# Patient Record
Sex: MALE | Race: WHITE | NOT HISPANIC OR LATINO | Employment: PART TIME | URBAN - METROPOLITAN AREA
[De-identification: names, ages, dates, MRNs, and addresses within clinical notes are randomized per-mention and may not be internally consistent; named-entity substitution may affect disease eponyms.]

---

## 2017-03-16 ENCOUNTER — HOSPITAL ENCOUNTER (OUTPATIENT)
Dept: GENERAL RADIOLOGY | Facility: HOSPITAL | Age: 62
Discharge: HOME | End: 2017-03-16

## 2017-03-16 ENCOUNTER — ANESTHESIA EVENT (OUTPATIENT)
Dept: PAIN MEDICINE | Facility: HOSPITAL | Age: 62
End: 2017-03-16

## 2017-03-16 ENCOUNTER — ANESTHESIA (OUTPATIENT)
Dept: PAIN MEDICINE | Facility: HOSPITAL | Age: 62
End: 2017-03-16

## 2017-03-16 ENCOUNTER — HOSPITAL ENCOUNTER (OUTPATIENT)
Dept: PAIN MEDICINE | Facility: HOSPITAL | Age: 62
Discharge: HOME | End: 2017-03-16
Admitting: NEUROLOGICAL SURGERY

## 2017-03-16 VITALS
SYSTOLIC BLOOD PRESSURE: 130 MMHG | BODY MASS INDEX: 26.6 KG/M2 | TEMPERATURE: 97.8 F | RESPIRATION RATE: 16 BRPM | OXYGEN SATURATION: 94 % | HEIGHT: 71 IN | WEIGHT: 190 LBS | HEART RATE: 57 BPM | DIASTOLIC BLOOD PRESSURE: 76 MMHG

## 2017-03-16 DIAGNOSIS — R52 PAIN: ICD-10-CM

## 2017-03-16 PROCEDURE — C1755 CATHETER, INTRASPINAL: HCPCS

## 2017-03-16 PROCEDURE — 0 IOPAMIDOL 41 % SOLUTION: Performed by: ANESTHESIOLOGY

## 2017-03-16 PROCEDURE — 77003 FLUOROGUIDE FOR SPINE INJECT: CPT

## 2017-03-16 PROCEDURE — 25010000002 METHYLPREDNISOLONE PER 80 MG: Performed by: ANESTHESIOLOGY

## 2017-03-16 RX ORDER — FENTANYL CITRATE 50 UG/ML
50 INJECTION, SOLUTION INTRAMUSCULAR; INTRAVENOUS
Status: DISCONTINUED | OUTPATIENT
Start: 2017-03-16 | End: 2017-03-17 | Stop reason: HOSPADM

## 2017-03-16 RX ORDER — BUPIVACAINE HYDROCHLORIDE 2.5 MG/ML
30 INJECTION, SOLUTION EPIDURAL; INFILTRATION; INTRACAUDAL ONCE
Status: DISCONTINUED | OUTPATIENT
Start: 2017-03-16 | End: 2017-03-17 | Stop reason: HOSPADM

## 2017-03-16 RX ORDER — LIDOCAINE HYDROCHLORIDE 10 MG/ML
0.5 INJECTION, SOLUTION INFILTRATION; PERINEURAL ONCE AS NEEDED
Status: CANCELLED | OUTPATIENT
Start: 2017-03-16

## 2017-03-16 RX ORDER — SODIUM CHLORIDE 0.9 % (FLUSH) 0.9 %
1-10 SYRINGE (ML) INJECTION AS NEEDED
Status: DISCONTINUED | OUTPATIENT
Start: 2017-03-16 | End: 2017-03-17 | Stop reason: HOSPADM

## 2017-03-16 RX ORDER — METHYLPREDNISOLONE ACETATE 80 MG/ML
80 INJECTION, SUSPENSION INTRA-ARTICULAR; INTRALESIONAL; INTRAMUSCULAR; SOFT TISSUE ONCE
Status: COMPLETED | OUTPATIENT
Start: 2017-03-16 | End: 2017-03-16

## 2017-03-16 RX ORDER — LIDOCAINE HYDROCHLORIDE 10 MG/ML
1 INJECTION, SOLUTION INFILTRATION; PERINEURAL ONCE
Status: DISCONTINUED | OUTPATIENT
Start: 2017-03-16 | End: 2017-03-17 | Stop reason: HOSPADM

## 2017-03-16 RX ORDER — MIDAZOLAM HYDROCHLORIDE 1 MG/ML
1 INJECTION INTRAMUSCULAR; INTRAVENOUS
Status: DISCONTINUED | OUTPATIENT
Start: 2017-03-16 | End: 2017-03-17 | Stop reason: HOSPADM

## 2017-03-16 RX ADMIN — IOPAMIDOL 10 ML: 408 INJECTION, SOLUTION INTRATHECAL at 08:41

## 2017-03-16 RX ADMIN — METHYLPREDNISOLONE ACETATE 80 MG: 80 INJECTION, SUSPENSION INTRA-ARTICULAR; INTRALESIONAL; INTRAMUSCULAR; SOFT TISSUE at 08:41

## 2017-03-16 NOTE — H&P
[facility]    History and Physical    Patient Name: Avni Olivo III  :  1955  MRN:  [id number]  Date of Admission: 3/16/2017    Subjective     Patient is a 61 y.o. male presents with chief complaint of chronic, intermitent, moderate low back and hips: right pain.  Onset of symptoms was gradual starting several years ago.  Symptoms are associated/aggravated by exercise, lifting, standing or twisting. Symptoms improve with relaxation    The following portions of the patients history were reviewed and updated as appropriate: current medications, allergies, past medical history, past surgical history, past family history, past social history and problem list                Objective     Past Medical History:   Past Medical History   Diagnosis Date   • Arm pain    • Chest pain    • History of EKG 2013   • HTN (hypertension)    • Hyperlipidemia    • Low back pain    • Skin cancer    • Skin cancer    • Spinal stenosis      Past Surgical History:   Past Surgical History   Procedure Laterality Date   • Knee surgery Right    • Mohs surgery       nose   • Tonsillectomy     • Foot fracture surgery Left    • Cardiac catheterization  06/10/2013     Family History:   Family History   Problem Relation Age of Onset   • Coronary artery disease Other    • Hyperlipidemia Other    • Hypertension Other    • Heart disease Father    • Heart disease Sister      Social History:   Social History   Substance Use Topics   • Smoking status: Never Smoker   • Smokeless tobacco: Never Used   • Alcohol use Yes      Comment: social drinker       Vital Signs Range for the last 24 hours  Temperature: Temp:  [36.6 °C (97.8 °F)] 36.6 °C (97.8 °F)   Temp Source: Temp src: Oral   BP:     Pulse: Heart Rate:  [58] 58   Respirations: Resp:  [16] 16   SPO2: SpO2:  [95 %] 95 %   O2 Amount (l/min):     O2 Devices O2 Device: room air   Weight: Weight:  [190 lb (86.2 kg)] 190 lb (86.2 kg)     Flowsheet Rows         First Filed Value    Admission  "Height  71\" (180.3 cm) Documented at 03/16/2017 0818    Admission Weight  190 lb (86.2 kg) Documented at 03/16/2017 0818          --------------------------------------------------------------------------------    Current Outpatient Prescriptions   Medication Sig Dispense Refill   • B Complex Vitamins (VITAMIN B COMPLEX) capsule capsule Take  by mouth Daily.     • CALCIUM PO Take  by mouth.     • carvedilol (COREG) 12.5 MG tablet Take  by mouth 2 (two) times a day.     • eszopiclone (LUNESTA) 2 MG tablet Take  by mouth.     • melatonin 5 MG tablet tablet Take 5 mg by mouth Every Night.     • MULTIPLE VITAMINS PO Take  by mouth.     • naproxen (NAPROSYN) 500 MG tablet Take 500 mg by mouth 2 (Two) Times a Day With Meals.     • valsartan (DIOVAN) 160 MG tablet Take  by mouth daily.     • aspirin 81 MG tablet Take  by mouth daily.     • atorvastatin (LIPITOR) 40 MG tablet Take 1 tablet by mouth.     • Flaxseed, Linseed, (FLAXSEED OIL PO) Take  by mouth.     • HYDROcodone-acetaminophen (NORCO) 7.5-325 MG per tablet TK 1-2 TS PO Q 4-6 H PRN  0   • Omega-3 Fatty Acids (FISH OIL PO) Take  by mouth.       Current Facility-Administered Medications   Medication Dose Route Frequency Provider Last Rate Last Dose   • bupivacaine PF (MARCAINE) 0.25 % injection 30 mL  30 mL Infiltration Once Jose Haas MD       • FentaNYL Citrate (PF) (SUBLIMAZE) injection 50 mcg  50 mcg Intravenous Q5 Min PRN Jose Haas MD       • iopamidol (ISOVUE-M 200) injection 41%  2 mL Injection Once in imaging Jose Haas MD       • lidocaine (XYLOCAINE) 1 % injection 1 mL  1 mL Intradermal Once Jose Haas MD       • methylPREDNISolone acetate (DEPO-medrol) injection 80 mg  80 mg Intramuscular Once Jose Haas MD       • midazolam (VERSED) injection 1 mg  1 mg Intravenous Q5 Min PRN Jose Haas MD       • sodium chloride 0.9 % flush 1-10 mL  1-10 mL Intravenous PRN Jose Haas MD     "   • sodium chloride 0.9 % flush 1-10 mL  1-10 mL Intravenous PRN Jose Haas MD           --------------------------------------------------------------------------------  Assessment/Plan      Anesthesia Evaluation     Patient summary reviewed and Nursing notes reviewed   no history of anesthetic complications:  NPO Status: > 8 hours   Airway   Mallampati: II  TM distance: >3 FB  Neck ROM: full  Dental - normal exam     Pulmonary - negative pulmonary ROS and normal exam   Cardiovascular - normal exam    (+) hypertension,       Neuro/Psych- negative ROS and neuro exam normal  GI/Hepatic/Renal/Endo - negative ROS     Musculoskeletal     (+) back pain, Straight Leg Test, radiculopathy Right lower extremity  (-)  ABDIAZIZ test  Abdominal  - normal exam   Substance History      OB/GYN          Other   (+) arthritis                            Diagnosis and Plan    Treatment Plan  ASA 2   Patient has had previous injection/procedure with 25-50% improvement.   Procedures: Lumbar Epidural Steroid Injection(LESI), With fluoroscopy,       Anesthetic plan and risks discussed with patient.          Diagnosis     * Lumbar radiculopathy [M54.16]     * Degeneration, intervertebral disc, lumbar [M51.36]     * Lumbar radiculopathy [M54.16]

## 2017-03-16 NOTE — ANESTHESIA PROCEDURE NOTES
PAIN Epidural block    Patient location during procedure: pain clinic  Indication:procedure for pain  Performed By  Anesthesiologist: NANCY DOS SANTOS  Preanesthetic Checklist  Completed: patient identified, site marked, surgical consent, pre-op evaluation, timeout performed, risks and benefits discussed and monitors and equipment checked  Additional Notes  Depomedrol - 80mg    Needle position confirmed by fluoroscopy and epidurogram using 2cc of [id number].    Diagnosis  Post-Op Diagnosis Codes:     * Lumbar radiculopathy (M54.16)     * Degeneration, intervertebral disc, lumbar (M51.36)    Epidural Block Prep:  Pt Position:prone  Sterile Tech:cap, gloves, mask and sterile barrier  Prep:chlorhexidine gluconate and isopropyl alcohol  Monitoring:blood pressure monitoring, continuous pulse oximetry and EKG  Epidural Block Procedure:  Approach:right paramedian  Guidance: fluoroscopy  Location:lumbar  Level:4-5  Needle Type:Tuohy  Needle Gauge:20  Aspiration:negative  Medications:  Depomedrol:80 mg  Preservative Free Saline:2mL  Isovue:2mL    Post Assessment:  Post-procedure: bandaide.  Pt Tolerance:patient tolerated the procedure well with no apparent complications  Complications:no

## 2017-05-22 ENCOUNTER — HOSPITAL ENCOUNTER (OUTPATIENT)
Dept: SLEEP MEDICINE | Facility: HOSPITAL | Age: 62
Discharge: HOME | End: 2017-05-22
Admitting: INTERNAL MEDICINE

## 2017-05-22 DIAGNOSIS — E66.3 OVERWEIGHT (BMI 25.0-29.9): ICD-10-CM

## 2017-05-22 DIAGNOSIS — I10 ESSENTIAL HYPERTENSION: ICD-10-CM

## 2017-05-22 DIAGNOSIS — R06.83 SNORING: ICD-10-CM

## 2017-05-22 DIAGNOSIS — G47.33 OSA (OBSTRUCTIVE SLEEP APNEA): Primary | ICD-10-CM

## 2017-05-22 PROCEDURE — G0463 HOSPITAL OUTPT CLINIC VISIT: HCPCS

## 2017-05-31 ENCOUNTER — OFFICE VISIT (OUTPATIENT)
Dept: NEUROSURGERY | Facility: CLINIC | Age: 62
End: 2017-05-31

## 2017-05-31 VITALS
HEIGHT: 71 IN | WEIGHT: 190 LBS | SYSTOLIC BLOOD PRESSURE: 132 MMHG | HEART RATE: 62 BPM | DIASTOLIC BLOOD PRESSURE: 86 MMHG | BODY MASS INDEX: 26.6 KG/M2

## 2017-05-31 DIAGNOSIS — M48.061 LUMBAR CANAL STENOSIS: Primary | ICD-10-CM

## 2017-05-31 PROCEDURE — 99212 OFFICE O/P EST SF 10 MIN: CPT | Performed by: NEUROLOGICAL SURGERY

## 2017-06-12 ENCOUNTER — TELEPHONE (OUTPATIENT)
Dept: CARDIOLOGY | Facility: CLINIC | Age: 62
End: 2017-06-12

## 2017-06-12 NOTE — TELEPHONE ENCOUNTER
Pt called and states that pt is having a skin cancer remove and they need a cardiac clearance. Pt last seen 10/6/2016...please advise    Mikael Dunn MD  Faxed number [phone]    Monse TOWNSEND

## 2017-06-13 NOTE — TELEPHONE ENCOUNTER
Pt reports that he also needs to make sure that an EKG will be sent to his surgeon as well as some additional lab work. Specifically CBC and CMP. meii

## 2017-06-13 NOTE — TELEPHONE ENCOUNTER
Called and l/m to call back and confirmed the appointment on June 19, 2017 at 12pm...Monse TOWNSEND

## 2017-06-13 NOTE — TELEPHONE ENCOUNTER
His surgeon will order the blood work. TK or I will send a copy of the EKG from his office visit, wasn't clear with whom the appt was

## 2017-06-19 ENCOUNTER — OFFICE VISIT (OUTPATIENT)
Dept: CARDIOLOGY | Facility: CLINIC | Age: 62
End: 2017-06-19

## 2017-06-19 VITALS
WEIGHT: 193.6 LBS | RESPIRATION RATE: 16 BRPM | BODY MASS INDEX: 27.1 KG/M2 | HEIGHT: 71 IN | DIASTOLIC BLOOD PRESSURE: 80 MMHG | HEART RATE: 58 BPM | SYSTOLIC BLOOD PRESSURE: 120 MMHG

## 2017-06-19 DIAGNOSIS — I10 ESSENTIAL HYPERTENSION: Primary | ICD-10-CM

## 2017-06-19 DIAGNOSIS — Z01.818 PRE-OPERATIVE EXAMINATION: ICD-10-CM

## 2017-06-19 DIAGNOSIS — E78.2 MIXED HYPERLIPIDEMIA: ICD-10-CM

## 2017-06-19 PROCEDURE — 99213 OFFICE O/P EST LOW 20 MIN: CPT | Performed by: INTERNAL MEDICINE

## 2017-06-19 PROCEDURE — 93000 ELECTROCARDIOGRAM COMPLETE: CPT | Performed by: INTERNAL MEDICINE

## 2017-06-19 NOTE — PROGRESS NOTES
PATIENTINFORMATION    Date of Office Visit: 2017  Encounter Provider: Zoraida Kenyon MD  Place of Service: [facility]  Patient Name: Avni Olivo III  : 1955    Subjective:     Encounter Date:2017      Patient ID: Avni Olivo III is a 62 y.o. male.      History of Present Illness    This gentleman saw Dr. Ramírez in  with chest pain. She did an exercise echocardiogram on 2013 which showed normal LV systolic function with ejection fraction of 64%, trace mitral regurgitation. He exercised for 11.5 minutes under Jacques protocol. His stress echocardiogram images were normal. His stress EKG was normal, but the patient did complain of some chest pain. On 06/10/2013, the patient underwent a heart catheterization, which showed no evidence of coronary artery disease.     I saw him for the first time in 2016.  He was on vacation in [place] and had a bradycardic and presyncopal episode at the airport. Paramedics were called and he was noted to have a pulse in the low 50s and elevated blood pressure systolic of about 200 over 120. He was taken to the emergency room. I have reviewed that visit. He had an EKG which showed sinus bradycardia, but was otherwise normal. He was hypertensive there. His troponin and proBNP were normal. His creatinine was elevated at 1.48. He was treated with IV fluids. He did not really feel much better, so a CT scan of his head was performed which looked okay. Eventually, he was discharged from the emergency room still feeling somewhat dizzy and they were able to continue their trip home.     He has not had any recurrence of the symptoms.  We think it was just the perfect storm of not eating, not getting enough sleep, taking his Coreg less than 12 hours apart.  He has been feeling well.  He denies palpitations, shortness of breath, edema, lightheadedness, chest pain or fatigue.  He is here for preoperative  "evaluation.  He has to have a melanoma and basal cell skin cancer removed from his face    Review of Systems   Constitution: Negative for fever, malaise/fatigue, weight gain and weight loss.   HENT: Negative for ear pain, hearing loss, nosebleeds and sore throat.    Eyes: Negative for double vision, pain, vision loss in left eye and vision loss in right eye.   Cardiovascular:        See history of present illness.   Respiratory: Positive for snoring. Negative for cough, shortness of breath, sleep disturbances due to breathing and wheezing.    Endocrine: Negative for cold intolerance, heat intolerance and polyuria.   Skin: Negative for itching, poor wound healing and rash.   Musculoskeletal: Positive for back pain. Negative for joint pain, joint swelling and myalgias.   Gastrointestinal: Negative for abdominal pain, diarrhea, hematochezia, nausea and vomiting.   Genitourinary: Negative for hematuria and hesitancy.   Neurological: Negative for numbness, paresthesias and seizures.   Psychiatric/Behavioral: Negative for depression. The patient is not nervous/anxious.            ECG 12 Lead  Date/Time: 6/19/2017 12:43 PM  Performed by: BROOK PETERS  Authorized by: BROOK PETERS   Comparison: compared with previous ECG from 10/6/2016  Similar to previous ECG  Rhythm: sinus rhythm  BPM: 58  Conduction: conduction normal  ST Segments: ST segments normal  T Waves: T waves normal  Clinical impression: normal ECG               Objective:     /80 (BP Location: Right arm, Patient Position: Sitting, Cuff Size: Adult)  Pulse 58  Resp 16  Ht 71\" (180.3 cm)  Wt 193 lb 9.6 oz (87.8 kg)  BMI 27 kg/m2 Body mass index is 27 kg/(m^2).     Physical Exam   Constitutional: He appears well-developed.   HENT:   Head: Normocephalic and atraumatic.   Eyes: Conjunctivae and lids are normal. Pupils are equal, round, and reactive to light. Lids are everted and swept, no foreign bodies found.   Neck: Normal range of motion. No JVD " present. Carotid bruit is not present. No tracheal deviation present. No thyroid mass present.   Cardiovascular: Normal rate, regular rhythm and normal heart sounds.    Pulses:       Dorsalis pedis pulses are 2+ on the right side, and 2+ on the left side.   Pulmonary/Chest: Effort normal and breath sounds normal.   Abdominal: Normal appearance and bowel sounds are normal.   Musculoskeletal: Normal range of motion.   Neurological: He is alert. He has normal strength.   Skin: Skin is warm, dry and intact.   Psychiatric: He has a normal mood and affect. His behavior is normal.   Vitals reviewed.            Assessment/Plan:       1.  Preoperative evaluation.  He is low risk for cardiovascular event with surgery.  I will fax a copy of his EKG and preoperative lead her to Dr. Dunn.  2.  Hypertension.  Controlled  3.  Hyperlipidemia    I will be happy to see him back in the future if he has any changes.    Orders Placed This Encounter   Procedures   • ECG 12 Lead     This order was created via procedure documentation      Avni Olivo III   Home Medication Instructions RENEE:    Printed on:06/19/17 1186   Medication Information                      aspirin 81 MG tablet  Take  by mouth daily.             atorvastatin (LIPITOR) 40 MG tablet  Take 1 tablet by mouth.             B Complex Vitamins (VITAMIN B COMPLEX) capsule capsule  Take  by mouth Daily.             CALCIUM PO  Take  by mouth.             carvedilol (COREG) 12.5 MG tablet  Take  by mouth 2 (two) times a day.             eszopiclone (LUNESTA) 2 MG tablet  Take  by mouth.             melatonin 5 MG tablet tablet  Take 5 mg by mouth Every Night.             MULTIPLE VITAMINS PO  Take  by mouth.             naproxen (NAPROSYN) 500 MG tablet  Take 500 mg by mouth 2 (Two) Times a Day With Meals.             Omega-3 Fatty Acids (FISH OIL PO)  Take  by mouth.             valsartan (DIOVAN) 160 MG tablet  Take  by mouth daily.                        Zoraida Kenyon,  MD  06/19/17  12:44 PM

## 2017-07-12 ENCOUNTER — APPOINTMENT (OUTPATIENT)
Dept: SLEEP MEDICINE | Facility: HOSPITAL | Age: 62
End: 2017-07-12

## 2017-08-03 ENCOUNTER — APPOINTMENT (OUTPATIENT)
Dept: SLEEP MEDICINE | Facility: HOSPITAL | Age: 62
End: 2017-08-03
Attending: INTERNAL MEDICINE

## 2017-08-08 ENCOUNTER — TRANSCRIBE ORDERS (OUTPATIENT)
Dept: PAIN MEDICINE | Facility: HOSPITAL | Age: 62
End: 2017-08-08

## 2017-08-08 DIAGNOSIS — M48.061 LUMBAR CANAL STENOSIS: Primary | ICD-10-CM

## 2017-08-22 ENCOUNTER — HOSPITAL ENCOUNTER (OUTPATIENT)
Dept: GENERAL RADIOLOGY | Facility: HOSPITAL | Age: 62
Discharge: HOME | End: 2017-08-22

## 2017-08-22 ENCOUNTER — HOSPITAL ENCOUNTER (OUTPATIENT)
Dept: PAIN MEDICINE | Facility: HOSPITAL | Age: 62
Discharge: HOME | End: 2017-08-22
Admitting: NEUROLOGICAL SURGERY

## 2017-08-22 ENCOUNTER — ANESTHESIA (OUTPATIENT)
Dept: PAIN MEDICINE | Facility: HOSPITAL | Age: 62
End: 2017-08-22

## 2017-08-22 ENCOUNTER — ANESTHESIA EVENT (OUTPATIENT)
Dept: PAIN MEDICINE | Facility: HOSPITAL | Age: 62
End: 2017-08-22

## 2017-08-22 VITALS
DIASTOLIC BLOOD PRESSURE: 65 MMHG | RESPIRATION RATE: 16 BRPM | SYSTOLIC BLOOD PRESSURE: 127 MMHG | OXYGEN SATURATION: 95 % | HEART RATE: 62 BPM | HEIGHT: 71 IN

## 2017-08-22 DIAGNOSIS — M48.061 LUMBAR CANAL STENOSIS: ICD-10-CM

## 2017-08-22 DIAGNOSIS — R52 PAIN: ICD-10-CM

## 2017-08-22 PROCEDURE — 25010000002 METHYLPREDNISOLONE PER 80 MG: Performed by: ANESTHESIOLOGY

## 2017-08-22 PROCEDURE — 77003 FLUOROGUIDE FOR SPINE INJECT: CPT

## 2017-08-22 PROCEDURE — 25010000002 MIDAZOLAM PER 1 MG: Performed by: ANESTHESIOLOGY

## 2017-08-22 PROCEDURE — 25010000002 FENTANYL CITRATE (PF) 100 MCG/2ML SOLUTION: Performed by: ANESTHESIOLOGY

## 2017-08-22 PROCEDURE — C1755 CATHETER, INTRASPINAL: HCPCS

## 2017-08-22 RX ORDER — LIDOCAINE HYDROCHLORIDE 10 MG/ML
1 INJECTION, SOLUTION INFILTRATION; PERINEURAL ONCE
Status: DISCONTINUED | OUTPATIENT
Start: 2017-08-22 | End: 2017-08-23 | Stop reason: HOSPADM

## 2017-08-22 RX ORDER — MIDAZOLAM HYDROCHLORIDE 1 MG/ML
1 INJECTION INTRAMUSCULAR; INTRAVENOUS
Status: DISCONTINUED | OUTPATIENT
Start: 2017-08-22 | End: 2017-08-23 | Stop reason: HOSPADM

## 2017-08-22 RX ORDER — METHYLPREDNISOLONE ACETATE 80 MG/ML
80 INJECTION, SUSPENSION INTRA-ARTICULAR; INTRALESIONAL; INTRAMUSCULAR; SOFT TISSUE ONCE
Status: COMPLETED | OUTPATIENT
Start: 2017-08-22 | End: 2017-08-22

## 2017-08-22 RX ORDER — SODIUM CHLORIDE 0.9 % (FLUSH) 0.9 %
1-10 SYRINGE (ML) INJECTION AS NEEDED
Status: DISCONTINUED | OUTPATIENT
Start: 2017-08-22 | End: 2017-08-23 | Stop reason: HOSPADM

## 2017-08-22 RX ORDER — FENTANYL CITRATE 50 UG/ML
50 INJECTION, SOLUTION INTRAMUSCULAR; INTRAVENOUS
Status: DISCONTINUED | OUTPATIENT
Start: 2017-08-22 | End: 2017-08-23 | Stop reason: HOSPADM

## 2017-08-22 RX ADMIN — MIDAZOLAM 1 MG: 1 INJECTION INTRAMUSCULAR; INTRAVENOUS at 09:06

## 2017-08-22 RX ADMIN — METHYLPREDNISOLONE ACETATE 80 MG: 80 INJECTION, SUSPENSION INTRA-ARTICULAR; INTRALESIONAL; INTRAMUSCULAR; SOFT TISSUE at 09:11

## 2017-08-22 RX ADMIN — FENTANYL CITRATE 50 MCG: 50 INJECTION INTRAMUSCULAR; INTRAVENOUS at 09:06

## 2017-08-22 NOTE — ANESTHESIA PROCEDURE NOTES
PAIN Epidural block    Patient location during procedure: pain clinic  Indication:procedure for pain  Performed By  Anesthesiologist: YAHIR WATERS  Preanesthetic Checklist  Completed: patient identified, site marked, surgical consent, pre-op evaluation, timeout performed, IV checked, risks and benefits discussed and monitors and equipment checked  Additional Notes  LSS/LDDD  Prep:  Pt Position:prone  Sterile Tech:cap, gloves, mask and sterile barrier  Prep:chlorhexidine gluconate and isopropyl alcohol  Monitoring:blood pressure monitoring, continuous pulse oximetry and EKG  Procedure:  Approach:right paramedian  Guidance: fluoroscopy  Location:lumbar  Level:4-5  Needle Type:Tuohy  Needle Gauge:20  Aspiration:negative  Medications:  Depomedrol:80  Preservative Free Saline:3mL    Post Assessment:  Dressing:secured with tape  Pt Tolerance:patient tolerated the procedure well with no apparent complications  Complications:no

## 2017-08-29 ENCOUNTER — HOSPITAL ENCOUNTER (OUTPATIENT)
Dept: SLEEP MEDICINE | Facility: HOSPITAL | Age: 62
Discharge: HOME | End: 2017-08-29
Attending: INTERNAL MEDICINE | Admitting: INTERNAL MEDICINE

## 2017-08-29 DIAGNOSIS — I10 ESSENTIAL HYPERTENSION: ICD-10-CM

## 2017-08-29 DIAGNOSIS — G47.33 OSA (OBSTRUCTIVE SLEEP APNEA): ICD-10-CM

## 2017-08-29 DIAGNOSIS — R06.83 SNORING: ICD-10-CM

## 2017-08-29 DIAGNOSIS — E66.3 OVERWEIGHT (BMI 25.0-29.9): ICD-10-CM

## 2017-08-29 PROCEDURE — 95806 SLEEP STUDY UNATT&RESP EFFT: CPT

## 2017-09-19 ENCOUNTER — TELEPHONE (OUTPATIENT)
Dept: SLEEP MEDICINE | Facility: HOSPITAL | Age: 62
End: 2017-09-19

## 2017-11-02 ENCOUNTER — TELEPHONE (OUTPATIENT)
Dept: NEUROSURGERY | Facility: CLINIC | Age: 62
End: 2017-11-02

## 2017-11-07 DIAGNOSIS — M48.061 SPINAL STENOSIS OF LUMBAR REGION, UNSPECIFIED WHETHER NEUROGENIC CLAUDICATION PRESENT: Primary | ICD-10-CM

## 2017-11-29 ENCOUNTER — HOSPITAL ENCOUNTER (OUTPATIENT)
Dept: PAIN MEDICINE | Facility: HOSPITAL | Age: 62
Discharge: HOME | End: 2017-11-29
Attending: NEUROLOGICAL SURGERY | Admitting: NEUROLOGICAL SURGERY

## 2017-11-29 ENCOUNTER — HOSPITAL ENCOUNTER (OUTPATIENT)
Dept: GENERAL RADIOLOGY | Facility: HOSPITAL | Age: 62
Discharge: HOME | End: 2017-11-29

## 2017-11-29 ENCOUNTER — ANESTHESIA EVENT (OUTPATIENT)
Dept: PAIN MEDICINE | Facility: HOSPITAL | Age: 62
End: 2017-11-29

## 2017-11-29 ENCOUNTER — ANESTHESIA (OUTPATIENT)
Dept: PAIN MEDICINE | Facility: HOSPITAL | Age: 62
End: 2017-11-29

## 2017-11-29 VITALS
DIASTOLIC BLOOD PRESSURE: 86 MMHG | OXYGEN SATURATION: 95 % | SYSTOLIC BLOOD PRESSURE: 132 MMHG | TEMPERATURE: 98.4 F | WEIGHT: 190 LBS | BODY MASS INDEX: 26.6 KG/M2 | HEIGHT: 71 IN | RESPIRATION RATE: 16 BRPM | HEART RATE: 58 BPM

## 2017-11-29 DIAGNOSIS — M48.061 SPINAL STENOSIS OF LUMBAR REGION, UNSPECIFIED WHETHER NEUROGENIC CLAUDICATION PRESENT: ICD-10-CM

## 2017-11-29 DIAGNOSIS — R52 PAIN: ICD-10-CM

## 2017-11-29 PROCEDURE — 0 IOPAMIDOL 41 % SOLUTION: Performed by: ANESTHESIOLOGY

## 2017-11-29 PROCEDURE — 25010000002 MIDAZOLAM PER 1 MG: Performed by: ANESTHESIOLOGY

## 2017-11-29 PROCEDURE — 25010000002 FENTANYL CITRATE (PF) 100 MCG/2ML SOLUTION: Performed by: ANESTHESIOLOGY

## 2017-11-29 PROCEDURE — 25010000002 METHYLPREDNISOLONE PER 80 MG: Performed by: ANESTHESIOLOGY

## 2017-11-29 PROCEDURE — C1755 CATHETER, INTRASPINAL: HCPCS

## 2017-11-29 PROCEDURE — 77003 FLUOROGUIDE FOR SPINE INJECT: CPT

## 2017-11-29 RX ORDER — METHYLPREDNISOLONE ACETATE 80 MG/ML
80 INJECTION, SUSPENSION INTRA-ARTICULAR; INTRALESIONAL; INTRAMUSCULAR; SOFT TISSUE ONCE
Status: COMPLETED | OUTPATIENT
Start: 2017-11-29 | End: 2017-11-29

## 2017-11-29 RX ORDER — LIDOCAINE HYDROCHLORIDE 10 MG/ML
0.5 INJECTION, SOLUTION INFILTRATION; PERINEURAL ONCE AS NEEDED
Status: CANCELLED | OUTPATIENT
Start: 2017-11-29

## 2017-11-29 RX ORDER — FENTANYL CITRATE 50 UG/ML
50 INJECTION, SOLUTION INTRAMUSCULAR; INTRAVENOUS
Status: DISCONTINUED | OUTPATIENT
Start: 2017-11-29 | End: 2017-11-30 | Stop reason: HOSPADM

## 2017-11-29 RX ORDER — MIDAZOLAM HYDROCHLORIDE 1 MG/ML
1 INJECTION INTRAMUSCULAR; INTRAVENOUS
Status: DISCONTINUED | OUTPATIENT
Start: 2017-11-29 | End: 2017-11-30 | Stop reason: HOSPADM

## 2017-11-29 RX ORDER — SODIUM CHLORIDE 0.9 % (FLUSH) 0.9 %
1-10 SYRINGE (ML) INJECTION AS NEEDED
Status: DISCONTINUED | OUTPATIENT
Start: 2017-11-29 | End: 2017-11-30 | Stop reason: HOSPADM

## 2017-11-29 RX ORDER — LIDOCAINE HYDROCHLORIDE 10 MG/ML
1 INJECTION, SOLUTION INFILTRATION; PERINEURAL ONCE
Status: DISCONTINUED | OUTPATIENT
Start: 2017-11-29 | End: 2017-11-30 | Stop reason: HOSPADM

## 2017-11-29 RX ORDER — SODIUM CHLORIDE 0.9 % (FLUSH) 0.9 %
1-10 SYRINGE (ML) INJECTION AS NEEDED
Status: CANCELLED | OUTPATIENT
Start: 2017-11-29

## 2017-11-29 RX ADMIN — MIDAZOLAM 1 MG: 1 INJECTION INTRAMUSCULAR; INTRAVENOUS at 10:36

## 2017-11-29 RX ADMIN — IOPAMIDOL 10 ML: 408 INJECTION, SOLUTION INTRATHECAL at 10:39

## 2017-11-29 RX ADMIN — METHYLPREDNISOLONE ACETATE 80 MG: 80 INJECTION, SUSPENSION INTRA-ARTICULAR; INTRALESIONAL; INTRAMUSCULAR; SOFT TISSUE at 10:39

## 2017-11-29 RX ADMIN — FENTANYL CITRATE 50 MCG: 50 INJECTION INTRAMUSCULAR; INTRAVENOUS at 10:36

## 2017-11-29 NOTE — ANESTHESIA PROCEDURE NOTES
PAIN Epidural block    Patient location during procedure: pain clinic  Start Time: 11/29/2017 10:33 AM  Stop Time: 11/29/2017 10:44 AM  Indication:procedure for pain  Performed By  Anesthesiologist: NANCY DOS SANTOS  Preanesthetic Checklist  Completed: patient identified, site marked, surgical consent, pre-op evaluation, timeout performed, risks and benefits discussed and monitors and equipment checked  Additional Notes  Depomedrol - 80mg    Needle position confirmed by fluoroscopy and epidurogram using 2cc of [id number].    Diagnosis  Post-Op Diagnosis Codes:     * Lumbar radiculopathy (M54.16)     * Lumbar degenerative disc disease (M51.36)    Prep:  Pt Position:prone  Sterile Tech:cap, gloves, mask and sterile barrier  Prep:chlorhexidine gluconate and isopropyl alcohol  Monitoring:blood pressure monitoring, continuous pulse oximetry and EKG  Procedure:  Sedation: yes   Approach:midline  Guidance: fluoroscopy  Location:lumbar  Level:4-5  Needle Type:Tuohy  Needle Gauge:20  Aspiration:negative  Medications:  Depomedrol:80 mg  Preservative Free Saline:2mL  Isovue:2mL    Post Assessment:  Post-procedure: bandaide.  Pt Tolerance:patient tolerated the procedure well with no apparent complications  Complications:no

## 2017-11-29 NOTE — H&P
[facility]    History and Physical    Patient Name: Avni Olivo III  :  1955  MRN:  [id number]  Date of Admission: 2017    Subjective     Patient is a 62 y.o. male presents with chief complaint of chronic, intermitent, moderate low back and hips: right pain.  Onset of symptoms was gradual starting several years ago.  Symptoms are associated/aggravated by exercise, lifting, standing or twisting. Symptoms improve with relaxation    The following portions of the patients history were reviewed and updated as appropriate: current medications, allergies, past medical history, past surgical history, past family history, past social history and problem list                Objective     Past Medical History:   Past Medical History:   Diagnosis Date   • Arm pain    • Chest pain    • DDD (degenerative disc disease), lumbar    • Dizziness    • History of EKG 2013   • HTN (hypertension)    • Hyperlipidemia    • Limb pain    • Low back pain    • Lower extremity edema    • Lumbar canal stenosis    • Melanoma 2017   • Mitral regurgitation     trace   • Nausea and vomiting    • Sinus bradycardia    • Snoring    • Spinal stenosis      Past Surgical History:   Past Surgical History:   Procedure Laterality Date   • CARDIAC CATHETERIZATION  06/10/2013   • FOOT FRACTURE SURGERY Left    • KNEE SURGERY Right    • MOHS SURGERY      nose   • SKIN CANCER EXCISION Bilateral 2017    MELANOMA   • TONSILLECTOMY       Family History:   Family History   Problem Relation Age of Onset   • Coronary artery disease Other    • Hyperlipidemia Other    • Hypertension Other    • Other Other      heart surgery   • Heart disease Father    • Heart disease Sister      Social History:   Social History   Substance Use Topics   • Smoking status: Never Smoker   • Smokeless tobacco: Never Used   • Alcohol use Yes      Comment: social drinker       Vital Signs Range for the last 24 hours  Temperature:     Temp Source:     BP:    "  Pulse:     Respirations:     SPO2:     O2 Amount (l/min):     O2 Devices     Weight:       Flowsheet Rows         First Filed Value    Admission Height  71\" (180.3 cm) Documented at 03/16/2017 0818    Admission Weight  190 lb (86.2 kg) Documented at 03/16/2017 0818          --------------------------------------------------------------------------------    Current Outpatient Prescriptions   Medication Sig Dispense Refill   • aspirin 81 MG tablet Take  by mouth daily.     • atorvastatin (LIPITOR) 40 MG tablet Take 1 tablet by mouth.     • B Complex Vitamins (VITAMIN B COMPLEX) capsule capsule Take  by mouth Daily.     • CALCIUM PO Take  by mouth.     • carvedilol (COREG) 12.5 MG tablet Take  by mouth 2 (two) times a day.     • eszopiclone (LUNESTA) 2 MG tablet Take  by mouth.     • melatonin 5 MG tablet tablet Take 5 mg by mouth Every Night.     • MULTIPLE VITAMINS PO Take  by mouth.     • naproxen (NAPROSYN) 500 MG tablet Take 500 mg by mouth 2 (Two) Times a Day With Meals.     • Omega-3 Fatty Acids (FISH OIL PO) Take  by mouth.     • valsartan (DIOVAN) 160 MG tablet Take  by mouth daily.       No current facility-administered medications for this encounter.        --------------------------------------------------------------------------------  Assessment/Plan      Anesthesia Evaluation     Patient summary reviewed and Nursing notes reviewed   no history of anesthetic complications:  NPO Solid Status: > 6 hours  NPO Liquid Status: > 6 hours     Airway   Mallampati: II  TM distance: >3 FB  Neck ROM: full  Dental - normal exam     Pulmonary - negative pulmonary ROS and normal exam   Cardiovascular - normal exam    (+) hypertension, valvular problems/murmurs MR, hyperlipidemia      Neuro/Psych- neuro exam normal  (+) dizziness/light headedness,    GI/Hepatic/Renal/Endo - negative ROS     Musculoskeletal     (+) back pain, Straight Leg Test, radiculopathy Right lower extremity  (-)  ABDIAZIZ test  Abdominal  - normal " exam   Substance History      OB/GYN          Other   (+) arthritis                                Diagnosis and Plan      Treatment Plan  ASA 2   Patient has had previous injection/procedure with 50-75% improvement.   Procedures: Lumbar Epidural Steroid Injection(LESI), With fluoroscopy,       Anesthetic plan and risks discussed with patient.          Diagnosis     * Lumbar radiculopathy [M54.16]     * Lumbar degenerative disc disease [M51.36]

## 2017-12-20 ENCOUNTER — OFFICE VISIT (OUTPATIENT)
Dept: NEUROSURGERY | Facility: CLINIC | Age: 62
End: 2017-12-20

## 2017-12-20 VITALS
DIASTOLIC BLOOD PRESSURE: 93 MMHG | HEART RATE: 64 BPM | HEIGHT: 71 IN | SYSTOLIC BLOOD PRESSURE: 155 MMHG | WEIGHT: 190 LBS | BODY MASS INDEX: 26.6 KG/M2

## 2017-12-20 DIAGNOSIS — M51.36 DDD (DEGENERATIVE DISC DISEASE), LUMBAR: ICD-10-CM

## 2017-12-20 DIAGNOSIS — M48.062 SPINAL STENOSIS OF LUMBAR REGION WITH NEUROGENIC CLAUDICATION: Primary | ICD-10-CM

## 2017-12-20 PROCEDURE — 99213 OFFICE O/P EST LOW 20 MIN: CPT | Performed by: NEUROLOGICAL SURGERY

## 2017-12-20 RX ORDER — TESTOSTERONE CYPIONATE 200 MG/ML
INJECTION, SOLUTION INTRAMUSCULAR
Refills: 5 | COMMUNITY
Start: 2017-09-29 | End: 2019-11-14

## 2017-12-20 NOTE — PROGRESS NOTES
Subjective   Patient ID: Avni Olivo III is a 62 y.o. male is here today for follow-up on back pain.    At the patient's last visit he reported that he was stable. He was still having bilateral lower extremity numbness. The numbness increases with prolonged walking.    Today the patient reports he had his last epidural injection on 11/29/17 and he has gotten some relief. He still complains of the numbness from the waist down with prolonged walking.    Back Pain   This is a chronic problem. The current episode started more than 1 month ago. The problem occurs daily. The problem has been waxing and waning since onset. The pain is present in the gluteal and sacro-iliac. The quality of the pain is described as aching. Associated symptoms include bladder incontinence, numbness and weakness. Pertinent negatives include no bowel incontinence.       The following portions of the patient's history were reviewed and updated as appropriate: allergies, current medications, past family history, past medical history, past social history, past surgical history and problem list.    Review of Systems   Gastrointestinal: Negative for bowel incontinence.   Genitourinary: Positive for bladder incontinence.   Musculoskeletal: Positive for back pain.   Neurological: Positive for weakness and numbness.   All other systems reviewed and are negative.    This patient has lumbar stenosis from L4 to L5 and L5 to S1 with bilateral buttock and leg pain. The blocks have been helping and so we have continued to do it. He seems to do best when it is done every 3 months. The last block was late 11/2017. We will get another one in late 02/2018 and another one in late 05/2018, and then have him come back and see me in 7 months. He has quite a bit of numbness and tingling in his legs which he has been learning to live with. The blocks do not really help with that, but they do help with the pain.        Objective   Physical Exam   Constitutional: He is  oriented to person, place, and time. He appears well-developed and well-nourished.   HENT:   Head: Normocephalic and atraumatic.   Eyes: Conjunctivae and EOM are normal. Pupils are equal, round, and reactive to light.   Fundoscopic exam:       The right eye shows no papilledema. The right eye shows venous pulsations.        The left eye shows no papilledema. The left eye shows venous pulsations.   Neck: Carotid bruit is not present.   Neurological: He is oriented to person, place, and time. He has a normal Finger-Nose-Finger Test and a normal Heel to Shin Test. Gait normal.   Reflex Scores:       Tricep reflexes are 2+ on the right side and 2+ on the left side.       Bicep reflexes are 2+ on the right side and 2+ on the left side.       Brachioradialis reflexes are 2+ on the right side and 2+ on the left side.       Patellar reflexes are 2+ on the right side and 2+ on the left side.       Achilles reflexes are 2+ on the right side and 2+ on the left side.  Psychiatric: His speech is normal.     Neurologic Exam     Mental Status   Oriented to person, place, and time.   Registration of memory: Good recent and remote memory.   Attention: normal. Concentration: normal.   Speech: speech is normal   Level of consciousness: alert  Knowledge: consistent with education.     Cranial Nerves     CN II   Visual fields full to confrontation.   Visual acuity: normal    CN III, IV, VI   Pupils are equal, round, and reactive to light.  Extraocular motions are normal.     CN V   Facial sensation intact.   Right corneal reflex: normal  Left corneal reflex: normal    CN VII   Facial expression full, symmetric.   Right facial weakness: none  Left facial weakness: none    CN VIII   Hearing: intact    CN IX, X   Palate: symmetric    CN XI   Right sternocleidomastoid strength: normal  Left sternocleidomastoid strength: normal    CN XII   Tongue: not atrophic  Tongue deviation: none    Motor Exam   Muscle bulk: normal  Right arm tone:  normal  Left arm tone: normal  Right leg tone: normal  Left leg tone: normal    Strength   Strength 5/5 except as noted.     Sensory Exam   Light touch normal.     Gait, Coordination, and Reflexes     Gait  Gait: normal    Coordination   Finger to nose coordination: normal  Heel to shin coordination: normal    Reflexes   Right brachioradialis: 2+  Left brachioradialis: 2+  Right biceps: 2+  Left biceps: 2+  Right triceps: 2+  Left triceps: 2+  Right patellar: 2+  Left patellar: 2+  Right achilles: 2+  Left achilles: 2+  Right : 2+  Left : 2+      Assessment/Plan   Independent Review of Radiographic Studies:    I have reviewed his lumbar MRI done 09/04/2015 which shows spinal stenosis at L4-L5 and L5-S1. I agree with the report.      Medical Decision Making:    We will stick with the current strategy of non-operative treatment and we will go ahead and continue the blocks. We will get another one in late 02/2018 and another one in late 05/2018, and have him come back to see me in 7 months. We will continue this for as long as it helps.      Avni was seen today for back pain.    Diagnoses and all orders for this visit:    Spinal stenosis of lumbar region with neurogenic claudication  -     Epidural Block    DDD (degenerative disc disease), lumbar  -     Epidural Block    Return in about 7 months (around 7/20/2018).

## 2018-02-19 ENCOUNTER — HOSPITAL ENCOUNTER (OUTPATIENT)
Dept: GENERAL RADIOLOGY | Facility: HOSPITAL | Age: 63
Discharge: HOME | End: 2018-02-19

## 2018-02-19 ENCOUNTER — HOSPITAL ENCOUNTER (OUTPATIENT)
Dept: PAIN MEDICINE | Facility: HOSPITAL | Age: 63
Discharge: HOME | End: 2018-02-19
Attending: NEUROLOGICAL SURGERY | Admitting: NEUROLOGICAL SURGERY

## 2018-02-19 ENCOUNTER — ANESTHESIA (OUTPATIENT)
Dept: PAIN MEDICINE | Facility: HOSPITAL | Age: 63
End: 2018-02-19

## 2018-02-19 ENCOUNTER — ANESTHESIA EVENT (OUTPATIENT)
Dept: PAIN MEDICINE | Facility: HOSPITAL | Age: 63
End: 2018-02-19

## 2018-02-19 VITALS
HEIGHT: 71 IN | OXYGEN SATURATION: 96 % | RESPIRATION RATE: 16 BRPM | SYSTOLIC BLOOD PRESSURE: 111 MMHG | DIASTOLIC BLOOD PRESSURE: 65 MMHG | WEIGHT: 193 LBS | HEART RATE: 66 BPM | BODY MASS INDEX: 27.02 KG/M2 | TEMPERATURE: 98.2 F

## 2018-02-19 DIAGNOSIS — M51.36 DDD (DEGENERATIVE DISC DISEASE), LUMBAR: ICD-10-CM

## 2018-02-19 DIAGNOSIS — R52 PAIN: ICD-10-CM

## 2018-02-19 DIAGNOSIS — M48.062 SPINAL STENOSIS OF LUMBAR REGION WITH NEUROGENIC CLAUDICATION: ICD-10-CM

## 2018-02-19 PROCEDURE — C1755 CATHETER, INTRASPINAL: HCPCS

## 2018-02-19 PROCEDURE — 25010000002 FENTANYL CITRATE (PF) 100 MCG/2ML SOLUTION: Performed by: ANESTHESIOLOGY

## 2018-02-19 PROCEDURE — 25010000002 METHYLPREDNISOLONE PER 80 MG: Performed by: ANESTHESIOLOGY

## 2018-02-19 PROCEDURE — 77003 FLUOROGUIDE FOR SPINE INJECT: CPT

## 2018-02-19 PROCEDURE — 0 IOPAMIDOL 41 % SOLUTION: Performed by: ANESTHESIOLOGY

## 2018-02-19 PROCEDURE — 25010000002 MIDAZOLAM PER 1 MG: Performed by: ANESTHESIOLOGY

## 2018-02-19 RX ORDER — LIDOCAINE HYDROCHLORIDE 10 MG/ML
0.5 INJECTION, SOLUTION INFILTRATION; PERINEURAL ONCE AS NEEDED
Status: DISCONTINUED | OUTPATIENT
Start: 2018-02-19 | End: 2018-02-20 | Stop reason: HOSPADM

## 2018-02-19 RX ORDER — SODIUM CHLORIDE 0.9 % (FLUSH) 0.9 %
1-10 SYRINGE (ML) INJECTION AS NEEDED
Status: DISCONTINUED | OUTPATIENT
Start: 2018-02-19 | End: 2018-02-20 | Stop reason: HOSPADM

## 2018-02-19 RX ORDER — FENTANYL CITRATE 50 UG/ML
50 INJECTION, SOLUTION INTRAMUSCULAR; INTRAVENOUS
Status: DISCONTINUED | OUTPATIENT
Start: 2018-02-19 | End: 2018-02-20 | Stop reason: HOSPADM

## 2018-02-19 RX ORDER — LIDOCAINE HYDROCHLORIDE 10 MG/ML
1 INJECTION, SOLUTION INFILTRATION; PERINEURAL ONCE
Status: DISCONTINUED | OUTPATIENT
Start: 2018-02-19 | End: 2018-02-20 | Stop reason: HOSPADM

## 2018-02-19 RX ORDER — METHYLPREDNISOLONE ACETATE 80 MG/ML
80 INJECTION, SUSPENSION INTRA-ARTICULAR; INTRALESIONAL; INTRAMUSCULAR; SOFT TISSUE ONCE
Status: COMPLETED | OUTPATIENT
Start: 2018-02-19 | End: 2018-02-19

## 2018-02-19 RX ORDER — MIDAZOLAM HYDROCHLORIDE 1 MG/ML
1 INJECTION INTRAMUSCULAR; INTRAVENOUS
Status: DISCONTINUED | OUTPATIENT
Start: 2018-02-19 | End: 2018-02-20 | Stop reason: HOSPADM

## 2018-02-19 RX ADMIN — MIDAZOLAM 1 MG: 1 INJECTION INTRAMUSCULAR; INTRAVENOUS at 09:32

## 2018-02-19 RX ADMIN — FENTANYL CITRATE 50 MCG: 50 INJECTION INTRAMUSCULAR; INTRAVENOUS at 09:32

## 2018-02-19 RX ADMIN — METHYLPREDNISOLONE ACETATE 80 MG: 80 INJECTION, SUSPENSION INTRA-ARTICULAR; INTRALESIONAL; INTRAMUSCULAR; SOFT TISSUE at 09:37

## 2018-02-19 RX ADMIN — IOPAMIDOL 10 ML: 408 INJECTION, SOLUTION INTRATHECAL at 09:37

## 2018-02-19 NOTE — ANESTHESIA PROCEDURE NOTES
PAIN Epidural block    Patient location during procedure: pain clinic  Start Time: 2/19/2018 9:23 AM  Stop Time: 2/19/2018 9:39 AM  Indication:procedure for pain  Performed By  Anesthesiologist: NANCY DOS SANTOS  Preanesthetic Checklist  Completed: patient identified, site marked, surgical consent, pre-op evaluation, timeout performed, risks and benefits discussed and monitors and equipment checked  Additional Notes  Depomedrol - 80mg    Needle position confirmed by fluoroscopy and epidurogram using 2cc of [id number].    Diagnosis  Post-Op Diagnosis Codes:     * Lumbar radiculopathy (M54.16)     * Lumbar degenerative disc disease (M51.36)    Prep:  Pt Position:prone  Sterile Tech:cap, gloves, mask and sterile barrier  Prep:chlorhexidine gluconate and isopropyl alcohol  Monitoring:blood pressure monitoring, continuous pulse oximetry and EKG  Procedure:  Sedation: no   Approach:midline  Guidance: fluoroscopy  Location:lumbar  Level:4-5  Needle Type:Tuohy  Needle Gauge:20  Aspiration:negative  Medications:  Depomedrol:80 mg  Preservative Free Saline:2mL  Isovue:2mL    Post Assessment:  Post-procedure: bandaide.  Pt Tolerance:patient tolerated the procedure well with no apparent complications  Complications:no

## 2018-02-19 NOTE — H&P
[facility]    History and Physical    Patient Name: Avni Olivo III  :  1955  MRN:  [id number]  Date of Admission: 2018    Subjective     Patient is a 62 y.o. male presents with chief complaint of chronic, intermitent, moderate low back and hips: right pain.  Onset of symptoms was gradual starting several years ago.  Symptoms are associated/aggravated by exercise, lifting, standing or twisting. Symptoms improve with relaxation    The following portions of the patients history were reviewed and updated as appropriate: current medications, allergies, past medical history, past surgical history, past family history, past social history and problem list                Objective     Past Medical History:   Past Medical History:   Diagnosis Date   • Arm pain    • Chest pain    • DDD (degenerative disc disease), lumbar    • Dizziness    • History of EKG 2013   • HTN (hypertension)    • Hyperlipidemia    • Limb pain    • Low back pain    • Lower extremity edema    • Lumbar canal stenosis    • Melanoma 2017   • Mitral regurgitation     trace   • Nausea and vomiting    • Sinus bradycardia    • Snoring    • Spinal stenosis      Past Surgical History:   Past Surgical History:   Procedure Laterality Date   • CARDIAC CATHETERIZATION  06/10/2013   • FOOT FRACTURE SURGERY Left    • KNEE SURGERY Right    • MOHS SURGERY      nose   • SKIN CANCER EXCISION Bilateral 2017    MELANOMA   • TONSILLECTOMY       Family History:   Family History   Problem Relation Age of Onset   • Coronary artery disease Other    • Hyperlipidemia Other    • Hypertension Other    • Other Other      heart surgery   • Heart disease Father    • Heart disease Sister      Social History:   Social History   Substance Use Topics   • Smoking status: Never Smoker   • Smokeless tobacco: Never Used   • Alcohol use Yes      Comment: social drinker       Vital Signs Range for the last 24 hours  Temperature:     Temp Source:     BP:    "  Pulse:     Respirations:     SPO2:     O2 Amount (l/min):     O2 Devices     Weight:       Flowsheet Rows         First Filed Value    Admission Height  71\" (180.3 cm) Documented at 03/16/2017 0818    Admission Weight  190 lb (86.2 kg) Documented at 03/16/2017 0818          --------------------------------------------------------------------------------    Current Outpatient Prescriptions   Medication Sig Dispense Refill   • aspirin 81 MG tablet Take  by mouth daily.     • atorvastatin (LIPITOR) 40 MG tablet Take 1 tablet by mouth.     • B Complex Vitamins (VITAMIN B COMPLEX) capsule capsule Take  by mouth Daily.     • CALCIUM PO Take  by mouth.     • carvedilol (COREG) 12.5 MG tablet Take  by mouth 2 (two) times a day.     • eszopiclone (LUNESTA) 2 MG tablet Take  by mouth.     • melatonin 5 MG tablet tablet Take 5 mg by mouth Every Night.     • MULTIPLE VITAMINS PO Take  by mouth.     • naproxen (NAPROSYN) 500 MG tablet Take 500 mg by mouth 2 (Two) Times a Day With Meals.     • Omega-3 Fatty Acids (FISH OIL PO) Take  by mouth.     • Testosterone Cypionate (DEPOTESTOTERONE CYPIONATE) 200 MG/ML injection   5   • valsartan (DIOVAN) 160 MG tablet Take  by mouth daily.       No current facility-administered medications for this encounter.        --------------------------------------------------------------------------------  Assessment/Plan      Anesthesia Evaluation     Patient summary reviewed and Nursing notes reviewed   no history of anesthetic complications:  NPO Solid Status: > 6 hours  NPO Liquid Status: > 6 hours    Pain impairs ability to perform ADLs: Sleeping  Modalities previously tried to control pain with limited effectiveness: Ice and Rest     Airway   Mallampati: II  TM distance: >3 FB  Neck ROM: full  Dental - normal exam     Pulmonary - negative pulmonary ROS and normal exam   Cardiovascular - normal exam    (+) hypertension, valvular problems/murmurs MR, hyperlipidemia      Neuro/Psych- neuro exam " normal  (+) dizziness/light headedness,     GI/Hepatic/Renal/Endo - negative ROS     Musculoskeletal     (+) back pain, radiculopathy Right lower extremity  (-)  ABDIAZIZ test  Abdominal  - normal exam   Substance History      OB/GYN          Other   (+) arthritis                Diagnosis and Plan      Treatment Plan  ASA 2      Procedures: Lumbar Epidural Steroid Injection(LESI), With fluoroscopy,       Anesthetic plan and risks discussed with patient.          Diagnosis     * Lumbar radiculopathy [M54.16]     * Lumbar degenerative disc disease [M51.36]

## 2018-03-13 ENCOUNTER — TELEPHONE (OUTPATIENT)
Dept: CARDIOLOGY | Facility: CLINIC | Age: 63
End: 2018-03-13

## 2018-03-13 DIAGNOSIS — R00.2 PALPITATION: Primary | ICD-10-CM

## 2018-03-13 NOTE — TELEPHONE ENCOUNTER
I put in an order for a 48 hour Holter monitor to be performed at [facility].  Please blood came to see me on the 29th.

## 2018-03-13 NOTE — TELEPHONE ENCOUNTER
03/13/18  4:41 PM  Avni Olivo III  1955    Home Phone [phone]   Mobile [phone]     Mr. Olivo calls for appt with Dr. Kenyon at the  office. He states he has been having an intermittent fluttering sensation in his chest for several months. Lately, it is occurring daily. He states it occurs frequently with eating. He has not had any episodes with exercise. He denies chest pain, SOA, or lightheadedness. He has not measured HR/BP. He did see Dr. Martinez in December and /93 with HR 64. He was last seen by Dr. Kenyon in June 2017.    He takes 12.5mg carvedilol BID and 160mg valsartan daily.    Does he need to be seen? Dr. Kenyon has an opening at  on 3/29.    Monse EGAN RN

## 2018-04-03 ENCOUNTER — OFFICE VISIT (OUTPATIENT)
Dept: CARDIOLOGY | Facility: CLINIC | Age: 63
End: 2018-04-03

## 2018-04-03 VITALS
WEIGHT: 203.8 LBS | HEIGHT: 71 IN | RESPIRATION RATE: 16 BRPM | HEART RATE: 66 BPM | SYSTOLIC BLOOD PRESSURE: 128 MMHG | DIASTOLIC BLOOD PRESSURE: 84 MMHG | BODY MASS INDEX: 28.53 KG/M2

## 2018-04-03 DIAGNOSIS — R00.2 PALPITATIONS: Primary | ICD-10-CM

## 2018-04-03 DIAGNOSIS — I10 ESSENTIAL HYPERTENSION: ICD-10-CM

## 2018-04-03 DIAGNOSIS — E78.2 MIXED HYPERLIPIDEMIA: ICD-10-CM

## 2018-04-03 PROCEDURE — 93000 ELECTROCARDIOGRAM COMPLETE: CPT | Performed by: INTERNAL MEDICINE

## 2018-04-03 PROCEDURE — 99214 OFFICE O/P EST MOD 30 MIN: CPT | Performed by: INTERNAL MEDICINE

## 2018-04-03 NOTE — PROGRESS NOTES
PATIENTINFORMATION    Date of Office Visit: 2018  Encounter Provider: Zoraida Kenyon MD  Place of Service: [facility] CARDIOLOGY  Patient Name: Avni Olivo III  : 1955    Subjective:     Encounter Date:2018      Patient ID: Avni Olivo III is a 62 y.o. male.      History of Present Illness    This gentleman saw Dr. Ramírez in  with chest pain. She did an exercise echocardiogram on 2013 which showed normal LV systolic function with ejection fraction of 64%, trace mitral regurgitation. He exercised for 11.5 minutes under Jacques protocol. His stress echocardiogram images were normal. His stress EKG was normal, but the patient did complain of some chest pain. On 06/10/2013, the patient underwent a heart catheterization, which showed no evidence of coronary artery disease.      I saw him for the first time in 2016.  He was on vacation in [place] and had a bradycardic and presyncopal episode at the airport. Paramedics were called and he was noted to have a pulse in the low 50s and elevated blood pressure systolic of about 200 over 120. He was taken to the emergency room. I have reviewed that visit. He had an EKG which showed sinus bradycardia, but was otherwise normal. He was hypertensive there. His troponin and proBNP were normal. His creatinine was elevated at 1.48. He was treated with IV fluids. He did not really feel much better, so a CT scan of his head was performed which looked okay. Eventually, he was discharged from the emergency room still feeling somewhat dizzy and they were able to continue their trip home.      He has not had any recurrence of the symptoms.  We think it was just the perfect storm of not eating, not getting enough sleep, taking his Coreg less than 12 hours apart.  He has been feeling well.      He called me a couple weeks ago complaining of palpitations.  I had him wear a Holter monitor.  He had symptomatic PVCs.  He  "says that he has a quick skip-type sensation that makes him take a breath.  It's more common if he's had bourbon or if he is feeling bloated.  He reports a lot of GI issues of late.    Review of Systems   Constitution: Negative for fever, malaise/fatigue, weight gain and weight loss.   HENT: Negative for ear pain, hearing loss, nosebleeds and sore throat.    Eyes: Negative for double vision, pain, vision loss in left eye and vision loss in right eye.   Cardiovascular:        See history of present illness.   Respiratory: Positive for snoring. Negative for cough, shortness of breath, sleep disturbances due to breathing and wheezing.    Endocrine: Negative for cold intolerance, heat intolerance and polyuria.   Skin: Negative for itching, poor wound healing and rash.   Musculoskeletal: Positive for joint pain. Negative for joint swelling and myalgias.   Gastrointestinal: Negative for abdominal pain, diarrhea, hematochezia, nausea and vomiting.   Genitourinary: Negative for hematuria and hesitancy.   Neurological: Negative for numbness, paresthesias and seizures.   Psychiatric/Behavioral: Negative for depression. The patient is not nervous/anxious.            ECG 12 Lead  Date/Time: 4/3/2018 11:54 AM  Performed by: BROOK PETERS  Authorized by: BROOK PETERS   Comparison: compared with previous ECG from 6/19/2017  Similar to previous ECG  Rhythm: sinus rhythm  BPM: 66  Conduction: conduction normal  ST Segments: ST segments normal  T Waves: T waves normal  Clinical impression: normal ECG               Objective:     /84 (BP Location: Right arm, Patient Position: Sitting, Cuff Size: Adult)   Pulse 66   Resp 16   Ht 180.3 cm (71\")   Wt 92.4 kg (203 lb 12.8 oz)   BMI 28.42 kg/m²  Body mass index is 28.42 kg/m².     Physical Exam   Constitutional: He appears well-developed.   HENT:   Head: Normocephalic and atraumatic.   Eyes: Conjunctivae and lids are normal. Pupils are equal, round, and reactive to light. " Lids are everted and swept, no foreign bodies found.   Neck: Normal range of motion. No JVD present. Carotid bruit is not present. No tracheal deviation present. No thyroid mass present.   Cardiovascular: Normal rate, regular rhythm and normal heart sounds.    Pulses:       Dorsalis pedis pulses are 2+ on the right side, and 2+ on the left side.   Pulmonary/Chest: Effort normal and breath sounds normal.   Abdominal: Normal appearance and bowel sounds are normal.   Musculoskeletal: Normal range of motion.   Neurological: He is alert. He has normal strength.   Skin: Skin is warm, dry and intact.   Psychiatric: He has a normal mood and affect. His behavior is normal.   Vitals reviewed.          Assessment/Plan:       1.  Premature ventricular contractions.  These are symptomatic but infrequent.  I encouraged him to exercise, manage his stress, avoid stimulants and reassured him that these are not dangerous.  I told him if they get worse, we could look at increasing his carvedilol.  I'm reluctant to do this now because he did have a reaction a couple of years ago when he took 2 doses of carvedilol to close together and became bradycardic and presyncopal.  2.  Hypertension.  Controlled  3.  Hyperlipidemia followed by Dr. Sims.    I will see him back in one year.    Orders Placed This Encounter   Procedures   • ECG 12 Lead     This order was created via procedure documentation      Avni Olivo III   Home Medication Instructions RENEE:    Printed on:04/03/18 4866   Medication Information                      aspirin 81 MG tablet  Take  by mouth daily.             atorvastatin (LIPITOR) 40 MG tablet  Take 1 tablet by mouth.             B Complex Vitamins (VITAMIN B COMPLEX) capsule capsule  Take  by mouth Daily.             CALCIUM PO  Take  by mouth.             carvedilol (COREG) 12.5 MG tablet  Take  by mouth 2 (two) times a day.             eszopiclone (LUNESTA) 2 MG tablet  Take  by mouth.             melatonin 5  MG tablet tablet  Take 5 mg by mouth Every Night.             MULTIPLE VITAMINS PO  Take  by mouth.             naproxen (NAPROSYN) 500 MG tablet  Take 500 mg by mouth 2 (Two) Times a Day With Meals.             Omega-3 Fatty Acids (FISH OIL PO)  Take  by mouth.             Testosterone Cypionate (DEPOTESTOTERONE CYPIONATE) 200 MG/ML injection               valsartan (DIOVAN) 160 MG tablet  Take  by mouth daily.                        Zoraida Kenyon MD  04/03/18  12:45 PM

## 2018-05-03 ENCOUNTER — OFFICE (OUTPATIENT)
Dept: URBAN - METROPOLITAN AREA CLINIC 66 | Facility: CLINIC | Age: 63
End: 2018-05-03

## 2018-05-03 VITALS
SYSTOLIC BLOOD PRESSURE: 143 MMHG | HEART RATE: 70 BPM | HEIGHT: 72 IN | WEIGHT: 200 LBS | DIASTOLIC BLOOD PRESSURE: 94 MMHG

## 2018-05-03 DIAGNOSIS — R19.4 CHANGE IN BOWEL HABIT: ICD-10-CM

## 2018-05-03 DIAGNOSIS — K59.01 SLOW TRANSIT CONSTIPATION: ICD-10-CM

## 2018-05-03 DIAGNOSIS — R68.81 EARLY SATIETY: ICD-10-CM

## 2018-05-03 DIAGNOSIS — R14.0 ABDOMINAL DISTENSION (GASEOUS): ICD-10-CM

## 2018-05-03 PROCEDURE — 99202 OFFICE O/P NEW SF 15 MIN: CPT | Performed by: INTERNAL MEDICINE

## 2018-05-03 RX ORDER — SIMETHICONE 180 MG
720 CAPSULE ORAL
Qty: 120 | Refills: 5 | Status: ACTIVE
Start: 2018-05-03

## 2018-05-10 ENCOUNTER — TRANSCRIBE ORDERS (OUTPATIENT)
Dept: PAIN MEDICINE | Facility: HOSPITAL | Age: 63
End: 2018-05-10

## 2018-05-10 DIAGNOSIS — M48.062 SPINAL STENOSIS OF LUMBAR REGION WITH NEUROGENIC CLAUDICATION: Primary | ICD-10-CM

## 2018-05-10 DIAGNOSIS — M51.36 DDD (DEGENERATIVE DISC DISEASE), LUMBAR: ICD-10-CM

## 2018-05-14 ENCOUNTER — HOSPITAL ENCOUNTER (OUTPATIENT)
Dept: GENERAL RADIOLOGY | Facility: HOSPITAL | Age: 63
Discharge: HOME | End: 2018-05-14

## 2018-05-14 ENCOUNTER — ANESTHESIA (OUTPATIENT)
Dept: PAIN MEDICINE | Facility: HOSPITAL | Age: 63
End: 2018-05-14

## 2018-05-14 ENCOUNTER — ANESTHESIA EVENT (OUTPATIENT)
Dept: PAIN MEDICINE | Facility: HOSPITAL | Age: 63
End: 2018-05-14

## 2018-05-14 ENCOUNTER — HOSPITAL ENCOUNTER (OUTPATIENT)
Dept: PAIN MEDICINE | Facility: HOSPITAL | Age: 63
Discharge: HOME | End: 2018-05-14
Admitting: NEUROLOGICAL SURGERY

## 2018-05-14 VITALS
RESPIRATION RATE: 16 BRPM | DIASTOLIC BLOOD PRESSURE: 90 MMHG | WEIGHT: 200 LBS | OXYGEN SATURATION: 93 % | HEART RATE: 65 BPM | HEIGHT: 71 IN | TEMPERATURE: 97.5 F | SYSTOLIC BLOOD PRESSURE: 118 MMHG | BODY MASS INDEX: 28 KG/M2

## 2018-05-14 DIAGNOSIS — M48.062 SPINAL STENOSIS OF LUMBAR REGION WITH NEUROGENIC CLAUDICATION: ICD-10-CM

## 2018-05-14 DIAGNOSIS — R52 PAIN: ICD-10-CM

## 2018-05-14 DIAGNOSIS — M51.36 DDD (DEGENERATIVE DISC DISEASE), LUMBAR: ICD-10-CM

## 2018-05-14 PROCEDURE — 25010000002 FENTANYL CITRATE (PF) 100 MCG/2ML SOLUTION: Performed by: ANESTHESIOLOGY

## 2018-05-14 PROCEDURE — 0 IOPAMIDOL 41 % SOLUTION: Performed by: ANESTHESIOLOGY

## 2018-05-14 PROCEDURE — 25010000002 METHYLPREDNISOLONE PER 80 MG: Performed by: ANESTHESIOLOGY

## 2018-05-14 PROCEDURE — 25010000002 MIDAZOLAM PER 1 MG: Performed by: ANESTHESIOLOGY

## 2018-05-14 PROCEDURE — 77003 FLUOROGUIDE FOR SPINE INJECT: CPT

## 2018-05-14 PROCEDURE — C1755 CATHETER, INTRASPINAL: HCPCS

## 2018-05-14 RX ORDER — LIDOCAINE HYDROCHLORIDE 10 MG/ML
0.5 INJECTION, SOLUTION INFILTRATION; PERINEURAL ONCE AS NEEDED
Status: DISCONTINUED | OUTPATIENT
Start: 2018-05-14 | End: 2018-05-15 | Stop reason: HOSPADM

## 2018-05-14 RX ORDER — MIDAZOLAM HYDROCHLORIDE 1 MG/ML
1 INJECTION INTRAMUSCULAR; INTRAVENOUS
Status: DISCONTINUED | OUTPATIENT
Start: 2018-05-14 | End: 2018-05-15 | Stop reason: HOSPADM

## 2018-05-14 RX ORDER — SODIUM CHLORIDE 0.9 % (FLUSH) 0.9 %
1-10 SYRINGE (ML) INJECTION AS NEEDED
Status: DISCONTINUED | OUTPATIENT
Start: 2018-05-14 | End: 2018-05-15 | Stop reason: HOSPADM

## 2018-05-14 RX ORDER — LIDOCAINE HYDROCHLORIDE 10 MG/ML
1 INJECTION, SOLUTION INFILTRATION; PERINEURAL ONCE
Status: DISCONTINUED | OUTPATIENT
Start: 2018-05-14 | End: 2018-05-15 | Stop reason: HOSPADM

## 2018-05-14 RX ORDER — METHYLPREDNISOLONE ACETATE 80 MG/ML
80 INJECTION, SUSPENSION INTRA-ARTICULAR; INTRALESIONAL; INTRAMUSCULAR; SOFT TISSUE ONCE
Status: COMPLETED | OUTPATIENT
Start: 2018-05-14 | End: 2018-05-14

## 2018-05-14 RX ORDER — FENTANYL CITRATE 50 UG/ML
50 INJECTION, SOLUTION INTRAMUSCULAR; INTRAVENOUS
Status: DISCONTINUED | OUTPATIENT
Start: 2018-05-14 | End: 2018-05-15 | Stop reason: HOSPADM

## 2018-05-14 RX ADMIN — METHYLPREDNISOLONE ACETATE 80 MG: 80 INJECTION, SUSPENSION INTRA-ARTICULAR; INTRALESIONAL; INTRAMUSCULAR; SOFT TISSUE at 08:36

## 2018-05-14 RX ADMIN — MIDAZOLAM HYDROCHLORIDE 1 MG: 1 INJECTION, SOLUTION INTRAMUSCULAR; INTRAVENOUS at 08:32

## 2018-05-14 RX ADMIN — FENTANYL CITRATE 50 MCG: 50 INJECTION INTRAMUSCULAR; INTRAVENOUS at 08:32

## 2018-05-14 RX ADMIN — IOPAMIDOL 10 ML: 408 INJECTION, SOLUTION INTRATHECAL at 08:36

## 2018-05-14 NOTE — H&P
[facility]    History and Physical    Patient Name: Avni Olivo III  :  1955  MRN:  [id number]  Date of Admission: 2018    Subjective     Patient is a 62 y.o. male presents with chief complaint of chronic, intermitent, moderate low back and hips: right pain.  Onset of symptoms was gradual starting several years ago.  Symptoms are associated/aggravated by exercise, lifting, standing or twisting. Symptoms improve with relaxation    The following portions of the patients history were reviewed and updated as appropriate: current medications, allergies, past medical history, past surgical history, past family history, past social history and problem list                Objective     Past Medical History:   Past Medical History:   Diagnosis Date   • Arm pain    • Chest pain    • DDD (degenerative disc disease), lumbar    • Dizziness    • History of EKG 2013   • HTN (hypertension)    • Hyperlipidemia    • Limb pain    • Low back pain    • Lower extremity edema    • Lumbar canal stenosis    • Melanoma 2017   • Mitral regurgitation     trace   • Nausea and vomiting    • Peripheral neuropathy    • Sinus bradycardia    • Snoring    • Spinal stenosis      Past Surgical History:   Past Surgical History:   Procedure Laterality Date   • CARDIAC CATHETERIZATION  06/10/2013   • EPIDURAL BLOCK     • FOOT FRACTURE SURGERY Left    • KNEE SURGERY Right    • MOHS SURGERY      nose   • SKIN CANCER EXCISION Bilateral 2017    MELANOMA   • TONSILLECTOMY       Family History:   Family History   Problem Relation Age of Onset   • Coronary artery disease Other    • Hyperlipidemia Other    • Hypertension Other    • Other Other      heart surgery   • Heart disease Father    • Heart disease Sister      Social History:   Social History   Substance Use Topics   • Smoking status: Never Smoker   • Smokeless tobacco: Never Used   • Alcohol use Yes      Comment: social drinker       Vital Signs Range for the last 24  "hours  Temperature: Temp:  [36.4 °C (97.5 °F)] 36.4 °C (97.5 °F)   Temp Source: Temp src: Oral   BP: BP: (127)/(88) 127/88   Pulse: Heart Rate:  [85] 85   Respirations: Resp:  [16] 16   SPO2: SpO2:  [95 %] 95 %   O2 Amount (l/min):     O2 Devices Device (Oxygen Therapy): room air   Weight: Weight:  [90.7 kg (200 lb)] 90.7 kg (200 lb)     Flowsheet Rows         First Filed Value    Admission Height  71\" (180.3 cm) Documented at 03/16/2017 0818    Admission Weight  190 lb (86.2 kg) Documented at 03/16/2017 0818          --------------------------------------------------------------------------------    Current Outpatient Prescriptions   Medication Sig Dispense Refill   • atorvastatin (LIPITOR) 40 MG tablet Take 1 tablet by mouth.     • B Complex Vitamins (VITAMIN B COMPLEX) capsule capsule Take  by mouth Daily.     • CALCIUM PO Take  by mouth.     • carvedilol (COREG) 12.5 MG tablet Take  by mouth 2 (two) times a day.     • eszopiclone (LUNESTA) 2 MG tablet Take  by mouth.     • melatonin 5 MG tablet tablet Take 5 mg by mouth Every Night.     • MULTIPLE VITAMINS PO Take  by mouth.     • naproxen (NAPROSYN) 500 MG tablet Take 500 mg by mouth 2 (Two) Times a Day With Meals.     • Testosterone Cypionate (DEPOTESTOTERONE CYPIONATE) 200 MG/ML injection   5   • valsartan (DIOVAN) 160 MG tablet Take  by mouth daily.     • aspirin 81 MG tablet Take  by mouth daily.     • Omega-3 Fatty Acids (FISH OIL PO) Take  by mouth.       Current Facility-Administered Medications   Medication Dose Route Frequency Provider Last Rate Last Dose   • lidocaine (XYLOCAINE) 1 % injection 0.5 mL  0.5 mL Intradermal Once PRN Jose Haas MD       • sodium chloride 0.9 % flush 1-10 mL  1-10 mL Intravenous PRN Jose Xu Haas, MD           --------------------------------------------------------------------------------  Assessment/Plan      Anesthesia Evaluation     Patient summary reviewed and Nursing notes reviewed   no history of " anesthetic complications:  NPO Solid Status: > 6 hours  NPO Liquid Status: > 6 hours    Pain impairs ability to perform ADLs: Sleeping  Modalities previously tried to control pain with limited effectiveness: Ice and Rest     Airway   Mallampati: II  TM distance: >3 FB  Neck ROM: full  Dental - normal exam     Pulmonary - negative pulmonary ROS and normal exam   Cardiovascular - normal exam    (+) hypertension, valvular problems/murmurs MR, hyperlipidemia,       Neuro/Psych- neuro exam normal  (+) dizziness/light headedness, numbness,     GI/Hepatic/Renal/Endo - negative ROS     Musculoskeletal     (+) back pain, radiculopathy Right lower extremity  (-)  ABDIAZIZ test  Abdominal  - normal exam   Substance History      OB/GYN          Other   (+) arthritis                Diagnosis and Plan      Treatment Plan  ASA 2   Patient has had previous injection/procedure with 25-50% improvement.   Procedures: Lumbar Epidural Steroid Injection(LESI), With fluoroscopy,       Anesthetic plan and risks discussed with patient.          Diagnosis     * Lumbar radiculopathy [M54.16]     * Lumbar degenerative disc disease [M51.36]

## 2018-05-14 NOTE — ANESTHESIA PROCEDURE NOTES
PAIN Epidural block    Patient location during procedure: pain clinic  Start Time: 5/14/2018 8:22 AM  Stop Time: 5/14/2018 8:38 AM  Indication:procedure for pain  Performed By  Anesthesiologist: NANCY DOS SANTOS  Preanesthetic Checklist  Completed: patient identified, site marked, surgical consent, pre-op evaluation, timeout performed, risks and benefits discussed and monitors and equipment checked  Additional Notes  Depomedrol - 80mg    Needle position confirmed by fluoroscopy and epidurogram using 2cc of [id number].    Diagnosis  Post-Op Diagnosis Codes:     * Lumbar radiculopathy (M54.16)     * Lumbar degenerative disc disease (M51.36)    Prep:  Pt Position:prone  Sterile Tech:cap, gloves, mask and sterile barrier  Prep:chlorhexidine gluconate and isopropyl alcohol  Monitoring:blood pressure monitoring, continuous pulse oximetry and EKG  Procedure:  Sedation: yes   Approach:midline  Guidance: fluoroscopy  Location:lumbar  Level:4-5  Needle Type:Tuohy  Needle Gauge:20  Aspiration:negative  Medications:  Depomedrol:80 mg  Preservative Free Saline:2mL  Isovue:2mL    Post Assessment:  Post-procedure: bandaide.  Pt Tolerance:patient tolerated the procedure well with no apparent complications  Complications:no

## 2018-05-31 ENCOUNTER — OFFICE (OUTPATIENT)
Dept: URBAN - METROPOLITAN AREA PATHOLOGY 4 | Facility: PATHOLOGY | Age: 63
End: 2018-05-31
Payer: COMMERCIAL

## 2018-05-31 ENCOUNTER — AMBULATORY SURGICAL CENTER (OUTPATIENT)
Dept: URBAN - METROPOLITAN AREA SURGERY 20 | Facility: SURGERY | Age: 63
End: 2018-05-31

## 2018-05-31 VITALS — HEIGHT: 72 IN

## 2018-05-31 DIAGNOSIS — R14.0 ABDOMINAL DISTENSION (GASEOUS): ICD-10-CM

## 2018-05-31 DIAGNOSIS — R19.4 CHANGE IN BOWEL HABIT: ICD-10-CM

## 2018-05-31 DIAGNOSIS — R68.81 EARLY SATIETY: ICD-10-CM

## 2018-05-31 DIAGNOSIS — K64.0 FIRST DEGREE HEMORRHOIDS: ICD-10-CM

## 2018-05-31 DIAGNOSIS — K59.01 SLOW TRANSIT CONSTIPATION: ICD-10-CM

## 2018-05-31 DIAGNOSIS — K57.30 DIVERTICULOSIS OF LARGE INTESTINE WITHOUT PERFORATION OR ABS: ICD-10-CM

## 2018-05-31 LAB
GI HISTOLOGY: A. SELECT: (no result)
GI HISTOLOGY: B. SELECT: (no result)
GI HISTOLOGY: PDF REPORT: (no result)

## 2018-05-31 PROCEDURE — 45378 DIAGNOSTIC COLONOSCOPY: CPT | Performed by: INTERNAL MEDICINE

## 2018-05-31 PROCEDURE — 88305 TISSUE EXAM BY PATHOLOGIST: CPT | Performed by: INTERNAL MEDICINE

## 2018-05-31 PROCEDURE — 43239 EGD BIOPSY SINGLE/MULTIPLE: CPT | Performed by: INTERNAL MEDICINE

## 2018-08-15 ENCOUNTER — OFFICE (OUTPATIENT)
Dept: URBAN - METROPOLITAN AREA CLINIC 66 | Facility: CLINIC | Age: 63
End: 2018-08-15

## 2018-08-15 VITALS
DIASTOLIC BLOOD PRESSURE: 84 MMHG | WEIGHT: 205 LBS | HEART RATE: 68 BPM | SYSTOLIC BLOOD PRESSURE: 112 MMHG | HEIGHT: 72 IN

## 2018-08-15 DIAGNOSIS — K57.30 DIVERTICULOSIS OF LARGE INTESTINE WITHOUT PERFORATION OR ABS: ICD-10-CM

## 2018-08-15 DIAGNOSIS — R68.81 EARLY SATIETY: ICD-10-CM

## 2018-08-15 DIAGNOSIS — K59.01 SLOW TRANSIT CONSTIPATION: ICD-10-CM

## 2018-08-15 DIAGNOSIS — K29.70 GASTRITIS, UNSPECIFIED, WITHOUT BLEEDING: ICD-10-CM

## 2018-08-15 PROCEDURE — 99212 OFFICE O/P EST SF 10 MIN: CPT | Performed by: INTERNAL MEDICINE

## 2018-08-24 ENCOUNTER — TELEPHONE (OUTPATIENT)
Dept: CARDIOLOGY | Facility: CLINIC | Age: 63
End: 2018-08-24

## 2018-08-24 RX ORDER — LOSARTAN POTASSIUM 50 MG/1
50 TABLET ORAL DAILY
Qty: 30 TABLET | Refills: 3 | Status: SHIPPED | OUTPATIENT
Start: 2018-08-24 | End: 2018-12-05 | Stop reason: SDUPTHER

## 2018-08-24 NOTE — TELEPHONE ENCOUNTER
I placed an order for losartan 50 mg a day to replace the valsartan.  Monitor blood pressure to make sure this is adequately controlling his blood pressure

## 2018-09-05 ENCOUNTER — HOSPITAL ENCOUNTER (OUTPATIENT)
Dept: PAIN MEDICINE | Facility: HOSPITAL | Age: 63
Discharge: HOME | End: 2018-09-05
Admitting: ANESTHESIOLOGY

## 2018-09-05 ENCOUNTER — ANESTHESIA EVENT (OUTPATIENT)
Dept: PAIN MEDICINE | Facility: HOSPITAL | Age: 63
End: 2018-09-05

## 2018-09-05 ENCOUNTER — HOSPITAL ENCOUNTER (OUTPATIENT)
Dept: GENERAL RADIOLOGY | Facility: HOSPITAL | Age: 63
Discharge: HOME | End: 2018-09-05

## 2018-09-05 ENCOUNTER — ANESTHESIA (OUTPATIENT)
Dept: PAIN MEDICINE | Facility: HOSPITAL | Age: 63
End: 2018-09-05

## 2018-09-05 VITALS
SYSTOLIC BLOOD PRESSURE: 137 MMHG | RESPIRATION RATE: 16 BRPM | HEART RATE: 66 BPM | BODY MASS INDEX: 27.2 KG/M2 | TEMPERATURE: 97.6 F | OXYGEN SATURATION: 95 % | DIASTOLIC BLOOD PRESSURE: 87 MMHG | WEIGHT: 195 LBS

## 2018-09-05 DIAGNOSIS — R52 PAIN: ICD-10-CM

## 2018-09-05 PROCEDURE — 77003 FLUOROGUIDE FOR SPINE INJECT: CPT

## 2018-09-05 PROCEDURE — 25010000002 MIDAZOLAM PER 1 MG: Performed by: ANESTHESIOLOGY

## 2018-09-05 PROCEDURE — C1755 CATHETER, INTRASPINAL: HCPCS

## 2018-09-05 PROCEDURE — 0 IOPAMIDOL 41 % SOLUTION: Performed by: ANESTHESIOLOGY

## 2018-09-05 PROCEDURE — 25010000002 FENTANYL CITRATE (PF) 100 MCG/2ML SOLUTION: Performed by: ANESTHESIOLOGY

## 2018-09-05 PROCEDURE — 25010000002 METHYLPREDNISOLONE PER 80 MG: Performed by: ANESTHESIOLOGY

## 2018-09-05 RX ORDER — FENTANYL CITRATE 50 UG/ML
50 INJECTION, SOLUTION INTRAMUSCULAR; INTRAVENOUS AS NEEDED
Status: DISCONTINUED | OUTPATIENT
Start: 2018-09-05 | End: 2018-09-06 | Stop reason: HOSPADM

## 2018-09-05 RX ORDER — LIDOCAINE HYDROCHLORIDE 10 MG/ML
1 INJECTION, SOLUTION INFILTRATION; PERINEURAL ONCE
Status: DISCONTINUED | OUTPATIENT
Start: 2018-09-05 | End: 2018-09-06 | Stop reason: HOSPADM

## 2018-09-05 RX ORDER — MIDAZOLAM HYDROCHLORIDE 1 MG/ML
1 INJECTION INTRAMUSCULAR; INTRAVENOUS
Status: DISCONTINUED | OUTPATIENT
Start: 2018-09-05 | End: 2018-09-06 | Stop reason: HOSPADM

## 2018-09-05 RX ORDER — SODIUM CHLORIDE 0.9 % (FLUSH) 0.9 %
1-10 SYRINGE (ML) INJECTION AS NEEDED
Status: DISCONTINUED | OUTPATIENT
Start: 2018-09-05 | End: 2018-09-06 | Stop reason: HOSPADM

## 2018-09-05 RX ORDER — METHYLPREDNISOLONE ACETATE 80 MG/ML
80 INJECTION, SUSPENSION INTRA-ARTICULAR; INTRALESIONAL; INTRAMUSCULAR; SOFT TISSUE ONCE
Status: COMPLETED | OUTPATIENT
Start: 2018-09-05 | End: 2018-09-05

## 2018-09-05 RX ADMIN — MIDAZOLAM HYDROCHLORIDE 1 MG: 2 INJECTION, SOLUTION INTRAMUSCULAR; INTRAVENOUS at 13:06

## 2018-09-05 RX ADMIN — FENTANYL CITRATE 50 MCG: 50 INJECTION INTRAMUSCULAR; INTRAVENOUS at 13:06

## 2018-09-05 RX ADMIN — IOPAMIDOL 10 ML: 408 INJECTION, SOLUTION INTRATHECAL at 13:10

## 2018-09-05 RX ADMIN — METHYLPREDNISOLONE ACETATE 80 MG: 80 INJECTION, SUSPENSION INTRA-ARTICULAR; INTRALESIONAL; INTRAMUSCULAR; SOFT TISSUE at 13:10

## 2018-09-05 NOTE — H&P
[facility]    History and Physical    Patient Name: Avni Olivo III  :  1955  MRN:  [id number]  Date of Admission: 2018    Subjective     Patient is a 63 y.o. male presents with chief complaint of chronic, intermitent, moderate low back and hips: right pain.  Onset of symptoms was gradual starting several years ago.  Symptoms are associated/aggravated by exercise, lifting, standing or twisting. Symptoms improve with relaxation    The following portions of the patients history were reviewed and updated as appropriate: current medications, allergies, past medical history, past surgical history, past family history, past social history and problem list                Objective     Past Medical History:   Past Medical History:   Diagnosis Date   • Arm pain    • Chest pain    • DDD (degenerative disc disease), lumbar    • Dizziness    • History of EKG 2013   • HTN (hypertension)    • Hyperlipidemia    • Limb pain    • Low back pain    • Lower extremity edema    • Lumbar canal stenosis    • Melanoma (CMS/[facility]) 2017   • Mitral regurgitation     trace   • Nausea and vomiting    • Peripheral neuropathy    • Sinus bradycardia    • Snoring    • Spinal stenosis      Past Surgical History:   Past Surgical History:   Procedure Laterality Date   • CARDIAC CATHETERIZATION  06/10/2013   • EPIDURAL BLOCK     • FOOT FRACTURE SURGERY Left    • KNEE SURGERY Right    • MOHS SURGERY      nose   • SKIN CANCER EXCISION Bilateral 2017    MELANOMA   • TONSILLECTOMY       Family History:   Family History   Problem Relation Age of Onset   • Coronary artery disease Other    • Hyperlipidemia Other    • Hypertension Other    • Other Other         heart surgery   • Heart disease Father    • Heart disease Sister      Social History:   Social History   Substance Use Topics   • Smoking status: Never Smoker   • Smokeless tobacco: Never Used   • Alcohol use Yes      Comment: social drinker       Vital Signs Range for  "the last 24 hours  Temperature: Temp:  [36.4 °C (97.6 °F)] 36.4 °C (97.6 °F)   Temp Source: Temp src: Oral   BP: BP: (167)/(100) 167/100   Pulse: Heart Rate:  [61] 61   Respirations: Resp:  [16] 16   SPO2: SpO2:  [97 %] 97 %   O2 Amount (l/min):     O2 Devices Device (Oxygen Therapy): room air   Weight: Weight:  [88.5 kg (195 lb)] 88.5 kg (195 lb)     Flowsheet Rows         First Filed Value    Admission Height  71\" (180.3 cm) Documented at 03/16/2017 0818    Admission Weight  190 lb (86.2 kg) Documented at 03/16/2017 0818          --------------------------------------------------------------------------------    Current Outpatient Prescriptions   Medication Sig Dispense Refill   • atorvastatin (LIPITOR) 40 MG tablet Take 1 tablet by mouth.     • B Complex Vitamins (VITAMIN B COMPLEX) capsule capsule Take  by mouth Daily.     • CALCIUM PO Take  by mouth.     • carvedilol (COREG) 12.5 MG tablet Take  by mouth 2 (two) times a day.     • eszopiclone (LUNESTA) 2 MG tablet Take  by mouth.     • losartan (COZAAR) 50 MG tablet Take 1 tablet by mouth Daily. 30 tablet 3   • melatonin 5 MG tablet tablet Take 5 mg by mouth Every Night.     • MULTIPLE VITAMINS PO Take  by mouth.     • naproxen (NAPROSYN) 500 MG tablet Take 500 mg by mouth 2 (Two) Times a Day With Meals.     • Testosterone Cypionate (DEPOTESTOTERONE CYPIONATE) 200 MG/ML injection   5   • aspirin 81 MG tablet Take  by mouth daily.     • Omega-3 Fatty Acids (FISH OIL PO) Take  by mouth.       Current Facility-Administered Medications   Medication Dose Route Frequency Provider Last Rate Last Dose   • sodium chloride 0.9 % flush 1-10 mL  1-10 mL Intravenous PRN Jose Haas MD           --------------------------------------------------------------------------------  Assessment/Plan      Anesthesia Evaluation     Patient summary reviewed and Nursing notes reviewed   no history of anesthetic complications:  NPO Solid Status: > 6 hours  NPO Liquid Status: > " 6 hours    Pain impairs ability to perform ADLs: Sleeping  Modalities previously tried to control pain with limited effectiveness within the last 4-6 weeks: Ice and Rest     Airway   Mallampati: II  TM distance: >3 FB  Neck ROM: full  Dental - normal exam     Pulmonary - negative pulmonary ROS and normal exam   Cardiovascular - normal exam    (+) hypertension, valvular problems/murmurs MR, hyperlipidemia,       Neuro/Psych- neuro exam normal  (+) dizziness/light headedness, numbness,     GI/Hepatic/Renal/Endo - negative ROS     Musculoskeletal     (+) back pain, radiculopathy Right lower extremity  (-)  ABDIAZIZ test  Abdominal  - normal exam   Substance History      OB/GYN          Other   (+) arthritis                Diagnosis and Plan      Treatment Plan  ASA 2   Patient has had previous injection/procedure with 25-50% improvement.   Procedures: Lumbar Epidural Steroid Injection(LESI), With fluoroscopy,       Anesthetic plan and risks discussed with patient.          Diagnosis     * Lumbar radiculopathy [M54.16]     * Lumbar degenerative disc disease [M51.36]

## 2018-09-05 NOTE — ANESTHESIA PROCEDURE NOTES
PAIN Epidural block      Patient location during procedure: pain clinic  Start Time: 9/5/2018 1:00 PM  Stop Time: 9/5/2018 1:14 PM  Indication:procedure for pain  Performed By  Anesthesiologist: Jose Haas MD  Preanesthetic Checklist  Completed: patient identified, site marked, surgical consent, pre-op evaluation, timeout performed, risks and benefits discussed and monitors and equipment checked  Additional Notes  Depomedrol - 80mg    Needle position confirmed by fluoroscopy and epidurogram using 2cc of [id number].    Diagnosis  Post-Op Diagnosis Codes:     * Lumbar radiculopathy (M54.16)     * Lumbar degenerative disc disease (M51.36)    Prep:  Pt Position:prone  Sterile Tech:cap, gloves, mask and sterile barrier  Prep:chlorhexidine gluconate and isopropyl alcohol  Monitoring:blood pressure monitoring, continuous pulse oximetry and EKG  Procedure:  Sedation: yes   Approach:midline  Guidance: fluoroscopy  Location:lumbar  Level:3-4  Needle Type:Tuohy  Needle Gauge:20  Aspiration:negative  Medications:  Depomedrol:80 mg  Preservative Free Saline:2mL  Isovue:2mL    Post Assessment:  Post-procedure: bandaide.  Pt Tolerance:patient tolerated the procedure well with no apparent complications  Complications:no

## 2018-10-09 NOTE — PROGRESS NOTES
Subjective   Patient ID: Avni Olivo III is a 63 y.o. male is here today for follow-up on back pain.    At the last visit the patient reported numbness from the waist down with prolonged walking.    Today the patient reports that he has had three epidurals since the last visit and states that he gets moderate relief. He still has some intermittent numbness from the waist down.    Back Pain   This is a chronic problem. The current episode started more than 1 month ago. The problem occurs intermittently. The problem has been waxing and waning since onset. The pain is present in the gluteal and sacro-iliac. The pain does not radiate. Associated symptoms include numbness and weakness. Pertinent negatives include no bladder incontinence, bowel incontinence, leg pain or tingling.       The following portions of the patient's history were reviewed and updated as appropriate: allergies, current medications, past family history, past medical history, past social history, past surgical history and problem list.    Review of Systems   Gastrointestinal: Negative for bowel incontinence.   Genitourinary: Negative for bladder incontinence.   Musculoskeletal: Positive for back pain.   Neurological: Positive for weakness and numbness. Negative for tingling.   All other systems reviewed and are negative.    I have been following this patient for lumbar stenosis from L4 to S1. We have been trying to avoid surgery with blocks. The blocks seem to be helping and so will continue it once every 3 or 4 months. He still has the numbness and tingling but he has no motor deficits and he is staying active and functional.       Objective   Physical Exam   Constitutional: He is oriented to person, place, and time. He appears well-developed and well-nourished.   HENT:   Head: Normocephalic and atraumatic.   Eyes: Pupils are equal, round, and reactive to light. Conjunctivae and EOM are normal.   Fundoscopic exam:       The right eye shows no  papilledema. The right eye shows venous pulsations.        The left eye shows no papilledema. The left eye shows venous pulsations.   Neck: Carotid bruit is not present.   Neurological: He is oriented to person, place, and time. He has a normal Finger-Nose-Finger Test and a normal Heel to Shin Test. Gait normal.   Reflex Scores:       Tricep reflexes are 2+ on the right side and 2+ on the left side.       Bicep reflexes are 2+ on the right side and 2+ on the left side.       Brachioradialis reflexes are 2+ on the right side and 2+ on the left side.       Patellar reflexes are 2+ on the right side and 2+ on the left side.       Achilles reflexes are 2+ on the right side and 2+ on the left side.  Psychiatric: His speech is normal.     Neurologic Exam     Mental Status   Oriented to person, place, and time.   Registration of memory: Good recent and remote memory.   Attention: normal. Concentration: normal.   Speech: speech is normal   Level of consciousness: alert  Knowledge: consistent with education.     Cranial Nerves     CN II   Visual fields full to confrontation.   Visual acuity: normal    CN III, IV, VI   Pupils are equal, round, and reactive to light.  Extraocular motions are normal.     CN V   Facial sensation intact.   Right corneal reflex: normal  Left corneal reflex: normal    CN VII   Facial expression full, symmetric.   Right facial weakness: none  Left facial weakness: none    CN VIII   Hearing: intact    CN IX, X   Palate: symmetric    CN XI   Right sternocleidomastoid strength: normal  Left sternocleidomastoid strength: normal    CN XII   Tongue: not atrophic  Tongue deviation: none    Motor Exam   Muscle bulk: normal  Right arm tone: normal  Left arm tone: normal  Right leg tone: normal  Left leg tone: normal    Strength   Strength 5/5 except as noted.     Sensory Exam   Light touch normal.     Gait, Coordination, and Reflexes     Gait  Gait: normal    Coordination   Finger to nose coordination:  normal  Heel to shin coordination: normal    Reflexes   Right brachioradialis: 2+  Left brachioradialis: 2+  Right biceps: 2+  Left biceps: 2+  Right triceps: 2+  Left triceps: 2+  Right patellar: 2+  Left patellar: 2+  Right achilles: 2+  Left achilles: 2+  Right : 2+  Left : 2+      Assessment/Plan   Independent Review of Radiographic Studies:    I reviewed the lumbar MRI done on 9//15 which show spinal stenosis at L4-L5 and L5-S1.  Agree with the report.      Medical Decision Making:    We'll stick with nonoperative treatment and continue the series of blocks on average one every 3 months.  We'll set up a series of 3 now but have him come back in 9 months.  I'll leave that up to him to determine the interval between blocks.      Avni was seen today for back pain.    Diagnoses and all orders for this visit:    Spinal stenosis of lumbar region with neurogenic claudication  -     Epidural Block    DDD (degenerative disc disease), lumbar  -     Epidural Block      Return in about 9 months (around 7/10/2019).

## 2018-10-10 ENCOUNTER — OFFICE VISIT (OUTPATIENT)
Dept: NEUROSURGERY | Facility: CLINIC | Age: 63
End: 2018-10-10

## 2018-10-10 VITALS
HEART RATE: 92 BPM | HEIGHT: 71 IN | WEIGHT: 195 LBS | DIASTOLIC BLOOD PRESSURE: 86 MMHG | SYSTOLIC BLOOD PRESSURE: 138 MMHG | BODY MASS INDEX: 27.3 KG/M2

## 2018-10-10 DIAGNOSIS — M48.062 SPINAL STENOSIS OF LUMBAR REGION WITH NEUROGENIC CLAUDICATION: Primary | ICD-10-CM

## 2018-10-10 DIAGNOSIS — M51.36 DDD (DEGENERATIVE DISC DISEASE), LUMBAR: ICD-10-CM

## 2018-10-10 PROCEDURE — 99213 OFFICE O/P EST LOW 20 MIN: CPT | Performed by: NEUROLOGICAL SURGERY

## 2018-12-05 RX ORDER — LOSARTAN POTASSIUM 50 MG/1
50 TABLET ORAL DAILY
Qty: 90 TABLET | Refills: 3 | Status: SHIPPED | OUTPATIENT
Start: 2018-12-05 | End: 2018-12-21 | Stop reason: SDUPTHER

## 2018-12-21 RX ORDER — LOSARTAN POTASSIUM 50 MG/1
50 TABLET ORAL DAILY
Qty: 90 TABLET | Refills: 3 | Status: SHIPPED | OUTPATIENT
Start: 2018-12-21 | End: 2020-01-20

## 2019-02-04 ENCOUNTER — HOSPITAL ENCOUNTER (OUTPATIENT)
Dept: PAIN MEDICINE | Facility: HOSPITAL | Age: 64
Discharge: HOME | End: 2019-02-04
Attending: NEUROLOGICAL SURGERY | Admitting: ANESTHESIOLOGY

## 2019-02-04 ENCOUNTER — ANESTHESIA EVENT (OUTPATIENT)
Dept: PAIN MEDICINE | Facility: HOSPITAL | Age: 64
End: 2019-02-04

## 2019-02-04 ENCOUNTER — HOSPITAL ENCOUNTER (OUTPATIENT)
Dept: GENERAL RADIOLOGY | Facility: HOSPITAL | Age: 64
Discharge: HOME | End: 2019-02-04

## 2019-02-04 ENCOUNTER — ANESTHESIA (OUTPATIENT)
Dept: PAIN MEDICINE | Facility: HOSPITAL | Age: 64
End: 2019-02-04

## 2019-02-04 VITALS
HEIGHT: 71 IN | TEMPERATURE: 98.3 F | OXYGEN SATURATION: 93 % | BODY MASS INDEX: 27.3 KG/M2 | WEIGHT: 195 LBS | HEART RATE: 65 BPM | SYSTOLIC BLOOD PRESSURE: 151 MMHG | DIASTOLIC BLOOD PRESSURE: 93 MMHG | RESPIRATION RATE: 16 BRPM

## 2019-02-04 DIAGNOSIS — M48.061 SPINAL STENOSIS OF LUMBAR REGION WITHOUT NEUROGENIC CLAUDICATION: Primary | ICD-10-CM

## 2019-02-04 DIAGNOSIS — R52 PAIN: ICD-10-CM

## 2019-02-04 PROCEDURE — 0 IOPAMIDOL 41 % SOLUTION: Performed by: ANESTHESIOLOGY

## 2019-02-04 PROCEDURE — 25010000002 FENTANYL CITRATE (PF) 100 MCG/2ML SOLUTION: Performed by: ANESTHESIOLOGY

## 2019-02-04 PROCEDURE — C1755 CATHETER, INTRASPINAL: HCPCS

## 2019-02-04 PROCEDURE — 77003 FLUOROGUIDE FOR SPINE INJECT: CPT

## 2019-02-04 PROCEDURE — 25010000002 MIDAZOLAM PER 1 MG: Performed by: ANESTHESIOLOGY

## 2019-02-04 PROCEDURE — 25010000002 METHYLPREDNISOLONE PER 80 MG: Performed by: ANESTHESIOLOGY

## 2019-02-04 RX ORDER — METHYLPREDNISOLONE ACETATE 80 MG/ML
80 INJECTION, SUSPENSION INTRA-ARTICULAR; INTRALESIONAL; INTRAMUSCULAR; SOFT TISSUE ONCE
Status: COMPLETED | OUTPATIENT
Start: 2019-02-04 | End: 2019-02-04

## 2019-02-04 RX ORDER — LIDOCAINE HYDROCHLORIDE 10 MG/ML
1 INJECTION, SOLUTION INFILTRATION; PERINEURAL ONCE
Status: DISCONTINUED | OUTPATIENT
Start: 2019-02-04 | End: 2019-02-05 | Stop reason: HOSPADM

## 2019-02-04 RX ORDER — MIDAZOLAM HYDROCHLORIDE 1 MG/ML
1 INJECTION INTRAMUSCULAR; INTRAVENOUS
Status: DISCONTINUED | OUTPATIENT
Start: 2019-02-04 | End: 2019-02-05 | Stop reason: HOSPADM

## 2019-02-04 RX ORDER — FENTANYL CITRATE 50 UG/ML
50 INJECTION, SOLUTION INTRAMUSCULAR; INTRAVENOUS AS NEEDED
Status: DISCONTINUED | OUTPATIENT
Start: 2019-02-04 | End: 2019-02-05 | Stop reason: HOSPADM

## 2019-02-04 RX ORDER — SODIUM CHLORIDE 0.9 % (FLUSH) 0.9 %
3-10 SYRINGE (ML) INJECTION AS NEEDED
Status: DISCONTINUED | OUTPATIENT
Start: 2019-02-04 | End: 2019-02-05 | Stop reason: HOSPADM

## 2019-02-04 RX ORDER — SODIUM CHLORIDE 0.9 % (FLUSH) 0.9 %
3 SYRINGE (ML) INJECTION EVERY 12 HOURS SCHEDULED
Status: DISCONTINUED | OUTPATIENT
Start: 2019-02-04 | End: 2019-02-05 | Stop reason: HOSPADM

## 2019-02-04 RX ORDER — LIDOCAINE HYDROCHLORIDE 10 MG/ML
0.5 INJECTION, SOLUTION INFILTRATION; PERINEURAL ONCE AS NEEDED
Status: DISCONTINUED | OUTPATIENT
Start: 2019-02-04 | End: 2019-02-05 | Stop reason: HOSPADM

## 2019-02-04 RX ADMIN — METHYLPREDNISOLONE ACETATE 80 MG: 80 INJECTION, SUSPENSION INTRA-ARTICULAR; INTRALESIONAL; INTRAMUSCULAR; SOFT TISSUE at 09:33

## 2019-02-04 RX ADMIN — FENTANYL CITRATE 50 MCG: 50 INJECTION, SOLUTION INTRAMUSCULAR; INTRAVENOUS at 09:28

## 2019-02-04 RX ADMIN — IOPAMIDOL 10 ML: 408 INJECTION, SOLUTION INTRATHECAL at 09:33

## 2019-02-04 RX ADMIN — MIDAZOLAM HYDROCHLORIDE 1 MG: 2 INJECTION, SOLUTION INTRAMUSCULAR; INTRAVENOUS at 09:28

## 2019-02-04 NOTE — ANESTHESIA PROCEDURE NOTES
PAIN Epidural block    Pre-sedation assessment completed: 2/4/2019 9:28 AM    Patient reassessed immediately prior to procedure    Patient location during procedure: pain clinic  Start Time: 2/4/2019 9:28 AM  Stop Time: 2/4/2019 9:44 AM  Indication:procedure for pain  Performed By  Anesthesiologist: Jose Haas MD  Preanesthetic Checklist  Completed: patient identified, site marked, surgical consent, pre-op evaluation, timeout performed, risks and benefits discussed and monitors and equipment checked  Additional Notes  Depomedrol - 80mg    Needle position confirmed by fluoroscopy and epidurogram using 2cc of [id number].    Diagnosis  Post-Op Diagnosis Codes:     * Lumbar radiculopathy (M54.16)     * Lumbar degenerative disc disease (M51.36)    Prep:  Pt Position:prone  Sterile Tech:cap, gloves, mask and sterile barrier  Prep:chlorhexidine gluconate and isopropyl alcohol  Monitoring:blood pressure monitoring, continuous pulse oximetry and EKG  Procedure:  Sedation: yes   Approach:midline  Guidance: fluoroscopy  Location:lumbar  Level:3-4  Needle Type:Tuohy  Needle Gauge:20  Aspiration:negative  Medications:  Depomedrol:80 mg  Preservative Free Saline:2mL  Isovue:2mL    Post Assessment:  Post-procedure: bandaide.  Pt Tolerance:patient tolerated the procedure well with no apparent complications  Complications:no

## 2019-02-04 NOTE — H&P
[facility]    History and Physical    Patient Name: Avni Olivo III  :  1955  MRN:  [id number]  Date of Admission: 2019    Subjective     Patient is a 63 y.o. male presents with chief complaint of chronic, intermitent, moderate low back and hips: right pain.  Onset of symptoms was gradual starting several years ago.  Symptoms are associated/aggravated by exercise, lifting, standing or twisting. Symptoms improve with relaxation    The following portions of the patients history were reviewed and updated as appropriate: current medications, allergies, past medical history, past surgical history, past family history, past social history and problem list                Objective     Past Medical History:   Past Medical History:   Diagnosis Date   • Arm pain    • Chest pain    • DDD (degenerative disc disease), lumbar    • Dizziness    • History of EKG 2013   • HTN (hypertension)    • Hyperlipidemia    • Limb pain    • Low back pain    • Lower extremity edema    • Lumbar canal stenosis    • Melanoma (CMS/[facility]) 2017   • Mitral regurgitation     trace   • Nausea and vomiting    • Peripheral neuropathy    • Sinus bradycardia    • Snoring    • Spinal stenosis      Past Surgical History:   Past Surgical History:   Procedure Laterality Date   • CARDIAC CATHETERIZATION  06/10/2013   • EPIDURAL BLOCK     • FOOT FRACTURE SURGERY Left    • KNEE SURGERY Right    • MOHS SURGERY      nose   • SKIN CANCER EXCISION Bilateral 2017    MELANOMA   • TONSILLECTOMY       Family History:   Family History   Problem Relation Age of Onset   • Coronary artery disease Other    • Hyperlipidemia Other    • Hypertension Other    • Other Other         heart surgery   • Heart disease Father    • Heart disease Sister      Social History:   Social History     Tobacco Use   • Smoking status: Never Smoker   • Smokeless tobacco: Never Used   Substance Use Topics   • Alcohol use: Yes     Comment: social drinker   • Drug use:  "No       Vital Signs Range for the last 24 hours  Temperature:     Temp Source:     BP: BP: ()/()    Pulse:     Respirations:     SPO2:     O2 Amount (l/min):     O2 Devices     Weight:       Flowsheet Rows         First Filed Value    Admission Height  71\" (180.3 cm) Documented at 03/16/2017 0818    Admission Weight  190 lb (86.2 kg) Documented at 03/16/2017 0818          --------------------------------------------------------------------------------    Current Outpatient Medications   Medication Sig Dispense Refill   • aspirin 81 MG tablet Take  by mouth daily.     • atorvastatin (LIPITOR) 40 MG tablet Take 1 tablet by mouth.     • B Complex Vitamins (VITAMIN B COMPLEX) capsule capsule Take  by mouth Daily.     • CALCIUM PO Take  by mouth.     • carvedilol (COREG) 12.5 MG tablet Take  by mouth 2 (two) times a day.     • eszopiclone (LUNESTA) 2 MG tablet Take  by mouth.     • losartan (COZAAR) 50 MG tablet Take 1 tablet by mouth Daily. 90 tablet 3   • melatonin 5 MG tablet tablet Take 5 mg by mouth Every Night.     • MULTIPLE VITAMINS PO Take  by mouth.     • naproxen (NAPROSYN) 500 MG tablet Take 500 mg by mouth 2 (Two) Times a Day With Meals.     • Omega-3 Fatty Acids (FISH OIL PO) Take  by mouth.     • Testosterone Cypionate (DEPOTESTOTERONE CYPIONATE) 200 MG/ML injection   5     No current facility-administered medications for this encounter.        --------------------------------------------------------------------------------  Assessment/Plan      Anesthesia Evaluation     Patient summary reviewed and Nursing notes reviewed   no history of anesthetic complications:  NPO Solid Status: > 6 hours  NPO Liquid Status: > 6 hours    Pain impairs ability to perform ADLs: Sleeping  Modalities previously tried to control pain with limited effectiveness within the last 4-6 weeks: Ice and Rest     Airway   Mallampati: II  TM distance: >3 FB  Neck ROM: full  Dental - normal exam     Pulmonary - negative pulmonary ROS " and normal exam   Cardiovascular - normal exam    (+) hypertension, valvular problems/murmurs MR, hyperlipidemia,       Neuro/Psych- neuro exam normal  (+) dizziness/light headedness, numbness,     GI/Hepatic/Renal/Endo - negative ROS     Musculoskeletal     (+) back pain, radiculopathy Right lower extremity  (-)  ABDIAZIZ test  Abdominal  - normal exam   Substance History      OB/GYN          Other   (+) arthritis                Diagnosis and Plan      Treatment Plan  ASA 2   Patient has had previous injection/procedure with 25-50% improvement.   Procedures: Lumbar Epidural Steroid Injection(LESI), With fluoroscopy,       Anesthetic plan and risks discussed with patient.          Diagnosis     * Lumbar radiculopathy [M54.16]     * Lumbar degenerative disc disease [M51.36]

## 2019-02-16 NOTE — H&P
[facility]    History and Physical    Patient Name: Avni Olivo III  :  1955  MRN:  [id number]  Date of Admission: 2017    Subjective     Patient is a 62 y.o. male presents with chief complaint of chronic low back pain.  Onset of symptoms was gradual starting several years ago.  Symptoms are associated/aggravated by activity or exercise or standing for prolonged period of time. Symptoms improve with injection.  Pain /10, radiate to right hip and back.    The following portions of the patients history were reviewed and updated as appropriate: current medications, allergies, past medical history, past surgical history, past family history, past social history and problem list                   Objective     Past Medical History:   Past Medical History:   Diagnosis Date   • Arm pain    • Chest pain    • DDD (degenerative disc disease), lumbar    • Dizziness    • History of EKG 2013   • HTN (hypertension)    • Hyperlipidemia    • Limb pain    • Low back pain    • Lower extremity edema    • Lumbar canal stenosis    • Melanoma 2017   • Mitral regurgitation     trace   • Nausea and vomiting    • Sinus bradycardia    • Snoring    • Spinal stenosis      Past Surgical History:   Past Surgical History:   Procedure Laterality Date   • CARDIAC CATHETERIZATION  06/10/2013   • FOOT FRACTURE SURGERY Left    • KNEE SURGERY Right    • MOHS SURGERY      nose   • SKIN CANCER EXCISION Bilateral 2017    MELANOMA   • TONSILLECTOMY       Family History:   Family History   Problem Relation Age of Onset   • Coronary artery disease Other    • Hyperlipidemia Other    • Hypertension Other    • Other Other      heart surgery   • Heart disease Father    • Heart disease Sister      Social History:   Social History   Substance Use Topics   • Smoking status: Never Smoker   • Smokeless tobacco: Never Used   • Alcohol use Yes      Comment: social drinker       Vital Signs Range for the last 24 hours  Temperature:    "  Temp Source:     BP: BP: (128)/(85) 128/85   Pulse: Heart Rate:  [62] 62   Respirations: Resp:  [16] 16   SPO2: SpO2:  [95 %] 95 %   O2 Amount (l/min):     O2 Devices O2 Device: room air   Weight:       Flowsheet Rows         First Filed Value    Admission Height  71\" (180.3 cm) Documented at 08/22/2017 0835    Admission Weight            --------------------------------------------------------------------------------    Current Outpatient Prescriptions   Medication Sig Dispense Refill   • aspirin 81 MG tablet Take  by mouth daily.     • atorvastatin (LIPITOR) 40 MG tablet Take 1 tablet by mouth.     • B Complex Vitamins (VITAMIN B COMPLEX) capsule capsule Take  by mouth Daily.     • CALCIUM PO Take  by mouth.     • carvedilol (COREG) 12.5 MG tablet Take  by mouth 2 (two) times a day.     • eszopiclone (LUNESTA) 2 MG tablet Take  by mouth.     • melatonin 5 MG tablet tablet Take 5 mg by mouth Every Night.     • MULTIPLE VITAMINS PO Take  by mouth.     • naproxen (NAPROSYN) 500 MG tablet Take 500 mg by mouth 2 (Two) Times a Day With Meals.     • Omega-3 Fatty Acids (FISH OIL PO) Take  by mouth.     • valsartan (DIOVAN) 160 MG tablet Take  by mouth daily.       No current facility-administered medications for this encounter.        --------------------------------------------------------------------------------  Assessment/Plan      Anesthesia Evaluation     Patient summary reviewed and Nursing notes reviewed          Airway   Mallampati: II  TM distance: >3 FB  Neck ROM: full  no difficulty expected  Dental - normal exam     Pulmonary - negative pulmonary ROS    breath sounds clear to auscultation  Cardiovascular - normal exam  Exercise tolerance: good (4-7 METS)    Patient on routine beta blocker and Beta blocker given within 24 hours of surgery  Rhythm: regular  Rate: normal    (+) hypertension well controlled, hyperlipidemia      Neuro/Psych- neuro exam normal  (+) dizziness/light headedness,    " GI/Hepatic/Renal/Endo      Musculoskeletal (-) normal exam    Abdominal  - normal exam   Substance History - negative use     OB/GYN          Other   (+) arthritis   history of cancer remission                               Diagnosis and Plan    Treatment Plan  ASA 2      Procedures: Lumbar Epidural Steroid Injection(LESI), With fluoroscopy,       Anesthetic plan and risks discussed with patient.          Diagnosis     * Lumbar degenerative disc disease [M51.36]     * Lumbar spinal stenosis [M48.06]                     Left arm;

## 2019-05-02 ENCOUNTER — APPOINTMENT (OUTPATIENT)
Dept: PAIN MEDICINE | Facility: HOSPITAL | Age: 64
End: 2019-05-02

## 2019-05-07 ENCOUNTER — HOSPITAL ENCOUNTER (OUTPATIENT)
Dept: PAIN MEDICINE | Facility: HOSPITAL | Age: 64
Discharge: HOME | End: 2019-05-07
Admitting: NEUROLOGICAL SURGERY

## 2019-05-07 ENCOUNTER — HOSPITAL ENCOUNTER (OUTPATIENT)
Dept: GENERAL RADIOLOGY | Facility: HOSPITAL | Age: 64
Discharge: HOME | End: 2019-05-07

## 2019-05-07 ENCOUNTER — ANESTHESIA (OUTPATIENT)
Dept: PAIN MEDICINE | Facility: HOSPITAL | Age: 64
End: 2019-05-07

## 2019-05-07 ENCOUNTER — ANESTHESIA EVENT (OUTPATIENT)
Dept: PAIN MEDICINE | Facility: HOSPITAL | Age: 64
End: 2019-05-07

## 2019-05-07 VITALS
HEART RATE: 62 BPM | OXYGEN SATURATION: 93 % | TEMPERATURE: 97.7 F | SYSTOLIC BLOOD PRESSURE: 138 MMHG | RESPIRATION RATE: 16 BRPM | DIASTOLIC BLOOD PRESSURE: 105 MMHG

## 2019-05-07 DIAGNOSIS — R52 PAIN: ICD-10-CM

## 2019-05-07 DIAGNOSIS — M51.36 DDD (DEGENERATIVE DISC DISEASE), LUMBAR: Primary | ICD-10-CM

## 2019-05-07 PROCEDURE — 25010000002 MIDAZOLAM PER 1 MG: Performed by: ANESTHESIOLOGY

## 2019-05-07 PROCEDURE — 0 IOPAMIDOL 41 % SOLUTION: Performed by: ANESTHESIOLOGY

## 2019-05-07 PROCEDURE — 77003 FLUOROGUIDE FOR SPINE INJECT: CPT

## 2019-05-07 PROCEDURE — C1755 CATHETER, INTRASPINAL: HCPCS

## 2019-05-07 PROCEDURE — 25010000002 FENTANYL CITRATE (PF) 100 MCG/2ML SOLUTION: Performed by: ANESTHESIOLOGY

## 2019-05-07 PROCEDURE — 25010000002 METHYLPREDNISOLONE PER 80 MG: Performed by: ANESTHESIOLOGY

## 2019-05-07 RX ORDER — LIDOCAINE HYDROCHLORIDE 10 MG/ML
1 INJECTION, SOLUTION INFILTRATION; PERINEURAL ONCE
Status: DISCONTINUED | OUTPATIENT
Start: 2019-05-07 | End: 2019-05-08 | Stop reason: HOSPADM

## 2019-05-07 RX ORDER — METHYLPREDNISOLONE ACETATE 80 MG/ML
80 INJECTION, SUSPENSION INTRA-ARTICULAR; INTRALESIONAL; INTRAMUSCULAR; SOFT TISSUE ONCE
Status: COMPLETED | OUTPATIENT
Start: 2019-05-07 | End: 2019-05-07

## 2019-05-07 RX ORDER — MIDAZOLAM HYDROCHLORIDE 1 MG/ML
1 INJECTION INTRAMUSCULAR; INTRAVENOUS
Status: DISCONTINUED | OUTPATIENT
Start: 2019-05-07 | End: 2019-05-08 | Stop reason: HOSPADM

## 2019-05-07 RX ORDER — SODIUM CHLORIDE 0.9 % (FLUSH) 0.9 %
3-10 SYRINGE (ML) INJECTION AS NEEDED
Status: DISCONTINUED | OUTPATIENT
Start: 2019-05-07 | End: 2019-05-08 | Stop reason: HOSPADM

## 2019-05-07 RX ORDER — SODIUM CHLORIDE 0.9 % (FLUSH) 0.9 %
3 SYRINGE (ML) INJECTION EVERY 12 HOURS SCHEDULED
Status: DISCONTINUED | OUTPATIENT
Start: 2019-05-07 | End: 2019-05-08 | Stop reason: HOSPADM

## 2019-05-07 RX ORDER — FENTANYL CITRATE 50 UG/ML
50 INJECTION, SOLUTION INTRAMUSCULAR; INTRAVENOUS AS NEEDED
Status: DISCONTINUED | OUTPATIENT
Start: 2019-05-07 | End: 2019-05-08 | Stop reason: HOSPADM

## 2019-05-07 RX ORDER — LIDOCAINE HYDROCHLORIDE 10 MG/ML
0.5 INJECTION, SOLUTION INFILTRATION; PERINEURAL ONCE AS NEEDED
Status: DISCONTINUED | OUTPATIENT
Start: 2019-05-07 | End: 2019-05-08 | Stop reason: HOSPADM

## 2019-05-07 RX ADMIN — IOPAMIDOL 3 ML: 408 INJECTION, SOLUTION INTRATHECAL at 09:53

## 2019-05-07 RX ADMIN — METHYLPREDNISOLONE ACETATE 80 MG: 80 INJECTION, SUSPENSION INTRA-ARTICULAR; INTRALESIONAL; INTRAMUSCULAR; SOFT TISSUE at 09:53

## 2019-05-07 RX ADMIN — MIDAZOLAM 1 MG: 1 INJECTION INTRAMUSCULAR; INTRAVENOUS at 09:50

## 2019-05-07 RX ADMIN — FENTANYL CITRATE 50 MCG: 50 INJECTION, SOLUTION INTRAMUSCULAR; INTRAVENOUS at 09:50

## 2019-05-07 NOTE — H&P
[facility]    History and Physical    Patient Name: Avni Olivo III  :  1955  MRN:  [id number]  Date of Admission: 2019    Subjective     Patient is a 63 y.o. male presents with chief complaint of chronic, intermitent, moderate low back and hips: right pain.  Onset of symptoms was gradual starting several years ago.  Symptoms are associated/aggravated by exercise, lifting, standing or twisting. Symptoms improve with relaxation    The following portions of the patients history were reviewed and updated as appropriate: current medications, allergies, past medical history, past surgical history, past family history, past social history and problem list                Objective     Past Medical History:   Past Medical History:   Diagnosis Date   • Arm pain    • Chest pain    • DDD (degenerative disc disease), lumbar    • Dizziness    • History of EKG 2013   • HTN (hypertension)    • Hyperlipidemia    • Limb pain    • Low back pain    • Lower extremity edema    • Lumbar canal stenosis    • Melanoma (CMS/[facility]) 2017   • Mitral regurgitation     trace   • Nausea and vomiting    • Palpitations    • Peripheral neuropathy    • Sinus bradycardia    • Snoring    • Spinal stenosis    • Sprain of right foot      Past Surgical History:   Past Surgical History:   Procedure Laterality Date   • CARDIAC CATHETERIZATION  06/10/2013   • EPIDURAL BLOCK     • FOOT FRACTURE SURGERY Left    • KNEE SURGERY Right    • MOHS SURGERY      nose   • SKIN CANCER EXCISION Bilateral 2017    MELANOMA   • TONSILLECTOMY       Family History:   Family History   Problem Relation Age of Onset   • Coronary artery disease Other    • Hyperlipidemia Other    • Hypertension Other    • Other Other         heart surgery   • Heart disease Father    • Heart disease Sister      Social History:   Social History     Tobacco Use   • Smoking status: Never Smoker   • Smokeless tobacco: Never Used   Substance Use Topics   • Alcohol use:  "Yes     Comment: social drinker   • Drug use: No       Vital Signs Range for the last 24 hours  Temperature: Temp:  [36.5 °C (97.7 °F)] 36.5 °C (97.7 °F)   Temp Source: Temp src: Oral   BP: BP: (147)/(91) 147/91   Pulse: Heart Rate:  [66] 66   Respirations: Resp:  [16] 16   SPO2: SpO2:  [93 %] 93 %   O2 Amount (l/min):     O2 Devices Device (Oxygen Therapy): room air   Weight:       Flowsheet Rows         First Filed Value    Admission Height  71\" (180.3 cm) Documented at 03/16/2017 0818    Admission Weight  190 lb (86.2 kg) Documented at 03/16/2017 0818          --------------------------------------------------------------------------------    Current Outpatient Medications   Medication Sig Dispense Refill   • atorvastatin (LIPITOR) 40 MG tablet Take 1 tablet by mouth.     • B Complex Vitamins (VITAMIN B COMPLEX) capsule capsule Take  by mouth Daily.     • CALCIUM PO Take  by mouth.     • carvedilol (COREG) 12.5 MG tablet Take  by mouth 2 (two) times a day.     • eszopiclone (LUNESTA) 2 MG tablet Take  by mouth.     • losartan (COZAAR) 50 MG tablet Take 1 tablet by mouth Daily. 90 tablet 3   • melatonin 5 MG tablet tablet Take 5 mg by mouth Every Night.     • MULTIPLE VITAMINS PO Take  by mouth.     • naproxen (NAPROSYN) 500 MG tablet Take 500 mg by mouth 2 (Two) Times a Day With Meals.     • Testosterone Cypionate (DEPOTESTOTERONE CYPIONATE) 200 MG/ML injection   5   • aspirin 81 MG tablet Take  by mouth daily.     • Omega-3 Fatty Acids (FISH OIL PO) Take  by mouth.       Current Facility-Administered Medications   Medication Dose Route Frequency Provider Last Rate Last Dose   • lidocaine (XYLOCAINE) 1 % injection 0.5 mL  0.5 mL Intradermal Once PRN Jose Haas MD       • sodium chloride 0.9 % flush 3 mL  3 mL Intravenous Q12H Jose Haas MD       • sodium chloride 0.9 % flush 3-10 mL  3-10 mL Intravenous PRN Jose Haas MD     "       --------------------------------------------------------------------------------  Assessment/Plan      Anesthesia Evaluation     Patient summary reviewed and Nursing notes reviewed   no history of anesthetic complications:  NPO Solid Status: > 6 hours  NPO Liquid Status: > 6 hours    Pain impairs ability to perform ADLs: Sleeping  Modalities previously tried to control pain with limited effectiveness within the last 4-6 weeks: Ice and Rest     Airway   Mallampati: II  TM distance: >3 FB  Neck ROM: full  Dental - normal exam     Pulmonary - negative pulmonary ROS and normal exam   Cardiovascular - normal exam    (+) hypertension, valvular problems/murmurs MR, hyperlipidemia,       Neuro/Psych- neuro exam normal  (+) dizziness/light headedness, numbness,     GI/Hepatic/Renal/Endo - negative ROS     Musculoskeletal     (+) back pain, radiculopathy Right lower extremity  (-)  ABDIAZIZ test  Abdominal  - normal exam   Substance History      OB/GYN          Other   (+) arthritis                Diagnosis and Plan      Treatment Plan  ASA 2   Patient has had previous injection/procedure with 50-75% improvement.   Procedures: Lumbar Epidural Steroid Injection(LESI), With fluoroscopy,       Anesthetic plan and risks discussed with patient.          Diagnosis     * Lumbar radiculopathy [M54.16]     * Lumbar degenerative disc disease [M51.36]

## 2019-05-07 NOTE — ANESTHESIA PROCEDURE NOTES
PAIN Epidural block    Pre-sedation assessment completed: 5/7/2019 9:46 AM    Patient reassessed immediately prior to procedure    Patient location during procedure: pain clinic  Start Time: 5/7/2019 9:46 AM  Stop Time: 5/7/2019 10:01 AM  Indication:procedure for pain  Performed By  Anesthesiologist: Jose Haas MD  Preanesthetic Checklist  Completed: patient identified, site marked, surgical consent, pre-op evaluation, timeout performed, risks and benefits discussed and monitors and equipment checked  Additional Notes  Depomedrol - 80mg    Needle position confirmed by fluoroscopy and epidurogram using 2cc of [id number].    Diagnosis  Post-Op Diagnosis Codes:     * Lumbar radiculopathy (M54.16)     * Lumbar degenerative disc disease (M51.36)    Prep:  Pt Position:prone  Sterile Tech:cap, gloves, mask and sterile barrier  Prep:chlorhexidine gluconate and isopropyl alcohol  Monitoring:blood pressure monitoring, continuous pulse oximetry and EKG  Procedure:Sedation: yes     Approach:midline  Guidance: fluoroscopy  Location:lumbar  Level:3-4  Needle Type:Tuohy  Needle Gauge:20  Aspiration:negative  Medications:  Depomedrol:80 mg  Preservative Free Saline:2mL  Isovue:2mL    Post Assessment:  Post-procedure: bandaide.  Pt Tolerance:patient tolerated the procedure well with no apparent complications  Complications:no

## 2019-05-08 ENCOUNTER — OFFICE VISIT (OUTPATIENT)
Dept: CARDIOLOGY | Facility: CLINIC | Age: 64
End: 2019-05-08

## 2019-05-08 VITALS
DIASTOLIC BLOOD PRESSURE: 100 MMHG | HEART RATE: 68 BPM | SYSTOLIC BLOOD PRESSURE: 140 MMHG | BODY MASS INDEX: 28.11 KG/M2 | HEIGHT: 71 IN | WEIGHT: 200.8 LBS

## 2019-05-08 DIAGNOSIS — E78.2 MIXED HYPERLIPIDEMIA: ICD-10-CM

## 2019-05-08 DIAGNOSIS — R07.89 PRESSURE IN CHEST: Primary | ICD-10-CM

## 2019-05-08 DIAGNOSIS — I49.3 PVC'S (PREMATURE VENTRICULAR CONTRACTIONS): ICD-10-CM

## 2019-05-08 DIAGNOSIS — I10 ESSENTIAL HYPERTENSION: ICD-10-CM

## 2019-05-08 DIAGNOSIS — G47.33 OSA (OBSTRUCTIVE SLEEP APNEA): ICD-10-CM

## 2019-05-08 PROCEDURE — 99214 OFFICE O/P EST MOD 30 MIN: CPT | Performed by: NURSE PRACTITIONER

## 2019-05-08 PROCEDURE — 93000 ELECTROCARDIOGRAM COMPLETE: CPT | Performed by: NURSE PRACTITIONER

## 2019-05-08 RX ORDER — ASCORBIC ACID 500 MG
1000 TABLET ORAL DAILY
COMMUNITY
End: 2022-01-11 | Stop reason: ALTCHOICE

## 2019-05-08 NOTE — PROGRESS NOTES
Date of Office Visit: 2019  Encounter Provider: Sushila Mtz, ESPINOZA, APRN  Place of Service: [facility] CARDIOLOGY  Patient Name: Avni Olivo III  :1955        Subjective:     Chief Complaint:  Follow-up, PVCs, hypertension.      History of Present Illness:  Avni Olivo III is a 63 y.o. male patient of Dr. Kenyon.  This is my first time seeing this patient in the office today, and I have reviewed his records.    Patient has a history of premature ventricular contractions, hypertension, hx of presyncopal episode, hyperlipidemia.    Patient was first seen in office by Dr. Ramírez in  with chest pain.  He had an exercise echo stress test done 2013 that showed normal LV systolic function with EF of 64%, trace mitral regurgitation.  He was able to exercise for 11.5 minutes on the Jacques protocol.  Stress echo images were normal.  Stress EKG was normal.  Patient did have some chest discomfort.  He underwent heart catheterization 6/10/2013 which showed no evidence of coronary artery disease.    Patient was first seen by Dr. Kenyon 2016.  He had been on vacation and [place] bradycardia and [place] the airport[place]  EKG in the ER there showed sinus bradycardia but was otherwise normal.  He was hypertensive.  Troponin and proBNP were normal.  Creatinine was elevated and he was given IV fluids.  CT scan of the head looked okay.  When he was seen back in the office for follow-up he had not had any recurrence of symptoms.  Episode was thought to have occurred from combination of not eating, not getting enough sleep, and taking Coreg less than 12 hours apart.    Patient later complained of palpitations and had a Holter monitor done which showed symptomatic PVCs that were infrequent.  It was described as a skipping sensation that would take his breath.  It was more common with alcohol or if he was feeling bloated.  He was advised to avoid stimulants and stress,  suicide regularly.  Plan was to possibly increase carvedilol symptoms got worse.      Patient presents to office today for follow-up appointment.  Patient reports he has been feeling well overall since last visit.  He exercises pretty regularly, though the amount of walking he does is limited by his spinal stenosis.  He does try to do something active every day, whether it is doing some walking around the neighborhood or riding his bike or going to the gym and doing some elliptical and weightlifting.  He thinks that he does heavier exertion at least 3 times a week.  He rides his bike for at least 30 minutes at a time when he is active.  Blood pressure is elevated in the office today, however he reports he took his medications late this morning.  He has a blood pressure cuff at home but has not been checking recently.  He continues to get palpitations, and he thinks that they have increased slightly since last visit or that possibly he is just more aware of them.  He thinks that they are occurring on a daily basis.  He is currently drinking 1 to 3 cups of coffee in the morning and 1-2 alcoholic beverages per day.  At this point I advised that he try to limit stimulants and drink no more than 8 ounces of coffee in the morning.  He wants to try to avoid going up on carvedilol at this time.  We will see if symptoms improve with decrease stimulant use; he will notify the office.  He is also going to monitor blood pressure at home and call if it is staying greater than 130/80.  He denies any chest pain/discomfort, shortness of breath, shortness of breath with exertion, lower extremity edema, dizziness, syncope, near syncope, falls, fatigue.  He reports he did have a positive sleep study in the past and CPAP was recommended however he never followed up on it.  Given elevated blood pressure reading and palpitations I recommended that he see sleep medicine and he is agreeable.        Past Medical History:   Diagnosis Date    • Arm pain    • Chest pain    • DDD (degenerative disc disease), lumbar    • Dizziness    • History of EKG 08/06/2013   • HTN (hypertension)    • Hyperlipidemia    • Limb pain    • Low back pain    • Lower extremity edema    • Lumbar canal stenosis    • Melanoma (CMS/HCC) 07/05/2017   • Mitral regurgitation     trace   • Nausea and vomiting    • Palpitations    • Peripheral neuropathy    • Sinus bradycardia    • Snoring    • Spinal stenosis    • Sprain of right foot      Past Surgical History:   Procedure Laterality Date   • CARDIAC CATHETERIZATION  06/10/2013   • EPIDURAL BLOCK     • FOOT FRACTURE SURGERY Left    • KNEE SURGERY Right    • MOHS SURGERY      nose   • SKIN CANCER EXCISION Bilateral 07/05/2017    MELANOMA   • TONSILLECTOMY       Outpatient Medications Prior to Visit   Medication Sig Dispense Refill   • aspirin 81 MG tablet Take  by mouth daily.     • atorvastatin (LIPITOR) 40 MG tablet Take 1 tablet by mouth.     • B Complex Vitamins (VITAMIN B COMPLEX) capsule capsule Take  by mouth Daily.     • CALCIUM PO Take  by mouth Daily.     • carvedilol (COREG) 12.5 MG tablet Take  by mouth 2 (two) times a day.     • Cholecalciferol (VITAMIN D3) 5000 units capsule capsule Take 5,000 Units by mouth Daily. 2 tablets daily     • eszopiclone (LUNESTA) 2 MG tablet Take 2 mg by mouth Every Night. Takes 1/2 tablet qhs     • losartan (COZAAR) 50 MG tablet Take 1 tablet by mouth Daily. 90 tablet 3   • melatonin 5 MG tablet tablet Take 5 mg by mouth Every Night.     • MULTIPLE VITAMINS PO Take  by mouth Daily.     • naproxen (NAPROSYN) 500 MG tablet Take 500 mg by mouth 2 (Two) Times a Day With Meals.     • Omega-3 Fatty Acids (FISH OIL PO) Take  by mouth Daily.     • Testosterone Cypionate (DEPOTESTOTERONE CYPIONATE) 200 MG/ML injection Every 4-5 months  5   • vitamin C (ASCORBIC ACID) 500 MG tablet Take 500 mg by mouth Daily. Takes 2 tablets daily       No facility-administered medications prior to visit.   "      Allergies as of 05/08/2019   • (No Known Allergies)     Social History     Socioeconomic History   • Marital status:      Spouse name: Not on file   • Number of children: Not on file   • Years of education: college graduate   • Highest education level: Not on file   Tobacco Use   • Smoking status: Never Smoker   • Smokeless tobacco: Never Used   Substance and Sexual Activity   • Alcohol use: Yes     Comment: social drinker   • Drug use: No   • Sexual activity: Defer     Family History   Problem Relation Age of Onset   • Coronary artery disease Other    • Hyperlipidemia Other    • Hypertension Other    • Other Other         heart surgery   • Heart disease Father    • Heart disease Sister        Review of Systems   Constitution: Negative for chills, fever, malaise/fatigue, weight gain and weight loss.   HENT: Negative for ear pain, hearing loss, nosebleeds and sore throat.    Eyes: Negative for blurred vision, double vision, redness, vision loss in left eye, vision loss in right eye and visual disturbance.   Cardiovascular:        SEE HPI.    Respiratory: Positive for snoring. Negative for cough, shortness of breath and wheezing.    Endocrine: Negative for cold intolerance and heat intolerance.   Skin: Negative for itching, rash and suspicious lesions.   Musculoskeletal: Negative for joint pain, joint swelling and myalgias.   Gastrointestinal: Negative for abdominal pain, diarrhea, hematemesis, melena, nausea and vomiting.   Genitourinary: Negative for dysuria, frequency and hematuria.   Neurological: Negative for dizziness, headaches, numbness, paresthesias and seizures.   Psychiatric/Behavioral: Negative for altered mental status and depression. The patient is not nervous/anxious.           Objective:     Vitals:    05/08/19 1017   BP: 140/100   BP Location: Left arm   Pulse: 68   Weight: 91.1 kg (200 lb 12.8 oz)   Height: 180.3 cm (71\")     Body mass index is 28.01 kg/m².      PHYSICAL EXAM:  Physical " Exam   Constitutional: He is oriented to person, place, and time. He appears well-developed and well-nourished. No distress.   HENT:   Head: Normocephalic and atraumatic.   Eyes: Pupils are equal, round, and reactive to light.   Neck: Neck supple. No JVD present. Carotid bruit is not present. No tracheal deviation present.   Cardiovascular: Normal rate, regular rhythm, normal heart sounds and intact distal pulses. Exam reveals no gallop and no friction rub.   No murmur heard.  Pulses:       Radial pulses are 2+ on the right side, and 2+ on the left side.        Posterior tibial pulses are 2+ on the right side, and 2+ on the left side.   Pulmonary/Chest: Effort normal and breath sounds normal. No respiratory distress. He has no wheezes. He has no rales.   Abdominal: Soft. Bowel sounds are normal. He exhibits no distension. There is no tenderness.   Musculoskeletal: He exhibits no edema, tenderness or deformity.   Neurological: He is alert and oriented to person, place, and time.   Skin: Skin is warm and dry. No rash noted. He is not diaphoretic. No erythema.   Psychiatric: He has a normal mood and affect. His behavior is normal. Judgment normal.           ECG 12 Lead  Date/Time: 5/8/2019 10:32 AM  Performed by: Sushila Mtz DNP, APRN  Authorized by: Sushila Mtz DNP, APRN   Comparison: compared with previous ECG from 4/3/2018  Rhythm: sinus rhythm  Rate: normal  BPM: 68  Other findings: non-specific ST-T wave changes  Other findings comments: Reviewed with Dr. Kenyon    Clinical impression: abnormal EKG            Assessment:       Diagnosis Plan   1. Pressure in chest     2. PVC's (premature ventricular contractions)     3. Essential hypertension     4. Mixed hyperlipidemia     5. URSZULA (obstructive sleep apnea)  Ambulatory Referral to Sleep Medicine         Plan:     1. History of chest pressure: Patient reports that for several years he will get some occasional left-sided chest pressure if he rides his  bike too hard without warming up first.  He was told in the past to warm up as the heart is also muscle.  He generally does not experience this if he warms up appropriately. He reports that in the past several years and this is been occurring it has not gotten any worse, is not occurring more often, and has not had any new associated symptoms.  He had a normal heart cath in 2013.  He had some nonspecific ST-T wave changes in anterior leads on ECG today.  We discussed doing a treadmill stress test, however patient declines at this time as he is really feeling well with exercise.  He is going to follow-up in 6 months or call sooner for any new or worsening symptoms or other issues before that time.  2. Premature ventricular contractions: Infrequent but symptomatic on last Holter monitor.  He is drinking 1 to 3 cups of coffee a day and 1 to 2 cups of alcohol a day.  Advised that he drink no more than 8 ounces of caffeine per day and try to limit alcohol.  He wants to try to avoid increase in carvedilol at this time.  He will try to limit caffeine and notify the office if symptoms do not improve or if they worsen.  3. Hypertension: Blood pressure elevated in the office today, however he reports that he took medications late this morning.  He does have a blood pressure cuff at home but has not been checking blood pressure recently.  I advised that he start to check blood pressure at home 1 to 2 hours after morning medications and after resting 10 to 15 minutes, keep a log, and call if systolic blood pressure staying greater than 130 or diastolic greater than 80.  4. Hyperlipidemia: Managed by outside provider.  On statin therapy.  5. History of sleep apnea: Patient reports he was diagnosed with sleep apnea in the past but he was not sure if the test was accurate.  CPAP was advised.  He never followed up with sleep medicine.  Due to elevated blood pressure and palpations I did advise seeing sleep medicine and he is  agreeable.  Referral placed.    Patient to follow-up with Dr. Kenyon in 6 months or sooner if needed for any new, recurrent, or worsening symptoms or other problems/concerns.           Your medication list           Accurate as of 5/8/19 11:14 AM. If you have any questions, ask your nurse or doctor.               CONTINUE taking these medications      Instructions Last Dose Given Next Dose Due   aspirin 81 MG tablet      Take  by mouth daily.       atorvastatin 40 MG tablet  Commonly known as:  LIPITOR      Take 1 tablet by mouth.       CALCIUM PO      Take  by mouth Daily.       carvedilol 12.5 MG tablet  Commonly known as:  COREG      Take  by mouth 2 (two) times a day.       eszopiclone 2 MG tablet  Commonly known as:  LUNESTA      Take 2 mg by mouth Every Night. Takes 1/2 tablet qhs       FISH OIL PO      Take  by mouth Daily.       losartan 50 MG tablet  Commonly known as:  COZAAR      Take 1 tablet by mouth Daily.       melatonin 5 MG tablet tablet      Take 5 mg by mouth Every Night.       MULTIPLE VITAMINS PO      Take  by mouth Daily.       naproxen 500 MG tablet  Commonly known as:  NAPROSYN      Take 500 mg by mouth 2 (Two) Times a Day With Meals.       Testosterone Cypionate 200 MG/ML injection  Commonly known as:  DEPOTESTOTERONE CYPIONATE      Every 4-5 months       vitamin b complex capsule capsule      Take  by mouth Daily.       vitamin C 500 MG tablet  Commonly known as:  ASCORBIC ACID      Take 500 mg by mouth Daily. Takes 2 tablets daily       vitamin D3 5000 units capsule capsule      Take 5,000 Units by mouth Daily. 2 tablets daily              The above medication changes may not have been made by this provider.  Patient's medication list was updated to reflect medications they are currently taking including medication changes made by other providers.            Thanks,    Sushila Mtz, ESPINOZA, APRN  05/08/2019             Dictated utilizing Dragon dictation

## 2019-05-21 ENCOUNTER — APPOINTMENT (OUTPATIENT)
Dept: SLEEP MEDICINE | Facility: HOSPITAL | Age: 64
End: 2019-05-21

## 2019-05-22 ENCOUNTER — OFFICE VISIT (OUTPATIENT)
Dept: SLEEP MEDICINE | Facility: HOSPITAL | Age: 64
End: 2019-05-22

## 2019-05-22 VITALS
SYSTOLIC BLOOD PRESSURE: 144 MMHG | OXYGEN SATURATION: 97 % | HEART RATE: 66 BPM | DIASTOLIC BLOOD PRESSURE: 92 MMHG | BODY MASS INDEX: 28 KG/M2 | WEIGHT: 200 LBS | HEIGHT: 71 IN

## 2019-05-22 DIAGNOSIS — R06.83 SNORING: Primary | ICD-10-CM

## 2019-05-22 DIAGNOSIS — R06.89 GASPING FOR BREATH: ICD-10-CM

## 2019-05-22 PROCEDURE — G0463 HOSPITAL OUTPT CLINIC VISIT: HCPCS

## 2019-05-22 NOTE — PROGRESS NOTES
[facility]  Telephone: [phone] / Fax: [phone] [place]  Telephone: [phone] / Fax: [phone] Hilary Stevenson    Referring Physician: [facility]  PCP: Apolinar Sims Jr., MD    Reason for consult:  sleep apnea    Avni Olivo III is a 63 y.o.male  was seen in the [facility] today for evaluation of sleep apnea. He had HST in 2017 through Dr. Richard. Air flow sensor shifted during the night and he woke up without it. Despite this, HST showed severe URSZULA. Overall AHI was 38. Left side AHI was 18, supine AHI was 59.  He did not believe the results and therefore did not start CPAP. He is here today to be reevaluated in view of loud snoring, recurrent PVCs and complaints by his wife. He wakes up feeling rested. He denies  significant EDS during the day. He does feel a bit sleepy after lunch. He snores worse on his back.  His sleep schedule is 11:30 PM-7:45 AM    Social history, no tobacco, no illicit drugs, 2 alcohol a day.    Review of systems: frequent urination, irregular heartbeat with PVCs.  Rest is negative      Avni Olivo III  has a past medical history of Arm pain, Chest pain, DDD (degenerative disc disease), lumbar, Dizziness, History of EKG (08/06/2013), HTN (hypertension), Hyperlipidemia, Limb pain, Low back pain, Lower extremity edema, Lumbar canal stenosis, Melanoma (CMS/[facility]) (07/05/2017), Mitral regurgitation, Nausea and vomiting, Palpitations, Peripheral neuropathy, Sinus bradycardia, Snoring, Spinal stenosis, and Sprain of right foot.    Current Medications:    Current Outpatient Medications:   •  aspirin 81 MG tablet, Take  by mouth daily., Disp: , Rfl:   •  atorvastatin (LIPITOR) 40 MG tablet, Take 1 tablet by mouth., Disp: , Rfl:   •  B Complex Vitamins (VITAMIN B COMPLEX) capsule capsule, Take  by mouth Daily., Disp: , Rfl:   •  CALCIUM PO, Take  by mouth Daily., Disp: , Rfl:   •  carvedilol (COREG) 12.5 MG tablet, Take  by mouth 2 (two)  "times a day., Disp: , Rfl:   •  Cholecalciferol (VITAMIN D3) 5000 units capsule capsule, Take 5,000 Units by mouth Daily. 2 tablets daily, Disp: , Rfl:   •  eszopiclone (LUNESTA) 2 MG tablet, Take 2 mg by mouth Every Night. Takes 1/2 tablet qhs, Disp: , Rfl:   •  losartan (COZAAR) 50 MG tablet, Take 1 tablet by mouth Daily., Disp: 90 tablet, Rfl: 3  •  melatonin 5 MG tablet tablet, Take 5 mg by mouth Every Night., Disp: , Rfl:   •  MULTIPLE VITAMINS PO, Take  by mouth Daily., Disp: , Rfl:   •  naproxen (NAPROSYN) 500 MG tablet, Take 500 mg by mouth 2 (Two) Times a Day With Meals., Disp: , Rfl:   •  Omega-3 Fatty Acids (FISH OIL PO), Take  by mouth Daily., Disp: , Rfl:   •  Testosterone Cypionate (DEPOTESTOTERONE CYPIONATE) 200 MG/ML injection, Every 4-5 months, Disp: , Rfl: 5  •  vitamin C (ASCORBIC ACID) 500 MG tablet, Take 500 mg by mouth Daily. Takes 2 tablets daily, Disp: , Rfl:     I have reviewed Past Medical History, Past Surgical History, Medication List, Social History and Family History as entered in Sleep Questionnaire and EPIC.    ESS  6   Vital Signs /92   Pulse 66   Ht 180.3 cm (71\")   Wt 90.7 kg (200 lb)   SpO2 97%   BMI 27.89 kg/m²  Body mass index is 27.89 kg/m².    General Alert and oriented. No acute distress noted   Pharynx/Throat Class IV Mallampati airway, large tongue, no evidence of redundant lateral pharyngeal tissue. No oral lesions. No thrush. Moist mucous membranes.   Head Normocephalic. Symmetrical. Atraumatic.    Nose No septal deviation. No drainage   Chest Wall Normal shape. Symmetric expansion with respiration. No tenderness.   Neck Trachea midline, no thyromegaly or adenopathy    Lungs Clear to auscultation bilaterally. No wheezes. No rhonchi. No rales. Respirations regular, even and unlabored.   Heart Regular rhythm and normal rate. Normal S1 and S2. No murmur   Abdomen Soft, non-tender and non-distended. Normal bowel sounds. No masses.   Extremities Moves all " extremities well. No edema   Psychiatric Normal mood and affect.       .    Diagnostic testing- HST in 2017- Overall AHI was 39. Left side AHI was 18, supine AHI was 59, lowest desaturation 76%    Impression:  1. Snoring    2. Gasping for breath          Plan:  I discussed the pathophysiology of obstructive sleep apnea with the patient. Repeat HST was scheduled as first HST was reportedly inaccurate(per pt).   We discussed the adverse outcomes associated with untreated sleep-disordered breathing.  We discussed treatment modalities of obstructive sleep apnea including CPAP device as well as oral mandibular advancement device. Sleep study will be scheduled to establish definitive diagnosis of sleep disorder breathing.  Weight loss will be strongly beneficial in order to reduce the severity of sleep-disordered breathing.  Patient has narrow oropharyngeal structure.  Caution during activities that require prolonged concentration is strongly advised.  Patient will be notified of sleep study results after sleep study is completed.  If sleep apnea is only mild,  oral mandibular advancement device may be one of the treatment options.  However if sleep apnea is moderately severe, CPAP treatment will be strongly encouraged.  The patient is not opposed to treatment with CPAP device if we confirm significant obstructive sleep apnea on polysomnography.         Thank you for allowing me to participate in your patient's care.    The patient will follow up with Dr. Sage after completion of HST    JESSICA Liu  [facility]  Phone: [phone]      Part of this note may be an electronic transcription/translation of spoken language to printed text using the Dragon Dictation System. Some errors may exist even though the document was edited.

## 2019-08-20 NOTE — PROGRESS NOTES
Subjective   Patient ID: Avni Olivo III is a 64 y.o. male is here today for follow-up after MALLORIE.  He had his last one yesterday here at [facility].     Patient was last seen 10.10.18 for constant mild to moderate low back pain and bilateral leg numbness with prolonged standing.  He is also having intermittent mild to moderate bilateral leg and hip pain.  He has intermittent bilateral leg weakness and muscle atrophy.    This gentleman has spinal stenosis at L4 to S1 with low back pain and bilateral leg pain.  We have been trying to treat him nonoperatively with epidural blocks every 3 months.  So far, they have been working.  He had one yesterday and it seems to be helping him.  He is going on a trip to [place] where there is a lot of walking, so he is concerned.  He does not have any motor deficits and he seems to be made comfortable this way, so we will continue with him having to get another block in July of this year, February of 2020, and May of 2020 and have him come back in 10 months.  If there is worsening, he will let us know.      Back Pain   The problem occurs constantly. The problem is unchanged. The pain is present in the lumbar spine. The pain radiates to the left thigh and right thigh. The pain is at a severity of 6/10. The pain is moderate. Associated symptoms include leg pain, numbness and weakness.   Leg Pain    The pain is present in the left leg, right leg, left hip and right hip. The pain is at a severity of 6/10. The pain is moderate. The pain has been intermittent since onset. Associated symptoms include numbness.   Extremity Weakness    The pain is present in the right upper leg, right lower leg, left lower leg and left upper leg. The problem occurs intermittently. The problem has been unchanged. Associated symptoms include numbness.       The following portions of the patient's history were reviewed and updated as appropriate: allergies, current medications, past family history, past medical  history, past social history, past surgical history and problem list.    Review of Systems   Musculoskeletal: Positive for extremity weakness. Negative for arthralgias, back pain and gait problem.   Neurological: Positive for weakness and numbness.   All other systems reviewed and are negative.      Objective   Physical Exam   Constitutional: He is oriented to person, place, and time. He appears well-developed and well-nourished.   HENT:   Head: Normocephalic and atraumatic.   Eyes: Conjunctivae and EOM are normal. Pupils are equal, round, and reactive to light.   Fundoscopic exam:       The right eye shows no papilledema. The right eye shows venous pulsations.        The left eye shows no papilledema. The left eye shows venous pulsations.   Neck: Carotid bruit is not present.   Neurological: He is oriented to person, place, and time. He has a normal Finger-Nose-Finger Test and a normal Heel to Shin Test. Gait normal.   Reflex Scores:       Tricep reflexes are 2+ on the right side and 2+ on the left side.       Bicep reflexes are 2+ on the right side and 2+ on the left side.       Brachioradialis reflexes are 2+ on the right side and 2+ on the left side.       Patellar reflexes are 2+ on the right side and 2+ on the left side.       Achilles reflexes are 2+ on the right side and 2+ on the left side.  Psychiatric: His speech is normal.     Neurologic Exam     Mental Status   Oriented to person, place, and time.   Registration of memory: Good recent and remote memory.   Attention: normal. Concentration: normal.   Speech: speech is normal   Level of consciousness: alert  Knowledge: consistent with education.     Cranial Nerves     CN II   Visual fields full to confrontation.   Visual acuity: normal    CN III, IV, VI   Pupils are equal, round, and reactive to light.  Extraocular motions are normal.     CN V   Facial sensation intact.   Right corneal reflex: normal  Left corneal reflex: normal    CN VII   Facial  expression full, symmetric.   Right facial weakness: none  Left facial weakness: none    CN VIII   Hearing: intact    CN IX, X   Palate: symmetric    CN XI   Right sternocleidomastoid strength: normal  Left sternocleidomastoid strength: normal    CN XII   Tongue: not atrophic  Tongue deviation: none    Motor Exam   Muscle bulk: normal  Right arm tone: normal  Left arm tone: normal  Right leg tone: normal  Left leg tone: normal    Strength   Strength 5/5 except as noted.     Sensory Exam   Light touch normal.     Gait, Coordination, and Reflexes     Gait  Gait: normal    Coordination   Finger to nose coordination: normal  Heel to shin coordination: normal    Reflexes   Right brachioradialis: 2+  Left brachioradialis: 2+  Right biceps: 2+  Left biceps: 2+  Right triceps: 2+  Left triceps: 2+  Right patellar: 2+  Left patellar: 2+  Right achilles: 2+  Left achilles: 2+  Right : 2+  Left : 2+      Assessment/Plan   Independent Review of Radiographic Studies:    I reviewed the lumbar MRI done in September 2015 shows spinal stenosis at L4-L5 and L5-S1.  Agree with the report.      Medical Decision Making:    Her current regimen has been working so we will continue blocks, the next one in November 2019, then another in February 2020, then another in May 2020.  We will have him come back in about 10 months.      Avni was seen today for back pain, numbness, leg pain, hip pain and extremity weakness.    Diagnoses and all orders for this visit:    Spinal stenosis of lumbar region with neurogenic claudication  -     Epidural Block    DDD (degenerative disc disease), lumbar  -     Epidural Block      Return in about 10 months (around 6/28/2020).

## 2019-08-27 ENCOUNTER — ANESTHESIA EVENT (OUTPATIENT)
Dept: PAIN MEDICINE | Facility: HOSPITAL | Age: 64
End: 2019-08-27

## 2019-08-27 ENCOUNTER — ANESTHESIA (OUTPATIENT)
Dept: PAIN MEDICINE | Facility: HOSPITAL | Age: 64
End: 2019-08-27

## 2019-08-27 ENCOUNTER — HOSPITAL ENCOUNTER (OUTPATIENT)
Dept: GENERAL RADIOLOGY | Facility: HOSPITAL | Age: 64
Discharge: HOME | End: 2019-08-27

## 2019-08-27 ENCOUNTER — HOSPITAL ENCOUNTER (OUTPATIENT)
Dept: PAIN MEDICINE | Facility: HOSPITAL | Age: 64
Discharge: HOME | End: 2019-08-27
Admitting: ANESTHESIOLOGY

## 2019-08-27 VITALS
DIASTOLIC BLOOD PRESSURE: 72 MMHG | HEART RATE: 64 BPM | RESPIRATION RATE: 16 BRPM | TEMPERATURE: 98.1 F | OXYGEN SATURATION: 94 % | SYSTOLIC BLOOD PRESSURE: 107 MMHG

## 2019-08-27 DIAGNOSIS — M51.36 DDD (DEGENERATIVE DISC DISEASE), LUMBAR: Primary | ICD-10-CM

## 2019-08-27 DIAGNOSIS — M54.2 NECK PAIN: ICD-10-CM

## 2019-08-27 PROCEDURE — 25010000002 METHYLPREDNISOLONE PER 80 MG: Performed by: ANESTHESIOLOGY

## 2019-08-27 PROCEDURE — 0 IOPAMIDOL 41 % SOLUTION: Performed by: ANESTHESIOLOGY

## 2019-08-27 PROCEDURE — 77003 FLUOROGUIDE FOR SPINE INJECT: CPT

## 2019-08-27 PROCEDURE — 25010000002 MIDAZOLAM PER 1 MG: Performed by: ANESTHESIOLOGY

## 2019-08-27 PROCEDURE — 25010000002 FENTANYL CITRATE (PF) 100 MCG/2ML SOLUTION: Performed by: ANESTHESIOLOGY

## 2019-08-27 PROCEDURE — C1755 CATHETER, INTRASPINAL: HCPCS

## 2019-08-27 RX ORDER — LIDOCAINE HYDROCHLORIDE 10 MG/ML
1 INJECTION, SOLUTION INFILTRATION; PERINEURAL ONCE
Status: DISCONTINUED | OUTPATIENT
Start: 2019-08-27 | End: 2019-08-28 | Stop reason: HOSPADM

## 2019-08-27 RX ORDER — FENTANYL CITRATE 50 UG/ML
50 INJECTION, SOLUTION INTRAMUSCULAR; INTRAVENOUS AS NEEDED
Status: DISCONTINUED | OUTPATIENT
Start: 2019-08-27 | End: 2019-08-28 | Stop reason: HOSPADM

## 2019-08-27 RX ORDER — METHYLPREDNISOLONE ACETATE 80 MG/ML
80 INJECTION, SUSPENSION INTRA-ARTICULAR; INTRALESIONAL; INTRAMUSCULAR; SOFT TISSUE ONCE
Status: COMPLETED | OUTPATIENT
Start: 2019-08-27 | End: 2019-08-27

## 2019-08-27 RX ORDER — MIDAZOLAM HYDROCHLORIDE 1 MG/ML
1 INJECTION INTRAMUSCULAR; INTRAVENOUS
Status: DISCONTINUED | OUTPATIENT
Start: 2019-08-27 | End: 2019-08-28 | Stop reason: HOSPADM

## 2019-08-27 RX ADMIN — METHYLPREDNISOLONE ACETATE 80 MG: 80 INJECTION, SUSPENSION INTRA-ARTICULAR; INTRALESIONAL; INTRAMUSCULAR; SOFT TISSUE at 12:28

## 2019-08-27 RX ADMIN — IOPAMIDOL 2 ML: 408 INJECTION, SOLUTION INTRATHECAL at 12:27

## 2019-08-27 RX ADMIN — MIDAZOLAM 2 MG: 1 INJECTION INTRAMUSCULAR; INTRAVENOUS at 12:24

## 2019-08-27 RX ADMIN — FENTANYL CITRATE 50 MCG: 50 INJECTION, SOLUTION INTRAMUSCULAR; INTRAVENOUS at 12:24

## 2019-08-27 NOTE — H&P
[facility]    History and Physical    Patient Name: Avni Olivo III  :  1955  MRN:  [id number]  Date of Admission: 2019    Subjective     Patient is a 64 y.o. male presents with chief complaint of chronic, intermitent, moderate low back and hips: right pain.  Onset of symptoms was gradual starting several years ago.  Symptoms are associated/aggravated by exercise, lifting, standing or twisting. Symptoms improve with relaxation    The following portions of the patients history were reviewed and updated as appropriate: current medications, allergies, past medical history, past surgical history, past family history, past social history and problem list                Objective     Past Medical History:   Past Medical History:   Diagnosis Date   • Arm pain    • Chest pain    • DDD (degenerative disc disease), lumbar    • Dizziness    • History of EKG 2013   • HTN (hypertension)    • Hyperlipidemia    • Limb pain    • Low back pain    • Lower extremity edema    • Lumbar canal stenosis    • Melanoma (CMS/[facility]) 2017   • Mitral regurgitation     trace   • Nausea and vomiting    • Palpitations    • Peripheral neuropathy    • Sinus bradycardia    • Snoring    • Spinal stenosis    • Sprain of right foot      Past Surgical History:   Past Surgical History:   Procedure Laterality Date   • CARDIAC CATHETERIZATION  06/10/2013   • EPIDURAL BLOCK     • FOOT FRACTURE SURGERY Left    • KNEE SURGERY Right    • MOHS SURGERY      nose   • SKIN CANCER EXCISION Bilateral 2017    MELANOMA   • TONSILLECTOMY       Family History:   Family History   Problem Relation Age of Onset   • Coronary artery disease Other    • Hyperlipidemia Other    • Hypertension Other    • Other Other         heart surgery   • Heart disease Father    • Heart disease Sister      Social History:   Social History     Tobacco Use   • Smoking status: Never Smoker   • Smokeless tobacco: Never Used   Substance Use Topics   • Alcohol use:  "Yes     Comment: social drinker   • Drug use: No       Vital Signs Range for the last 24 hours  Temperature: Temp:  [36.7 °C (98.1 °F)] 36.7 °C (98.1 °F)   Temp Source: Temp src: Oral   BP: BP: (133)/(87) 133/87   Pulse: Heart Rate:  [63] 63   Respirations: Resp:  [16] 16   SPO2: SpO2:  [96 %] 96 %   O2 Amount (l/min):     O2 Devices Device (Oxygen Therapy): room air   Weight:       Flowsheet Rows         First Filed Value    Admission Height  71\" (180.3 cm) Documented at 03/16/2017 0818    Admission Weight  190 lb (86.2 kg) Documented at 03/16/2017 0818          --------------------------------------------------------------------------------    Current Outpatient Medications   Medication Sig Dispense Refill   • atorvastatin (LIPITOR) 40 MG tablet Take 1 tablet by mouth.     • B Complex Vitamins (VITAMIN B COMPLEX) capsule capsule Take  by mouth Daily.     • CALCIUM PO Take  by mouth Daily.     • carvedilol (COREG) 12.5 MG tablet Take  by mouth 2 (two) times a day.     • Cholecalciferol (VITAMIN D3) 5000 units capsule capsule Take 5,000 Units by mouth Daily. 2 tablets daily     • eszopiclone (LUNESTA) 2 MG tablet Take 2 mg by mouth Every Night. Takes 1/2 tablet qhs     • losartan (COZAAR) 50 MG tablet Take 1 tablet by mouth Daily. 90 tablet 3   • melatonin 5 MG tablet tablet Take 5 mg by mouth Every Night.     • MULTIPLE VITAMINS PO Take  by mouth Daily.     • naproxen (NAPROSYN) 500 MG tablet Take 500 mg by mouth 2 (Two) Times a Day With Meals.     • Testosterone Cypionate (DEPOTESTOTERONE CYPIONATE) 200 MG/ML injection Every 4-5 months  5   • vitamin C (ASCORBIC ACID) 500 MG tablet Take 500 mg by mouth Daily. Takes 2 tablets daily     • aspirin 81 MG tablet Take  by mouth daily.     • Omega-3 Fatty Acids (FISH OIL PO) Take  by mouth Daily.       No current facility-administered medications for this encounter.        --------------------------------------------------------------------------------  Assessment/Plan "      Anesthesia Evaluation     Patient summary reviewed and Nursing notes reviewed   no history of anesthetic complications:  NPO Solid Status: > 6 hours  NPO Liquid Status: > 6 hours    Pain impairs ability to perform ADLs: Sleeping  Modalities previously tried to control pain with limited effectiveness within the last 4-6 weeks: Ice and Rest     Airway   Mallampati: II  TM distance: >3 FB  Neck ROM: full  Dental - normal exam     Pulmonary - negative pulmonary ROS and normal exam   Cardiovascular - normal exam    (+) hypertension, valvular problems/murmurs MR, hyperlipidemia,       Neuro/Psych- neuro exam normal  (+) dizziness/light headedness, numbness,     GI/Hepatic/Renal/Endo - negative ROS     Musculoskeletal (-) normal exam    (+) back pain, radiculopathy Right lower extremity  (-)  ABDIAZIZ test  Abdominal  - normal exam   Substance History      OB/GYN          Other   (+) arthritis                Diagnosis and Plan      Treatment Plan  ASA 2   Patient has had previous injection/procedure with 25-50% improvement.   Procedures: Lumbar Epidural Steroid Injection(LESI), With fluoroscopy,       Anesthetic plan and risks discussed with patient.          Diagnosis     * Lumbar degenerative disc disease [M51.36]     * Lumbar radiculopathy [M54.16]

## 2019-08-27 NOTE — ANESTHESIA PROCEDURE NOTES
PAIN Epidural block    Pre-sedation assessment completed: 8/27/2019 12:15 PM    Patient reassessed immediately prior to procedure    Patient location during procedure: pain clinic  Start Time: 8/27/2019 12:15 PM  Stop Time: 8/27/2019 12:30 PM  Indication:procedure for pain  Performed By  Anesthesiologist: Jose Haas MD  Preanesthetic Checklist  Completed: patient identified, site marked, surgical consent, pre-op evaluation, timeout performed, risks and benefits discussed and monitors and equipment checked  Additional Notes  Depomedrol - 80mg    Needle position confirmed by fluoroscopy and epidurogram using 2cc of [id number].    Diagnosis  Post-Op Diagnosis Codes:     * Lumbar degenerative disc disease (M51.36)     * Lumbar radiculopathy (M54.16)    Prep:  Pt Position:prone  Sterile Tech:cap, gloves, mask and sterile barrier  Prep:chlorhexidine gluconate and isopropyl alcohol  Monitoring:blood pressure monitoring, continuous pulse oximetry and EKG  Procedure:Sedation: yes     Approach:right paramedian  Guidance: fluoroscopy  Location:lumbar  Level:3-4  Needle Type:Tuohy  Needle Gauge:20  Aspiration:negative  Medications:  Depomedrol:80 mg  Preservative Free Saline:2mL  Isovue:2mL    Post Assessment:  Post-procedure: bandaide.  Pt Tolerance:patient tolerated the procedure well with no apparent complications  Complications:no

## 2019-08-28 ENCOUNTER — OFFICE VISIT (OUTPATIENT)
Dept: NEUROSURGERY | Facility: CLINIC | Age: 64
End: 2019-08-28

## 2019-08-28 VITALS
BODY MASS INDEX: 27.16 KG/M2 | HEIGHT: 71 IN | SYSTOLIC BLOOD PRESSURE: 140 MMHG | HEART RATE: 72 BPM | WEIGHT: 194 LBS | DIASTOLIC BLOOD PRESSURE: 70 MMHG

## 2019-08-28 DIAGNOSIS — M51.36 DDD (DEGENERATIVE DISC DISEASE), LUMBAR: ICD-10-CM

## 2019-08-28 DIAGNOSIS — M48.062 SPINAL STENOSIS OF LUMBAR REGION WITH NEUROGENIC CLAUDICATION: Primary | ICD-10-CM

## 2019-08-28 PROCEDURE — 99213 OFFICE O/P EST LOW 20 MIN: CPT | Performed by: NEUROLOGICAL SURGERY

## 2019-08-30 ENCOUNTER — TELEPHONE (OUTPATIENT)
Dept: SLEEP MEDICINE | Facility: HOSPITAL | Age: 64
End: 2019-08-30

## 2019-08-30 NOTE — TELEPHONE ENCOUNTER
Lm for patient to return call to scheduled repeat HST. problem with device, only 1hr of data was able to be recovered

## 2019-09-03 ENCOUNTER — TELEPHONE (OUTPATIENT)
Dept: SLEEP MEDICINE | Facility: HOSPITAL | Age: 64
End: 2019-09-03

## 2019-09-19 ENCOUNTER — HOSPITAL ENCOUNTER (OUTPATIENT)
Dept: SLEEP MEDICINE | Facility: HOSPITAL | Age: 64
End: 2019-09-19

## 2019-10-17 ENCOUNTER — HOSPITAL ENCOUNTER (OUTPATIENT)
Dept: SLEEP MEDICINE | Facility: HOSPITAL | Age: 64
Discharge: HOME | End: 2019-10-17
Admitting: NURSE PRACTITIONER

## 2019-10-17 PROCEDURE — 95806 SLEEP STUDY UNATT&RESP EFFT: CPT

## 2019-10-24 ENCOUNTER — TELEPHONE (OUTPATIENT)
Dept: SLEEP MEDICINE | Facility: HOSPITAL | Age: 64
End: 2019-10-24

## 2019-10-25 ENCOUNTER — TELEPHONE (OUTPATIENT)
Dept: SLEEP MEDICINE | Facility: HOSPITAL | Age: 64
End: 2019-10-25

## 2019-10-25 NOTE — TELEPHONE ENCOUNTER
Doug for pt to cb for results. Faxing to [facility]. Scheduled f/u on 12/18/19 2:30p with Jaye

## 2019-11-14 ENCOUNTER — OFFICE VISIT (OUTPATIENT)
Dept: CARDIOLOGY | Facility: CLINIC | Age: 64
End: 2019-11-14

## 2019-11-14 VITALS
BODY MASS INDEX: 28 KG/M2 | HEART RATE: 63 BPM | DIASTOLIC BLOOD PRESSURE: 70 MMHG | HEIGHT: 71 IN | SYSTOLIC BLOOD PRESSURE: 116 MMHG | WEIGHT: 200 LBS

## 2019-11-14 DIAGNOSIS — I49.3 PVC'S (PREMATURE VENTRICULAR CONTRACTIONS): ICD-10-CM

## 2019-11-14 DIAGNOSIS — E78.2 MIXED HYPERLIPIDEMIA: ICD-10-CM

## 2019-11-14 DIAGNOSIS — I10 ESSENTIAL HYPERTENSION: Primary | ICD-10-CM

## 2019-11-14 PROCEDURE — 99214 OFFICE O/P EST MOD 30 MIN: CPT | Performed by: INTERNAL MEDICINE

## 2019-11-14 PROCEDURE — 93000 ELECTROCARDIOGRAM COMPLETE: CPT | Performed by: INTERNAL MEDICINE

## 2019-11-14 NOTE — PROGRESS NOTES
PATIENTINFORMATION    Date of Office Visit: 19  Encounter Provider: Zoraida Kenyon MD  Place of Service: [facility] CARDIOLOGY  Patient Name: Avni Olivo III  : 1955    Subjective:         Patient ID: Avni Olivo III is a 64 y.o. male.      History of Present Illness    This gentleman saw Dr. Ramírez in  with chest pain. She did an exercise echocardiogram on 2013 which showed normal LV systolic function with ejection fraction of 64%, trace mitral regurgitation. He exercised for 11.5 minutes under Jacques protocol. His stress echocardiogram images were normal. His stress EKG was normal, but the patient did complain of some chest pain. On 06/10/2013, the patient underwent a heart catheterization, which showed no evidence of coronary artery disease.      I saw him for the first time in 2016.  He was on vacation in [place] and had a bradycardic and presyncopal episode at the airport. Paramedics were called and he was noted to have a pulse in the low 50s and elevated blood pressure systolic of about 200 over 120. He was taken to the emergency room. I have reviewed that visit. He had an EKG which showed sinus bradycardia, but was otherwise normal. He was hypertensive there. His troponin and proBNP were normal. His creatinine was elevated at 1.48. He was treated with IV fluids. He did not really feel much better, so a CT scan of his head was performed which looked okay. Eventually, he was discharged from the emergency room still feeling somewhat dizzy and they were able to continue their trip home.      I think it was just the perfect storm of not eating, not getting enough sleep, taking his Coreg less than 12 hours apart.      I saw him back in 2018 when he was complaining about palpitations and was diagnosed with symptomatic PVCs.    The palpitations are better since he has decreased the amount of caffeine he is taking in.  He is exercising.   "Sometimes he developed some tightness in his left chest.  He does not have to stop.  Goes away on its own.  This is been an occasional issue for him for some time and has not really changed.  He has not had any more syncope or presyncope.  Funny enough, he is now living next door to Dr. Wood    Review of Systems   Constitution: Negative for fever, malaise/fatigue, weight gain and weight loss.   HENT: Negative for ear pain, hearing loss, nosebleeds and sore throat.    Eyes: Negative for double vision, pain, vision loss in left eye and vision loss in right eye.   Cardiovascular:        See history of present illness.   Respiratory: Positive for snoring. Negative for cough, shortness of breath, sleep disturbances due to breathing and wheezing.    Endocrine: Negative for cold intolerance, heat intolerance and polyuria.   Skin: Negative for itching, poor wound healing and rash.   Musculoskeletal: Positive for joint pain. Negative for joint swelling and myalgias.   Gastrointestinal: Negative for abdominal pain, diarrhea, hematochezia, nausea and vomiting.   Genitourinary: Negative for hematuria and hesitancy.   Neurological: Positive for numbness. Negative for paresthesias and seizures.   Psychiatric/Behavioral: Negative for depression. The patient is not nervous/anxious.            ECG 12 Lead  Date/Time: 11/14/2019 2:00 PM  Performed by: Zoraida Kenyon MD  Authorized by: Zoraida Kenyon MD   Comparison: compared with previous ECG from 4/3/2018  Similar to previous ECG  Rhythm: sinus rhythm  BPM: 63  Conduction: conduction normal  ST Segments: ST segments normal  T Waves: T waves normal    Clinical impression: normal ECG               Objective:     /70 (BP Location: Left arm, Patient Position: Sitting, Cuff Size: Large Adult)   Ht 180.3 cm (70.98\")   Wt 90.7 kg (200 lb)   BMI 27.91 kg/m²  Body mass index is 27.91 kg/m².     Physical Exam   Constitutional: He appears well-developed.   HENT:   Head: " Normocephalic and atraumatic.   Eyes: Conjunctivae and lids are normal. Pupils are equal, round, and reactive to light. Lids are everted and swept, no foreign bodies found.   Neck: Normal range of motion. No JVD present. Carotid bruit is not present. No tracheal deviation present. No thyroid mass present.   Cardiovascular: Normal rate, regular rhythm and normal heart sounds.   Pulses:       Dorsalis pedis pulses are 2+ on the right side, and 2+ on the left side.   Pulmonary/Chest: Effort normal and breath sounds normal.   Abdominal: Normal appearance and bowel sounds are normal.   Musculoskeletal: Normal range of motion.   Neurological: He is alert. He has normal strength.   Skin: Skin is warm, dry and intact.   Psychiatric: He has a normal mood and affect. His behavior is normal.   Vitals reviewed.          Assessment/Plan:       1.  Premature ventricular contractions.  These are symptomatic but infrequent.  I encouraged him to exercise, manage his stress, avoid stimulants and reassured him that these are not dangerous.  I told him if they get worse, we could look at increasing his carvedilol.  I'm reluctant to do this now because he did have a reaction a couple of years ago when he took 2 doses of carvedilol to close together and became bradycardic and presyncopal.  2.  Hypertension.  Controlled  3.  Hyperlipidemia.  Dr. Eubanks suggested a calcium score.  His heart cath in 2013 showed some mild calcium in his right but otherwise looked pretty good.  I think is not unreasonable to do a calcium score to quantify the amount of calcium.  The patient is anxious to come off the medication if possible.  I think if he had a higher calcium score compared to other men his age, I would not feel comfortable doing that but if his calcium score is very low, we could consider a trial off of statins and see where his numbers go.  I do not have any recent numbers in our system.    RTC 1 year    No orders of the defined types were  placed in this encounter.       Discharge Medications           Accurate as of 11/14/19  1:39 PM. If you have any questions, ask your nurse or doctor.               Continue These Medications      Instructions Start Date   aspirin 81 MG tablet   Oral, Daily      atorvastatin 40 MG tablet  Commonly known as:  LIPITOR   1 tablet, Oral      CALCIUM PO   Oral, Daily      carvedilol 12.5 MG tablet  Commonly known as:  COREG   Oral, 2 Times Daily      eszopiclone 2 MG tablet  Commonly known as:  LUNESTA   2 mg, Oral, Nightly, Takes 1/2 tablet qhs      FISH OIL PO   Oral, Daily      losartan 50 MG tablet  Commonly known as:  COZAAR   50 mg, Oral, Daily      melatonin 5 MG tablet tablet   5 mg, Oral, Nightly      MULTIPLE VITAMINS PO   Oral, Daily      naproxen 500 MG tablet  Commonly known as:  NAPROSYN   500 mg, Oral, 2 Times Daily With Meals      vitamin b complex capsule capsule   Oral, Daily      vitamin C 500 MG tablet  Commonly known as:  ASCORBIC ACID   500 mg, Oral, Daily, Takes 2 tablets daily      vitamin D3 125 MCG (5000 UT) capsule capsule   5,000 Units, Oral, Daily, 2 tablets daily         Stop These Medications    Testosterone Cypionate 200 MG/ML injection  Commonly known as:  DEPOTESTOTERONE CYPIONATE  Stopped by:  MD Zoraida Bah MD  11/14/19  1:39 PM

## 2019-12-03 ENCOUNTER — HOSPITAL ENCOUNTER (OUTPATIENT)
Dept: CT IMAGING | Facility: HOSPITAL | Age: 64
Discharge: HOME | End: 2019-12-03
Admitting: INTERNAL MEDICINE

## 2019-12-03 DIAGNOSIS — E78.2 MIXED HYPERLIPIDEMIA: ICD-10-CM

## 2019-12-03 DIAGNOSIS — I10 ESSENTIAL HYPERTENSION: ICD-10-CM

## 2019-12-03 DIAGNOSIS — I49.3 PVC'S (PREMATURE VENTRICULAR CONTRACTIONS): ICD-10-CM

## 2019-12-03 PROCEDURE — 75571 CT HRT W/O DYE W/CA TEST: CPT

## 2019-12-04 ENCOUNTER — ANESTHESIA EVENT (OUTPATIENT)
Dept: PAIN MEDICINE | Facility: HOSPITAL | Age: 64
End: 2019-12-04

## 2019-12-04 ENCOUNTER — HOSPITAL ENCOUNTER (OUTPATIENT)
Dept: GENERAL RADIOLOGY | Facility: HOSPITAL | Age: 64
Discharge: HOME | End: 2019-12-04

## 2019-12-04 ENCOUNTER — ANESTHESIA (OUTPATIENT)
Dept: PAIN MEDICINE | Facility: HOSPITAL | Age: 64
End: 2019-12-04

## 2019-12-04 ENCOUNTER — HOSPITAL ENCOUNTER (OUTPATIENT)
Dept: PAIN MEDICINE | Facility: HOSPITAL | Age: 64
Discharge: HOME | End: 2019-12-04
Admitting: ANESTHESIOLOGY

## 2019-12-04 VITALS
HEIGHT: 71 IN | HEART RATE: 57 BPM | TEMPERATURE: 97.6 F | OXYGEN SATURATION: 95 % | DIASTOLIC BLOOD PRESSURE: 74 MMHG | RESPIRATION RATE: 16 BRPM | WEIGHT: 195 LBS | BODY MASS INDEX: 27.3 KG/M2 | SYSTOLIC BLOOD PRESSURE: 121 MMHG

## 2019-12-04 DIAGNOSIS — M48.062 SPINAL STENOSIS OF LUMBAR REGION WITH NEUROGENIC CLAUDICATION: Primary | ICD-10-CM

## 2019-12-04 DIAGNOSIS — R52 PAIN: ICD-10-CM

## 2019-12-04 PROCEDURE — C1755 CATHETER, INTRASPINAL: HCPCS

## 2019-12-04 PROCEDURE — 25010000002 METHYLPREDNISOLONE PER 80 MG: Performed by: ANESTHESIOLOGY

## 2019-12-04 PROCEDURE — 25010000002 MIDAZOLAM PER 1 MG: Performed by: ANESTHESIOLOGY

## 2019-12-04 PROCEDURE — 0 IOPAMIDOL 41 % SOLUTION: Performed by: ANESTHESIOLOGY

## 2019-12-04 PROCEDURE — 25010000002 FENTANYL CITRATE (PF) 100 MCG/2ML SOLUTION: Performed by: ANESTHESIOLOGY

## 2019-12-04 PROCEDURE — 77003 FLUOROGUIDE FOR SPINE INJECT: CPT

## 2019-12-04 RX ORDER — MIDAZOLAM HYDROCHLORIDE 1 MG/ML
1 INJECTION INTRAMUSCULAR; INTRAVENOUS
Status: DISCONTINUED | OUTPATIENT
Start: 2019-12-04 | End: 2019-12-05 | Stop reason: HOSPADM

## 2019-12-04 RX ORDER — METHYLPREDNISOLONE ACETATE 80 MG/ML
80 INJECTION, SUSPENSION INTRA-ARTICULAR; INTRALESIONAL; INTRAMUSCULAR; SOFT TISSUE ONCE
Status: COMPLETED | OUTPATIENT
Start: 2019-12-04 | End: 2019-12-04

## 2019-12-04 RX ORDER — FENTANYL CITRATE 50 UG/ML
50 INJECTION, SOLUTION INTRAMUSCULAR; INTRAVENOUS AS NEEDED
Status: DISCONTINUED | OUTPATIENT
Start: 2019-12-04 | End: 2019-12-05 | Stop reason: HOSPADM

## 2019-12-04 RX ORDER — SODIUM CHLORIDE 0.9 % (FLUSH) 0.9 %
3-10 SYRINGE (ML) INJECTION AS NEEDED
Status: DISCONTINUED | OUTPATIENT
Start: 2019-12-04 | End: 2019-12-05 | Stop reason: HOSPADM

## 2019-12-04 RX ORDER — SODIUM CHLORIDE 0.9 % (FLUSH) 0.9 %
3 SYRINGE (ML) INJECTION EVERY 12 HOURS SCHEDULED
Status: DISCONTINUED | OUTPATIENT
Start: 2019-12-04 | End: 2019-12-05 | Stop reason: HOSPADM

## 2019-12-04 RX ORDER — LIDOCAINE HYDROCHLORIDE 10 MG/ML
1 INJECTION, SOLUTION INFILTRATION; PERINEURAL ONCE
Status: DISCONTINUED | OUTPATIENT
Start: 2019-12-04 | End: 2019-12-05 | Stop reason: HOSPADM

## 2019-12-04 RX ADMIN — FENTANYL CITRATE 50 MCG: 50 INJECTION, SOLUTION INTRAMUSCULAR; INTRAVENOUS at 09:35

## 2019-12-04 RX ADMIN — IOPAMIDOL 10 ML: 408 INJECTION, SOLUTION INTRATHECAL at 09:36

## 2019-12-04 RX ADMIN — MIDAZOLAM 2 MG: 1 INJECTION INTRAMUSCULAR; INTRAVENOUS at 09:35

## 2019-12-04 RX ADMIN — METHYLPREDNISOLONE ACETATE 80 MG: 80 INJECTION, SUSPENSION INTRA-ARTICULAR; INTRALESIONAL; INTRAMUSCULAR; SOFT TISSUE at 09:36

## 2019-12-04 NOTE — H&P
[facility]    History and Physical    Patient Name: Avni Olivo III  :  1955  MRN:  [id number]  Date of Admission: 2019    Subjective     Patient is a 64 y.o. male presents with chief complaint of chronic, intermitent, moderate low back and hips: right pain.  Onset of symptoms was gradual starting several years ago.  Symptoms are associated/aggravated by exercise, lifting, standing or twisting. Symptoms improve with relaxation    The following portions of the patients history were reviewed and updated as appropriate: current medications, allergies, past medical history, past surgical history, past family history, past social history and problem list                Objective     Past Medical History:   Past Medical History:   Diagnosis Date   • Arm pain    • Chest pain    • DDD (degenerative disc disease), lumbar    • Dizziness    • History of EKG 2013   • HTN (hypertension)    • Hyperlipidemia    • Limb pain    • Low back pain    • Lower extremity edema    • Lumbar canal stenosis    • Melanoma (CMS/[facility]) 2017   • Mitral regurgitation     trace   • Nausea and vomiting    • Palpitations    • Peripheral neuropathy    • Sinus bradycardia    • Snoring    • Spinal stenosis    • Sprain of right foot      Past Surgical History:   Past Surgical History:   Procedure Laterality Date   • CARDIAC CATHETERIZATION  06/10/2013   • EPIDURAL BLOCK     • FOOT FRACTURE SURGERY Left    • KNEE SURGERY Right    • MOHS SURGERY      nose   • SKIN CANCER EXCISION Bilateral 2017    MELANOMA   • TONSILLECTOMY       Family History:   Family History   Problem Relation Age of Onset   • Coronary artery disease Other    • Hyperlipidemia Other    • Hypertension Other    • Other Other         heart surgery   • Heart disease Father    • Heart disease Sister      Social History:   Social History     Tobacco Use   • Smoking status: Never Smoker   • Smokeless tobacco: Never Used   Substance Use Topics   • Alcohol use:  "Yes     Comment: social drinker   • Drug use: No       Vital Signs Range for the last 24 hours  Temperature: Temp:  [36.4 °C (97.6 °F)] 36.4 °C (97.6 °F)   Temp Source: Temp src: Oral   BP: BP: (155)/(95) 155/95   Pulse: Heart Rate:  [57] 57   Respirations: Resp:  [14] 14   SPO2: SpO2:  [95 %] 95 %   O2 Amount (l/min):     O2 Devices Device (Oxygen Therapy): room air   Weight: Weight:  [88.5 kg (195 lb)] 88.5 kg (195 lb)     Flowsheet Rows         First Filed Value    Admission Height  71\" (180.3 cm) Documented at 03/16/2017 0818    Admission Weight  190 lb (86.2 kg) Documented at 03/16/2017 0818          --------------------------------------------------------------------------------    Current Outpatient Medications   Medication Sig Dispense Refill   • atorvastatin (LIPITOR) 40 MG tablet Take 1 tablet by mouth.     • B Complex Vitamins (VITAMIN B COMPLEX) capsule capsule Take  by mouth Daily.     • CALCIUM PO Take  by mouth Daily.     • carvedilol (COREG) 12.5 MG tablet Take  by mouth 2 (two) times a day.     • Cholecalciferol (VITAMIN D3) 5000 units capsule capsule Take 5,000 Units by mouth Daily. 2 tablets daily     • eszopiclone (LUNESTA) 2 MG tablet Take 2 mg by mouth Every Night. Takes 1/2 tablet qhs     • losartan (COZAAR) 50 MG tablet Take 1 tablet by mouth Daily. 90 tablet 3   • melatonin 5 MG tablet tablet Take 5 mg by mouth Every Night.     • naproxen (NAPROSYN) 500 MG tablet Take 500 mg by mouth 2 (Two) Times a Day With Meals.     • vitamin C (ASCORBIC ACID) 500 MG tablet Take 500 mg by mouth Daily. Takes 2 tablets daily     • aspirin 81 MG tablet Take  by mouth daily.     • MULTIPLE VITAMINS PO Take  by mouth Daily.     • Omega-3 Fatty Acids (FISH OIL PO) Take  by mouth Daily.       Current Facility-Administered Medications   Medication Dose Route Frequency Provider Last Rate Last Dose   • sodium chloride 0.9 % flush 3 mL  3 mL Intravenous Q12H Jose Haas MD       • sodium chloride 0.9 % " flush 3-10 mL  3-10 mL Intravenous PRN Jose Haas MD           --------------------------------------------------------------------------------  Assessment/Plan      Anesthesia Evaluation     Patient summary reviewed and Nursing notes reviewed   no history of anesthetic complications:  NPO Solid Status: > 6 hours  NPO Liquid Status: > 6 hours    Pain impairs ability to perform ADLs: Sleeping  Modalities previously tried to control pain with limited effectiveness within the last 4-6 weeks: Ice and Rest     Airway   Mallampati: II  TM distance: >3 FB  Neck ROM: full  Dental - normal exam     Pulmonary - negative pulmonary ROS and normal exam   Cardiovascular - normal exam    (+) hypertension, valvular problems/murmurs MR, hyperlipidemia,       Neuro/Psych- neuro exam normal  (+) dizziness/light headedness, numbness,     GI/Hepatic/Renal/Endo - negative ROS     Musculoskeletal (-) normal exam    (+) back pain, radiculopathy Right lower extremity  (-)  ABDIAZIZ test  Abdominal  - normal exam   Substance History      OB/GYN          Other   arthritis,                 Diagnosis and Plan      Treatment Plan  ASA 2   Patient has had previous injection/procedure with 25-50% improvement.   Procedures: Lumbar Epidural Steroid Injection(LESI), With fluoroscopy,       Anesthetic plan and risks discussed with patient.          Diagnosis     * Lumbar degenerative disc disease [M51.36]     * Lumbar radiculopathy [M54.16]

## 2019-12-04 NOTE — ANESTHESIA PROCEDURE NOTES
PAIN Epidural block    Pre-sedation assessment completed: 12/4/2019 9:30 AM    Patient reassessed immediately prior to procedure    Patient location during procedure: pain clinic  Start Time: 12/4/2019 9:30 AM  Stop Time: 12/4/2019 9:48 AM  Indication:procedure for pain  Performed By  Anesthesiologist: Jose Haas MD  Preanesthetic Checklist  Completed: patient identified, site marked, surgical consent, pre-op evaluation, timeout performed, risks and benefits discussed and monitors and equipment checked  Additional Notes  Depomedrol - 80mg    Needle position confirmed by fluoroscopy and epidurogram using 2cc of [id number].    Diagnosis  Post-Op Diagnosis Codes:     * Lumbar degenerative disc disease (M51.36)     * Lumbar radiculopathy (M54.16)    Prep:  Pt Position:prone  Sterile Tech:cap, gloves, mask and sterile barrier  Prep:chlorhexidine gluconate and isopropyl alcohol  Monitoring:blood pressure monitoring, continuous pulse oximetry and EKG  Procedure:Sedation: yes     Approach:right paramedian  Guidance: fluoroscopy  Location:lumbar  Level:3-4  Needle Type:Tuohy  Needle Gauge:20  Aspiration:negative  Medications:  Depomedrol:80 mg  Preservative Free Saline:2mL  Isovue:2mL    Post Assessment:  Post-procedure: bandaide.  Pt Tolerance:patient tolerated the procedure well with no apparent complications  Complications:no

## 2019-12-06 ENCOUNTER — TELEPHONE (OUTPATIENT)
Dept: CARDIOLOGY | Facility: CLINIC | Age: 64
End: 2019-12-06

## 2019-12-06 DIAGNOSIS — I25.119 CORONARY ARTERY DISEASE INVOLVING NATIVE CORONARY ARTERY OF NATIVE HEART WITH ANGINA PECTORIS (HCC): ICD-10-CM

## 2019-12-06 DIAGNOSIS — R07.2 PRECORDIAL PAIN: Primary | ICD-10-CM

## 2019-12-06 NOTE — TELEPHONE ENCOUNTER
"I called and spoke with him and explained to him that a calcium score of over 2000 is quite high and I am very concerned about this.  He said that he does have some chest discomfort when he starts exercising but usually goes away and he is able to continue with exercise but he says he just has not felt real well in the last few months.  He has a \"fullness in his chest but he has been attributing that to GI.  I think we need to do a stress echocardiogram for further evaluation.  "

## 2019-12-11 ENCOUNTER — APPOINTMENT (OUTPATIENT)
Dept: SLEEP MEDICINE | Facility: HOSPITAL | Age: 64
End: 2019-12-11

## 2019-12-12 ENCOUNTER — HOSPITAL ENCOUNTER (OUTPATIENT)
Dept: CARDIOLOGY | Facility: HOSPITAL | Age: 64
Discharge: HOME | End: 2019-12-12
Admitting: INTERNAL MEDICINE

## 2019-12-12 VITALS
HEART RATE: 66 BPM | DIASTOLIC BLOOD PRESSURE: 80 MMHG | WEIGHT: 195 LBS | HEIGHT: 70 IN | OXYGEN SATURATION: 98 % | BODY MASS INDEX: 27.92 KG/M2 | SYSTOLIC BLOOD PRESSURE: 150 MMHG

## 2019-12-12 DIAGNOSIS — I25.119 CORONARY ARTERY DISEASE INVOLVING NATIVE CORONARY ARTERY OF NATIVE HEART WITH ANGINA PECTORIS (HCC): ICD-10-CM

## 2019-12-12 DIAGNOSIS — R07.2 PRECORDIAL PAIN: ICD-10-CM

## 2019-12-12 LAB
AORTIC ROOT ANNULUS: 1.9 CM
ASCENDING AORTA: 3.3 CM
BH CV ECHO MEAS - ACS: 2 CM
BH CV ECHO MEAS - AO MAX PG: 9.2 MMHG
BH CV ECHO MEAS - AO V2 MAX: 152 CM/SEC
BH CV ECHO MEAS - ASC AORTA: 3.3 CM
BH CV ECHO MEAS - BSA(HAYCOCK): 2.1 M^2
BH CV ECHO MEAS - BSA: 2.1 M^2
BH CV ECHO MEAS - BZI_BMI: 27.2 KILOGRAMS/M^2
BH CV ECHO MEAS - BZI_METRIC_HEIGHT: 180.3 CM
BH CV ECHO MEAS - BZI_METRIC_WEIGHT: 88.5 KG
BH CV ECHO MEAS - EDV(MOD-SP4): 100 ML
BH CV ECHO MEAS - EDV(TEICH): 87.4 ML
BH CV ECHO MEAS - EF(CUBED): 69.8 %
BH CV ECHO MEAS - EF(MOD-BP): 61 %
BH CV ECHO MEAS - EF(MOD-SP4): 61 %
BH CV ECHO MEAS - EF(TEICH): 61.7 %
BH CV ECHO MEAS - ESV(MOD-SP4): 39 ML
BH CV ECHO MEAS - ESV(TEICH): 33.5 ML
BH CV ECHO MEAS - FS: 32.9 %
BH CV ECHO MEAS - IVS/LVPW: 1
BH CV ECHO MEAS - IVSD: 1 CM
BH CV ECHO MEAS - LAT PEAK E' VEL: 8 CM/SEC
BH CV ECHO MEAS - LV DIASTOLIC VOL/BSA (35-75): 47.9 ML/M^2
BH CV ECHO MEAS - LV MASS(C)D: 154.2 GRAMS
BH CV ECHO MEAS - LV MASS(C)DI: 73.9 GRAMS/M^2
BH CV ECHO MEAS - LV SYSTOLIC VOL/BSA (12-30): 18.7 ML/M^2
BH CV ECHO MEAS - LVIDD: 4.4 CM
BH CV ECHO MEAS - LVIDS: 2.9 CM
BH CV ECHO MEAS - LVLD AP4: 8.3 CM
BH CV ECHO MEAS - LVLS AP4: 7.4 CM
BH CV ECHO MEAS - LVPWD: 1 CM
BH CV ECHO MEAS - MED PEAK E' VEL: 7 CM/SEC
BH CV ECHO MEAS - MV A DUR: 0.11 SEC
BH CV ECHO MEAS - MV A MAX VEL: 69.8 CM/SEC
BH CV ECHO MEAS - MV DEC SLOPE: 273.5 CM/SEC^2
BH CV ECHO MEAS - MV DEC TIME: 0.27 SEC
BH CV ECHO MEAS - MV E MAX VEL: 76.3 CM/SEC
BH CV ECHO MEAS - MV E/A: 1.1
BH CV ECHO MEAS - MV P1/2T MAX VEL: 76.3 CM/SEC
BH CV ECHO MEAS - MV P1/2T: 81.7 MSEC
BH CV ECHO MEAS - MVA P1/2T LCG: 2.9 CM^2
BH CV ECHO MEAS - MVA(P1/2T): 2.7 CM^2
BH CV ECHO MEAS - PULM A REVS DUR: 0.09 SEC
BH CV ECHO MEAS - PULM A REVS VEL: 20.9 CM/SEC
BH CV ECHO MEAS - PULM DIAS VEL: 43.3 CM/SEC
BH CV ECHO MEAS - PULM S/D: 0.64
BH CV ECHO MEAS - PULM SYS VEL: 27.6 CM/SEC
BH CV ECHO MEAS - SI(CUBED): 28.4 ML/M^2
BH CV ECHO MEAS - SI(MOD-SP4): 29.2 ML/M^2
BH CV ECHO MEAS - SI(TEICH): 25.8 ML/M^2
BH CV ECHO MEAS - SV(CUBED): 59.2 ML
BH CV ECHO MEAS - SV(MOD-SP4): 61 ML
BH CV ECHO MEAS - SV(TEICH): 53.9 ML
BH CV ECHO MEAS - TAPSE (>1.6): 2.6 CM2
BH CV ECHO MEAS - TR MAX VEL: 183.2 CM/SEC
BH CV ECHO MEASUREMENTS AVERAGE E/E' RATIO: 10.17
BH CV STRESS BP STAGE 1: NORMAL
BH CV STRESS BP STAGE 2: NORMAL
BH CV STRESS BP STAGE 3: NORMAL
BH CV STRESS DURATION MIN STAGE 1: 3
BH CV STRESS DURATION MIN STAGE 2: 3
BH CV STRESS DURATION MIN STAGE 3: 3
BH CV STRESS DURATION MIN STAGE 4: 0
BH CV STRESS DURATION SEC STAGE 1: 0
BH CV STRESS DURATION SEC STAGE 2: 0
BH CV STRESS DURATION SEC STAGE 3: 0
BH CV STRESS DURATION SEC STAGE 4: 30
BH CV STRESS ECHO POST STRESS EJECTION FRACTION EF: 84 %
BH CV STRESS GRADE STAGE 1: 10
BH CV STRESS GRADE STAGE 2: 12
BH CV STRESS GRADE STAGE 3: 14
BH CV STRESS GRADE STAGE 4: 16
BH CV STRESS HR STAGE 1: 92
BH CV STRESS HR STAGE 2: 113
BH CV STRESS HR STAGE 3: 128
BH CV STRESS HR STAGE 4: 128
BH CV STRESS METS STAGE 1: 5
BH CV STRESS METS STAGE 2: 7.5
BH CV STRESS METS STAGE 3: 10
BH CV STRESS METS STAGE 4: 13.5
BH CV STRESS PROTOCOL 1: NORMAL
BH CV STRESS SPEED STAGE 1: 1.7
BH CV STRESS SPEED STAGE 2: 2.5
BH CV STRESS SPEED STAGE 3: 3.4
BH CV STRESS SPEED STAGE 4: 4.2
BH CV STRESS STAGE 1: 1
BH CV STRESS STAGE 2: 2
BH CV STRESS STAGE 3: 3
BH CV STRESS STAGE 4: 4
BH CV XLRA - RV BASE: 2.8 CM
BH CV XLRA - RV LENGTH: 7.8 CM
BH CV XLRA - RV MID: 2.5 CM
BH CV XLRA - TDI S': 14 CM/SEC
LEFT ATRIUM VOLUME INDEX: 19 ML/M2
MAXIMAL PREDICTED HEART RATE: 158 BPM
PERCENT MAX PREDICTED HR: 81.01 %
SINUS: 3.1 CM
STJ: 3.1 CM
STRESS BASELINE BP: NORMAL MMHG
STRESS BASELINE HR: 66 BPM
STRESS O2 SAT REST: 98 %
STRESS PERCENT HR: 95 %
STRESS POST ESTIMATED WORKLOAD: 11 METS
STRESS POST EXERCISE DUR MIN: 9 MIN
STRESS POST EXERCISE DUR SEC: 30 SEC
STRESS POST PEAK BP: NORMAL MMHG
STRESS POST PEAK HR: 128 BPM
STRESS TARGET HR: 134 BPM

## 2019-12-12 PROCEDURE — 93352 ADMIN ECG CONTRAST AGENT: CPT | Performed by: INTERNAL MEDICINE

## 2019-12-12 PROCEDURE — 93016 CV STRESS TEST SUPVJ ONLY: CPT | Performed by: INTERNAL MEDICINE

## 2019-12-12 PROCEDURE — 93320 DOPPLER ECHO COMPLETE: CPT

## 2019-12-12 PROCEDURE — 93350 STRESS TTE ONLY: CPT

## 2019-12-12 PROCEDURE — 93325 DOPPLER ECHO COLOR FLOW MAPG: CPT | Performed by: INTERNAL MEDICINE

## 2019-12-12 PROCEDURE — 93325 DOPPLER ECHO COLOR FLOW MAPG: CPT

## 2019-12-12 PROCEDURE — 93320 DOPPLER ECHO COMPLETE: CPT | Performed by: INTERNAL MEDICINE

## 2019-12-12 PROCEDURE — 93017 CV STRESS TEST TRACING ONLY: CPT

## 2019-12-12 PROCEDURE — 93018 CV STRESS TEST I&R ONLY: CPT | Performed by: INTERNAL MEDICINE

## 2019-12-12 PROCEDURE — 25010000002 PERFLUTREN (DEFINITY) 8.476 MG IN SODIUM CHLORIDE 0.9 % 10 ML INJECTION: Performed by: INTERNAL MEDICINE

## 2019-12-12 PROCEDURE — 93350 STRESS TTE ONLY: CPT | Performed by: INTERNAL MEDICINE

## 2019-12-12 RX ADMIN — PERFLUTREN 3 ML: 6.52 INJECTION, SUSPENSION INTRAVENOUS at 11:03

## 2019-12-13 ENCOUNTER — TELEPHONE (OUTPATIENT)
Dept: CARDIOLOGY | Facility: CLINIC | Age: 64
End: 2019-12-13

## 2019-12-13 DIAGNOSIS — R07.2 PRECORDIAL PAIN: Primary | ICD-10-CM

## 2019-12-13 DIAGNOSIS — I25.10 CORONARY ARTERY CALCIFICATION: ICD-10-CM

## 2019-12-13 DIAGNOSIS — I25.84 CORONARY ARTERY CALCIFICATION: ICD-10-CM

## 2019-12-13 NOTE — TELEPHONE ENCOUNTER
"Dr Kenyon,   I called and he has further questions I was not able to answer.    He is very concerned about his calcium score of 2068, he doesn't understand how he can have a calcium score so elevated and a normal stress echo.  He also had concerns that it was \"submaximal\"    He is still having mild exertional chest pain.  He said he has been dieting and exercising and watching his cholesterol and blood pressure.  He is scared about his calcium score.    Please call.  Thanks  Teresa Fernandez RN  Triage nurse    "

## 2019-12-13 NOTE — TELEPHONE ENCOUNTER
The patient is calling and would like to speak to you about his ECHO that he had done on 12/6/19. He can be reached at [phone]    Thanks  Jose Armando

## 2019-12-13 NOTE — TELEPHONE ENCOUNTER
The stress echo was done on December 12, 2019.    · No ischemic echo findings were noted with submaximal stress. The patient only reached 81% of his age predicted maximum heart rate.  · Left ventricular systolic function is normal. Calculated EF = 61%. Estimated EF was in agreement with the calculated EF. Normal left ventricular cavity size and wall thickness noted. All left ventricular wall segments contract normally. Left ventricular diastolic function is normal.      These let him know that everything was normal.  So, even though his calcium score is quite high, it does not appear that he has any tightly blocked arteries at this point in time.  He needs a heart healthy diet, regular exercise, good blood pressure control and good cholesterol control.

## 2019-12-18 NOTE — TELEPHONE ENCOUNTER
I called and spoke with him.  We spent a long time on the phone and I explained to him how it could be that he did have a high calcium score but may not have greater than 70% blockage.  Unfortunately, his stress test was submaximal and he does have symptoms when he is riding his bike where he will get discomfort in his chest and when he slows down his pace it gets better.  He is scared to re-push himself to see if the pain would come back.  With his high calcium score, I recommend a heart catheterization and he is agreeable.    He wonders if it can be done before the end of the year for insurance reasons?  He knows Dr. Wood but I think would be okay with someone else cathing him if that is with the schedule required.  He expects your call.

## 2019-12-19 PROBLEM — R07.2 PRECORDIAL PAIN: Status: ACTIVE | Noted: 2019-12-19

## 2019-12-19 PROBLEM — I25.10 CORONARY ARTERY CALCIFICATION: Status: ACTIVE | Noted: 2019-12-19

## 2019-12-19 PROBLEM — I25.84 CORONARY ARTERY CALCIFICATION: Status: ACTIVE | Noted: 2019-12-19

## 2019-12-23 ENCOUNTER — TRANSCRIBE ORDERS (OUTPATIENT)
Dept: ADMINISTRATIVE | Facility: HOSPITAL | Age: 64
End: 2019-12-23

## 2019-12-23 ENCOUNTER — LAB (OUTPATIENT)
Dept: LAB | Facility: HOSPITAL | Age: 64
End: 2019-12-23

## 2019-12-23 DIAGNOSIS — I20.8 STABLE ANGINA (HCC): Primary | ICD-10-CM

## 2019-12-23 DIAGNOSIS — I20.8 STABLE ANGINA (HCC): ICD-10-CM

## 2019-12-23 LAB
ANION GAP SERPL CALCULATED.3IONS-SCNC: 14.3 MMOL/L (ref 5–15)
BASOPHILS # BLD AUTO: 0.05 10*3/MM3 (ref 0–0.2)
BASOPHILS NFR BLD AUTO: 0.7 % (ref 0–1.5)
BUN BLD-MCNC: 23 MG/DL (ref 8–23)
BUN/CREAT SERPL: 19.8 (ref 7–25)
CALCIUM SPEC-SCNC: 9.7 MG/DL (ref 8.6–10.5)
CHLORIDE SERPL-SCNC: 105 MMOL/L (ref 98–107)
CO2 SERPL-SCNC: 24.7 MMOL/L (ref 22–29)
CREAT BLD-MCNC: 1.16 MG/DL (ref 0.76–1.27)
DEPRECATED RDW RBC AUTO: 42.7 FL (ref 37–54)
EOSINOPHIL # BLD AUTO: 0.34 10*3/MM3 (ref 0–0.4)
EOSINOPHIL NFR BLD AUTO: 4.8 % (ref 0.3–6.2)
ERYTHROCYTE [DISTWIDTH] IN BLOOD BY AUTOMATED COUNT: 12.4 % (ref 12.3–15.4)
GFR SERPL CREATININE-BSD FRML MDRD: 64 ML/MIN/1.73
GLUCOSE BLD-MCNC: 100 MG/DL (ref 65–99)
HCT VFR BLD AUTO: 43.9 % (ref 37.5–51)
HGB BLD-MCNC: 15.5 G/DL (ref 13–17.7)
IMM GRANULOCYTES # BLD AUTO: 0.05 10*3/MM3 (ref 0–0.05)
IMM GRANULOCYTES NFR BLD AUTO: 0.7 % (ref 0–0.5)
LYMPHOCYTES # BLD AUTO: 1.93 10*3/MM3 (ref 0.7–3.1)
LYMPHOCYTES NFR BLD AUTO: 27.5 % (ref 19.6–45.3)
MCH RBC QN AUTO: 33.6 PG (ref 26.6–33)
MCHC RBC AUTO-ENTMCNC: 35.3 G/DL (ref 31.5–35.7)
MCV RBC AUTO: 95.2 FL (ref 79–97)
MONOCYTES # BLD AUTO: 0.66 10*3/MM3 (ref 0.1–0.9)
MONOCYTES NFR BLD AUTO: 9.4 % (ref 5–12)
NEUTROPHILS # BLD AUTO: 3.99 10*3/MM3 (ref 1.7–7)
NEUTROPHILS NFR BLD AUTO: 56.9 % (ref 42.7–76)
NRBC BLD AUTO-RTO: 0 /100 WBC (ref 0–0.2)
PLATELET # BLD AUTO: 230 10*3/MM3 (ref 140–450)
PMV BLD AUTO: 10.3 FL (ref 6–12)
POTASSIUM BLD-SCNC: 4.6 MMOL/L (ref 3.5–5.2)
RBC # BLD AUTO: 4.61 10*6/MM3 (ref 4.14–5.8)
SODIUM BLD-SCNC: 144 MMOL/L (ref 136–145)
WBC NRBC COR # BLD: 7.02 10*3/MM3 (ref 3.4–10.8)

## 2019-12-23 PROCEDURE — 36415 COLL VENOUS BLD VENIPUNCTURE: CPT

## 2019-12-23 PROCEDURE — 85025 COMPLETE CBC W/AUTO DIFF WBC: CPT

## 2019-12-23 PROCEDURE — 80048 BASIC METABOLIC PNL TOTAL CA: CPT

## 2019-12-27 ENCOUNTER — HOSPITAL ENCOUNTER (OUTPATIENT)
Facility: HOSPITAL | Age: 64
Discharge: HOME | End: 2019-12-28
Attending: INTERNAL MEDICINE | Admitting: INTERNAL MEDICINE

## 2019-12-27 DIAGNOSIS — I25.10 CORONARY ARTERY CALCIFICATION: ICD-10-CM

## 2019-12-27 DIAGNOSIS — I25.84 CORONARY ARTERY CALCIFICATION: ICD-10-CM

## 2019-12-27 DIAGNOSIS — R07.2 PRECORDIAL PAIN: ICD-10-CM

## 2019-12-27 LAB
ACT BLD: 268 SECONDS (ref 82–152)
GLUCOSE BLDC GLUCOMTR-MCNC: 159 MG/DL (ref 70–130)
TROPONIN T SERPL-MCNC: <0.01 NG/ML (ref 0–0.03)

## 2019-12-27 PROCEDURE — 93010 ELECTROCARDIOGRAM REPORT: CPT | Performed by: INTERNAL MEDICINE

## 2019-12-27 PROCEDURE — 92928 PRQ TCAT PLMT NTRAC ST 1 LES: CPT | Performed by: INTERNAL MEDICINE

## 2019-12-27 PROCEDURE — 0 IOPAMIDOL PER 1 ML: Performed by: INTERNAL MEDICINE

## 2019-12-27 PROCEDURE — 25010000002 HEPARIN (PORCINE) PER 1000 UNITS: Performed by: INTERNAL MEDICINE

## 2019-12-27 PROCEDURE — C1769 GUIDE WIRE: HCPCS | Performed by: INTERNAL MEDICINE

## 2019-12-27 PROCEDURE — 93458 L HRT ARTERY/VENTRICLE ANGIO: CPT | Performed by: INTERNAL MEDICINE

## 2019-12-27 PROCEDURE — C1874 STENT, COATED/COV W/DEL SYS: HCPCS | Performed by: INTERNAL MEDICINE

## 2019-12-27 PROCEDURE — C9600 PERC DRUG-EL COR STENT SING: HCPCS | Performed by: INTERNAL MEDICINE

## 2019-12-27 PROCEDURE — 84484 ASSAY OF TROPONIN QUANT: CPT | Performed by: INTERNAL MEDICINE

## 2019-12-27 PROCEDURE — C1725 CATH, TRANSLUMIN NON-LASER: HCPCS | Performed by: INTERNAL MEDICINE

## 2019-12-27 PROCEDURE — 85347 COAGULATION TIME ACTIVATED: CPT

## 2019-12-27 PROCEDURE — C1894 INTRO/SHEATH, NON-LASER: HCPCS | Performed by: INTERNAL MEDICINE

## 2019-12-27 PROCEDURE — G0378 HOSPITAL OBSERVATION PER HR: HCPCS

## 2019-12-27 PROCEDURE — 93005 ELECTROCARDIOGRAM TRACING: CPT | Performed by: INTERNAL MEDICINE

## 2019-12-27 PROCEDURE — C1887 CATHETER, GUIDING: HCPCS | Performed by: INTERNAL MEDICINE

## 2019-12-27 PROCEDURE — 25010000002 MIDAZOLAM PER 1 MG: Performed by: INTERNAL MEDICINE

## 2019-12-27 PROCEDURE — 82962 GLUCOSE BLOOD TEST: CPT

## 2019-12-27 PROCEDURE — 25010000002 FENTANYL CITRATE (PF) 100 MCG/2ML SOLUTION: Performed by: INTERNAL MEDICINE

## 2019-12-27 DEVICE — XIENCE SIERRA™ EVEROLIMUS ELUTING CORONARY STENT SYSTEM 3.25 MM X 12 MM / RAPID-EXCHANGE
Type: IMPLANTABLE DEVICE | Status: FUNCTIONAL
Brand: XIENCE SIERRA™

## 2019-12-27 DEVICE — XIENCE SIERRA™ EVEROLIMUS ELUTING CORONARY STENT SYSTEM 3.25 MM X 15 MM / RAPID-EXCHANGE
Type: IMPLANTABLE DEVICE | Status: FUNCTIONAL
Brand: XIENCE SIERRA™

## 2019-12-27 RX ORDER — CLOPIDOGREL BISULFATE 75 MG/1
75 TABLET ORAL DAILY
Qty: 90 TABLET | Refills: 3 | Status: SHIPPED | OUTPATIENT
Start: 2019-12-27 | End: 2020-07-30 | Stop reason: ALTCHOICE

## 2019-12-27 RX ORDER — SODIUM CHLORIDE 0.9 % (FLUSH) 0.9 %
10 SYRINGE (ML) INJECTION AS NEEDED
Status: DISCONTINUED | OUTPATIENT
Start: 2019-12-27 | End: 2019-12-27 | Stop reason: HOSPADM

## 2019-12-27 RX ORDER — CLOPIDOGREL BISULFATE 75 MG/1
600 TABLET ORAL ONCE
Status: DISCONTINUED | OUTPATIENT
Start: 2019-12-27 | End: 2019-12-28 | Stop reason: HOSPADM

## 2019-12-27 RX ORDER — ASPIRIN 81 MG/1
81 TABLET ORAL ONCE
Status: COMPLETED | OUTPATIENT
Start: 2019-12-28 | End: 2019-12-28

## 2019-12-27 RX ORDER — ATORVASTATIN CALCIUM 20 MG/1
40 TABLET, FILM COATED ORAL NIGHTLY
Status: DISCONTINUED | OUTPATIENT
Start: 2019-12-27 | End: 2019-12-28 | Stop reason: HOSPADM

## 2019-12-27 RX ORDER — LOSARTAN POTASSIUM 50 MG/1
50 TABLET ORAL DAILY
Status: DISCONTINUED | OUTPATIENT
Start: 2019-12-27 | End: 2019-12-28 | Stop reason: HOSPADM

## 2019-12-27 RX ORDER — LIDOCAINE HYDROCHLORIDE 10 MG/ML
0.1 INJECTION, SOLUTION EPIDURAL; INFILTRATION; INTRACAUDAL; PERINEURAL ONCE AS NEEDED
Status: DISCONTINUED | OUTPATIENT
Start: 2019-12-27 | End: 2019-12-27 | Stop reason: HOSPADM

## 2019-12-27 RX ORDER — HEPARIN SODIUM 1000 [USP'U]/ML
INJECTION, SOLUTION INTRAVENOUS; SUBCUTANEOUS AS NEEDED
Status: DISCONTINUED | OUTPATIENT
Start: 2019-12-27 | End: 2019-12-27 | Stop reason: HOSPADM

## 2019-12-27 RX ORDER — SODIUM CHLORIDE 0.9 % (FLUSH) 0.9 %
3 SYRINGE (ML) INJECTION EVERY 12 HOURS SCHEDULED
Status: DISCONTINUED | OUTPATIENT
Start: 2019-12-27 | End: 2019-12-28 | Stop reason: HOSPADM

## 2019-12-27 RX ORDER — LIDOCAINE HYDROCHLORIDE 20 MG/ML
INJECTION, SOLUTION INFILTRATION; PERINEURAL AS NEEDED
Status: DISCONTINUED | OUTPATIENT
Start: 2019-12-27 | End: 2019-12-27 | Stop reason: HOSPADM

## 2019-12-27 RX ORDER — NITROGLYCERIN 0.4 MG/1
0.4 TABLET SUBLINGUAL ONCE
Status: COMPLETED | OUTPATIENT
Start: 2019-12-27 | End: 2019-12-27

## 2019-12-27 RX ORDER — MELATONIN
1000 2 TIMES DAILY
COMMUNITY
End: 2022-07-07

## 2019-12-27 RX ORDER — SODIUM CHLORIDE 0.9 % (FLUSH) 0.9 %
10 SYRINGE (ML) INJECTION AS NEEDED
Status: DISCONTINUED | OUTPATIENT
Start: 2019-12-27 | End: 2019-12-28 | Stop reason: HOSPADM

## 2019-12-27 RX ORDER — NAPROXEN 500 MG/1
500 TABLET ORAL ONCE AS NEEDED
Status: COMPLETED | OUTPATIENT
Start: 2019-12-27 | End: 2019-12-27

## 2019-12-27 RX ORDER — SODIUM CHLORIDE 0.9 % (FLUSH) 0.9 %
3 SYRINGE (ML) INJECTION EVERY 12 HOURS SCHEDULED
Status: DISCONTINUED | OUTPATIENT
Start: 2019-12-27 | End: 2019-12-27 | Stop reason: HOSPADM

## 2019-12-27 RX ORDER — FENTANYL CITRATE 50 UG/ML
INJECTION, SOLUTION INTRAMUSCULAR; INTRAVENOUS AS NEEDED
Status: DISCONTINUED | OUTPATIENT
Start: 2019-12-27 | End: 2019-12-27 | Stop reason: HOSPADM

## 2019-12-27 RX ORDER — CLOPIDOGREL BISULFATE 75 MG/1
75 TABLET ORAL DAILY
Status: DISCONTINUED | OUTPATIENT
Start: 2019-12-28 | End: 2019-12-28 | Stop reason: HOSPADM

## 2019-12-27 RX ORDER — ZOLPIDEM TARTRATE 5 MG/1
5 TABLET ORAL NIGHTLY PRN
Status: DISCONTINUED | OUTPATIENT
Start: 2019-12-27 | End: 2019-12-28 | Stop reason: HOSPADM

## 2019-12-27 RX ORDER — CARVEDILOL 12.5 MG/1
12.5 TABLET ORAL 2 TIMES DAILY WITH MEALS
Status: DISCONTINUED | OUTPATIENT
Start: 2019-12-27 | End: 2019-12-28 | Stop reason: HOSPADM

## 2019-12-27 RX ORDER — CLOPIDOGREL BISULFATE 75 MG/1
TABLET ORAL AS NEEDED
Status: DISCONTINUED | OUTPATIENT
Start: 2019-12-27 | End: 2019-12-27 | Stop reason: HOSPADM

## 2019-12-27 RX ORDER — UBIDECARENONE 100 MG
100 CAPSULE ORAL DAILY
COMMUNITY
End: 2020-11-11

## 2019-12-27 RX ORDER — MIDAZOLAM HYDROCHLORIDE 1 MG/ML
INJECTION INTRAMUSCULAR; INTRAVENOUS AS NEEDED
Status: DISCONTINUED | OUTPATIENT
Start: 2019-12-27 | End: 2019-12-27 | Stop reason: HOSPADM

## 2019-12-27 RX ORDER — SODIUM CHLORIDE 9 MG/ML
75 INJECTION, SOLUTION INTRAVENOUS CONTINUOUS
Status: DISCONTINUED | OUTPATIENT
Start: 2019-12-27 | End: 2019-12-28 | Stop reason: HOSPADM

## 2019-12-27 RX ADMIN — ZOLPIDEM TARTRATE 5 MG: 5 TABLET ORAL at 23:02

## 2019-12-27 RX ADMIN — SODIUM CHLORIDE, PRESERVATIVE FREE 3 ML: 5 INJECTION INTRAVENOUS at 21:15

## 2019-12-27 RX ADMIN — ATORVASTATIN CALCIUM 40 MG: 20 TABLET, FILM COATED ORAL at 21:15

## 2019-12-27 RX ADMIN — NITROGLYCERIN 0.4 MG: 0.4 TABLET SUBLINGUAL at 13:48

## 2019-12-27 RX ADMIN — SODIUM CHLORIDE 75 ML/HR: 9 INJECTION, SOLUTION INTRAVENOUS at 10:00

## 2019-12-27 RX ADMIN — NITROGLYCERIN 1 INCH: 20 OINTMENT TOPICAL at 13:51

## 2019-12-27 RX ADMIN — NAPROXEN 500 MG: 500 TABLET ORAL at 12:43

## 2019-12-27 RX ADMIN — CARVEDILOL 12.5 MG: 12.5 TABLET, FILM COATED ORAL at 17:57

## 2019-12-27 NOTE — NURSING NOTE
Pt arrived to floor escorted by RN from Cath lab. A & O x 3.  C/o small amount of pain at L upper chest 1/10 at this time.  Pt refuses pain medication at this time.  /94, 98% on RA, 61, 20 . Will continue to monitor.

## 2019-12-27 NOTE — DISCHARGE INSTRUCTIONS
[facility]  [address] [place] [address]    Coronary Angiogram with Stent (Radial Approach) After Care    Refer to this sheet in the next few weeks. These instructions provide you with information on caring for yourself after your procedure. Your health care provider may also give you more specific instructions. Your treatment has been planned according to current medical practices, but problems sometimes occur. Call your health care provider if you have any problems or questions after your procedure.       Home Care Instructions:  · You may shower the day after the procedure. Remove the bandage (dressing) and gently wash the site with plain soap and water. Gently pat the site dry. You may apply a band aid daily for 2 days if desired.    · Do not apply powder or lotion to the site.  · Do not submerge the affected site in water for 3 to 5 days or until the site is completely healed.   · Do not flex or bend the affected arm for 24 hours.  · Do not lift, push or pull anything over 10 pounds for 2 days after your procedure.  · Do not operate machinery or power tools for 24 hours.  · Inspect the site at least twice daily. You may notice some bruising at the site and it may be tender for 1 to 2 weeks.     · Increase your fluid intake for the next 2 days.    · Keep arm elevated for 24 hours.  For the remainder of the day, keep your arm in the “Pledge of Allegiance” position when up and about.    · Limit your activity for the next 48 hours and avoid strenuous activity as directed by your physician.   · Cardiac Rehab may or may not be ordered.  Please consult with your physician  · You may drive 24 hours after the procedure unless otherwise instructed by your caregiver.  · A responsible adult should be with you for the first 24 hours after you arrive home.   · Do not make any important legal decisions or sign legal papers for 24 hours. Do not drink alcohol for 24 hours.    · Take medicines only as  directed by your health care provider.  Blood thinners may be prescribed after your procedure to improve blood flow through the stent.    · Metformin or any medications containing Metformin should not be taken for 48 hours after your procedure.    · Eat a heart-healthy diet. This should include plenty of fresh fruits and vegetables. Meat should be lean cuts. Avoid the following types of food:    ¨ Food that is high in salt.    ¨ Canned or highly processed food.    ¨ Food that is high in saturated fat or sugar.    ¨ Fried food.    · Make any other lifestyle changes recommended by your health care provider. This may include:    ¨ Not using any tobacco products including cigarettes, chewing tobacco, or electronic cigarettes.   ¨ Managing your weight.    ¨ Getting regular exercise.    ¨ Managing your blood pressure.    ¨ Limiting your alcohol intake.    ¨ Managing other health problems, such as diabetes.    · If you need an MRI after your heart stent was placed, be sure to tell the health care provider who orders the MRI that you have a heart stent.    · Keep all follow-up visits as directed by your health care provider.    ·   Call Your Doctor If:  · You have unusual pain at the radial/ulnar (wrist) site.  · You have redness, warmth, swelling, or pain at the radial/ulnar (wrist) site.  · You have drainage (other than a small amount of blood on the dressing).  · `You have chills or a fever > 101.  · Your arm becomes pale or dark, cool, tingly, or numb.  · You develop chest pain, shortness of breath, feel faint or pass out.    · You have heavy bleeding from the site, hold pressure on the site for 20 minutes.  If the bleeding stops, apply a fresh bandage and call your cardiologist.  However, if you continue to have bleeding, call 911.        Make Sure You:   · Understand these instructions.  · Will watch your condition.  · Will get help right away if you are not doing well or get worse.      Use Naproxen sparingly with  Plavix. Consult back specialist for further instruction and medication recommendation.

## 2019-12-27 NOTE — CONSULTS
Met with patient, discussed benefits of cardiac rehab. Provided phase II information packet. I provided the contact information for cardiac rehab [facility].

## 2019-12-28 VITALS
DIASTOLIC BLOOD PRESSURE: 85 MMHG | HEART RATE: 65 BPM | WEIGHT: 195 LBS | OXYGEN SATURATION: 95 % | BODY MASS INDEX: 27.3 KG/M2 | RESPIRATION RATE: 18 BRPM | HEIGHT: 71 IN | TEMPERATURE: 97.6 F | SYSTOLIC BLOOD PRESSURE: 133 MMHG

## 2019-12-28 PROBLEM — R07.2 PRECORDIAL PAIN: Status: RESOLVED | Noted: 2019-12-19 | Resolved: 2019-12-28

## 2019-12-28 LAB
ANION GAP SERPL CALCULATED.3IONS-SCNC: 10.9 MMOL/L (ref 5–15)
BUN BLD-MCNC: 21 MG/DL (ref 8–23)
BUN/CREAT SERPL: 18.3 (ref 7–25)
CALCIUM SPEC-SCNC: 8.7 MG/DL (ref 8.6–10.5)
CHLORIDE SERPL-SCNC: 104 MMOL/L (ref 98–107)
CO2 SERPL-SCNC: 25.1 MMOL/L (ref 22–29)
CREAT BLD-MCNC: 1.15 MG/DL (ref 0.76–1.27)
GFR SERPL CREATININE-BSD FRML MDRD: 64 ML/MIN/1.73
GLUCOSE BLD-MCNC: 101 MG/DL (ref 65–99)
GLUCOSE BLDC GLUCOMTR-MCNC: 101 MG/DL (ref 70–130)
GLUCOSE BLDC GLUCOMTR-MCNC: 111 MG/DL (ref 70–130)
POTASSIUM BLD-SCNC: 4.1 MMOL/L (ref 3.5–5.2)
SODIUM BLD-SCNC: 140 MMOL/L (ref 136–145)
TROPONIN T SERPL-MCNC: <0.01 NG/ML (ref 0–0.03)

## 2019-12-28 PROCEDURE — 99217 PR OBSERVATION CARE DISCHARGE MANAGEMENT: CPT | Performed by: NURSE PRACTITIONER

## 2019-12-28 PROCEDURE — 82962 GLUCOSE BLOOD TEST: CPT

## 2019-12-28 PROCEDURE — 93010 ELECTROCARDIOGRAM REPORT: CPT | Performed by: INTERNAL MEDICINE

## 2019-12-28 PROCEDURE — 80048 BASIC METABOLIC PNL TOTAL CA: CPT | Performed by: INTERNAL MEDICINE

## 2019-12-28 PROCEDURE — 93005 ELECTROCARDIOGRAM TRACING: CPT | Performed by: INTERNAL MEDICINE

## 2019-12-28 PROCEDURE — 84484 ASSAY OF TROPONIN QUANT: CPT | Performed by: INTERNAL MEDICINE

## 2019-12-28 PROCEDURE — G0378 HOSPITAL OBSERVATION PER HR: HCPCS

## 2019-12-28 RX ORDER — ACETAMINOPHEN 325 MG/1
650 TABLET ORAL ONCE
Status: COMPLETED | OUTPATIENT
Start: 2019-12-28 | End: 2019-12-28

## 2019-12-28 RX ADMIN — CARVEDILOL 12.5 MG: 12.5 TABLET, FILM COATED ORAL at 08:44

## 2019-12-28 RX ADMIN — ACETAMINOPHEN 650 MG: 325 TABLET, FILM COATED ORAL at 10:56

## 2019-12-28 RX ADMIN — CLOPIDOGREL 75 MG: 75 TABLET, FILM COATED ORAL at 08:44

## 2019-12-28 RX ADMIN — LOSARTAN POTASSIUM 50 MG: 50 TABLET, FILM COATED ORAL at 08:44

## 2019-12-28 RX ADMIN — ASPIRIN 81 MG: 81 TABLET, COATED ORAL at 08:44

## 2019-12-28 NOTE — DISCHARGE SUMMARY
Physician Discharge Summary    Patient Identification:  Name: Avni Olivo III  Age: 64 y.o.  Sex: male  :  1955  MRN: [id number]    Admit date: 2019    Discharge date and time:  2019 12:20 PM       Admitting Physician: Terrance Wood MD     Discharge Provider: JESSICA Harris    Discharge Diagnoses:   Patient Active Problem List   Diagnosis   • Spinal stenosis of lumbar region with neurogenic claudication   • Essential hypertension   • DDD (degenerative disc disease), lumbar   • Mixed hyperlipidemia   • PVC's (premature ventricular contractions)   • Pressure in chest   • Coronary artery calcification       Discharged Condition: good    Hospital Course:     Mr. Olivo is a 64-year-old  male who had a highly abnormal calcium score who was brought in for elective cardiac catheterization on 2019 by Dr. Wood.    Patient underwent successful left heart cath, coronary arteriography, left ventriculography, PTCA/KALYAN x2 to the mid LAD.  Cardiac cath did reveal three-vessel calcification but critical narrowing of the mid LAD was noted and treated with KALYAN x2.  Left ventriculogram showed an EF was 55 to 60% with global contractility normal.  Recommendations were for dual antiplatelet therapy for at least 9 months and aggressive risk factor modification.  The evening of the procedure    The evening of the procedure but he did develop some midsternal chest pain so discharge was canceled and patient was held overnight for further observation.  The morning of discharge patient been up ambulating in the hallway without any chest pain or pressure.  Twelve-lead EKG today showed no acute ST or T wave abnormalities and was stable from the prior EKG on 2019.  Hemodynamically the patient did well post cath and telemetry he remained stable showing sinus rhythm.  I reviewed the results of the cardiac cath report along with activity guidelines as outlined below.  He will follow-up with  Dr. Polanco's nurse practitioner in 1 week and Dr. Kenyon in 4 weeks.     He does have chronic back pain which he receives epidural injections for.  I did discuss with him risks of stopping dual antiplatelet therapy prematurely and that he would need to get clearance from cardiology before stopping dual antiplatelet therapy.  He does report that at some point he is to have back surgery and informed him he would need to discuss timing of this with his cardiologist due to the need for dual antiplatelet therapy at least 9 months per Dr. Hinton's recommendation.      Consults:   None    Discharge Exam:  General: No acute distress   Skin: Warm and dry, no diaphoresis noted   EYES: Non icteric, no conjunctival discharge   HEENT: external ear and nose normal; oral mucosa moist   Neck: Supple; no carotid bruits; no JVD   Heart: S1S2 regular rate and rhythm; no murmurs; no gallop or rub appreciated   Pulmonary: Respirations regular, unlabored at rest, bilateral breath sounds have good air entry throughout all lung fields; no crackles, rubs or wheezes auscultated.     GI: Soft, non-tender, non-distended, positive bowel sounds  No hepatosplenomegaly   Extremities: Bilateral lower extremities have no pre-tibial pitting edema; DP/PT pulses are palpable; right radial cath site opsite dressing d/i.  Dressing removed to reveal no bruising, thrill, or bruit.  Radial and ulnar sites 2+. Hands warm.  Cap refill normal.    Neurological: Alert and oriented x 3; no neuro deficits          LABS:  Results from last 7 days   Lab Units 12/28/19  0252 12/27/19  1437   TROPONIN T ng/mL <0.010 <0.010         Results from last 7 days   Lab Units 12/28/19  0252   SODIUM mmol/L 140   POTASSIUM mmol/L 4.1   BUN mg/dL 21   CREATININE mg/dL 1.15   CALCIUM mg/dL 8.7     Results from last 7 days   Lab Units 12/23/19  0659   WBC 10*3/mm3 7.02   HEMOGLOBIN g/dL 15.5   HEMATOCRIT % 43.9   PLATELETS 10*3/mm3 230            Conclusion 12/27/19     Date:  12/27/2019  Procedure: Left heart catheterization, coronary arteriography, left ventriculography, PTCA/KALYAN x2 mid LAD, just sedation  Indication: Angina pectoris, abnormal  calcium score     Technique: The patient was prepped and draped in the usual sterile fashion.  1% Xylocaine without epinephrine was used to anesthetize the skin and subcutaneous tissue in the right wrist.  The right radial artery was entered using the modified Seldinger technique and a 6 Serbian sheath was advanced into the artery over guidewire.  Intra-arterial heparin, verapamil and nitroglycerin were then administered.  6 Serbian Dequan catheters were then used to perform selective left and right coronary arteriography and a variety of axial and Elieso axial projections using small amounts of hand injected contrast.  A 6 Serbian pigtail catheter was placed in the body of left ventricle and left ventriculography was performed in a 30 degree SANDERS projection using 45 cc of contrast power injected at 15 cc/s.     A 6 FL 4 guiding catheter was then positioned in the left main coronary artery.  A 0.014 sport wire was advanced down the left anterior descending coronary artery across the critical stenosis in the mid LAD.  A 3.0 x 15 mm Trek balloon catheter was then used to dilate the stenosis in the mid segment.  This was followed by deployment of a 3.25 x 15 mm Chandrika drug-eluting stent.  Because of a postdilatation dissection just distal to the stent a second stent was deployed.  It was a 3.25 x 12 mm Chandrika drug-eluting stent.  Follow-up angiography was performed and the catheter system was removed.     The radial artery catheter was removed, hemostasis ensured and the patient was discharged from the lab in stable condition.  There were no complications.  Estimated blood loss was minimal.  Conscious sedation was administered with Versed and fentanyl.     Hemodynamics:  1.  Aorta: 125/70, mean 96  2.  Left ventricle 125/11     Cineangiography:  1.  Left  main: Left main coronary artery is mildly calcified.  There is no critical narrowing noted.  2.  LAD: The proximal segment of the LAD appears to be normal.  The mid segment of the artery has a 30% stenosis that narrows to an 85% stenosis after the origin of a large septal .  The remainder of the LAD appears to be normal.  There is a large branching diagonal system that arises from the proximal segment as well.  The diagonal system is mildly calcified but no critical narrowing is noted.  3.  LCx: The left circumflex coronary artery is a large vessel that appears to be mildly calcified in the mid segment.  The proximal segment is normal.  The distal segment terminates in the AV groove and is normal.  The first marginal branch arising from the proximal segment is small and narrowed 50% in its proximal portion.  The distal marginal branch is a large branching system that is normal.  4.  RCA: The right coronary artery is a large dominant vessel.  There is luminal irregularities noted in the right coronary artery as well as calcification specifically in the mid segment.  The most significant narrowing is 20 to 30%.  The PDA is normal.  The posterior lateral branches are normal.     Left ventriculography: The overall size of the ventricle appears to be normal.  The global contractility of the ventricle is normal.  Estimated ejection fraction is 55 to 60%.     Intervention:  1.  Mid LAD: The initial stenosis is 85%.  The initial GAVIN flow is 2.  The lesion type is B2.  The residual narrowing 0.  Residual GAVIN flow 3.     Assessment:  1.  Ischemic heart disease:  Etiology: Coronary atherosclerosis  Anatomy: Three-vessel calcification but critical narrowing of the mid LAD  Physiology: Angina pectoris, abnormal calcium score  Commendations: Dual antiplatelet therapy for at least 9 months, aggressive risk factor modification.           Disposition:  home    Discharge Medications:      Discharge Medications      New  Medications      Instructions Start Date   clopidogrel 75 MG tablet  Commonly known as:  PLAVIX   75 mg, Oral, Daily         Continue These Medications      Instructions Start Date   7-KETO LEAN capsule   1 tablet, Oral, Daily      aspirin 81 MG tablet   81 mg, Oral, Daily      atorvastatin 40 MG tablet  Commonly known as:  LIPITOR   40 mg, Oral, Nightly      CALCIUM PO   Oral, Daily      carvedilol 12.5 MG tablet  Commonly known as:  COREG   12.5 mg, Oral, 2 Times Daily      cholecalciferol 25 MCG (1000 UT) tablet  Commonly known as:  VITAMIN D3   2,000 Units, Oral, Daily      coenzyme Q10 100 MG capsule   100 mg, Oral, Daily      DHEA 50 50 MG capsule  Generic drug:  Prasterone (DHEA)   1 capsule, Oral, 2 Times Daily      eszopiclone 2 MG tablet  Commonly known as:  LUNESTA   2 mg, Oral, Nightly, Takes 1/2 tablet qhs      FISH OIL PO   1,280 mg, Oral, 2 Times Daily      losartan 50 MG tablet  Commonly known as:  COZAAR   50 mg, Oral, Daily      melatonin 5 MG tablet tablet   5 mg, Oral, Nightly      naproxen 500 MG tablet  Commonly known as:  NAPROSYN   500 mg, Oral, 2 Times Daily With Meals      PROBIOTIC DAILY PO   1 tablet, Oral, Daily      TURMERIC CURCUMIN PO   2 tablets, Oral, Daily      vitamin b complex capsule capsule   1 capsule, Oral, Daily      vitamin C 500 MG tablet  Commonly known as:  ASCORBIC ACID   1,000 mg, Oral, Daily             Instructions        Your medications have changed     START taking:    clopidogrel (PLAVIX)    Review your updated medication list below.     Follow up         Follow up with [facility]  [address] [place] [address] [phone]           Follow up with Apolinar Sims Jr., MD  [address] [facility] [address]   [place] [id number] [phone]           Schedule an appointment with Terrance Wood MD as soon as possible for a visit in 10 day(s)   Follow up with Nurse Pracitioner in 10 days.  [address] [facility] [address]  "  [place] [id number]   [phone]           Schedule an appointment with Zoraida Kenyon MD as soon as possible for a visit in 6 week(s)  [address]   [facility] [address]   [facility] [address]   [phone]    [id number] Follow Up Sleep Clinic with JESSICA Liu   Monday Jan 6, 2020 10:45 AM  [facility] SLEEP [facility]   [address]  [place] [address]   [phone]    [id number] Follow Up with Jeremy Martinez MD   Wednesday Jun 17, 2020 8:45 AM   Arrive 15 minutes prior to appointment.  [facility] NEUROSURGERY   [address] [facility] [address]  [facility] [address]   [phone]                Signed:  Shaniqua Aldridge, JESSICA  12/28/2019  11:17 AM      EMR Dragon/Transcription:   \"Dictated utilizing Dragon dictation\".   "

## 2020-01-06 ENCOUNTER — OFFICE VISIT (OUTPATIENT)
Dept: SLEEP MEDICINE | Facility: HOSPITAL | Age: 65
End: 2020-01-06

## 2020-01-06 VITALS
HEIGHT: 71 IN | WEIGHT: 195 LBS | HEART RATE: 57 BPM | SYSTOLIC BLOOD PRESSURE: 178 MMHG | OXYGEN SATURATION: 98 % | BODY MASS INDEX: 27.3 KG/M2 | DIASTOLIC BLOOD PRESSURE: 97 MMHG

## 2020-01-06 DIAGNOSIS — G47.33 OSA ON CPAP: Primary | ICD-10-CM

## 2020-01-06 DIAGNOSIS — Z99.89 OSA ON CPAP: Primary | ICD-10-CM

## 2020-01-06 DIAGNOSIS — E66.3 OVERWEIGHT (BMI 25.0-29.9): ICD-10-CM

## 2020-01-06 PROCEDURE — G0463 HOSPITAL OUTPT CLINIC VISIT: HCPCS

## 2020-01-06 NOTE — PROGRESS NOTES
[facility]  Telephone: [phone] / Fax: [phone] [place]  Telephone: [phone] / Fax: [phone] Hilary Stevenson    PCP: Apolinar Sims Jr., MD    Reason for visit: URSZULA f/u    Avni Olivo III is a 64 y.o.male  was last seen at [facility] sleep lab in May and had HST in October. It did confirm severe URSZULA and he was set up with CPAP device. He is doing well on the CPAP machine in general and notices improvement in sleep quality. His main complaint today is difficulty sleeping on his side with current mask. He tried Dreamwear nasal pillow mask and Dreamwear nasal cushion.  There is no complaint of aerophagia, excessive dry mouth or air leak. He was recently diagnosed with CAD and had angioplasty and 2 stents.    SH- no tobacco, drinks +/- 14 alcoholic drinks per week.    ROS-negative.    DME- [employer]     Current Medications:    Current Outpatient Medications:   •  aspirin 81 MG tablet, Take 81 mg by mouth Daily., Disp: , Rfl:   •  atorvastatin (LIPITOR) 40 MG tablet, Take 40 mg by mouth Every Night., Disp: , Rfl:   •  B Complex Vitamins (VITAMIN B COMPLEX) capsule capsule, Take 1 capsule by mouth Daily., Disp: , Rfl:   •  CALCIUM PO, Take  by mouth Daily., Disp: , Rfl:   •  carvedilol (COREG) 12.5 MG tablet, Take 12.5 mg by mouth 2 (Two) Times a Day., Disp: , Rfl:   •  cholecalciferol (VITAMIN D3) 25 MCG (1000 UT) tablet, Take 2,000 Units by mouth Daily., Disp: , Rfl:   •  clopidogrel (PLAVIX) 75 MG tablet, Take 1 tablet by mouth Daily., Disp: 90 tablet, Rfl: 3  •  coenzyme Q10 100 MG capsule, Take 100 mg by mouth Daily., Disp: , Rfl:   •  eszopiclone (LUNESTA) 2 MG tablet, Take 2 mg by mouth Every Night. Takes 1/2 tablet qhs, Disp: , Rfl:   •  losartan (COZAAR) 50 MG tablet, Take 1 tablet by mouth Daily., Disp: 90 tablet, Rfl: 3  •  melatonin 5 MG tablet tablet, Take 5 mg by mouth Every Night., Disp: , Rfl:   •  Misc Natural Products (7-KETO LEAN) capsule, Take 1 tablet by mouth  "Daily., Disp: , Rfl:   •  naproxen (NAPROSYN) 500 MG tablet, Take 500 mg by mouth 2 (Two) Times a Day With Meals., Disp: , Rfl:   •  Omega-3 Fatty Acids (FISH OIL PO), Take 1,280 mg by mouth 2 (Two) Times a Day., Disp: , Rfl:   •  Prasterone, DHEA, (DHEA 50) 50 MG capsule, Take 1 capsule by mouth 2 (Two) Times a Day., Disp: , Rfl:   •  Probiotic Product (PROBIOTIC DAILY PO), Take 1 tablet by mouth Daily., Disp: , Rfl:   •  TURMERIC CURCUMIN PO, Take 2 tablets by mouth Daily., Disp: , Rfl:   •  vitamin C (ASCORBIC ACID) 500 MG tablet, Take 1,000 mg by mouth Daily., Disp: , Rfl:    also entered in Sleep Questionnaire    Patient  has a past medical history of Arm pain, Chest pain, DDD (degenerative disc disease), lumbar, Dizziness, History of EKG (08/06/2013), HTN (hypertension), Hyperlipidemia, Limb pain, Low back pain, Lower extremity edema, Lumbar canal stenosis, Melanoma (CMS/HCC) (07/05/2017), Mitral regurgitation, Nausea and vomiting, Palpitations, Peripheral neuropathy, Sinus bradycardia, Snoring, Spinal stenosis, and Sprain of right foot.    I have reviewed the Past Medical History, Past Surgical History, Social History and Family History.    Vital Signs /97 (BP Location: Left arm, Patient Position: Sitting)   Pulse 57   Ht 180.3 cm (71\")   Wt 88.5 kg (195 lb)   SpO2 98%   BMI 27.20 kg/m²  Body mass index is 27.2 kg/m².    General Alert and oriented. No acute distress noted   Pharynx/Throat Class IV Mallampati airway, large tongue, no evidence of redundant lateral pharyngeal tissue. No oral lesions. No thrush. Moist mucous membranes.   Head Normocephalic. Symmetrical. Atraumatic.    Nose No septal deviation. No drainage   Chest Wall Normal shape. Symmetric expansion with respiration. No tenderness.   Neck Trachea midline, no thyromegaly or adenopathy    Lungs Clear to auscultation bilaterally. No wheezes. No rhonchi. No rales. Respirations regular, even and unlabored.   Heart Regular rhythm and " normal rate. Normal S1 and S2. No murmur   Abdomen Soft, non-tender and non-distended. Normal bowel sounds. No masses.   Extremities Moves all extremities well. No edema   Psychiatric Normal mood and affect.     Testing:  · Download 11/21/19-1/2/20, average nightly use of 5 hours and 28 minutes on auto CPAP 5-15cm with average pressure of 10.7, AHI 3.0.    Study-Diagnostic findings: The patient tolerated the home sleep testing with monitoring time of 489 minutes. The data obtained make this a technically adequate study. The apnea hypopneas index(AHI) was 33.6 per sleep hour.  The AHI during supine position was 42.3 per sleep hour. Mean heart rate of 63.5 BPM.  Snoring was noted 2.6% of sleep time. Lowest oxygen saturation during the study was 74%. Saturation below 89% was noted for 15.9 mins.     Impression:  1. URSZULA on CPAP    2. Overweight (BMI 25.0-29.9)          Plan:We looked at different masks today. He wanted to try Air fit P10. I asked our tech to get him fitted. I gave him a handwritten order for this mask style to take to [facility] and obtain. If any future issues arise, he was asked to call us. I reviewed latest download data with patient. His AHI is normal. Compliance is good. Current pressure of 5-15 cm appear to be adequate.      Weight loss will be strongly beneficial to reduce the severity of sleep-disordered breathing.  Caution during activities that require prolonged concentration is strongly advised if sleepiness returns. Changing of PAP supplies regularly is important for effective use. Patient needs to change cushion on the mask or plugs on nasal pillows along with disposable filters once every month and change mask frame, tubing, headgear and Velcro straps every 6 months at the minimum.       Follow up with Dr. Sage in 6 months.    Thank you for allowing me to participate in your patient's care.      JESSICA Liu  [facility]  Phone: [phone]      Part of this note may be an  electronic transcription/translation of spoken language to printed text using the Dragon Dictation System.

## 2020-01-09 ENCOUNTER — OFFICE VISIT (OUTPATIENT)
Dept: CARDIOLOGY | Facility: CLINIC | Age: 65
End: 2020-01-09

## 2020-01-09 ENCOUNTER — TELEPHONE (OUTPATIENT)
Dept: CARDIOLOGY | Facility: CLINIC | Age: 65
End: 2020-01-09

## 2020-01-09 VITALS
BODY MASS INDEX: 27.41 KG/M2 | HEART RATE: 65 BPM | WEIGHT: 195.8 LBS | SYSTOLIC BLOOD PRESSURE: 134 MMHG | RESPIRATION RATE: 16 BRPM | HEIGHT: 71 IN | DIASTOLIC BLOOD PRESSURE: 82 MMHG

## 2020-01-09 DIAGNOSIS — Z09 HOSPITAL DISCHARGE FOLLOW-UP: Primary | ICD-10-CM

## 2020-01-09 DIAGNOSIS — E78.2 MIXED HYPERLIPIDEMIA: ICD-10-CM

## 2020-01-09 DIAGNOSIS — I25.10 CORONARY ARTERY DISEASE INVOLVING NATIVE CORONARY ARTERY OF NATIVE HEART WITHOUT ANGINA PECTORIS: ICD-10-CM

## 2020-01-09 DIAGNOSIS — I10 ESSENTIAL HYPERTENSION: ICD-10-CM

## 2020-01-09 DIAGNOSIS — Z95.5 S/P CORONARY ARTERY STENT PLACEMENT: ICD-10-CM

## 2020-01-09 DIAGNOSIS — Z13.29 SCREENING FOR ENDOCRINE DISORDER: Primary | ICD-10-CM

## 2020-01-09 DIAGNOSIS — G47.33 OSA (OBSTRUCTIVE SLEEP APNEA): ICD-10-CM

## 2020-01-09 DIAGNOSIS — E78.5 HYPERLIPIDEMIA LDL GOAL <70: ICD-10-CM

## 2020-01-09 DIAGNOSIS — I49.3 PVC'S (PREMATURE VENTRICULAR CONTRACTIONS): ICD-10-CM

## 2020-01-09 PROCEDURE — 99214 OFFICE O/P EST MOD 30 MIN: CPT | Performed by: NURSE PRACTITIONER

## 2020-01-09 PROCEDURE — 93000 ELECTROCARDIOGRAM COMPLETE: CPT | Performed by: NURSE PRACTITIONER

## 2020-01-09 RX ORDER — ATORVASTATIN CALCIUM 40 MG/1
60 TABLET, FILM COATED ORAL NIGHTLY
Start: 2020-01-09 | End: 2020-07-17 | Stop reason: SDUPTHER

## 2020-01-09 RX ORDER — TRAMADOL HYDROCHLORIDE 50 MG/1
50 TABLET ORAL EVERY 6 HOURS PRN
COMMUNITY
End: 2020-10-27

## 2020-01-09 NOTE — PROGRESS NOTES
Date of Office Visit: 20  Encounter Provider: JESSICA Underwood  Place of Service: [facility] CARDIOLOGY  Patient Name: Avni Olivo III  :1955    Chief Complaint   Patient presents with   • Hypertension   • Palpitations   • Coronary Artery Disease   • Follow-up   :     HPI: Avni Olivo III is a 64 y.o. male  with history of hypertension, premature ventricular contraction, hyperlipidemia, obstructive sleep apnea, coronary artery disease status post coronary artery stenting.      He is followed by Dr Can. I will see him for the first time today and have reviewed his medical record.     In  he was evaluated for chest pain.  He had a exercise echocardiogram which showed normal left ventricular systolic function with an ejection fraction of 64% and trace mitral regurgitation.  He exercised for 11.5 minutes and his stress echocardiogram images were normal.  He did complain of some chest pain although his stress ECG was normal.  He underwent heart catheterization on 6/10/2013 which showed no evidence of coronary artery disease.  In 2016 while on vacation in [place], he had bradycardia and a presyncopal episode in the airport.  His pulse was in the low 50s but his blood pressure was elevated with a systolic above 200/120.  He was taken to the emergency room.  ECG confirms sinus bradycardia but otherwise normal.  He was hypertensive.  Troponin and proBNP were normal.  Creatinine was elevated at 1.48 he received IV fluids.  He had a CT scan of the head which showed no acute abnormality.  He apparently had taken his carvedilol less than 12 hours apart and not been eating well or sleeping well due to traveling.  In 2018 he was complaining of palpitations and was diagnosed with symptomatic PVCs.  The palpitations decreased after lowering his caffeine consumption.  In 2019 he complained of some chest tightness in the left chest while exercising.  He  did not have to stop exercising and reporting that it went away on its own.  CT cardiac calcium scoring was recommended and he had markedly elevated Agatston calcification score of 2068.  The right coronary artery had a score of 1048 the  circumflex 160 on the diagonal 1 213 the marginal 213, and .  Follow-up stress echocardiogram showed normal left ventricular systolic function.  Stress ECG had nonspecific ST-T wave changes with rare PVCs during stress.  He reported chest pain, shortness of breath and non-exercise limiting angina during the stress test with complaint of 3 out of 10 chest tightness.  However there were no ischemic echo findings noted with submaximal stress as he did not achieve target heart rate.  On 12/27/2019 he had left heart catheterization performed by Dr. Wood ( who is his current neighbor) which showed mildly calcified left main with no critical narrowing.  The left anterior descending artery had normal proximal segment with mid vessel 30% stenosis which narrowed to an 85% stenosis after the origin of the large septal .  The remainder was normal.  The diagonal system was mildly calcified with no critical narrowing.  The left circumflex was large mildly calcified in the mid segment with first marginal branch small with 50% narrowing proximally.  The right coronary artery was large with luminal irregularities with a 20-30% narrowing.  The PDA was normal the posterior lateral branches were normal.  Ejection fraction was 55-60%.  He received 2 drug-eluting stents to the mid LAD with 3.25 x 15 mm and 3.25 x 12 mm Xience Chandrika Everolimus stents.  Troponins were negative.  He was started on Plavix in addition to aspirin which he was already taking at home.  He was continued on carvedilol 12.5 mg twice daily as well as atorvastatin 40 mg daily.    He presents today for hospital discharge follow-up.  He continues to have palpitations associated with history of premature  ventricular contraction.  Those are stable.  He has had no chest pain tightness pressure, shortness of breath, edema, lightheadedness, or fatigue.  He has been wearing CPAP and feels indifferent about it.  This has helped his apnea hypoxic index so he will continue to use that.  He is not having any bleeding issues.  He has some concern about chronic back pain.  He has been taking naproxen occasionally twice daily and receiving epidural but now understands he needs to limit or avoid NSAID use.  He is taking tramadol which helps.    No Known Allergies    Past Medical History:   Diagnosis Date   • Arm pain    • Chest pain    • DDD (degenerative disc disease), lumbar    • Dizziness    • History of EKG 08/06/2013   • HTN (hypertension)    • Hyperlipidemia    • Limb pain    • Low back pain    • Lower extremity edema    • Lumbar canal stenosis    • Melanoma (CMS/HCC) 07/05/2017   • Mitral regurgitation     trace   • Nausea and vomiting    • Palpitations    • Peripheral neuropathy    • Sinus bradycardia    • Snoring    • Spinal stenosis    • Sprain of right foot        Past Surgical History:   Procedure Laterality Date   • CARDIAC CATHETERIZATION  06/10/2013   • CARDIAC CATHETERIZATION N/A 12/27/2019    Procedure: Coronary angiography;  Surgeon: Terrance Wood MD;  Location: [facility] CATH INVASIVE LOCATION;  Service: Cardiovascular   • CARDIAC CATHETERIZATION N/A 12/27/2019    Procedure: Left Heart Cath;  Surgeon: Terrance Wood MD;  Location: [facility] CATH INVASIVE LOCATION;  Service: Cardiovascular   • CARDIAC CATHETERIZATION N/A 12/27/2019    Procedure: Left ventriculography;  Surgeon: Terrance Wood MD;  Location: [facility] CATH INVASIVE LOCATION;  Service: Cardiovascular   • CARDIAC CATHETERIZATION N/A 12/27/2019    Procedure: Stent KALYAN coronary;  Surgeon: Terrance oWod MD;  Location: [facility] CATH INVASIVE LOCATION;  Service: Cardiovascular   • EPIDURAL BLOCK     • FOOT FRACTURE SURGERY Left    • KNEE  "ARTHROSCOPY Left    • KNEE SURGERY Right    • MOHS SURGERY      nose   • SKIN CANCER EXCISION Bilateral 07/05/2017    MELANOMA   • TONSILLECTOMY           Family and social history reviewed.     ROS  All other systems were reviewed and are negative          Objective:     Vitals:    01/09/20 0837   BP: 134/82   BP Location: Left arm   Patient Position: Sitting   Pulse: 65   Resp: 16   Weight: 88.8 kg (195 lb 12.8 oz)   Height: 180.3 cm (71\")     Body mass index is 27.31 kg/m².    PHYSICAL EXAM:  Physical Exam   Constitutional: He is oriented to person, place, and time. He appears well-developed and well-nourished. No distress.   HENT:   Head: Normocephalic.   Eyes: Conjunctivae are normal.   Neck: Normal range of motion. No JVD present.   Cardiovascular: Normal rate, regular rhythm, normal heart sounds and intact distal pulses.   No murmur heard.  Pulses:       Carotid pulses are 2+ on the right side, and 2+ on the left side.       Radial pulses are 2+ on the right side, and 2+ on the left side.        Posterior tibial pulses are 2+ on the right side, and 2+ on the left side.   Pulmonary/Chest: Effort normal and breath sounds normal. No respiratory distress. He has no wheezes. He has no rhonchi. He has no rales. He exhibits no tenderness.   Abdominal: Soft. Bowel sounds are normal. He exhibits no distension.   Musculoskeletal: Normal range of motion. He exhibits no edema.   Neurological: He is alert and oriented to person, place, and time.   Skin: Skin is warm, dry and intact. No rash noted. He is not diaphoretic. No cyanosis.   Right radial site dry clean and intact pulse intact   Psychiatric: He has a normal mood and affect. His behavior is normal. Judgment and thought content normal.         ECG 12 Lead  Date/Time: 1/9/2020 8:44 AM  Performed by: Radha Martini APRN  Authorized by: Radha Martini APRN   Comparison: compared with previous ECG   Similar to previous ECG  Rhythm: sinus rhythm  Rate: normal  T " inversion: III  T flattening: V6 and aVF  QRS axis: normal    Clinical impression: abnormal EKG  Comments: No significant change             Current Outpatient Medications   Medication Sig Dispense Refill   • aspirin 81 MG tablet Take 81 mg by mouth Daily.     • atorvastatin (LIPITOR) 40 MG tablet Take 1.5 tablets by mouth Every Night.     • B Complex Vitamins (VITAMIN B COMPLEX) capsule capsule Take 1 capsule by mouth Daily.     • CALCIUM PO Take  by mouth Daily.     • carvedilol (COREG) 12.5 MG tablet Take 12.5 mg by mouth 2 (Two) Times a Day.     • cholecalciferol (VITAMIN D3) 25 MCG (1000 UT) tablet Take 2,000 Units by mouth Daily.     • clopidogrel (PLAVIX) 75 MG tablet Take 1 tablet by mouth Daily. 90 tablet 3   • coenzyme Q10 100 MG capsule Take 100 mg by mouth Daily.     • eszopiclone (LUNESTA) 2 MG tablet Take 2 mg by mouth Every Night. Takes 1/2 tablet qhs     • losartan (COZAAR) 50 MG tablet Take 1 tablet by mouth Daily. 90 tablet 3   • melatonin 5 MG tablet tablet Take 5 mg by mouth Every Night.     • Misc Natural Products (7-KETO LEAN) capsule Take 1 tablet by mouth Daily.     • Omega-3 Fatty Acids (FISH OIL PO) Take 1,280 mg by mouth 2 (Two) Times a Day.     • Probiotic Product (PROBIOTIC DAILY PO) Take 1 tablet by mouth Daily.     • traMADol (ULTRAM) 50 MG tablet Take 50 mg by mouth Every 6 (Six) Hours As Needed for Moderate Pain .     • TURMERIC CURCUMIN PO Take 2 tablets by mouth Daily.     • vitamin C (ASCORBIC ACID) 500 MG tablet Take 1,000 mg by mouth Daily.       No current facility-administered medications for this visit.      Assessment:       Diagnosis Plan   1. Hospital discharge follow-up     2. S/P coronary artery stent placement  ECG 12 Lead   3. Mixed hyperlipidemia     4. Essential hypertension     5. URSZULA (obstructive sleep apnea)     6. PVC's (premature ventricular contractions)     7. Coronary artery disease involving native coronary artery of native heart without angina pectoris  ECG  12 Lead        Orders Placed This Encounter   Procedures   • ECG 12 Lead     This order was created via procedure documentation         Plan:       1.  64-year-old gentleman with markedly abnormal calcium scoring who received 2 drug-eluting stents to the mid LAD 12/27/2019.  Normal left ventricular systolic function. He has been referred to cardiac rehab and will participate at [facility]. DAPT recommended at least 9 months.  He has been advised not to interrupt dual antiplatelet therapy for at least 6 months for elective procedures.    2.  Hypertension blood pressure today appears well controlled  3.  Hyperlipidemia on target dose atorvastatin target LDL 70 or less agree with increasing his dose from 40 to 60 mg.  We will obtain his lipid panel from Dr. Gayathri Florentino  4.  Obstructive sleep apnea on CPAP follows with Dr. Sage  5. Chronic back pain has received epidurals follows with Dr. Martinez with neurosurgery at some point planning to have back surgery.  Discussed limiting or avoiding NSAIDs.  Okay with tramadol use.  6. History of melanoma   7.  History of premature ventricular contractions stable  Follow-up in 4 to 6 weeks call with questions or concerns.            It has been a pleasure to participate in this patient's care.      Thank you,  JESSICA Underwood      **I used Dragon to dictate this note:**

## 2020-01-09 NOTE — TELEPHONE ENCOUNTER
Please call Gayathri Florentino office at [phone] with [facility] to get most recent labs ( lipids, A1c) and need to send her our records ( printed) per patient request. We will need their fax number .

## 2020-01-09 NOTE — TELEPHONE ENCOUNTER
Requested records faxed to Gayathri Florentino's office.  Fax # [phone].  Faxed confirmation received. / MARCELLA

## 2020-01-09 NOTE — TELEPHONE ENCOUNTER
Received CMP and Testosterone lab results from [facility].  I asked for Lipid and A1C.  I can try PCP if you would like. Placing report in your inbox.  Please advise.  / MARCELLA

## 2020-01-10 NOTE — TELEPHONE ENCOUNTER
I spoke with patient and he will go to [place] to have labs drawn.  He is aware that he will need to fast prior to lab.  Patient verbalized understanding to all instructions given./ MARCELLA

## 2020-01-10 NOTE — TELEPHONE ENCOUNTER
He told me he has not seen his PCP in a while . He told me Dr. Florentino checks his cholesterol. If we are unable to locate lipid panel will need one in our system for rehab. I would also like to check an A1C.      AL

## 2020-01-10 NOTE — TELEPHONE ENCOUNTER
I called Dr. Florentino's office again and they do not have a lipid panel or A1C.  I called Dr. Sims's office and they lipid panel from 2018 and A1C from 2017.  They are faxing those for your review.  I will place them in your inbox once received. / MARCELLA

## 2020-01-10 NOTE — TELEPHONE ENCOUNTER
Please let patient know I will need up to date labs for rehab. He need to fast. Order placed. He came come to main lab anytime between now and starting rehab.

## 2020-01-13 ENCOUNTER — LAB (OUTPATIENT)
Dept: LAB | Facility: HOSPITAL | Age: 65
End: 2020-01-13

## 2020-01-13 DIAGNOSIS — Z13.29 SCREENING FOR ENDOCRINE DISORDER: ICD-10-CM

## 2020-01-13 DIAGNOSIS — I25.10 CORONARY ARTERY DISEASE INVOLVING NATIVE CORONARY ARTERY OF NATIVE HEART WITHOUT ANGINA PECTORIS: ICD-10-CM

## 2020-01-13 DIAGNOSIS — E78.5 HYPERLIPIDEMIA LDL GOAL <70: ICD-10-CM

## 2020-01-13 LAB
CHOLEST SERPL-MCNC: 142 MG/DL (ref 0–200)
HBA1C MFR BLD: 5.3 % (ref 4.8–5.6)
HDLC SERPL-MCNC: 54 MG/DL (ref 40–60)
LDLC SERPL CALC-MCNC: 65 MG/DL (ref 0–100)
LDLC/HDLC SERPL: 1.21 {RATIO}
TRIGL SERPL-MCNC: 113 MG/DL (ref 0–150)
VLDLC SERPL-MCNC: 22.6 MG/DL (ref 5–40)

## 2020-01-13 PROCEDURE — 36415 COLL VENOUS BLD VENIPUNCTURE: CPT

## 2020-01-13 PROCEDURE — 83036 HEMOGLOBIN GLYCOSYLATED A1C: CPT | Performed by: NURSE PRACTITIONER

## 2020-01-13 PROCEDURE — 80061 LIPID PANEL: CPT | Performed by: NURSE PRACTITIONER

## 2020-01-20 RX ORDER — LOSARTAN POTASSIUM 50 MG/1
TABLET ORAL
Qty: 90 TABLET | Refills: 4 | Status: SHIPPED | OUTPATIENT
Start: 2020-01-20 | End: 2021-03-04

## 2020-02-20 ENCOUNTER — LAB (OUTPATIENT)
Dept: LAB | Facility: HOSPITAL | Age: 65
End: 2020-02-20

## 2020-02-20 ENCOUNTER — OFFICE VISIT (OUTPATIENT)
Dept: CARDIOLOGY | Facility: CLINIC | Age: 65
End: 2020-02-20

## 2020-02-20 ENCOUNTER — TELEPHONE (OUTPATIENT)
Dept: CARDIOLOGY | Facility: CLINIC | Age: 65
End: 2020-02-20

## 2020-02-20 VITALS
HEIGHT: 71 IN | SYSTOLIC BLOOD PRESSURE: 122 MMHG | WEIGHT: 195 LBS | BODY MASS INDEX: 27.3 KG/M2 | OXYGEN SATURATION: 95 % | DIASTOLIC BLOOD PRESSURE: 80 MMHG | HEART RATE: 62 BPM

## 2020-02-20 DIAGNOSIS — I25.10 CORONARY ARTERY CALCIFICATION: ICD-10-CM

## 2020-02-20 DIAGNOSIS — R53.83 OTHER FATIGUE: ICD-10-CM

## 2020-02-20 DIAGNOSIS — I25.10 CORONARY ARTERY DISEASE INVOLVING NATIVE HEART WITHOUT ANGINA PECTORIS, UNSPECIFIED VESSEL OR LESION TYPE: ICD-10-CM

## 2020-02-20 DIAGNOSIS — R53.83 OTHER FATIGUE: Primary | ICD-10-CM

## 2020-02-20 DIAGNOSIS — E78.2 MIXED HYPERLIPIDEMIA: ICD-10-CM

## 2020-02-20 DIAGNOSIS — I10 ESSENTIAL HYPERTENSION: ICD-10-CM

## 2020-02-20 DIAGNOSIS — I49.3 PVC'S (PREMATURE VENTRICULAR CONTRACTIONS): ICD-10-CM

## 2020-02-20 DIAGNOSIS — I25.84 CORONARY ARTERY CALCIFICATION: ICD-10-CM

## 2020-02-20 LAB
ALBUMIN SERPL-MCNC: 4.6 G/DL (ref 3.5–5.2)
ALBUMIN/GLOB SERPL: 2.1 G/DL
ALP SERPL-CCNC: 43 U/L (ref 39–117)
ALT SERPL W P-5'-P-CCNC: 37 U/L (ref 1–41)
ANION GAP SERPL CALCULATED.3IONS-SCNC: 13 MMOL/L (ref 5–15)
AST SERPL-CCNC: 36 U/L (ref 1–40)
BILIRUB SERPL-MCNC: 0.7 MG/DL (ref 0.1–1.2)
BUN BLD-MCNC: 17 MG/DL (ref 8–23)
BUN/CREAT SERPL: 15.9 (ref 7–25)
CALCIUM SPEC-SCNC: 9.5 MG/DL (ref 8.6–10.5)
CHLORIDE SERPL-SCNC: 99 MMOL/L (ref 98–107)
CO2 SERPL-SCNC: 25 MMOL/L (ref 22–29)
CREAT BLD-MCNC: 1.07 MG/DL (ref 0.76–1.27)
DEPRECATED RDW RBC AUTO: 41.4 FL (ref 37–54)
ERYTHROCYTE [DISTWIDTH] IN BLOOD BY AUTOMATED COUNT: 12.1 % (ref 12.3–15.4)
GFR SERPL CREATININE-BSD FRML MDRD: 70 ML/MIN/1.73
GLOBULIN UR ELPH-MCNC: 2.2 GM/DL
GLUCOSE BLD-MCNC: 97 MG/DL (ref 65–99)
HCT VFR BLD AUTO: 42.6 % (ref 37.5–51)
HGB BLD-MCNC: 14.9 G/DL (ref 13–17.7)
MAGNESIUM SERPL-MCNC: 2.1 MG/DL (ref 1.6–2.4)
MCH RBC QN AUTO: 32.5 PG (ref 26.6–33)
MCHC RBC AUTO-ENTMCNC: 35 G/DL (ref 31.5–35.7)
MCV RBC AUTO: 92.8 FL (ref 79–97)
PLATELET # BLD AUTO: 206 10*3/MM3 (ref 140–450)
PMV BLD AUTO: 10.2 FL (ref 6–12)
POTASSIUM BLD-SCNC: 4.7 MMOL/L (ref 3.5–5.2)
PROT SERPL-MCNC: 6.8 G/DL (ref 6–8.5)
RBC # BLD AUTO: 4.59 10*6/MM3 (ref 4.14–5.8)
SODIUM BLD-SCNC: 137 MMOL/L (ref 136–145)
T-UPTAKE NFR SERPL: 1 TBI (ref 0.8–1.3)
T4 SERPL-MCNC: 5.56 MCG/DL (ref 4.5–11.7)
TSH SERPL DL<=0.05 MIU/L-ACNC: 1.63 UIU/ML (ref 0.27–4.2)
WBC NRBC COR # BLD: 4.97 10*3/MM3 (ref 3.4–10.8)

## 2020-02-20 PROCEDURE — 93000 ELECTROCARDIOGRAM COMPLETE: CPT | Performed by: INTERNAL MEDICINE

## 2020-02-20 PROCEDURE — 84443 ASSAY THYROID STIM HORMONE: CPT | Performed by: INTERNAL MEDICINE

## 2020-02-20 PROCEDURE — 85027 COMPLETE CBC AUTOMATED: CPT

## 2020-02-20 PROCEDURE — 99214 OFFICE O/P EST MOD 30 MIN: CPT | Performed by: INTERNAL MEDICINE

## 2020-02-20 PROCEDURE — 36415 COLL VENOUS BLD VENIPUNCTURE: CPT

## 2020-02-20 PROCEDURE — 83735 ASSAY OF MAGNESIUM: CPT

## 2020-02-20 PROCEDURE — 80053 COMPREHEN METABOLIC PANEL: CPT

## 2020-02-20 PROCEDURE — 84436 ASSAY OF TOTAL THYROXINE: CPT | Performed by: INTERNAL MEDICINE

## 2020-02-20 PROCEDURE — 84479 ASSAY OF THYROID (T3 OR T4): CPT | Performed by: INTERNAL MEDICINE

## 2020-02-20 RX ORDER — PRASUGREL 10 MG/1
10 TABLET, FILM COATED ORAL DAILY
Qty: 30 TABLET | Refills: 2 | Status: SHIPPED | OUTPATIENT
Start: 2020-02-20 | End: 2020-07-30 | Stop reason: ALTCHOICE

## 2020-02-20 NOTE — PROGRESS NOTES
PATIENTINFORMATION    Date of Office Visit: 20  Encounter Provider: Zoraida Kenyon MD  Place of Service: [facility] CARDIOLOGY  Patient Name: Avni Olivo III  : 1955    Subjective:         Patient ID: Avni Olivo III is a 64 y.o. male.      History of Present Illness    This gentleman saw Dr. Ramírez in  with chest pain. She did an exercise echocardiogram on 2013 which showed normal LV systolic function with ejection fraction of 64%, trace mitral regurgitation. He exercised for 11.5 minutes under Jacques protocol. His stress echocardiogram images were normal. His stress EKG was normal, but the patient did complain of some chest pain. On 06/10/2013, the patient underwent a heart catheterization, which showed no evidence of coronary artery disease.      I saw him for the first time in 2016.  He was on vacation in [place] and had a bradycardic and presyncopal episode at the airport. Paramedics were called and he was noted to have a pulse in the low 50s and elevated blood pressure systolic of about 200 over 120. He was taken to the emergency room. I have reviewed that visit. He had an EKG which showed sinus bradycardia, but was otherwise normal. He was hypertensive there. His troponin and proBNP were normal. His creatinine was elevated at 1.48. He was treated with IV fluids. He did not really feel much better, so a CT scan of his head was performed which looked okay. Eventually, he was discharged from the emergency room still feeling somewhat dizzy and they were able to continue their trip home.      I saw him back in 2018 when he was complaining about palpitations and was diagnosed with symptomatic PVCs.  He had a stress echo in 2019 which showed normal LV systolic function.  No significant valvular heart disease.  However, he went on to have a heart catheterization.  In 2019 which showed a mid LAD lesion of 85%.  2  "drug-eluting stents were placed.    He has been participating in cardiac rehab.  He denies chest pain, tightness or pressure.  No shortness of breath.  No palpitations.         Review of Systems   Constitution: Negative for fever, malaise/fatigue, weight gain and weight loss.   HENT: Negative for ear pain, hearing loss, nosebleeds and sore throat.    Eyes: Negative for double vision, pain, vision loss in left eye and vision loss in right eye.   Cardiovascular:        See history of present illness.   Respiratory: Negative for cough, shortness of breath, sleep disturbances due to breathing, snoring and wheezing.    Endocrine: Negative for cold intolerance, heat intolerance and polyuria.   Skin: Negative for itching, poor wound healing and rash.   Musculoskeletal: Positive for joint pain. Negative for joint swelling and myalgias.   Gastrointestinal: Negative for abdominal pain, diarrhea, hematochezia, nausea and vomiting.   Genitourinary: Negative for hematuria and hesitancy.   Neurological: Negative for numbness, paresthesias and seizures.   Psychiatric/Behavioral: Negative for depression. The patient is not nervous/anxious.            ECG 12 Lead  Date/Time: 2/20/2020 12:43 PM  Performed by: Zoraida Kenyon MD  Authorized by: Zoraida Kenyon MD   Comparison: compared with previous ECG from 11/14/2019  Similar to previous ECG  Rhythm: sinus rhythm  BPM: 58  Conduction: conduction normal  ST Segments: ST segments normal  T Waves: T waves normal    Clinical impression: normal ECG               Objective:     /80 (BP Location: Left arm)   Pulse 62   Ht 180.3 cm (71\")   Wt 88.5 kg (195 lb)   SpO2 95%   BMI 27.20 kg/m²  Body mass index is 27.2 kg/m².     Physical Exam   Constitutional: He appears well-developed.   HENT:   Head: Normocephalic and atraumatic.   Eyes: Pupils are equal, round, and reactive to light. Conjunctivae and lids are normal. Lids are everted and swept, no foreign bodies found.   Neck: " Normal range of motion. No JVD present. Carotid bruit is not present. No tracheal deviation present. No thyroid mass present.   Cardiovascular: Normal rate, regular rhythm and normal heart sounds.   Pulses:       Dorsalis pedis pulses are 2+ on the right side, and 2+ on the left side.   Pulmonary/Chest: Effort normal and breath sounds normal.   Abdominal: Normal appearance and bowel sounds are normal.   Musculoskeletal: Normal range of motion.   Neurological: He is alert. He has normal strength.   Skin: Skin is warm, dry and intact.   Psychiatric: He has a normal mood and affect. His behavior is normal.   Vitals reviewed.      Lab Review: pending      Assessment/Plan:       1.  Coronary disease status post stent to the mid LAD in December 2019.  Continue dual antiplatelet therapy.  He thinks that the Plavix may be causing him to be fatigued in the morning.  I think this is highly unlikely.  I am going to check labs on him today though to make sure he is not anemic or having any thyroid issues.  Continue cardiac rehab and exercise after he completes cardiac rehab.  2.  Hypertension.  Currently well controlled  3.  Hyperlipidemia.  Followed by his primary care provider.  Goal LDL of less than 70  4.  History of premature ventricular contractions.  We spent in excess tensive: Greater than 45 minutes in: Amount of time going through his questions and concerns.  No changes to his medications.  Check labs today.  My triage nurse will be in touch with him with results of his labs.  Continue current medications.  I did discuss with him potentially trying something different than Plavix.  Given his age, Effient might be a good option to see if that helps his fatigue though I highly doubt it is related to the Plavix.      Orders Placed This Encounter   Procedures   • CBC (No Diff)     Standing Status:   Future     Number of Occurrences:   1     Standing Expiration Date:   2/20/2021   • Thyroid Panel With TSH     Standing  Status:   Future     Number of Occurrences:   1     Standing Expiration Date:   2/20/2021   • Comprehensive Metabolic Panel     Standing Status:   Future     Number of Occurrences:   1     Standing Expiration Date:   2/20/2021   • Magnesium     Standing Status:   Future     Number of Occurrences:   1     Standing Expiration Date:   2/20/2021   • ECG 12 Lead     This order was created via procedure documentation        Discharge Medications           Accurate as of February 20, 2020  1:55 PM. If you have any questions, ask your nurse or doctor.               Continue These Medications      Instructions Start Date   7-KETO LEAN capsule   1 tablet, Oral, Daily      aspirin 81 MG tablet   81 mg, Oral, Daily      atorvastatin 40 MG tablet  Commonly known as:  LIPITOR   60 mg, Oral, Nightly      CALCIUM PO   Oral, Daily      carvedilol 12.5 MG tablet  Commonly known as:  COREG   12.5 mg, Oral, 2 Times Daily      cholecalciferol 25 MCG (1000 UT) tablet  Commonly known as:  VITAMIN D3   2,000 Units, Oral, Daily      clopidogrel 75 MG tablet  Commonly known as:  PLAVIX   75 mg, Oral, Daily      coenzyme Q10 100 MG capsule   100 mg, Oral, Daily      eszopiclone 2 MG tablet  Commonly known as:  LUNESTA   2 mg, Oral, Nightly, Takes 1/2 tablet qhs      FISH OIL PO   1,280 mg, Oral, 2 Times Daily      losartan 50 MG tablet  Commonly known as:  COZAAR   TAKE 1 TABLET DAILY      melatonin 5 MG tablet tablet   5 mg, Oral, Nightly      PROBIOTIC DAILY PO   1 tablet, Oral, Daily      traMADol 50 MG tablet  Commonly known as:  ULTRAM   50 mg, Oral, Every 6 Hours PRN      TURMERIC CURCUMIN PO   2 tablets, Oral, Daily      vitamin b complex capsule capsule   1 capsule, Oral, Daily      vitamin C 500 MG tablet  Commonly known as:  ASCORBIC ACID   1,000 mg, Oral, Daily                    Zoraida Kenyon MD  02/20/20  1:55 PM

## 2020-02-20 NOTE — TELEPHONE ENCOUNTER
----- Message from Zoraida Kenyon MD sent at 2/20/2020  2:22 PM EST -----  Please let him know that his labs look normal.  His kidney function is stable.  He is not anemic.  I do not see any abnormalities in his labs to explain why he is feeling fatigued.  If he wants to change from Plavix to Effient, we can give him some samples and see if it makes a difference for him.  I explained to him and you can reiterate that I highly doubt the Plavix is causing his fatigue.

## 2020-04-06 RX ORDER — TICAGRELOR 90 MG/1
TABLET ORAL
Qty: 60 TABLET | Refills: 0 | Status: SHIPPED | OUTPATIENT
Start: 2020-04-06 | End: 2020-04-17 | Stop reason: SDUPTHER

## 2020-05-12 ENCOUNTER — TELEPHONE (OUTPATIENT)
Dept: CARDIOLOGY | Facility: CLINIC | Age: 65
End: 2020-05-12

## 2020-05-12 NOTE — TELEPHONE ENCOUNTER
He should not even consider holding his antiplatelet therapy until December 2020.  His stent was placed in December 2019 and he needs to be on dual antiplatelet therapy for a year unless there is an emergency

## 2020-05-12 NOTE — TELEPHONE ENCOUNTER
I called an left him a voice message letting him know that he needs to be on his medication for a year unless there is am emergency.

## 2020-05-12 NOTE — TELEPHONE ENCOUNTER
He called back and said that he is going to move forward and hold prior to the epidural and start it again right after. He would like to discuss this with you. [phone]

## 2020-05-12 NOTE — TELEPHONE ENCOUNTER
The patient is calling to let you know that he has a spinal epidural scheduled for 6/4/20 and needs to know when he needs to hold his Brillinta and resume it. I tried to call him back to see how many he will be having? I had to leave a voicemail requesting this information.

## 2020-05-13 NOTE — TELEPHONE ENCOUNTER
I discussed the risk of MI with him and that he is stopping the medication absolutely against your advice.  He voiced his understanding.    Monica Bravo RN  Triage [facility]

## 2020-05-13 NOTE — TELEPHONE ENCOUNTER
Called and left voicemail. Will continue to try and reach patient.    Monica Bravo RN  Triage [facility]

## 2020-05-13 NOTE — TELEPHONE ENCOUNTER
I spoke with  this morning.  He doesn't have any questions. He just wanted to let you know he was going to go ahead with his epidural injection on June 4th.  He states he just can't wait any longer.  He will be stopping the Brilinta for 7 days prior.  I instructed him to be sure to get clarification of when to restart it from pain management and not to be off the med any longer that he absolutely has to be. We discussed the reasoning behind the antiplatelet therapy post stent and I also instructed him to be aware of symptoms of chest pain, soa, n/v and diaphoresis as well as back pain in the injection area and to seek medical attention if this occurs. Pt. Verbalized understanding and denied any questions.      Monica Bravo RN  Triage [facility]

## 2020-05-13 NOTE — TELEPHONE ENCOUNTER
As long as he understands that I completely disagree with him stopping Brilinta and he risks having a massive heart attack if he does and I will not write any clearance letter to pain management for him to be okay to stop the Brilinta

## 2020-06-04 ENCOUNTER — HOSPITAL ENCOUNTER (OUTPATIENT)
Dept: PAIN MEDICINE | Facility: HOSPITAL | Age: 65
Discharge: HOME | End: 2020-06-04
Admitting: ANESTHESIOLOGY

## 2020-06-04 ENCOUNTER — ANESTHESIA EVENT (OUTPATIENT)
Dept: PAIN MEDICINE | Facility: HOSPITAL | Age: 65
End: 2020-06-04

## 2020-06-04 ENCOUNTER — ANESTHESIA (OUTPATIENT)
Dept: PAIN MEDICINE | Facility: HOSPITAL | Age: 65
End: 2020-06-04

## 2020-06-04 ENCOUNTER — HOSPITAL ENCOUNTER (OUTPATIENT)
Dept: GENERAL RADIOLOGY | Facility: HOSPITAL | Age: 65
Discharge: HOME | End: 2020-06-04

## 2020-06-04 VITALS
OXYGEN SATURATION: 95 % | HEART RATE: 59 BPM | DIASTOLIC BLOOD PRESSURE: 80 MMHG | SYSTOLIC BLOOD PRESSURE: 125 MMHG | RESPIRATION RATE: 16 BRPM | TEMPERATURE: 97.8 F

## 2020-06-04 DIAGNOSIS — M48.062 SPINAL STENOSIS OF LUMBAR REGION WITH NEUROGENIC CLAUDICATION: Primary | ICD-10-CM

## 2020-06-04 DIAGNOSIS — R52 PAIN: ICD-10-CM

## 2020-06-04 PROCEDURE — 25010000002 MIDAZOLAM PER 1 MG: Performed by: ANESTHESIOLOGY

## 2020-06-04 PROCEDURE — 25010000002 FENTANYL CITRATE (PF) 100 MCG/2ML SOLUTION: Performed by: ANESTHESIOLOGY

## 2020-06-04 PROCEDURE — 77003 FLUOROGUIDE FOR SPINE INJECT: CPT

## 2020-06-04 PROCEDURE — 0 IOPAMIDOL 41 % SOLUTION: Performed by: ANESTHESIOLOGY

## 2020-06-04 PROCEDURE — C1755 CATHETER, INTRASPINAL: HCPCS

## 2020-06-04 PROCEDURE — 25010000002 METHYLPREDNISOLONE PER 80 MG: Performed by: ANESTHESIOLOGY

## 2020-06-04 RX ORDER — MIDAZOLAM HYDROCHLORIDE 1 MG/ML
1 INJECTION INTRAMUSCULAR; INTRAVENOUS
Status: DISCONTINUED | OUTPATIENT
Start: 2020-06-04 | End: 2020-06-05 | Stop reason: HOSPADM

## 2020-06-04 RX ORDER — LIDOCAINE HYDROCHLORIDE 10 MG/ML
1 INJECTION, SOLUTION INFILTRATION; PERINEURAL ONCE
Status: DISCONTINUED | OUTPATIENT
Start: 2020-06-04 | End: 2020-06-05 | Stop reason: HOSPADM

## 2020-06-04 RX ORDER — SODIUM CHLORIDE 0.9 % (FLUSH) 0.9 %
3 SYRINGE (ML) INJECTION EVERY 12 HOURS SCHEDULED
Status: DISCONTINUED | OUTPATIENT
Start: 2020-06-04 | End: 2020-06-05 | Stop reason: HOSPADM

## 2020-06-04 RX ORDER — METHYLPREDNISOLONE ACETATE 80 MG/ML
80 INJECTION, SUSPENSION INTRA-ARTICULAR; INTRALESIONAL; INTRAMUSCULAR; SOFT TISSUE ONCE
Status: COMPLETED | OUTPATIENT
Start: 2020-06-04 | End: 2020-06-04

## 2020-06-04 RX ORDER — SODIUM CHLORIDE 0.9 % (FLUSH) 0.9 %
3-10 SYRINGE (ML) INJECTION AS NEEDED
Status: DISCONTINUED | OUTPATIENT
Start: 2020-06-04 | End: 2020-06-05 | Stop reason: HOSPADM

## 2020-06-04 RX ORDER — FENTANYL CITRATE 50 UG/ML
50 INJECTION, SOLUTION INTRAMUSCULAR; INTRAVENOUS AS NEEDED
Status: DISCONTINUED | OUTPATIENT
Start: 2020-06-04 | End: 2020-06-05 | Stop reason: HOSPADM

## 2020-06-04 RX ADMIN — MIDAZOLAM 2 MG: 1 INJECTION INTRAMUSCULAR; INTRAVENOUS at 12:56

## 2020-06-04 RX ADMIN — IOPAMIDOL 10 ML: 408 INJECTION, SOLUTION INTRATHECAL at 12:59

## 2020-06-04 RX ADMIN — FENTANYL CITRATE 50 MCG: 50 INJECTION, SOLUTION INTRAMUSCULAR; INTRAVENOUS at 12:55

## 2020-06-04 RX ADMIN — METHYLPREDNISOLONE ACETATE 80 MG: 80 INJECTION, SUSPENSION INTRA-ARTICULAR; INTRALESIONAL; INTRAMUSCULAR; SOFT TISSUE at 12:59

## 2020-06-04 NOTE — ANESTHESIA PROCEDURE NOTES
PAIN Epidural block    Pre-sedation assessment completed: 6/4/2020 12:46 PM    Patient reassessed immediately prior to procedure    Patient location during procedure: pain clinic  Start Time: 6/4/2020 12:46 PM  Stop Time: 6/4/2020 1:01 PM  Indication:procedure for pain  Performed By  Anesthesiologist: Jose Haas MD  Preanesthetic Checklist  Completed: patient identified, site marked, surgical consent, pre-op evaluation, timeout performed, risks and benefits discussed and monitors and equipment checked  Additional Notes  Depomedrol - 80mg    Needle position confirmed by fluoroscopy and epidurogram using 2cc of [id number].    Diagnosis  Post-Op Diagnosis Codes:     * Lumbar degenerative disc disease (M51.36)     * Lumbar radiculopathy (M54.16)    Prep:  Pt Position:prone  Sterile Tech:cap, gloves, mask and sterile barrier  Prep:chlorhexidine gluconate and isopropyl alcohol  Monitoring:blood pressure monitoring, continuous pulse oximetry and EKG  Procedure:Sedation: yes     Approach:right paramedian  Guidance: fluoroscopy  Location:lumbar  Level:3-4  Needle Type:Tuohy  Needle Gauge:20  Aspiration:negative  Medications:  Depomedrol:80 mg  Preservative Free Saline:2mL  Isovue:2mL    Post Assessment:  Post-procedure: bandaide.  Pt Tolerance:patient tolerated the procedure well with no apparent complications  Complications:no

## 2020-06-04 NOTE — H&P
[facility]    History and Physical    Patient Name: Avni Olivo III  :  1955  MRN:  [id number]  Date of Admission: 2020    Subjective     Patient is a 64 y.o. male presents with chief complaint of chronic, intermitent, moderate low back and hips: right pain.  Onset of symptoms was gradual starting several years ago.  Symptoms are associated/aggravated by exercise, lifting, standing or twisting. Symptoms improve with relaxation    The following portions of the patients history were reviewed and updated as appropriate: current medications, allergies, past medical history, past surgical history, past family history, past social history and problem list                Objective     Past Medical History:   Past Medical History:   Diagnosis Date   • Arm pain    • Chest pain    • DDD (degenerative disc disease), lumbar    • Dizziness    • History of EKG 2013   • HTN (hypertension)    • Hyperlipidemia    • Limb pain    • Low back pain    • Lower extremity edema    • Lumbar canal stenosis    • Melanoma (CMS/HCC) 2017   • Mitral regurgitation     trace   • Nausea and vomiting    • Palpitations    • Peripheral neuropathy    • Sinus bradycardia    • Snoring    • Spinal stenosis    • Sprain of right foot      Past Surgical History:   Past Surgical History:   Procedure Laterality Date   • CARDIAC CATHETERIZATION  06/10/2013   • CARDIAC CATHETERIZATION N/A 2019    Procedure: Coronary angiography;  Surgeon: Terrance Wood MD;  Location: [facility] INVASIVE LOCATION;  Service: Cardiovascular   • CARDIAC CATHETERIZATION N/A 2019    Procedure: Left Heart Cath;  Surgeon: Terrance Wood MD;  Location: [facility] CATH INVASIVE LOCATION;  Service: Cardiovascular   • CARDIAC CATHETERIZATION N/A 2019    Procedure: Left ventriculography;  Surgeon: Terrance Wood MD;  Location: [facility] CATH INVASIVE LOCATION;  Service: Cardiovascular   • CARDIAC CATHETERIZATION N/A 2019     "Procedure: Stent KALYAN coronary;  Surgeon: Terrance Wood MD;  Location: [facility] INVASIVE LOCATION;  Service: Cardiovascular   • EPIDURAL BLOCK     • FOOT FRACTURE SURGERY Left    • KNEE ARTHROSCOPY Left    • KNEE SURGERY Right    • MOHS SURGERY      nose   • SKIN CANCER EXCISION Bilateral 07/05/2017    MELANOMA   • TONSILLECTOMY       Family History:   Family History   Problem Relation Age of Onset   • Coronary artery disease Other    • Hyperlipidemia Other    • Hypertension Other    • Other Other         heart surgery   • Heart disease Father    • Heart disease Sister      Social History:   Social History     Tobacco Use   • Smoking status: Never Smoker   • Smokeless tobacco: Never Used   Substance Use Topics   • Alcohol use: Yes     Alcohol/week: 2.0 standard drinks     Types: 2 Cans of beer per week     Comment: social drinker   • Drug use: No       Vital Signs Range for the last 24 hours  Temperature:     Temp Source:     BP:     Pulse:     Respirations:     SPO2:     O2 Amount (l/min):     O2 Devices     Weight:       Flowsheet Rows         First Filed Value    Admission Height  71\" (180.3 cm) Documented at 03/16/2017 0818    Admission Weight  190 lb (86.2 kg) Documented at 03/16/2017 0818          --------------------------------------------------------------------------------    Current Outpatient Medications   Medication Sig Dispense Refill   • aspirin 81 MG tablet Take 81 mg by mouth Daily.     • atorvastatin (LIPITOR) 40 MG tablet Take 1.5 tablets by mouth Every Night.     • B Complex Vitamins (VITAMIN B COMPLEX) capsule capsule Take 1 capsule by mouth Daily.     • CALCIUM PO Take  by mouth Daily.     • carvedilol (COREG) 12.5 MG tablet Take 12.5 mg by mouth 2 (Two) Times a Day.     • cholecalciferol (VITAMIN D3) 25 MCG (1000 UT) tablet Take 2,000 Units by mouth Daily.     • clopidogrel (PLAVIX) 75 MG tablet Take 1 tablet by mouth Daily. 90 tablet 3   • coenzyme Q10 100 MG capsule Take 100 mg by " mouth Daily.     • eszopiclone (LUNESTA) 2 MG tablet Take 2 mg by mouth Every Night. Takes 1/2 tablet qhs     • losartan (COZAAR) 50 MG tablet TAKE 1 TABLET DAILY 90 tablet 4   • melatonin 5 MG tablet tablet Take 5 mg by mouth Every Night.     • Misc Natural Products (7-KETO LEAN) capsule Take 1 tablet by mouth Daily.     • Omega-3 Fatty Acids (FISH OIL PO) Take 1,280 mg by mouth 2 (Two) Times a Day.     • prasugrel (EFFIENT) 10 MG tablet Take 1 tablet by mouth Daily. 30 tablet 2   • Probiotic Product (PROBIOTIC DAILY PO) Take 1 tablet by mouth Daily.     • ticagrelor (Brilinta) 90 MG tablet tablet Take 1 tablet by mouth 2 (Two) Times a Day. 180 tablet 1   • traMADol (ULTRAM) 50 MG tablet Take 50 mg by mouth Every 6 (Six) Hours As Needed for Moderate Pain .     • TURMERIC CURCUMIN PO Take 2 tablets by mouth Daily.     • vitamin C (ASCORBIC ACID) 500 MG tablet Take 1,000 mg by mouth Daily.       No current facility-administered medications for this encounter.        --------------------------------------------------------------------------------  Assessment/Plan      Anesthesia Evaluation     Patient summary reviewed and Nursing notes reviewed   no history of anesthetic complications:  NPO Solid Status: > 6 hours  NPO Liquid Status: > 6 hours    Pain impairs ability to perform ADLs: Sleeping  Modalities previously tried to control pain with limited effectiveness within the last 4-6 weeks: Ice and Rest     Airway   Mallampati: II  TM distance: >3 FB  Neck ROM: full  Dental - normal exam     Pulmonary - negative pulmonary ROS and normal exam   Cardiovascular - normal exam    (+) hypertension, valvular problems/murmurs MR, CAD, hyperlipidemia,       Neuro/Psych- neuro exam normal  (+) dizziness/light headedness, numbness,     GI/Hepatic/Renal/Endo - negative ROS     Musculoskeletal (-) normal exam    (+) back pain, radiculopathy Right lower extremity  (-)  ABDIAZIZ test  Abdominal  - normal exam   Substance History       OB/GYN          Other   arthritis,                 Diagnosis and Plan      Treatment Plan  ASA 3   Patient has had previous injection/procedure with 25-50% improvement.   Procedures: Lumbar Epidural Steroid Injection(LESI), With fluoroscopy,       Anesthetic plan and risks discussed with patient.          Diagnosis     * Lumbar degenerative disc disease [M51.36]     * Lumbar radiculopathy [M54.16]

## 2020-07-15 ENCOUNTER — TELEPHONE (OUTPATIENT)
Dept: CARDIOLOGY | Facility: CLINIC | Age: 65
End: 2020-07-15

## 2020-07-15 DIAGNOSIS — E78.5 DYSLIPIDEMIA: Primary | ICD-10-CM

## 2020-07-15 NOTE — TELEPHONE ENCOUNTER
Pt left msg asking if Dr. Kenyon would take over filling his Atorvastatin?  He said his PMD is requiring him to make an appt before they will fill it and he would rather Dr. Kenyon be in charge of it anyway.  Please advise. He wants a 90 day send to [facility].    Thanks,  Susanna FLORES 02-20-20 with Dr. Kenyon    Pt's # [phone]

## 2020-07-16 NOTE — TELEPHONE ENCOUNTER
I do not see that he has had a repeat CMP and lipid panel since atorvastatin dose was increased.  He needs to have this done before 90 day supply of atorvastatin given.  He can get this done through us or PCP.  If he wants to get this done through us then ok to give 30 day supply of atorvastatin, NO REFILLS, until he stops by outpatient lab to get fasting labs drawn.  If he is due for other routine labwork through PCP as well then it may be easier for him to just get this done all at once through PCP.  Even if we do take over managing his cholesterol he still needs a PCP to follow with regularly.  He is also due for office follow-up with JESSICA or Dr. Kenyon in August.  After you speak with him please transfer him to scheduling to get this scheduled.

## 2020-07-17 RX ORDER — ATORVASTATIN CALCIUM 40 MG/1
60 TABLET, FILM COATED ORAL NIGHTLY
Qty: 45 TABLET | Refills: 0 | Status: SHIPPED | OUTPATIENT
Start: 2020-07-17 | End: 2021-03-30 | Stop reason: SDUPTHER

## 2020-07-17 NOTE — TELEPHONE ENCOUNTER
I spoke with the pt and he would like you to place the lab order.  He said he will go to [place] to have the labs done.  I did convey to him it must be a fasting lab.  I sent in a 30 day supply of the medication and explained that it will not be refilled until the labs are completed-I made sure he understood it would absolutely NOT be refilled if the labs are not done.  He verbalized understanding.  I told him I would let him know when the order has been put in so he will know when he can go to the lab.    Thanks,  Susanna

## 2020-07-17 NOTE — TELEPHONE ENCOUNTER
Orders placed.  Please transfer him to scheduling to schedule follow-ups as advised below.  He needs to see Dr. Kenyon or JESSICA in August.  He will need to continue to follow with our office regularly to get refills.

## 2020-07-23 RX ORDER — ATORVASTATIN CALCIUM 40 MG/1
TABLET, FILM COATED ORAL
Qty: 45 TABLET | Refills: 0 | OUTPATIENT
Start: 2020-07-23

## 2020-07-30 ENCOUNTER — LAB (OUTPATIENT)
Dept: LAB | Facility: HOSPITAL | Age: 65
End: 2020-07-30

## 2020-07-30 ENCOUNTER — TELEPHONE (OUTPATIENT)
Dept: CARDIOLOGY | Facility: CLINIC | Age: 65
End: 2020-07-30

## 2020-07-30 DIAGNOSIS — E78.5 DYSLIPIDEMIA: Primary | ICD-10-CM

## 2020-07-30 DIAGNOSIS — E78.5 DYSLIPIDEMIA: ICD-10-CM

## 2020-07-30 LAB
ALBUMIN SERPL-MCNC: 4.3 G/DL (ref 3.5–5.2)
ALBUMIN/GLOB SERPL: 1.9 G/DL
ALP SERPL-CCNC: 51 U/L (ref 39–117)
ALT SERPL W P-5'-P-CCNC: 42 U/L (ref 1–41)
ANION GAP SERPL CALCULATED.3IONS-SCNC: 9.9 MMOL/L (ref 5–15)
AST SERPL-CCNC: 43 U/L (ref 1–40)
BILIRUB SERPL-MCNC: 0.4 MG/DL (ref 0–1.2)
BUN SERPL-MCNC: 15 MG/DL (ref 8–23)
BUN/CREAT SERPL: 14 (ref 7–25)
CALCIUM SPEC-SCNC: 9.4 MG/DL (ref 8.6–10.5)
CHLORIDE SERPL-SCNC: 104 MMOL/L (ref 98–107)
CHOLEST SERPL-MCNC: 148 MG/DL (ref 0–200)
CO2 SERPL-SCNC: 26.1 MMOL/L (ref 22–29)
CREAT SERPL-MCNC: 1.07 MG/DL (ref 0.76–1.27)
GFR SERPL CREATININE-BSD FRML MDRD: 70 ML/MIN/1.73
GLOBULIN UR ELPH-MCNC: 2.3 GM/DL
GLUCOSE SERPL-MCNC: 114 MG/DL (ref 65–99)
HDLC SERPL-MCNC: 53 MG/DL (ref 40–60)
LDLC SERPL CALC-MCNC: 64 MG/DL (ref 0–100)
LDLC/HDLC SERPL: 1.2 {RATIO}
POTASSIUM SERPL-SCNC: 4.4 MMOL/L (ref 3.5–5.2)
PROT SERPL-MCNC: 6.6 G/DL (ref 6–8.5)
SODIUM SERPL-SCNC: 140 MMOL/L (ref 136–145)
TRIGL SERPL-MCNC: 156 MG/DL (ref 0–150)
VLDLC SERPL-MCNC: 31.2 MG/DL (ref 5–40)

## 2020-07-30 PROCEDURE — 36415 COLL VENOUS BLD VENIPUNCTURE: CPT

## 2020-07-30 PROCEDURE — 80053 COMPREHEN METABOLIC PANEL: CPT

## 2020-07-30 PROCEDURE — 80061 LIPID PANEL: CPT | Performed by: NURSE PRACTITIONER

## 2020-07-30 NOTE — TELEPHONE ENCOUNTER
I called and discussed CMP and lipid panel with patient.   Liver enzymes borderline elevated.  We discussed concerns that this could be from increased atorvastatin dose however he feels it is more likely from just getting back from vacation where he drank a lot more alcohol than he normally dose.  He wants to try to continue the same dose of lipitor and recheck CMP in 2 weeks.  If not returned to normal after cutting out alcohol then we will decrease atorvastatin.  He is also going to follow-up with Dr. Sims on this issue as well as increased blood sugar.  In the meantime he will avoid alcohol, sweets, white carbs.  He normally eats pretty healthy.  Feeling well without complaints. Will call sooner if he develops issues or concerns.

## 2020-08-05 ENCOUNTER — TELEPHONE (OUTPATIENT)
Dept: NEUROSURGERY | Facility: CLINIC | Age: 65
End: 2020-08-05

## 2020-08-05 NOTE — TELEPHONE ENCOUNTER
LM for patient letting him know that Dr LEHMAN's next availability for televisit is 8.13.20 at 1 pm, I informed him in my message THAT DR LEHMAN WILL BE CALLING FROM THE HOSPITAL SO I CANNOT GUARANTEE THAT HE WILL CALL AT 1 PM, IF COULD BE BEFORE OR AFTER    Ela, can you put him on schedule for 8.13.20 at 1 pm for televisit

## 2020-08-05 NOTE — TELEPHONE ENCOUNTER
PATIENT WAS INITIALLY SCHEDULED FOR A TELEPHONE VISIT TWICE IN JUN 2020 BUT BOTH PARTIES COULD NOT GET IN CONTACT WITH EACH OTHER. PATIENT READY TO BE RESCHEDULED FOR A TELEPHONE VISIT IF POSSIBLE. UNABLE TO COMPLETE TASK DUE TO IT BEING A TELEVIST. PATIENT INQUIRING INTO GETTING INJECTION RENEWED & WOULD LIKE TO DISCUSS FURTHER WITH PROVIDER HIS SPINAL STENOSIS THAT HAS BEEN AFFECTING BOTH LEGS BUT MOSTLY HIS R-LEG FOR A WHILE.    PATIENT CAN BE CONTACTED [phone] FOR FURTHER INSTRUCTION

## 2020-08-06 NOTE — PROGRESS NOTES
Subjective   History of Present Illness: Avni Olivo III is a 65 y.o. male for televisit follow up after MALLORIE, his last MALLORIE was 6.2.20 which has helped.  He would like to discuss having mor MALLORIE if possible.  He is going to start PT next week on his own, he states that where he goes they do not require an order    Patient was last seen 8.28.19 for low back and leg pain, weakness and numbness    Patient states that he is currently having constant mild to moderate low back and intermittent bilateral leg numbness, he does have bilateral leg weakness and atrophy. He denies leg pain, urinary incontinence or problems with his balance and gait.  He has Tramadol at home which he takes prn    You have chosen to receive care through a telephone visit. Do you consent to use a telephone visit for your medical care today? Yes  This was a televisit from the hospital.  The patient was at home.  It lasted 10 minutes.  I have not seen him in a year.  He has known spinal stenosis at L4-L5 and L5-S1.  We have been trying to treat him nonoperatively with epidural blocks every 3 months and it seems to be helping.  He has noticed that his legs are weak and he feels that there is some atrophy to the legs.  From a pain standpoint, the blocks do seem to help.  I noticed that he had not been imaged since 2015 and I said it would probably be a good idea to get a more recent picture to see what the dimensions of the spinal canal are.  We will go ahead and get that set up and have him come to see me.  We will go ahead and set up the next block in September, which is 3 months after the previous one.  We will discuss whether or not we should continue doing this after the MRI review and examination.      Back Pain   The problem occurs constantly. The problem is unchanged. The pain is present in the lumbar spine. The pain does not radiate. The pain is at a severity of 5/10. The pain is moderate. The symptoms are aggravated by standing, sitting,  twisting, position, lying down and bending. Associated symptoms include numbness and weakness. Pertinent negatives include no leg pain.   Extremity Weakness    The pain is present in the left lower leg, left upper leg, right upper leg and right lower leg. The problem occurs constantly. The problem has been unchanged. Associated symptoms include numbness.       The following portions of the patient's history were reviewed and updated as appropriate: allergies, current medications, past family history, past medical history, past social history, past surgical history and problem list.    Review of Systems   HENT: Negative.    Eyes: Negative.    Respiratory: Negative.    Cardiovascular: Negative.    Gastrointestinal: Negative.    Endocrine: Negative.    Genitourinary: Negative for urgency.   Musculoskeletal: Positive for back pain and extremity weakness. Negative for arthralgias, gait problem, joint swelling and myalgias.   Allergic/Immunologic: Negative.    Neurological: Positive for weakness and numbness.   Hematological: Negative.    Psychiatric/Behavioral: Negative.        Objective             Physical Exam   Deferred  Neurologic Exam   Deferred        Assessment/Plan   Independent Review of Radiographic Studies:      I reviewed the lumbar MRI done in September 2015 shows spinal stenosis at L4-L5 and L5-S1.  Agree with the report.  I personally reviewed the images from the following studies.        Medical Decision Making:      We will go ahead and obtain the new MRI and have him come back to discuss it.  But in the meantime will order another epidural block at least on this occasion in September of this year.      Avni was seen today for back pain, numbness and extremity weakness.    Diagnoses and all orders for this visit:    Spinal stenosis of lumbar region with neurogenic claudication  -     MRI Lumbar Spine Without Contrast; Future  -     Epidural Block    DDD (degenerative disc disease), lumbar  -     MRI  Lumbar Spine Without Contrast; Future  -     Epidural Block      Return in about 13 days (around 8/26/2020) for After MRI.

## 2020-08-13 ENCOUNTER — OFFICE VISIT (OUTPATIENT)
Dept: NEUROSURGERY | Facility: CLINIC | Age: 65
End: 2020-08-13

## 2020-08-13 DIAGNOSIS — M51.36 DDD (DEGENERATIVE DISC DISEASE), LUMBAR: ICD-10-CM

## 2020-08-13 DIAGNOSIS — M48.062 SPINAL STENOSIS OF LUMBAR REGION WITH NEUROGENIC CLAUDICATION: Primary | ICD-10-CM

## 2020-08-13 PROCEDURE — 99213 OFFICE O/P EST LOW 20 MIN: CPT | Performed by: NEUROLOGICAL SURGERY

## 2020-08-26 ENCOUNTER — OFFICE VISIT (OUTPATIENT)
Dept: CARDIOLOGY | Facility: CLINIC | Age: 65
End: 2020-08-26

## 2020-08-26 VITALS
HEIGHT: 71 IN | BODY MASS INDEX: 27.77 KG/M2 | HEART RATE: 53 BPM | DIASTOLIC BLOOD PRESSURE: 72 MMHG | SYSTOLIC BLOOD PRESSURE: 128 MMHG | WEIGHT: 198.4 LBS

## 2020-08-26 DIAGNOSIS — I25.84 CORONARY ARTERY CALCIFICATION: Primary | ICD-10-CM

## 2020-08-26 DIAGNOSIS — M79.606 PAIN OF LOWER EXTREMITY, UNSPECIFIED LATERALITY: Primary | ICD-10-CM

## 2020-08-26 DIAGNOSIS — E78.5 DYSLIPIDEMIA: ICD-10-CM

## 2020-08-26 DIAGNOSIS — I10 ESSENTIAL HYPERTENSION: ICD-10-CM

## 2020-08-26 DIAGNOSIS — I25.10 CORONARY ARTERY CALCIFICATION: Primary | ICD-10-CM

## 2020-08-26 PROCEDURE — 93000 ELECTROCARDIOGRAM COMPLETE: CPT | Performed by: NURSE PRACTITIONER

## 2020-08-26 PROCEDURE — 99214 OFFICE O/P EST MOD 30 MIN: CPT | Performed by: NURSE PRACTITIONER

## 2020-08-26 RX ORDER — PRASTERONE (DHEA) 50 MG
50 TABLET ORAL 2 TIMES DAILY
COMMUNITY

## 2020-08-26 NOTE — PROGRESS NOTES
Date of Office Visit: 2020  Encounter Provider: Sushila Mtz, ESPINOZA, APRN  Place of Service: [facility]  Patient Name: Avni Olivo III  :1955        Subjective:     Chief Complaint:  Follow-up, coronary artery disease, hypertension      History of Present Illness:  Avni Olivo III is a 65 y.o. male patient of Dr. Kenyon.  I am seeing this patient in the office today and I have reviewed his records.    Patient has a history of  coronary artery disease, premature ventricular contractions, hypertension, hx of presyncopal episode, hyperlipidemia.     Patient was first seen in office by Dr. Ramírez in  with chest pain.  He had an exercise echo stress test done 2013 that showed normal LV systolic function with EF of 64%, trace mitral regurgitation.  He was able to exercise for 11.5 minutes on the Jacques protocol.  Stress echo images were normal.  Stress EKG was normal.  Patient did have some chest discomfort.  He underwent heart catheterization 6/10/2013 which showed no evidence of coronary artery disease. Patient was first seen by Dr. Kenyon 2016.  He had been on vacation and had bradycardia and presyncopal episode at the airport.  EKG in the ER there showed sinus bradycardia but was otherwise normal.  He was hypertensive.  Troponin and proBNP were normal.  Creatinine was elevated and he was given IV fluids.  CT scan of the head looked okay.  When he was seen back in the office for follow-up he had not had any recurrence of symptoms.  Episode was thought to have occurred from combination of not eating, not getting enough sleep, and taking Coreg less than 12 hours apart.  Patient later complained of palpitations during 2018 visit and had a Holter monitor done which showed symptomatic PVCs that were infrequent.  It was described as a skipping sensation that would take his breath.  It was more common with alcohol or if he was feeling bloated.  He  was advised to avoid stimulants and stress, suicide regularly.  Plan was to possibly increase carvedilol symptoms got worse.  Patient had stress echo 12/2019 showing normal LV systolic function, no significant valvular disease, and no ischemic changes were noted.  Coronary artery calcium score was 2068 and he went on to have heart catheterization 12/27/2019 where he was found to have three-vessel calcification but critical narrowing of the mid LAD for which he received drug-eluting stents x 2.  No antiplatelet therapy for at least 1 year as well as aggressive risk factor modification were recommended.        Patient presents to office today for follow-up appointment.  Patient reports he is doing well overall since last visit.  He reports that ever since having a stent placed he is gotten some occasional chest pressure with activities that resolves as he continues to exercise.  He is adamant that it has been going on for 20 years and is not new or worsening and did not go away after stent placement or worsened since.  EKG overall unchanged from previous.  Denies any shortness of breath, shortness of breath with exertion, lower extremity edema, dizziness, syncope, falls, fatigue, or abnormal bleeding.  He gets a rare brief fluttering sensation in his chest, more so if he drinks too much coffee, not exertional, no associated symptoms, has been going on for years and years but has not occurred at all recently.  Blood pressure and heart rate are well controlled in the office today.  He reports that he is followed up with PCP and had repeat labs showing liver enzymes returned to normal after stopping drinking alcohol.          Past Medical History:   Diagnosis Date   • Arm pain    • Chest pain    • DDD (degenerative disc disease), lumbar    • Dizziness    • History of EKG 08/06/2013   • HTN (hypertension)    • Hyperlipidemia    • Limb pain    • Low back pain    • Lower extremity edema    • Lumbar canal stenosis    •  Melanoma (CMS/[facility]) 07/05/2017   • Mitral regurgitation     trace   • Nausea and vomiting    • Palpitations    • Peripheral neuropathy    • Sinus bradycardia    • Snoring    • Spinal stenosis    • Sprain of right foot      Past Surgical History:   Procedure Laterality Date   • CARDIAC CATHETERIZATION  06/10/2013   • CARDIAC CATHETERIZATION N/A 12/27/2019    Procedure: Coronary angiography;  Surgeon: Terrance Wood MD;  Location:  [facility];  Service: Cardiovascular   • CARDIAC CATHETERIZATION N/A 12/27/2019    Procedure: Left Heart Cath;  Surgeon: Terrance Wood MD;  Location:  [facility];  Service: Cardiovascular   • CARDIAC CATHETERIZATION N/A 12/27/2019    Procedure: Left ventriculography;  Surgeon: Terrance Wodo MD;  Location:  [facility];  Service: Cardiovascular   • CARDIAC CATHETERIZATION N/A 12/27/2019    Procedure: Stent KALYAN coronary;  Surgeon: Terrance Wood MD;  Location:  [facility] IN[facility];  Service: Cardiovascular   • EPIDURAL BLOCK     • FOOT FRACTURE SURGERY Left    • KNEE ARTHROSCOPY Left    • KNEE SURGERY Right    • MOHS SURGERY      nose   • SKIN CANCER EXCISION Bilateral 07/05/2017    MELANOMA   • TONSILLECTOMY       Outpatient Medications Prior to Visit   Medication Sig Dispense Refill   • aspirin 81 MG tablet Take 81 mg by mouth Daily.     • atorvastatin (LIPITOR) 40 MG tablet Take 1.5 tablets by mouth Every Night. 45 tablet 0   • B Complex Vitamins (VITAMIN B COMPLEX) capsule capsule Take 1 capsule by mouth Daily.     • CALCIUM PO Take  by mouth Daily.     • carvedilol (COREG) 12.5 MG tablet Take 12.5 mg by mouth 2 (Two) Times a Day.     • cholecalciferol (VITAMIN D3) 25 MCG (1000 UT) tablet Take 1,000 Units by mouth 2 (two) times a day.     • coenzyme Q10 100 MG capsule Take 100 mg by mouth Daily.     • DHEA 50 MG tablet Take 50 mg by mouth 2 (two) times a day.     • eszopiclone (LUNESTA) 2 MG tablet Take  1 mg by mouth Every Night. Takes 1/2 tablet qhs     • losartan (COZAAR) 50 MG tablet TAKE 1 TABLET DAILY 90 tablet 4   • melatonin 5 MG tablet tablet Take 5 mg by mouth Every Night.     • Misc Natural Products (7-KETO LEAN) capsule Take 1 tablet by mouth Daily.     • Omega-3 Fatty Acids (FISH OIL PO) Take 1,280 mg by mouth 2 (Two) Times a Day.     • Probiotic Product (PROBIOTIC DAILY PO) Take 1 tablet by mouth Daily.     • ticagrelor (Brilinta) 90 MG tablet tablet Take 1 tablet by mouth 2 (Two) Times a Day. 180 tablet 1   • traMADol (ULTRAM) 50 MG tablet Take 50 mg by mouth Every 6 (Six) Hours As Needed for Moderate Pain .     • TURMERIC CURCUMIN PO Take 2 tablets by mouth Daily.     • vitamin C (ASCORBIC ACID) 500 MG tablet Take 1,000 mg by mouth Daily.       No facility-administered medications prior to visit.        Allergies as of 08/26/2020   • (No Known Allergies)     Social History     Socioeconomic History   • Marital status:      Spouse name: Not on file   • Number of children: Not on file   • Years of education: college graduate   • Highest education level: Not on file   Tobacco Use   • Smoking status: Never Smoker   • Smokeless tobacco: Never Used   Substance and Sexual Activity   • Alcohol use: Yes     Alcohol/week: 2.0 standard drinks     Types: 2 Cans of beer per week     Comment: social drinker   • Drug use: No   • Sexual activity: Defer     Family History   Problem Relation Age of Onset   • Coronary artery disease Other    • Hyperlipidemia Other    • Hypertension Other    • Other Other         heart surgery   • Heart disease Father    • Heart disease Sister        Review of Systems   Constitution: Negative for chills, fever, malaise/fatigue, weight gain and weight loss.   HENT: Negative for ear pain, hearing loss, nosebleeds and sore throat.    Eyes: Negative for blurred vision, double vision, redness, vision loss in left eye, vision loss in right eye and visual disturbance.   Cardiovascular:  "       SEE HPI   Respiratory: Negative for cough, shortness of breath, snoring and wheezing.    Endocrine: Negative for cold intolerance and heat intolerance.   Skin: Positive for rash. Negative for itching and suspicious lesions.   Musculoskeletal: Positive for joint pain. Negative for joint swelling and myalgias.   Gastrointestinal: Negative for abdominal pain, diarrhea, hematemesis, melena, nausea and vomiting.   Genitourinary: Negative for dysuria, frequency and hematuria.   Neurological: Negative for dizziness, headaches, numbness, paresthesias and seizures.   Psychiatric/Behavioral: Negative for altered mental status and depression. The patient is not nervous/anxious.           Objective:     Vitals:    08/26/20 1139   BP: 128/72   BP Location: Right arm   Cuff Size: Adult   Pulse: 53   Weight: 90 kg (198 lb 6.4 oz)   Height: 180.3 cm (71\")     Body mass index is 27.67 kg/m².      PHYSICAL EXAM:  Physical Exam   Constitutional: He is oriented to person, place, and time. He appears well-developed and well-nourished. No distress.   HENT:   Head: Normocephalic and atraumatic.   Eyes: Pupils are equal, round, and reactive to light.   Neck: Neck supple. No JVD present. Carotid bruit is not present.   Cardiovascular: Normal rate, regular rhythm, normal heart sounds and intact distal pulses. Exam reveals no gallop and no friction rub.   No murmur heard.  Pulses:       Radial pulses are 2+ on the right side, and 2+ on the left side.        Posterior tibial pulses are 2+ on the right side, and 2+ on the left side.   Pulmonary/Chest: Effort normal and breath sounds normal. No respiratory distress.   Abdominal: Soft. Bowel sounds are normal. He exhibits no distension.   Musculoskeletal: He exhibits no edema, tenderness or deformity.   Neurological: He is alert and oriented to person, place, and time.   Skin: Skin is warm and dry. No rash noted. He is not diaphoretic. No erythema.   Psychiatric: He has a normal mood and " affect. His behavior is normal. Judgment normal.           ECG 12 Lead  Date/Time: 8/26/2020 11:53 AM  Performed by: Sushila Mtz DNP, APRN  Authorized by: Sushila Mtz DNP, APRN   Comparison: compared with previous ECG from 1/9/2020  Similar to previous ECG  Rhythm: sinus rhythm  Rate: bradycardic  BPM: 53  Other findings: non-specific ST-T wave changes  Comments: No significant changes from previous EKG              Assessment:       Diagnosis Plan   1. Coronary artery calcification     2. Essential hypertension     3. Dyslipidemia           Plan:     1. Coronary artery disease: With history of drug-eluting stents to mid LAD 12/2019.  Remains on dual antiplatelet therapy with Brilinta and aspirin and would recommend continuing for at least 1 year at which point could consider going down to single agent.  Discussed risks of holding dual antiplatelet therapy prior to the 1 year daisy.  Patient gets some occasional chest pressure when he first starts to exercise which improves as he continues.  He reports it has been going on for at least 20 years and was not new or worsening either before or after the stent placement, absolutely no changes in this in the last 20 years.  We discussed checking a stress test however he does not feel that this is needed.  He will continue to exercise and stay active and will notify office of any new or worsening symptoms prior to next visit.  2. Hypertension: Blood pressure well controlled in the office today.  Patient continue to monitor and call if blood pressure staying greater than 130/80 on average.  3. Dyslipidemia: Patient had elevated liver enzymes on last metabolic panel.  He had just gotten back from vacation where he had been drinking more alcohol than normal and thought that this was the cause as he was asymptomatic.  He reports that PCP recheck these and they returned to normal.  He will continue to follow-up with PCP for cholesterol management and liver  enzymes.  4. History of symptomatic PVCs  5. Obstructive sleep apnea: On CPAP therapy.  Follows with Dr. Sage.  6. Chronic back pain: Follows with Dr. Martinez with history of epidural injections and tramadol use.  7. History of melanoma  8. History of going AGAINST MEDICAL ADVICE: 6/2020 patient had spinal epidural done after holding Brilinta which she was aware was completely against the medical advice of this office given that he was only 6 months out from stent placement and verbalized understanding of risks of heart attack/death.    Patient to follow-up with Dr. Kenyon in 6 months or sooner if needed for any new, recurrent, or worsening symptoms or other issues/concerns.           Your medication list           Accurate as of August 26, 2020 12:38 PM. If you have any questions, ask your nurse or doctor.               CONTINUE taking these medications      Instructions Last Dose Given Next Dose Due   7-Keto Lean capsule      Take 1 tablet by mouth Daily.       aspirin 81 MG tablet      Take 81 mg by mouth Daily.       atorvastatin 40 MG tablet  Commonly known as:  LIPITOR      Take 1.5 tablets by mouth Every Night.       CALCIUM PO      Take  by mouth Daily.       carvedilol 12.5 MG tablet  Commonly known as:  COREG      Take 12.5 mg by mouth 2 (Two) Times a Day.       cholecalciferol 25 MCG (1000 UT) tablet  Commonly known as:  VITAMIN D3      Take 1,000 Units by mouth 2 (two) times a day.       coenzyme Q10 100 MG capsule      Take 100 mg by mouth Daily.       DHEA 50 MG tablet      Take 50 mg by mouth 2 (two) times a day.       eszopiclone 2 MG tablet  Commonly known as:  LUNESTA      Take 1 mg by mouth Every Night. Takes 1/2 tablet qhs       FISH OIL PO      Take 1,280 mg by mouth 2 (Two) Times a Day.       losartan 50 MG tablet  Commonly known as:  COZAAR      TAKE 1 TABLET DAILY       melatonin 5 MG tablet tablet      Take 5 mg by mouth Every Night.       PROBIOTIC DAILY PO      Take 1 tablet by mouth  Daily.       ticagrelor 90 MG tablet tablet  Commonly known as:  Brilinta      Take 1 tablet by mouth 2 (Two) Times a Day.       traMADol 50 MG tablet  Commonly known as:  ULTRAM      Take 50 mg by mouth Every 6 (Six) Hours As Needed for Moderate Pain .       TURMERIC CURCUMIN PO      Take 2 tablets by mouth Daily.       vitamin b complex capsule capsule      Take 1 capsule by mouth Daily.       vitamin C 500 MG tablet  Commonly known as:  ASCORBIC ACID      Take 1,000 mg by mouth Daily.              The above medication changes may not have been made by this provider.  Patient's medication list was updated to reflect medications they are currently taking including medication changes made by other providers.        Thanks,    Sushila Mtz, ESPINOZA, APRN  08/26/2020         Dictated utilizing Dragon dictation

## 2020-08-31 ENCOUNTER — HOSPITAL ENCOUNTER (OUTPATIENT)
Dept: MRI IMAGING | Facility: HOSPITAL | Age: 65
Discharge: HOME | End: 2020-08-31
Admitting: NEUROLOGICAL SURGERY

## 2020-08-31 DIAGNOSIS — M51.36 DDD (DEGENERATIVE DISC DISEASE), LUMBAR: ICD-10-CM

## 2020-08-31 DIAGNOSIS — M48.062 SPINAL STENOSIS OF LUMBAR REGION WITH NEUROGENIC CLAUDICATION: ICD-10-CM

## 2020-08-31 PROCEDURE — 72148 MRI LUMBAR SPINE W/O DYE: CPT

## 2020-09-03 ENCOUNTER — TELEPHONE (OUTPATIENT)
Dept: CARDIOLOGY | Facility: CLINIC | Age: 65
End: 2020-09-03

## 2020-09-03 ENCOUNTER — HOSPITAL ENCOUNTER (OUTPATIENT)
Dept: CARDIOLOGY | Facility: HOSPITAL | Age: 65
Discharge: HOME | End: 2020-09-03
Admitting: NURSE PRACTITIONER

## 2020-09-03 DIAGNOSIS — M79.606 PAIN OF LOWER EXTREMITY, UNSPECIFIED LATERALITY: ICD-10-CM

## 2020-09-03 LAB
BH CV LOWER ARTERIAL LEFT ABI RATIO: 1.12
BH CV LOWER ARTERIAL LEFT GREAT TOE SYS MAX: 87 MMHG
BH CV LOWER ARTERIAL LEFT HIGH THIGH SYS MAX: 195 MMHG
BH CV LOWER ARTERIAL LEFT POPLITEAL SYS MAX: 172 MMHG
BH CV LOWER ARTERIAL LEFT POST TIBIAL SYS MAX: 176 MMHG
BH CV LOWER ARTERIAL LEFT TBI RATIO: 0.55
BH CV LOWER ARTERIAL RIGHT ABI RATIO: 1.22
BH CV LOWER ARTERIAL RIGHT GREAT TOE SYS MAX: 123 MMHG
BH CV LOWER ARTERIAL RIGHT HIGH THIGH SYS MAX: 200 MMHG
BH CV LOWER ARTERIAL RIGHT POPLITEAL SYS MAX: 195 MMHG
BH CV LOWER ARTERIAL RIGHT POST TIBIAL SYS MAX: 191 MMHG
BH CV LOWER ARTERIAL RIGHT TBI RATIO: 0.78
UPPER ARTERIAL LEFT ARM BRACHIAL SYS MAX: 144 MMHG
UPPER ARTERIAL RIGHT ARM BRACHIAL SYS MAX: 157 MMHG

## 2020-09-03 PROCEDURE — 93923 UPR/LXTR ART STDY 3+ LVLS: CPT | Performed by: INTERNAL MEDICINE

## 2020-09-03 PROCEDURE — 93923 UPR/LXTR ART STDY 3+ LVLS: CPT

## 2020-09-03 NOTE — TELEPHONE ENCOUNTER
Please let patient know that lower extremity ultrasounds do not show any causes for leg pain.  Would have him follow-up with primary care and his back doctor for possible noncardiac causes.

## 2020-09-04 NOTE — TELEPHONE ENCOUNTER
9/4/20  I spoke with patient and gave him these instructions.  He has upcoming appts with both of those doctors and has seen the u/s report in my chart/troy

## 2020-09-21 ENCOUNTER — ANESTHESIA (OUTPATIENT)
Dept: PAIN MEDICINE | Facility: HOSPITAL | Age: 65
End: 2020-09-21

## 2020-09-21 ENCOUNTER — TELEPHONE (OUTPATIENT)
Dept: NEUROSURGERY | Facility: CLINIC | Age: 65
End: 2020-09-21

## 2020-09-21 ENCOUNTER — HOSPITAL ENCOUNTER (OUTPATIENT)
Dept: GENERAL RADIOLOGY | Facility: HOSPITAL | Age: 65
Discharge: HOME | End: 2020-09-21

## 2020-09-21 ENCOUNTER — HOSPITAL ENCOUNTER (OUTPATIENT)
Dept: PAIN MEDICINE | Facility: HOSPITAL | Age: 65
Discharge: HOME | End: 2020-09-21

## 2020-09-21 ENCOUNTER — ANESTHESIA EVENT (OUTPATIENT)
Dept: PAIN MEDICINE | Facility: HOSPITAL | Age: 65
End: 2020-09-21

## 2020-09-21 VITALS
DIASTOLIC BLOOD PRESSURE: 75 MMHG | SYSTOLIC BLOOD PRESSURE: 129 MMHG | HEART RATE: 55 BPM | RESPIRATION RATE: 16 BRPM | OXYGEN SATURATION: 99 % | TEMPERATURE: 97.1 F

## 2020-09-21 DIAGNOSIS — R52 PAIN: ICD-10-CM

## 2020-09-21 DIAGNOSIS — M48.062 SPINAL STENOSIS OF LUMBAR REGION WITH NEUROGENIC CLAUDICATION: Primary | ICD-10-CM

## 2020-09-21 PROCEDURE — 25010000002 MIDAZOLAM PER 1 MG: Performed by: ANESTHESIOLOGY

## 2020-09-21 PROCEDURE — 0 IOPAMIDOL 41 % SOLUTION: Performed by: ANESTHESIOLOGY

## 2020-09-21 PROCEDURE — C1755 CATHETER, INTRASPINAL: HCPCS

## 2020-09-21 PROCEDURE — 25010000002 METHYLPREDNISOLONE PER 80 MG: Performed by: ANESTHESIOLOGY

## 2020-09-21 PROCEDURE — 77003 FLUOROGUIDE FOR SPINE INJECT: CPT

## 2020-09-21 PROCEDURE — 25010000002 FENTANYL CITRATE (PF) 100 MCG/2ML SOLUTION: Performed by: ANESTHESIOLOGY

## 2020-09-21 RX ORDER — METHYLPREDNISOLONE ACETATE 80 MG/ML
80 INJECTION, SUSPENSION INTRA-ARTICULAR; INTRALESIONAL; INTRAMUSCULAR; SOFT TISSUE ONCE
Status: COMPLETED | OUTPATIENT
Start: 2020-09-21 | End: 2020-09-21

## 2020-09-21 RX ORDER — SODIUM CHLORIDE 0.9 % (FLUSH) 0.9 %
3-10 SYRINGE (ML) INJECTION AS NEEDED
Status: DISCONTINUED | OUTPATIENT
Start: 2020-09-21 | End: 2020-09-22 | Stop reason: HOSPADM

## 2020-09-21 RX ORDER — MIDAZOLAM HYDROCHLORIDE 1 MG/ML
1 INJECTION INTRAMUSCULAR; INTRAVENOUS
Status: DISCONTINUED | OUTPATIENT
Start: 2020-09-21 | End: 2020-09-22 | Stop reason: HOSPADM

## 2020-09-21 RX ORDER — LIDOCAINE HYDROCHLORIDE 10 MG/ML
1 INJECTION, SOLUTION INFILTRATION; PERINEURAL ONCE
Status: DISCONTINUED | OUTPATIENT
Start: 2020-09-21 | End: 2020-09-22 | Stop reason: HOSPADM

## 2020-09-21 RX ORDER — LIDOCAINE HYDROCHLORIDE 10 MG/ML
0.5 INJECTION, SOLUTION INFILTRATION; PERINEURAL ONCE AS NEEDED
Status: DISCONTINUED | OUTPATIENT
Start: 2020-09-21 | End: 2020-09-22 | Stop reason: HOSPADM

## 2020-09-21 RX ORDER — SODIUM CHLORIDE 0.9 % (FLUSH) 0.9 %
3 SYRINGE (ML) INJECTION EVERY 12 HOURS SCHEDULED
Status: DISCONTINUED | OUTPATIENT
Start: 2020-09-21 | End: 2020-09-22 | Stop reason: HOSPADM

## 2020-09-21 RX ORDER — FENTANYL CITRATE 50 UG/ML
50 INJECTION, SOLUTION INTRAMUSCULAR; INTRAVENOUS AS NEEDED
Status: DISCONTINUED | OUTPATIENT
Start: 2020-09-21 | End: 2020-09-22 | Stop reason: HOSPADM

## 2020-09-21 RX ADMIN — IOPAMIDOL 10 ML: 408 INJECTION, SOLUTION INTRATHECAL at 10:16

## 2020-09-21 RX ADMIN — FENTANYL CITRATE 100 MCG: 50 INJECTION, SOLUTION INTRAMUSCULAR; INTRAVENOUS at 10:16

## 2020-09-21 RX ADMIN — METHYLPREDNISOLONE ACETATE 80 MG: 80 INJECTION, SUSPENSION INTRA-ARTICULAR; INTRALESIONAL; INTRAMUSCULAR; SOFT TISSUE at 10:17

## 2020-09-21 RX ADMIN — MIDAZOLAM 2 MG: 1 INJECTION INTRAMUSCULAR; INTRAVENOUS at 10:15

## 2020-09-21 NOTE — ANESTHESIA PROCEDURE NOTES
PAIN Epidural block    Pre-sedation assessment completed: 9/21/2020 10:00 AM    Patient reassessed immediately prior to procedure    Patient location during procedure: pain clinic  Start Time: 9/21/2020 10:00 AM  Stop Time: 9/21/2020 10:16 AM  Indication:procedure for pain  Performed By  Anesthesiologist: Jose Haas MD  Preanesthetic Checklist  Completed: patient identified, site marked, surgical consent, pre-op evaluation, timeout performed, risks and benefits discussed and monitors and equipment checked  Additional Notes  Depomedrol - 80mg    Needle position confirmed by fluoroscopy and epidurogram using 2cc of [id number].    Diagnosis  Post-Op Diagnosis Codes:     * Lumbar radiculopathy (M54.16)     * Lumbar spinal stenosis (M48.061)     * Lumbar degenerative disc disease (M51.36)    Prep:  Pt Position:prone  Sterile Tech:cap, gloves, mask and sterile barrier  Prep:chlorhexidine gluconate and isopropyl alcohol  Monitoring:blood pressure monitoring, continuous pulse oximetry and EKG  Procedure:Sedation: yes     Approach:right paramedian  Guidance: fluoroscopy  Location:lumbar  Level:4-5  Needle Type:Tuohy  Needle Gauge:20  Aspiration:negative  Medications:  Depomedrol:80 mg  Preservative Free Saline:2mL  Isovue:2mL  Comments:I have previously called this L3-4; but apparently I was counting a partially lumbarized S1.  Post Assessment:  Post-procedure: bandaide.  Pt Tolerance:patient tolerated the procedure well with no apparent complications  Complications:no

## 2020-09-21 NOTE — TELEPHONE ENCOUNTER
LM that appt with Dr. Martinez on 9/25/20 @ 1:45pm has been canceled and that he will receive a call in the next 1-2 business days to get r/s.

## 2020-09-21 NOTE — H&P
[facility]    History and Physical    Patient Name: Avni Olivo III  :  1955  MRN:  [id number]  Date of Admission: 2020    Subjective     Patient is a 65 y.o. male presents with chief complaint of chronic, intermitent, moderate low back and hips: right pain.  Onset of symptoms was gradual starting several years ago.  Symptoms are associated/aggravated by exercise, lifting, standing or twisting. Symptoms improve with relaxation    The following portions of the patients history were reviewed and updated as appropriate: current medications, allergies, past medical history, past surgical history, past family history, past social history and problem list                Objective     Past Medical History:   Past Medical History:   Diagnosis Date   • Arm pain    • Chest pain    • DDD (degenerative disc disease), lumbar    • Dizziness    • History of EKG 2013   • HTN (hypertension)    • Hyperlipidemia    • Limb pain    • Low back pain    • Lower extremity edema    • Lumbar canal stenosis    • Melanoma (CMS/HCC) 2017   • Mitral regurgitation     trace   • Nausea and vomiting    • Palpitations    • Peripheral neuropathy    • Sinus bradycardia    • Snoring    • Spinal stenosis    • Sprain of right foot      Past Surgical History:   Past Surgical History:   Procedure Laterality Date   • CARDIAC CATHETERIZATION  06/10/2013   • CARDIAC CATHETERIZATION N/A 2019    Procedure: Coronary angiography;  Surgeon: Terrance Wood MD;  Location: [facility] INVASIVE LOCATION;  Service: Cardiovascular   • CARDIAC CATHETERIZATION N/A 2019    Procedure: Left Heart Cath;  Surgeon: Terrance Wood MD;  Location: [facility] CATH INVASIVE LOCATION;  Service: Cardiovascular   • CARDIAC CATHETERIZATION N/A 2019    Procedure: Left ventriculography;  Surgeon: Terrance Wood MD;  Location: [facility] CATH INVASIVE LOCATION;  Service: Cardiovascular   • CARDIAC CATHETERIZATION N/A 2019     "Procedure: Stent KALYAN coronary;  Surgeon: Terrance Wood MD;  Location: [facility] INVASIVE LOCATION;  Service: Cardiovascular   • EPIDURAL BLOCK     • FOOT FRACTURE SURGERY Left    • KNEE ARTHROSCOPY Left    • KNEE SURGERY Right    • MOHS SURGERY      nose   • SKIN CANCER EXCISION Bilateral 07/05/2017    MELANOMA   • TONSILLECTOMY       Family History:   Family History   Problem Relation Age of Onset   • Coronary artery disease Other    • Hyperlipidemia Other    • Hypertension Other    • Other Other         heart surgery   • Heart disease Father    • Heart disease Sister      Social History:   Social History     Tobacco Use   • Smoking status: Never Smoker   • Smokeless tobacco: Never Used   Substance Use Topics   • Alcohol use: Yes     Alcohol/week: 2.0 standard drinks     Types: 2 Cans of beer per week     Comment: social drinker   • Drug use: No       Vital Signs Range for the last 24 hours  Temperature:     Temp Source:     BP:     Pulse:     Respirations:     SPO2:     O2 Amount (l/min):     O2 Devices     Weight:       Flowsheet Rows         First Filed Value    Admission Height  71\" (180.3 cm) Documented at 03/16/2017 0818    Admission Weight  190 lb (86.2 kg) Documented at 03/16/2017 0818          --------------------------------------------------------------------------------    Current Outpatient Medications   Medication Sig Dispense Refill   • atorvastatin (LIPITOR) 40 MG tablet Take 1.5 tablets by mouth Every Night. 45 tablet 0   • B Complex Vitamins (VITAMIN B COMPLEX) capsule capsule Take 1 capsule by mouth Daily.     • CALCIUM PO Take  by mouth Daily.     • carvedilol (COREG) 12.5 MG tablet Take 12.5 mg by mouth 2 (Two) Times a Day.     • cholecalciferol (VITAMIN D3) 25 MCG (1000 UT) tablet Take 1,000 Units by mouth 2 (two) times a day.     • coenzyme Q10 100 MG capsule Take 100 mg by mouth Daily.     • DHEA 50 MG tablet Take 50 mg by mouth 2 (two) times a day.     • eszopiclone (LUNESTA) 2 MG " tablet Take 1 mg by mouth Every Night. Takes 1/2 tablet qhs     • losartan (COZAAR) 50 MG tablet TAKE 1 TABLET DAILY 90 tablet 4   • melatonin 5 MG tablet tablet Take 5 mg by mouth Every Night.     • Misc Natural Products (7-KETO LEAN) capsule Take 1 tablet by mouth Daily.     • Probiotic Product (PROBIOTIC DAILY PO) Take 1 tablet by mouth Daily.     • traMADol (ULTRAM) 50 MG tablet Take 50 mg by mouth Every 6 (Six) Hours As Needed for Moderate Pain .     • TURMERIC CURCUMIN PO Take 2 tablets by mouth Daily.     • vitamin C (ASCORBIC ACID) 500 MG tablet Take 1,000 mg by mouth Daily.     • aspirin 81 MG tablet Take 81 mg by mouth Daily.     • Omega-3 Fatty Acids (FISH OIL PO) Take 1,280 mg by mouth 2 (Two) Times a Day.     • ticagrelor (Brilinta) 90 MG tablet tablet Take 1 tablet by mouth 2 (Two) Times a Day. 180 tablet 1     Current Facility-Administered Medications   Medication Dose Route Frequency Provider Last Rate Last Dose   • lidocaine (XYLOCAINE) 1 % injection 0.5 mL  0.5 mL Intradermal Once PRN Jose Haas MD       • sodium chloride 0.9 % flush 3 mL  3 mL Intravenous Q12H Jose Haas MD       • sodium chloride 0.9 % flush 3-10 mL  3-10 mL Intravenous PRN Jose Haas MD           --------------------------------------------------------------------------------  Assessment/Plan      Anesthesia Evaluation     Patient summary reviewed and Nursing notes reviewed   no history of anesthetic complications:  NPO Solid Status: > 6 hours  NPO Liquid Status: > 6 hours    Pain impairs ability to perform ADLs: Sleeping  Modalities previously tried to control pain with limited effectiveness within the last 4-6 weeks: Ice and Rest     Airway   Mallampati: II  TM distance: >3 FB  Neck ROM: full  Dental - normal exam     Pulmonary - negative pulmonary ROS and normal exam   Cardiovascular - normal exam    (+) hypertension, valvular problems/murmurs MR, CAD, hyperlipidemia,       Neuro/Psych-  neuro exam normal  (+) dizziness/light headedness, numbness,     GI/Hepatic/Renal/Endo - negative ROS     Musculoskeletal (-) normal exam    (+) back pain, radiculopathy Right lower extremity  (-)  ABDIAZIZ test  Abdominal  - normal exam   Substance History      OB/GYN          Other   arthritis,                 Diagnosis and Plan      Treatment Plan  ASA 3   Patient has had previous injection/procedure with 25-50% improvement.   Procedures: Lumbar Epidural Steroid Injection(LESI), With fluoroscopy,       Anesthetic plan and risks discussed with patient.          Diagnosis     * Lumbar radiculopathy [M54.16]     * Lumbar spinal stenosis [M48.061]     * Lumbar degenerative disc disease [M51.36]

## 2020-09-28 RX ORDER — TICAGRELOR 90 MG/1
TABLET ORAL
Qty: 180 TABLET | Refills: 1 | Status: SHIPPED | OUTPATIENT
Start: 2020-09-28 | End: 2021-03-04

## 2020-10-05 NOTE — PROGRESS NOTES
Subjective   History of Present Illness: Avni Olivo III is a 65 y.o. male is here today for follow-up to discuss lumbar MRI results done at [facility] 8.31.20 and after MALLORIE.     Patient was last seen 8.13.20 for low back pain and intermittent bilateral leg numbness and weakness.     He states lumbar MALLORIE do offer relief of leg pain. He is concerned of atrophy in R calf.  He is currently going to PT twice a week which is slowly helping. He also has redness in R calf, he denies and swelling or warmth. Mr. Olivo is not taking any pain medications at this time.     He would like to discuss surgery options.     I have been following this gentleman for several years and managing his spinal stenosis and pain with epidural blocks. These by and large have helped, but he told me a few weeks ago he felt his legs were getting weaker. On examination, he has pretty weak calf muscles and a bit of atrophy in his calves. The pain is still controlled by the blocks, but he is having somewhat more difficulty walking. We reviewed his MRI, which showed progression of the stenosis particularly at L5-S1. He does not have transitional anatomy, but now he is weaker and the problem is that it will continue to get worse and affect his walking even if the pain is controlled with the epidural blocks at this point I think he needs to have a decompressive surgery from L3 to S1 bilaterally without fusion. He had a stent last 12/2019 and is on aspirin and Brilinta and he is almost a year out. He had come off of his blood thinners for the epidural blocks much to the consternation of his cardiologist, Dr. Kenyon, but I think there is a stronger argument that could be made for doing this rather quickly to restore the strength in his legs and hopefully retain his ability to walk. So, we will touch base with her as whether or not she is agreeable to him coming off his medicine with or without any kind of bridging Lovenox. Hopefully we can get him back on some of  "his blood thinners shortly after the surgery perhaps maybe 2-3 days. We will move forward with the surgery described below as soon as it is feasible.       Back Pain  The pain is at a severity of 5/10. The pain is moderate. Associated symptoms include leg pain, numbness and weakness. Pertinent negatives include no bladder incontinence, bowel incontinence or fever.   Leg Pain   Associated symptoms include muscle weakness and numbness.       The following portions of the patient's history were reviewed and updated as appropriate: allergies, current medications, past family history, past medical history, past social history, past surgical history and problem list.    Review of Systems   Constitutional: Negative for fever.   Gastrointestinal: Negative for bowel incontinence.   Genitourinary: Negative for bladder incontinence.   Musculoskeletal: Positive for back pain (bilateral leg pain ) and gait problem.   Neurological: Positive for weakness and numbness.           Objective     Vitals:    10/14/20 0842   BP: 162/95   Pulse: 62   Temp: 97.4 °F (36.3 °C)   Weight: 89.8 kg (198 lb)   Height: 180.3 cm (71\")     Body mass index is 27.62 kg/m².      Physical Exam  Constitutional:       Appearance: He is well-developed.   HENT:      Head: Normocephalic and atraumatic.   Eyes:      Extraocular Movements: EOM normal.      Conjunctiva/sclera: Conjunctivae normal.      Pupils: Pupils are equal, round, and reactive to light.      Funduscopic exam:     Right eye: No papilledema.         Left eye: No papilledema.   Neck:      Vascular: No carotid bruit.   Neurological:      Mental Status: He is oriented to person, place, and time.      Coordination: Finger-Nose-Finger Test and Heel to Shin Test normal.      Gait: Gait is intact.      Deep Tendon Reflexes:      Reflex Scores:       Tricep reflexes are 2+ on the right side and 2+ on the left side.       Bicep reflexes are 2+ on the right side and 2+ on the left side.       " Brachioradialis reflexes are 2+ on the right side and 2+ on the left side.       Patellar reflexes are 2+ on the right side and 2+ on the left side.       Achilles reflexes are 2+ on the right side and 2+ on the left side.  Psychiatric:         Speech: Speech normal.       Neurologic Exam     Mental Status   Oriented to person, place, and time.   Registration of memory: Good recent and remote memory.   Attention: normal. Concentration: normal.   Speech: speech is normal   Level of consciousness: alert  Knowledge: consistent with education.     Cranial Nerves     CN II   Visual fields full to confrontation.   Visual acuity: normal    CN III, IV, VI   Pupils are equal, round, and reactive to light.  Extraocular motions are normal.     CN V   Facial sensation intact.   Right corneal reflex: normal  Left corneal reflex: normal    CN VII   Facial expression full, symmetric.   Right facial weakness: none  Left facial weakness: none    CN VIII   Hearing: intact    CN IX, X   Palate: symmetric    CN XI   Right sternocleidomastoid strength: normal  Left sternocleidomastoid strength: normal    CN XII   Tongue: not atrophic  Tongue deviation: none    Motor Exam   Muscle bulk: normal  Right arm tone: normal  Left arm tone: normal  Right leg tone: normal  Left leg tone: normal    Strength   Strength 5/5 except as noted.   Right gastroc: 2/5  Left gastroc: 2/5    Sensory Exam   Light touch normal.     Gait, Coordination, and Reflexes     Gait  Gait: normal    Coordination   Finger to nose coordination: normal  Heel to shin coordination: normal    Reflexes   Right brachioradialis: 2+  Left brachioradialis: 2+  Right biceps: 2+  Left biceps: 2+  Right triceps: 2+  Left triceps: 2+  Right patellar: 2+  Left patellar: 2+  Right achilles: 2+  Left achilles: 2+  Right : 2+  Left : 2+          Assessment/Plan   Independent Review of Radiographic Studies:      I personally reviewed the images from the following studies.    I  reviewed the lumbar MRI done 8/31/2020 and there has been some progression of his stenosis.  He has transitional anatomy with partial lumbarization of S1.  There is severe spinal stenosis at L5-S1 more prominent compared to before as well as at L for L5 and L3-L4.  On x-rays there is no significant spondylolisthesis.  Agree with the report.        Medical Decision Making:      I described and recommended an open lumbar decompression at L3-L4, L4-L5, and L5-S1 without fusion..  The goal of surgery is relief of radiating leg pain with improvement of numbness, tingling, walking, and quality of life.  This will not help or hinder low back pain.  The risks include, but are not limited to, infection, hemorrhage on transfusion or reoperation, CSF leak requiring reoperation, incomplete relief of symptoms, potential need for additional surgeries in the future including a fusion, stroke, paralysis, coma, and death.  The patient agrees to proceed.  He will need to come off of his aspirin and Brilinta.      Diagnoses and all orders for this visit:    1. Spinal stenosis of lumbar region with neurogenic claudication (Primary)  -     Case Request; Standing  -     Case Request    2. DDD (degenerative disc disease), lumbar  -     Case Request; Standing  -     Case Request    3. Bilateral leg weakness  -     Case Request; Standing  -     Case Request      Return for 2 weeks after surgery.

## 2020-10-14 ENCOUNTER — TELEPHONE (OUTPATIENT)
Dept: CARDIOLOGY | Facility: CLINIC | Age: 65
End: 2020-10-14

## 2020-10-14 ENCOUNTER — TELEPHONE (OUTPATIENT)
Dept: NEUROSURGERY | Facility: CLINIC | Age: 65
End: 2020-10-14

## 2020-10-14 ENCOUNTER — OFFICE VISIT (OUTPATIENT)
Dept: NEUROSURGERY | Facility: CLINIC | Age: 65
End: 2020-10-14

## 2020-10-14 VITALS
HEIGHT: 71 IN | HEART RATE: 62 BPM | BODY MASS INDEX: 27.72 KG/M2 | TEMPERATURE: 97.4 F | SYSTOLIC BLOOD PRESSURE: 162 MMHG | WEIGHT: 198 LBS | DIASTOLIC BLOOD PRESSURE: 95 MMHG

## 2020-10-14 DIAGNOSIS — M51.36 DDD (DEGENERATIVE DISC DISEASE), LUMBAR: ICD-10-CM

## 2020-10-14 DIAGNOSIS — R29.898 BILATERAL LEG WEAKNESS: ICD-10-CM

## 2020-10-14 DIAGNOSIS — M48.062 SPINAL STENOSIS OF LUMBAR REGION WITH NEUROGENIC CLAUDICATION: Primary | ICD-10-CM

## 2020-10-14 PROCEDURE — 99214 OFFICE O/P EST MOD 30 MIN: CPT | Performed by: NEUROLOGICAL SURGERY

## 2020-10-14 NOTE — TELEPHONE ENCOUNTER
I l/m for Dr. Kenyon's MA to see if pt can come off Brilinta and aspirin for surgery.     Pt is going to think about when/if he wants surgery and will c/b when he decides.

## 2020-10-14 NOTE — TELEPHONE ENCOUNTER
Just to clarify, I show this pt on Brilinta. Dr. Kenyon's note states it is ok to come off Plavix I see Brilinta listed in his meds. Is it still ok for the Brilinta as well?

## 2020-10-14 NOTE — TELEPHONE ENCOUNTER
Office  from Dr Martinez office calling stating patient is needing surgery (level 3 lumbar decompression) and that he will need to be off of his Brilinta and Asprin for 5 days prior. They are wanting to make sure that is ok. Ok to put response in Epic per their office since they are on Epic as well.

## 2020-10-14 NOTE — TELEPHONE ENCOUNTER
Patient was initially on Plavix but in August 2020 he was on aspirin and Brilinta due to not tolerating plavix. His stent was placed in December of 2019.  At this point in time, he may hold aspirin and Brilinta for 5 days prior to surgery and resume as soon as possible postoperatively.        JESSICA Underwood  [facility]   [address] [place] [address] [address]  [place] [address]  Ph: [phone]  Fax: [phone]

## 2020-10-15 NOTE — TELEPHONE ENCOUNTER
Patient called to get an update regarding if you have heard back from his cardiologist so that he can schedule surgery.

## 2020-10-21 PROBLEM — R29.898 BILATERAL LEG WEAKNESS: Status: ACTIVE | Noted: 2020-10-21

## 2020-10-27 ENCOUNTER — HOSPITAL ENCOUNTER (OUTPATIENT)
Dept: GENERAL RADIOLOGY | Facility: HOSPITAL | Age: 65
Discharge: HOME | End: 2020-10-27

## 2020-10-27 ENCOUNTER — APPOINTMENT (OUTPATIENT)
Dept: PREADMISSION TESTING | Facility: HOSPITAL | Age: 65
End: 2020-10-27

## 2020-10-27 VITALS
BODY MASS INDEX: 28.07 KG/M2 | OXYGEN SATURATION: 96 % | HEART RATE: 60 BPM | TEMPERATURE: 98 F | DIASTOLIC BLOOD PRESSURE: 94 MMHG | WEIGHT: 200.5 LBS | RESPIRATION RATE: 16 BRPM | SYSTOLIC BLOOD PRESSURE: 163 MMHG | HEIGHT: 71 IN

## 2020-10-27 LAB
APTT PPP: 30.8 SECONDS (ref 22.7–35.4)
INR PPP: 1 (ref 0.9–1.1)
PROTHROMBIN TIME: 13.1 SECONDS (ref 11.7–14.2)

## 2020-10-27 PROCEDURE — 85730 THROMBOPLASTIN TIME PARTIAL: CPT | Performed by: NEUROLOGICAL SURGERY

## 2020-10-27 PROCEDURE — 71046 X-RAY EXAM CHEST 2 VIEWS: CPT

## 2020-10-27 PROCEDURE — C9803 HOPD COVID-19 SPEC COLLECT: HCPCS

## 2020-10-27 PROCEDURE — 36415 COLL VENOUS BLD VENIPUNCTURE: CPT

## 2020-10-27 PROCEDURE — 85610 PROTHROMBIN TIME: CPT | Performed by: NEUROLOGICAL SURGERY

## 2020-10-27 PROCEDURE — U0004 COV-19 TEST NON-CDC HGH THRU: HCPCS | Performed by: NURSE PRACTITIONER

## 2020-10-28 LAB — SARS-COV-2 RNA RESP QL NAA+PROBE: NOT DETECTED

## 2020-10-29 ENCOUNTER — ANESTHESIA (OUTPATIENT)
Dept: PERIOP | Facility: HOSPITAL | Age: 65
End: 2020-10-29

## 2020-10-29 ENCOUNTER — APPOINTMENT (OUTPATIENT)
Dept: GENERAL RADIOLOGY | Facility: HOSPITAL | Age: 65
End: 2020-10-29

## 2020-10-29 ENCOUNTER — HOSPITAL ENCOUNTER (OUTPATIENT)
Facility: HOSPITAL | Age: 65
Discharge: HOME | End: 2020-10-31
Attending: NEUROLOGICAL SURGERY | Admitting: NEUROLOGICAL SURGERY

## 2020-10-29 ENCOUNTER — ANESTHESIA EVENT (OUTPATIENT)
Dept: PERIOP | Facility: HOSPITAL | Age: 65
End: 2020-10-29

## 2020-10-29 DIAGNOSIS — Z98.890 HISTORY OF LUMBAR LAMINECTOMY FOR SPINAL CORD DECOMPRESSION: Primary | ICD-10-CM

## 2020-10-29 DIAGNOSIS — Z98.890 STATUS POST LUMBAR SPINE OPERATIVE PROCEDURE FOR DECOMPRESSION OF SPINAL CORD: ICD-10-CM

## 2020-10-29 PROBLEM — M48.061 SPINAL STENOSIS OF LUMBAR REGION: Status: ACTIVE | Noted: 2020-10-29

## 2020-10-29 PROCEDURE — 25010000003 CEFAZOLIN PER 500 MG: Performed by: NEUROLOGICAL SURGERY

## 2020-10-29 PROCEDURE — 25010000002 METHOCARBAMOL 1000 MG/10ML SOLUTION: Performed by: NEUROLOGICAL SURGERY

## 2020-10-29 PROCEDURE — 63047 LAM FACETEC & FORAMOT LUMBAR: CPT | Performed by: NEUROLOGICAL SURGERY

## 2020-10-29 PROCEDURE — C1889 IMPLANT/INSERT DEVICE, NOC: HCPCS | Performed by: NEUROLOGICAL SURGERY

## 2020-10-29 PROCEDURE — 0: Performed by: NEUROLOGICAL SURGERY

## 2020-10-29 PROCEDURE — 63048 LAM FACETEC &FORAMOT EA ADDL: CPT | Performed by: SPECIALIST/TECHNOLOGIST, OTHER

## 2020-10-29 PROCEDURE — 25010000002 HYDROMORPHONE PER 4 MG: Performed by: NURSE ANESTHETIST, CERTIFIED REGISTERED

## 2020-10-29 PROCEDURE — 25010000002 NEOSTIGMINE PER 0.5 MG: Performed by: NURSE ANESTHETIST, CERTIFIED REGISTERED

## 2020-10-29 PROCEDURE — 25010000002 MIDAZOLAM PER 1 MG: Performed by: ANESTHESIOLOGY

## 2020-10-29 PROCEDURE — 25010000002 FENTANYL CITRATE (PF) 100 MCG/2ML SOLUTION: Performed by: NURSE ANESTHETIST, CERTIFIED REGISTERED

## 2020-10-29 PROCEDURE — 63048 LAM FACETEC &FORAMOT EA ADDL: CPT | Performed by: NEUROLOGICAL SURGERY

## 2020-10-29 PROCEDURE — 72020 X-RAY EXAM OF SPINE 1 VIEW: CPT

## 2020-10-29 PROCEDURE — 25010000002 FENTANYL CITRATE (PF) 100 MCG/2ML SOLUTION: Performed by: ANESTHESIOLOGY

## 2020-10-29 PROCEDURE — 25010000002 ONDANSETRON PER 1 MG: Performed by: NURSE ANESTHETIST, CERTIFIED REGISTERED

## 2020-10-29 PROCEDURE — 25010000002 DEXAMETHASONE PER 1 MG: Performed by: NURSE ANESTHETIST, CERTIFIED REGISTERED

## 2020-10-29 PROCEDURE — 25010000002 HEPARIN (PORCINE) PER 1000 UNITS: Performed by: NEUROLOGICAL SURGERY

## 2020-10-29 PROCEDURE — 25010000003 CEFAZOLIN IN DEXTROSE 2-4 GM/100ML-% SOLUTION: Performed by: NEUROLOGICAL SURGERY

## 2020-10-29 PROCEDURE — 25010000002 PROPOFOL 10 MG/ML EMULSION: Performed by: NURSE ANESTHETIST, CERTIFIED REGISTERED

## 2020-10-29 PROCEDURE — 76000 FLUOROSCOPY <1 HR PHYS/QHP: CPT

## 2020-10-29 PROCEDURE — 63047 LAM FACETEC & FORAMOT LUMBAR: CPT | Performed by: SPECIALIST/TECHNOLOGIST, OTHER

## 2020-10-29 DEVICE — WAX BONE HEMO NAT 2.5G: Type: IMPLANTABLE DEVICE | Site: SPINE LUMBAR | Status: FUNCTIONAL

## 2020-10-29 DEVICE — FLOSEAL HEMOSTATIC MATRIX, 10ML
Type: IMPLANTABLE DEVICE | Site: SPINE LUMBAR | Status: FUNCTIONAL
Brand: FLOSEAL HEMOSTATIC MATRIX

## 2020-10-29 RX ORDER — LOSARTAN POTASSIUM 50 MG/1
50 TABLET ORAL DAILY
Status: DISCONTINUED | OUTPATIENT
Start: 2020-10-29 | End: 2020-10-31 | Stop reason: HOSPADM

## 2020-10-29 RX ORDER — SODIUM CHLORIDE 0.9 % (FLUSH) 0.9 %
3 SYRINGE (ML) INJECTION EVERY 12 HOURS SCHEDULED
Status: DISCONTINUED | OUTPATIENT
Start: 2020-10-29 | End: 2020-10-31 | Stop reason: HOSPADM

## 2020-10-29 RX ORDER — ONDANSETRON 2 MG/ML
4 INJECTION INTRAMUSCULAR; INTRAVENOUS EVERY 6 HOURS PRN
Status: DISCONTINUED | OUTPATIENT
Start: 2020-10-29 | End: 2020-10-31 | Stop reason: HOSPADM

## 2020-10-29 RX ORDER — DOCUSATE SODIUM 100 MG/1
100 CAPSULE, LIQUID FILLED ORAL 2 TIMES DAILY PRN
Status: DISCONTINUED | OUTPATIENT
Start: 2020-10-29 | End: 2020-10-31 | Stop reason: HOSPADM

## 2020-10-29 RX ORDER — FENTANYL CITRATE 50 UG/ML
50 INJECTION, SOLUTION INTRAMUSCULAR; INTRAVENOUS
Status: DISCONTINUED | OUTPATIENT
Start: 2020-10-29 | End: 2020-10-29 | Stop reason: HOSPADM

## 2020-10-29 RX ORDER — SODIUM CHLORIDE 0.9 % (FLUSH) 0.9 %
3 SYRINGE (ML) INJECTION EVERY 12 HOURS SCHEDULED
Status: DISCONTINUED | OUTPATIENT
Start: 2020-10-29 | End: 2020-10-29 | Stop reason: HOSPADM

## 2020-10-29 RX ORDER — ONDANSETRON 4 MG/1
4 TABLET, FILM COATED ORAL EVERY 6 HOURS PRN
Status: DISCONTINUED | OUTPATIENT
Start: 2020-10-29 | End: 2020-10-31 | Stop reason: HOSPADM

## 2020-10-29 RX ORDER — CEFAZOLIN SODIUM 2 G/100ML
2 INJECTION, SOLUTION INTRAVENOUS ONCE
Status: COMPLETED | OUTPATIENT
Start: 2020-10-29 | End: 2020-10-29

## 2020-10-29 RX ORDER — HYDROCODONE BITARTRATE AND ACETAMINOPHEN 7.5; 325 MG/1; MG/1
1 TABLET ORAL ONCE AS NEEDED
Status: COMPLETED | OUTPATIENT
Start: 2020-10-29 | End: 2020-10-29

## 2020-10-29 RX ORDER — HYDROMORPHONE HCL 110MG/55ML
PATIENT CONTROLLED ANALGESIA SYRINGE INTRAVENOUS AS NEEDED
Status: DISCONTINUED | OUTPATIENT
Start: 2020-10-29 | End: 2020-10-29 | Stop reason: SURG

## 2020-10-29 RX ORDER — NALOXONE HCL 0.4 MG/ML
0.1 VIAL (ML) INJECTION
Status: DISCONTINUED | OUTPATIENT
Start: 2020-10-29 | End: 2020-10-30

## 2020-10-29 RX ORDER — METHOCARBAMOL 100 MG/ML
1000 INJECTION, SOLUTION INTRAMUSCULAR; INTRAVENOUS ONCE
Status: COMPLETED | OUTPATIENT
Start: 2020-10-29 | End: 2020-10-29

## 2020-10-29 RX ORDER — BUPIVACAINE HYDROCHLORIDE AND EPINEPHRINE 2.5; 5 MG/ML; UG/ML
INJECTION, SOLUTION INFILTRATION; PERINEURAL AS NEEDED
Status: DISCONTINUED | OUTPATIENT
Start: 2020-10-29 | End: 2020-10-29 | Stop reason: HOSPADM

## 2020-10-29 RX ORDER — SODIUM CHLORIDE 0.9 % (FLUSH) 0.9 %
3-10 SYRINGE (ML) INJECTION AS NEEDED
Status: DISCONTINUED | OUTPATIENT
Start: 2020-10-29 | End: 2020-10-29 | Stop reason: HOSPADM

## 2020-10-29 RX ORDER — NALOXONE HCL 0.4 MG/ML
0.2 VIAL (ML) INJECTION AS NEEDED
Status: DISCONTINUED | OUTPATIENT
Start: 2020-10-29 | End: 2020-10-29 | Stop reason: HOSPADM

## 2020-10-29 RX ORDER — EPHEDRINE SULFATE 50 MG/ML
INJECTION, SOLUTION INTRAVENOUS AS NEEDED
Status: DISCONTINUED | OUTPATIENT
Start: 2020-10-29 | End: 2020-10-29 | Stop reason: SURG

## 2020-10-29 RX ORDER — DROPERIDOL 2.5 MG/ML
0.62 INJECTION, SOLUTION INTRAMUSCULAR; INTRAVENOUS ONCE AS NEEDED
Status: DISCONTINUED | OUTPATIENT
Start: 2020-10-29 | End: 2020-10-29 | Stop reason: HOSPADM

## 2020-10-29 RX ORDER — MIDAZOLAM HYDROCHLORIDE 1 MG/ML
1 INJECTION INTRAMUSCULAR; INTRAVENOUS
Status: COMPLETED | OUTPATIENT
Start: 2020-10-29 | End: 2020-10-29

## 2020-10-29 RX ORDER — LIDOCAINE HYDROCHLORIDE 10 MG/ML
0.5 INJECTION, SOLUTION EPIDURAL; INFILTRATION; INTRACAUDAL; PERINEURAL ONCE AS NEEDED
Status: DISCONTINUED | OUTPATIENT
Start: 2020-10-29 | End: 2020-10-29 | Stop reason: HOSPADM

## 2020-10-29 RX ORDER — LIDOCAINE HYDROCHLORIDE 20 MG/ML
INJECTION, SOLUTION INFILTRATION; PERINEURAL AS NEEDED
Status: DISCONTINUED | OUTPATIENT
Start: 2020-10-29 | End: 2020-10-29 | Stop reason: SURG

## 2020-10-29 RX ORDER — HYDROMORPHONE HYDROCHLORIDE 1 MG/ML
0.5 INJECTION, SOLUTION INTRAMUSCULAR; INTRAVENOUS; SUBCUTANEOUS
Status: DISCONTINUED | OUTPATIENT
Start: 2020-10-29 | End: 2020-10-29 | Stop reason: HOSPADM

## 2020-10-29 RX ORDER — ONDANSETRON 2 MG/ML
4 INJECTION INTRAMUSCULAR; INTRAVENOUS ONCE AS NEEDED
Status: DISCONTINUED | OUTPATIENT
Start: 2020-10-29 | End: 2020-10-29 | Stop reason: HOSPADM

## 2020-10-29 RX ORDER — PROMETHAZINE HYDROCHLORIDE 25 MG/1
25 SUPPOSITORY RECTAL ONCE AS NEEDED
Status: DISCONTINUED | OUTPATIENT
Start: 2020-10-29 | End: 2020-10-29 | Stop reason: HOSPADM

## 2020-10-29 RX ORDER — SODIUM CHLORIDE 0.9 % (FLUSH) 0.9 %
10 SYRINGE (ML) INJECTION AS NEEDED
Status: DISCONTINUED | OUTPATIENT
Start: 2020-10-29 | End: 2020-10-31 | Stop reason: HOSPADM

## 2020-10-29 RX ORDER — ATORVASTATIN CALCIUM 20 MG/1
60 TABLET, FILM COATED ORAL NIGHTLY
Status: DISCONTINUED | OUTPATIENT
Start: 2020-10-29 | End: 2020-10-31 | Stop reason: HOSPADM

## 2020-10-29 RX ORDER — CARVEDILOL 12.5 MG/1
12.5 TABLET ORAL EVERY 12 HOURS SCHEDULED
Status: DISCONTINUED | OUTPATIENT
Start: 2020-10-29 | End: 2020-10-31 | Stop reason: HOSPADM

## 2020-10-29 RX ORDER — SODIUM CHLORIDE, SODIUM LACTATE, POTASSIUM CHLORIDE, CALCIUM CHLORIDE 600; 310; 30; 20 MG/100ML; MG/100ML; MG/100ML; MG/100ML
9 INJECTION, SOLUTION INTRAVENOUS CONTINUOUS
Status: DISCONTINUED | OUTPATIENT
Start: 2020-10-29 | End: 2020-10-29

## 2020-10-29 RX ORDER — FENTANYL CITRATE 50 UG/ML
INJECTION, SOLUTION INTRAMUSCULAR; INTRAVENOUS AS NEEDED
Status: DISCONTINUED | OUTPATIENT
Start: 2020-10-29 | End: 2020-10-29 | Stop reason: SURG

## 2020-10-29 RX ORDER — DEXAMETHASONE SODIUM PHOSPHATE 10 MG/ML
INJECTION INTRAMUSCULAR; INTRAVENOUS AS NEEDED
Status: DISCONTINUED | OUTPATIENT
Start: 2020-10-29 | End: 2020-10-29 | Stop reason: SURG

## 2020-10-29 RX ORDER — CEFAZOLIN SODIUM 2 G/100ML
2 INJECTION, SOLUTION INTRAVENOUS EVERY 8 HOURS
Status: COMPLETED | OUTPATIENT
Start: 2020-10-29 | End: 2020-10-30

## 2020-10-29 RX ORDER — WOUND DRESSING ADHESIVE - LIQUID
LIQUID MISCELLANEOUS AS NEEDED
Status: DISCONTINUED | OUTPATIENT
Start: 2020-10-29 | End: 2020-10-29 | Stop reason: HOSPADM

## 2020-10-29 RX ORDER — FAMOTIDINE 10 MG/ML
20 INJECTION, SOLUTION INTRAVENOUS ONCE
Status: COMPLETED | OUTPATIENT
Start: 2020-10-29 | End: 2020-10-29

## 2020-10-29 RX ORDER — FLUMAZENIL 0.1 MG/ML
0.2 INJECTION INTRAVENOUS AS NEEDED
Status: DISCONTINUED | OUTPATIENT
Start: 2020-10-29 | End: 2020-10-29 | Stop reason: HOSPADM

## 2020-10-29 RX ORDER — LABETALOL HYDROCHLORIDE 5 MG/ML
5 INJECTION, SOLUTION INTRAVENOUS
Status: DISCONTINUED | OUTPATIENT
Start: 2020-10-29 | End: 2020-10-29 | Stop reason: HOSPADM

## 2020-10-29 RX ORDER — PROMETHAZINE HYDROCHLORIDE 25 MG/1
25 TABLET ORAL ONCE AS NEEDED
Status: DISCONTINUED | OUTPATIENT
Start: 2020-10-29 | End: 2020-10-29 | Stop reason: HOSPADM

## 2020-10-29 RX ORDER — DROPERIDOL 2.5 MG/ML
1.25 INJECTION, SOLUTION INTRAMUSCULAR; INTRAVENOUS ONCE AS NEEDED
Status: DISCONTINUED | OUTPATIENT
Start: 2020-10-29 | End: 2020-10-29 | Stop reason: HOSPADM

## 2020-10-29 RX ORDER — HYDRALAZINE HYDROCHLORIDE 20 MG/ML
5 INJECTION INTRAMUSCULAR; INTRAVENOUS
Status: DISCONTINUED | OUTPATIENT
Start: 2020-10-29 | End: 2020-10-29 | Stop reason: HOSPADM

## 2020-10-29 RX ORDER — CYCLOBENZAPRINE HCL 10 MG
10 TABLET ORAL 3 TIMES DAILY PRN
Status: DISCONTINUED | OUTPATIENT
Start: 2020-10-29 | End: 2020-10-31 | Stop reason: HOSPADM

## 2020-10-29 RX ORDER — ROCURONIUM BROMIDE 10 MG/ML
INJECTION, SOLUTION INTRAVENOUS AS NEEDED
Status: DISCONTINUED | OUTPATIENT
Start: 2020-10-29 | End: 2020-10-29 | Stop reason: SURG

## 2020-10-29 RX ORDER — HYDROCODONE BITARTRATE AND ACETAMINOPHEN 7.5; 325 MG/1; MG/1
1 TABLET ORAL EVERY 4 HOURS PRN
Status: DISCONTINUED | OUTPATIENT
Start: 2020-10-29 | End: 2020-10-31 | Stop reason: HOSPADM

## 2020-10-29 RX ORDER — HYDROMORPHONE HCL IN 0.9% NACL 10 MG/50ML
PATIENT CONTROLLED ANALGESIA SYRINGE INTRAVENOUS CONTINUOUS
Status: DISCONTINUED | OUTPATIENT
Start: 2020-10-29 | End: 2020-10-30

## 2020-10-29 RX ORDER — ACETAMINOPHEN 325 MG/1
650 TABLET ORAL ONCE AS NEEDED
Status: DISCONTINUED | OUTPATIENT
Start: 2020-10-29 | End: 2020-10-29 | Stop reason: HOSPADM

## 2020-10-29 RX ORDER — ACETAMINOPHEN 325 MG/1
650 TABLET ORAL EVERY 4 HOURS PRN
Status: DISCONTINUED | OUTPATIENT
Start: 2020-10-29 | End: 2020-10-31 | Stop reason: HOSPADM

## 2020-10-29 RX ORDER — ONDANSETRON 2 MG/ML
INJECTION INTRAMUSCULAR; INTRAVENOUS AS NEEDED
Status: DISCONTINUED | OUTPATIENT
Start: 2020-10-29 | End: 2020-10-29 | Stop reason: SURG

## 2020-10-29 RX ORDER — KETAMINE HYDROCHLORIDE 10 MG/ML
INJECTION INTRAMUSCULAR; INTRAVENOUS AS NEEDED
Status: DISCONTINUED | OUTPATIENT
Start: 2020-10-29 | End: 2020-10-29 | Stop reason: SURG

## 2020-10-29 RX ORDER — PROPOFOL 10 MG/ML
VIAL (ML) INTRAVENOUS AS NEEDED
Status: DISCONTINUED | OUTPATIENT
Start: 2020-10-29 | End: 2020-10-29 | Stop reason: SURG

## 2020-10-29 RX ORDER — AMOXICILLIN 250 MG
1 CAPSULE ORAL NIGHTLY PRN
Status: DISCONTINUED | OUTPATIENT
Start: 2020-10-29 | End: 2020-10-31 | Stop reason: HOSPADM

## 2020-10-29 RX ORDER — GLYCOPYRROLATE 0.2 MG/ML
INJECTION INTRAMUSCULAR; INTRAVENOUS AS NEEDED
Status: DISCONTINUED | OUTPATIENT
Start: 2020-10-29 | End: 2020-10-29 | Stop reason: SURG

## 2020-10-29 RX ADMIN — FAMOTIDINE 20 MG: 10 INJECTION INTRAVENOUS at 07:17

## 2020-10-29 RX ADMIN — SODIUM CHLORIDE, POTASSIUM CHLORIDE, SODIUM LACTATE AND CALCIUM CHLORIDE 9 ML/HR: 600; 310; 30; 20 INJECTION, SOLUTION INTRAVENOUS at 07:13

## 2020-10-29 RX ADMIN — LOSARTAN POTASSIUM 50 MG: 50 TABLET, FILM COATED ORAL at 17:02

## 2020-10-29 RX ADMIN — FENTANYL CITRATE 50 MCG: 50 INJECTION, SOLUTION INTRAMUSCULAR; INTRAVENOUS at 11:50

## 2020-10-29 RX ADMIN — LABETALOL HYDROCHLORIDE 5 MG: 5 INJECTION, SOLUTION INTRAVENOUS at 11:40

## 2020-10-29 RX ADMIN — MIDAZOLAM 2 MG: 1 INJECTION INTRAMUSCULAR; INTRAVENOUS at 07:51

## 2020-10-29 RX ADMIN — HYDROMORPHONE HYDROCHLORIDE 0.5 MG: 1 INJECTION, SOLUTION INTRAMUSCULAR; INTRAVENOUS; SUBCUTANEOUS at 13:10

## 2020-10-29 RX ADMIN — KETAMINE HYDROCHLORIDE 10 MG: 10 INJECTION INTRAMUSCULAR; INTRAVENOUS at 09:32

## 2020-10-29 RX ADMIN — SODIUM CHLORIDE, PRESERVATIVE FREE 3 ML: 5 INJECTION INTRAVENOUS at 21:05

## 2020-10-29 RX ADMIN — LABETALOL HYDROCHLORIDE 5 MG: 5 INJECTION, SOLUTION INTRAVENOUS at 11:30

## 2020-10-29 RX ADMIN — FENTANYL CITRATE 50 MCG: 50 INJECTION INTRAMUSCULAR; INTRAVENOUS at 08:14

## 2020-10-29 RX ADMIN — METHOCARBAMOL 1000 MG: 100 INJECTION INTRAMUSCULAR; INTRAVENOUS at 11:11

## 2020-10-29 RX ADMIN — EPHEDRINE SULFATE 5 MG: 50 INJECTION INTRAVENOUS at 08:58

## 2020-10-29 RX ADMIN — NEOSTIGMINE METHYLSULFATE 3 MG: 1 INJECTION INTRAMUSCULAR; INTRAVENOUS; SUBCUTANEOUS at 10:37

## 2020-10-29 RX ADMIN — FENTANYL CITRATE 50 MCG: 50 INJECTION INTRAMUSCULAR; INTRAVENOUS at 08:51

## 2020-10-29 RX ADMIN — EPHEDRINE SULFATE 5 MG: 50 INJECTION INTRAVENOUS at 08:37

## 2020-10-29 RX ADMIN — CEFAZOLIN SODIUM 2 G: 2 INJECTION, SOLUTION INTRAVENOUS at 17:02

## 2020-10-29 RX ADMIN — DEXAMETHASONE SODIUM PHOSPHATE 4 MG: 10 INJECTION INTRAMUSCULAR; INTRAVENOUS at 08:35

## 2020-10-29 RX ADMIN — HYDROMORPHONE HYDROCHLORIDE 0.5 MG: 2 INJECTION, SOLUTION INTRAMUSCULAR; INTRAVENOUS; SUBCUTANEOUS at 09:35

## 2020-10-29 RX ADMIN — PROPOFOL 160 MG: 10 INJECTION, EMULSION INTRAVENOUS at 08:14

## 2020-10-29 RX ADMIN — LIDOCAINE HYDROCHLORIDE 60 MG: 20 INJECTION, SOLUTION INFILTRATION; PERINEURAL at 08:14

## 2020-10-29 RX ADMIN — CARVEDILOL 12.5 MG: 12.5 TABLET, FILM COATED ORAL at 22:21

## 2020-10-29 RX ADMIN — HYDROMORPHONE HYDROCHLORIDE 0.5 MG: 1 INJECTION, SOLUTION INTRAMUSCULAR; INTRAVENOUS; SUBCUTANEOUS at 11:30

## 2020-10-29 RX ADMIN — HYDROCODONE BITARTRATE AND ACETAMINOPHEN 1 TABLET: 7.5; 325 TABLET ORAL at 21:05

## 2020-10-29 RX ADMIN — EPHEDRINE SULFATE 5 MG: 50 INJECTION INTRAVENOUS at 09:16

## 2020-10-29 RX ADMIN — CEFAZOLIN SODIUM 2 G: 2 INJECTION, SOLUTION INTRAVENOUS at 08:25

## 2020-10-29 RX ADMIN — SODIUM CHLORIDE, POTASSIUM CHLORIDE, SODIUM LACTATE AND CALCIUM CHLORIDE: 600; 310; 30; 20 INJECTION, SOLUTION INTRAVENOUS at 09:42

## 2020-10-29 RX ADMIN — ATORVASTATIN CALCIUM 60 MG: 20 TABLET, FILM COATED ORAL at 21:05

## 2020-10-29 RX ADMIN — MIDAZOLAM 1 MG: 1 INJECTION INTRAMUSCULAR; INTRAVENOUS at 07:17

## 2020-10-29 RX ADMIN — CYCLOBENZAPRINE 10 MG: 10 TABLET, FILM COATED ORAL at 15:13

## 2020-10-29 RX ADMIN — GLYCOPYRROLATE 0.6 MG: 0.2 INJECTION INTRAMUSCULAR; INTRAVENOUS at 10:37

## 2020-10-29 RX ADMIN — HYDROCODONE BITARTRATE AND ACETAMINOPHEN 1 TABLET: 7.5; 325 TABLET ORAL at 13:40

## 2020-10-29 RX ADMIN — FENTANYL CITRATE 50 MCG: 50 INJECTION, SOLUTION INTRAMUSCULAR; INTRAVENOUS at 07:52

## 2020-10-29 RX ADMIN — HYDROMORPHONE HYDROCHLORIDE 0.5 MG: 2 INJECTION, SOLUTION INTRAMUSCULAR; INTRAVENOUS; SUBCUTANEOUS at 08:55

## 2020-10-29 RX ADMIN — HYDROCODONE BITARTRATE AND ACETAMINOPHEN 1 TABLET: 7.5; 325 TABLET ORAL at 17:02

## 2020-10-29 RX ADMIN — FENTANYL CITRATE 50 MCG: 50 INJECTION, SOLUTION INTRAMUSCULAR; INTRAVENOUS at 07:53

## 2020-10-29 RX ADMIN — ROCURONIUM BROMIDE 50 MG: 10 INJECTION INTRAVENOUS at 08:14

## 2020-10-29 RX ADMIN — ONDANSETRON HYDROCHLORIDE 4 MG: 2 SOLUTION INTRAMUSCULAR; INTRAVENOUS at 10:06

## 2020-10-29 RX ADMIN — FENTANYL CITRATE 50 MCG: 50 INJECTION, SOLUTION INTRAMUSCULAR; INTRAVENOUS at 11:11

## 2020-10-29 RX ADMIN — KETAMINE HYDROCHLORIDE 25 MG: 10 INJECTION INTRAMUSCULAR; INTRAVENOUS at 08:32

## 2020-10-29 NOTE — ANESTHESIA PROCEDURE NOTES
Arterial Line    Pre-sedation assessment completed: 10/29/2020 7:42 AM    Patient reassessed immediately prior to procedure    Patient location during procedure: holding area  Start time: 10/29/2020 7:42 AM  Stop Time:10/29/2020 7:50 AM       Line placed for hemodynamic monitoring.  Performed By   Anesthesiologist: Kiel Morales MD  Preanesthetic Checklist  Completed: patient identified, surgical consent, pre-op evaluation, timeout performed, IV checked, risks and benefits discussed and monitors and equipment checked  Arterial Line Prep   Sterile Tech: cap, gloves, gown, mask and sterile barriers  Prep: ChloraPrep  Patient monitoring: blood pressure monitoring, continuous pulse oximetry and EKG  Arterial Line Procedure   Laterality:left  Location:  radial artery  Catheter size: 20 G   Guidance: ultrasound guided  PROCEDURE NOTE/ULTRASOUND INTERPRETATION.  Using ultrasound guidance the potential vascular sites for insertion of the catheter were visualized to determine the patency of the vessel to be used for vascular access.  After selecting the appropriate site for insertion, the needle was visualized under ultrasound being inserted into the radial artery, followed by ultrasound confirmation of wire and catheter placement. There were no abnormalities seen on ultrasound; an image was taken; and the patient tolerated the procedure with no complications.   Number of attempts: 1  Successful placement: yes  Post Assessment   Dressing Type: occlusive dressing applied, secured with tape and wrist guard applied.   Complications no  Circ/Move/Sens Assessment: normal.   Patient Tolerance: patient tolerated the procedure well with no apparent complications

## 2020-10-29 NOTE — ANESTHESIA POSTPROCEDURE EVALUATION
Patient: Avni Olivo III    Procedure Summary     Date: 10/29/20 Room / Location: [facility] OR  / [facility] MAIN OR    Anesthesia Start: 0805 Anesthesia Stop: 1101    Procedure: LUMBAR DECOMPRESSION, OPEN LUMBAR DECOMPRESSION, LUMBAR 3 TO LUMBAR 4, LUMBAR 4 TO LUMBAR 5, LUMBAR 5 TO SACRAL 1 (has transitional anatomy) (Bilateral Spine Lumbar) Diagnosis:       Spinal stenosis of lumbar region with neurogenic claudication      DDD (degenerative disc disease), lumbar      Bilateral leg weakness      (Spinal stenosis of lumbar region with neurogenic claudication [M48.062])      (DDD (degenerative disc disease), lumbar [M51.36])      (Bilateral leg weakness [R29.898])    Surgeon: Jeremy Martinez MD Provider: Kiel Morales MD    Anesthesia Type: general ASA Status: 3          Anesthesia Type: general    Vitals  Vitals Value Taken Time   /107 10/29/20 1059   Temp     Pulse 96 10/29/20 1104   Resp     SpO2 100 % 10/29/20 1104   Vitals shown include unvalidated device data.        Post Anesthesia Care and Evaluation    Patient location during evaluation: PACU  Patient participation: complete - patient participated  Level of consciousness: awake and alert  Pain management: adequate  Airway patency: patent  Anesthetic complications: No anesthetic complications    Cardiovascular status: acceptable  Respiratory status: acceptable  Hydration status: acceptable    Comments: --------------------            10/29/20               0756     --------------------   BP:       178/73     Pulse:      61       Resp:       16       Temp:                SpO2:      96%      --------------------

## 2020-10-29 NOTE — ANESTHESIA PREPROCEDURE EVALUATION
Anesthesia Evaluation     Patient summary reviewed and Nursing notes reviewed                Airway   Mallampati: III  Neck ROM: limited  Dental      Pulmonary - negative pulmonary ROS   Cardiovascular     ECG reviewed  Patient on routine beta blocker  Rhythm: regular  Rate: normal    (+) hypertension, valvular problems/murmurs, CAD, cardiac stents more than 12 months ago hyperlipidemia,       Neuro/Psych  (+) dizziness/light headedness, numbness,     GI/Hepatic/Renal/Endo - negative ROS     Musculoskeletal     Abdominal    Substance History - negative use     OB/GYN negative ob/gyn ROS         Other   arthritis,    history of cancer                    Anesthesia Plan    ASA 3     general   (Art line for BP monitoring viktor op)  intravenous induction     Anesthetic plan, all risks, benefits, and alternatives have been provided, discussed and informed consent has been obtained with: patient.

## 2020-10-29 NOTE — ANESTHESIA PROCEDURE NOTES
Airway  Urgency: elective    Date/Time: 10/29/2020 8:19 AM  Airway not difficult    General Information and Staff    Patient location during procedure: OR  Anesthesiologist: Kiel Morales MD  CRNA: Zoraida Perez CRNA    Indications and Patient Condition  Indications for airway management: airway protection    Preoxygenated: yes (pt pre-O2 with 100% O2)  Mask difficulty assessment: 2 - vent by mask + OA or adjuvant +/- NMBA (easy BMV )    Final Airway Details  Final airway type: endotracheal airway      Successful airway: ETT  Cuffed: yes   Successful intubation technique: direct laryngoscopy  Endotracheal tube insertion site: oral  Blade: Adebayo  Blade size: 4  ETT size (mm): 7.5  Cormack-Lehane Classification: grade I - full view of glottis  Placement verified by: chest auscultation and capnometry   Cuff volume (mL): 7  Measured from: lips  ETT/EBT  to lips (cm): 22  Number of attempts at approach: 1  Assessment: lips, teeth, and gum same as pre-op and atraumatic intubation    Additional Comments  ATOETx1. No change in dentition.

## 2020-10-30 PROBLEM — M48.00 SPINAL STENOSIS: Status: ACTIVE | Noted: 2020-10-30

## 2020-10-30 LAB
HCT VFR BLD AUTO: 35.8 % (ref 37.5–51)
HGB BLD-MCNC: 12.2 G/DL (ref 13–17.7)

## 2020-10-30 PROCEDURE — G0378 HOSPITAL OBSERVATION PER HR: HCPCS

## 2020-10-30 PROCEDURE — 97161 PT EVAL LOW COMPLEX 20 MIN: CPT

## 2020-10-30 PROCEDURE — 97530 THERAPEUTIC ACTIVITIES: CPT

## 2020-10-30 PROCEDURE — 94799 UNLISTED PULMONARY SVC/PX: CPT

## 2020-10-30 PROCEDURE — 25010000003 CEFAZOLIN IN DEXTROSE 2-4 GM/100ML-% SOLUTION: Performed by: NEUROLOGICAL SURGERY

## 2020-10-30 PROCEDURE — 85014 HEMATOCRIT: CPT | Performed by: NURSE PRACTITIONER

## 2020-10-30 PROCEDURE — 85018 HEMOGLOBIN: CPT | Performed by: NURSE PRACTITIONER

## 2020-10-30 RX ADMIN — DOCUSATE SODIUM 100 MG: 100 CAPSULE ORAL at 17:35

## 2020-10-30 RX ADMIN — SODIUM CHLORIDE, PRESERVATIVE FREE 3 ML: 5 INJECTION INTRAVENOUS at 22:30

## 2020-10-30 RX ADMIN — HYDROCODONE BITARTRATE AND ACETAMINOPHEN 1 TABLET: 7.5; 325 TABLET ORAL at 07:28

## 2020-10-30 RX ADMIN — HYDROCODONE BITARTRATE AND ACETAMINOPHEN 1 TABLET: 7.5; 325 TABLET ORAL at 17:02

## 2020-10-30 RX ADMIN — HYDROCODONE BITARTRATE AND ACETAMINOPHEN 1 TABLET: 7.5; 325 TABLET ORAL at 01:42

## 2020-10-30 RX ADMIN — HYDROCODONE BITARTRATE AND ACETAMINOPHEN 1 TABLET: 7.5; 325 TABLET ORAL at 22:28

## 2020-10-30 RX ADMIN — DOCUSATE SODIUM 100 MG: 100 CAPSULE ORAL at 01:42

## 2020-10-30 RX ADMIN — CYCLOBENZAPRINE 10 MG: 10 TABLET, FILM COATED ORAL at 11:15

## 2020-10-30 RX ADMIN — CYCLOBENZAPRINE 10 MG: 10 TABLET, FILM COATED ORAL at 22:29

## 2020-10-30 RX ADMIN — DOCUSATE SODIUM 100 MG: 100 CAPSULE ORAL at 08:37

## 2020-10-30 RX ADMIN — ATORVASTATIN CALCIUM 60 MG: 20 TABLET, FILM COATED ORAL at 22:28

## 2020-10-30 RX ADMIN — CARVEDILOL 12.5 MG: 12.5 TABLET, FILM COATED ORAL at 08:34

## 2020-10-30 RX ADMIN — CARVEDILOL 12.5 MG: 12.5 TABLET, FILM COATED ORAL at 22:29

## 2020-10-30 RX ADMIN — DOCUSATE SODIUM 50MG AND SENNOSIDES 8.6MG 1 TABLET: 8.6; 5 TABLET, FILM COATED ORAL at 17:35

## 2020-10-30 RX ADMIN — CEFAZOLIN SODIUM 2 G: 2 INJECTION, SOLUTION INTRAVENOUS at 01:41

## 2020-10-30 RX ADMIN — LOSARTAN POTASSIUM 50 MG: 50 TABLET, FILM COATED ORAL at 08:34

## 2020-10-30 RX ADMIN — HYDROCODONE BITARTRATE AND ACETAMINOPHEN 1 TABLET: 7.5; 325 TABLET ORAL at 11:16

## 2020-10-30 RX ADMIN — SODIUM CHLORIDE, PRESERVATIVE FREE 3 ML: 5 INJECTION INTRAVENOUS at 08:37

## 2020-10-31 VITALS
SYSTOLIC BLOOD PRESSURE: 132 MMHG | RESPIRATION RATE: 18 BRPM | WEIGHT: 197.06 LBS | OXYGEN SATURATION: 99 % | HEIGHT: 71 IN | TEMPERATURE: 98.5 F | DIASTOLIC BLOOD PRESSURE: 79 MMHG | BODY MASS INDEX: 27.59 KG/M2 | HEART RATE: 66 BPM

## 2020-10-31 PROCEDURE — G0378 HOSPITAL OBSERVATION PER HR: HCPCS

## 2020-10-31 RX ORDER — POLYETHYLENE GLYCOL 3350 17 G/17G
17 POWDER, FOR SOLUTION ORAL DAILY
Status: DISCONTINUED | OUTPATIENT
Start: 2020-10-31 | End: 2020-10-31 | Stop reason: HOSPADM

## 2020-10-31 RX ORDER — CYCLOBENZAPRINE HCL 10 MG
10 TABLET ORAL 3 TIMES DAILY PRN
Qty: 90 TABLET | Refills: 0 | Status: SHIPPED | OUTPATIENT
Start: 2020-10-31 | End: 2020-11-11

## 2020-10-31 RX ORDER — HYDROCODONE BITARTRATE AND ACETAMINOPHEN 7.5; 325 MG/1; MG/1
1 TABLET ORAL EVERY 4 HOURS PRN
Start: 2020-10-31 | End: 2020-11-05

## 2020-10-31 RX ORDER — MAGNESIUM CARB/ALUMINUM HYDROX 105-160MG
296 TABLET,CHEWABLE ORAL ONCE
Status: COMPLETED | OUTPATIENT
Start: 2020-10-31 | End: 2020-10-31

## 2020-10-31 RX ADMIN — Medication 296 ML: at 11:47

## 2020-10-31 RX ADMIN — CARVEDILOL 12.5 MG: 12.5 TABLET, FILM COATED ORAL at 08:28

## 2020-10-31 RX ADMIN — POLYETHYLENE GLYCOL 3350 17 G: 17 POWDER, FOR SOLUTION ORAL at 08:28

## 2020-10-31 RX ADMIN — CYCLOBENZAPRINE 10 MG: 10 TABLET, FILM COATED ORAL at 07:45

## 2020-10-31 RX ADMIN — HYDROCODONE BITARTRATE AND ACETAMINOPHEN 1 TABLET: 7.5; 325 TABLET ORAL at 07:45

## 2020-10-31 RX ADMIN — HYDROCODONE BITARTRATE AND ACETAMINOPHEN 1 TABLET: 7.5; 325 TABLET ORAL at 02:56

## 2020-10-31 RX ADMIN — DOCUSATE SODIUM 100 MG: 100 CAPSULE ORAL at 07:45

## 2020-10-31 RX ADMIN — LOSARTAN POTASSIUM 50 MG: 50 TABLET, FILM COATED ORAL at 08:28

## 2020-10-31 RX ADMIN — SODIUM CHLORIDE, PRESERVATIVE FREE 3 ML: 5 INJECTION INTRAVENOUS at 08:29

## 2020-10-31 RX ADMIN — HYDROCODONE BITARTRATE AND ACETAMINOPHEN 1 TABLET: 7.5; 325 TABLET ORAL at 11:47

## 2020-11-05 DIAGNOSIS — M48.062 SPINAL STENOSIS OF LUMBAR REGION WITH NEUROGENIC CLAUDICATION: Primary | ICD-10-CM

## 2020-11-05 NOTE — TELEPHONE ENCOUNTER
Pt was given 5 day supply of Hydrocodone 7.5/325 1q4h  at discharge. He had L3-L4, L5-S1 lumbar decompression 10/29/20. Pt requesting a refill.     Post op follow up scheduled 11/18/20.

## 2020-11-05 NOTE — TELEPHONE ENCOUNTER
Patient called and would like a refill on his hydrocodone 7.5-325 mg [facility]  Please advise patient when prescription has been sent to pharmacy.

## 2020-11-06 DIAGNOSIS — Z98.890 STATUS POST LUMBAR SPINE OPERATIVE PROCEDURE FOR DECOMPRESSION OF SPINAL CORD: ICD-10-CM

## 2020-11-06 RX ORDER — HYDROCODONE BITARTRATE AND ACETAMINOPHEN 7.5; 325 MG/1; MG/1
1 TABLET ORAL EVERY 6 HOURS PRN
Qty: 20 TABLET | Refills: 0 | Status: SHIPPED | OUTPATIENT
Start: 2020-11-06 | End: 2020-12-18

## 2020-11-06 NOTE — TELEPHONE ENCOUNTER
Called pt to let him know directions have changed to 1 every 6 hours verses the 4 hours originally ordered.

## 2020-11-06 NOTE — TELEPHONE ENCOUNTER
Pt called back, and I relayed the message to him regarding his prescription. Pt stated that he understood.

## 2020-11-11 ENCOUNTER — OFFICE VISIT (OUTPATIENT)
Dept: NEUROSURGERY | Facility: CLINIC | Age: 65
End: 2020-11-11

## 2020-11-11 VITALS
SYSTOLIC BLOOD PRESSURE: 146 MMHG | HEART RATE: 68 BPM | RESPIRATION RATE: 16 BRPM | TEMPERATURE: 96.9 F | HEIGHT: 71 IN | DIASTOLIC BLOOD PRESSURE: 85 MMHG | BODY MASS INDEX: 27.44 KG/M2 | WEIGHT: 196 LBS

## 2020-11-11 DIAGNOSIS — M48.062 SPINAL STENOSIS OF LUMBAR REGION WITH NEUROGENIC CLAUDICATION: ICD-10-CM

## 2020-11-11 DIAGNOSIS — Z98.890 STATUS POST LUMBAR SPINE OPERATIVE PROCEDURE FOR DECOMPRESSION OF SPINAL CORD: Primary | ICD-10-CM

## 2020-11-11 PROCEDURE — 99024 POSTOP FOLLOW-UP VISIT: CPT | Performed by: NURSE PRACTITIONER

## 2020-11-11 RX ORDER — CYCLOBENZAPRINE HCL 10 MG
10 TABLET ORAL 3 TIMES DAILY PRN
Qty: 30 TABLET | Refills: 1 | Status: SHIPPED | OUTPATIENT
Start: 2020-11-11 | End: 2020-12-18

## 2020-11-11 RX ORDER — HYDROCODONE BITARTRATE AND ACETAMINOPHEN 7.5; 325 MG/1; MG/1
1 TABLET ORAL EVERY 6 HOURS PRN
Qty: 35 TABLET | Refills: 0 | Status: SHIPPED | OUTPATIENT
Start: 2020-11-11 | End: 2021-03-16

## 2020-11-11 NOTE — PROGRESS NOTES
Subjective   Patient ID: Avni Olivo III is a 65 y.o. male is here today for a post-op visit. He had a  L3/4 L4/5 L5/S1 decompression with Dr. Martinez on 10/29/20.      The patient states that he is having low back pain. The pain ranges from 5/10 to 9/10. It does not radiate. He denies N/T BLE. He denies loss of B/B. He does have tingling in bilateral feet but he does not have numbness. He is taking hydrocodone and flexeril for the pain and he is trying to taper off of the hydrocodone.     Prior to surgery, he did have atrophy of in bilateral calf muscles and was working with PT due to weakness. He says that he is not sure if he has weakness because he has not been active since surgery.     History of Present Illness    The following portions of the patient's history were reviewed and updated as appropriate: allergies, past family history and problem list.    Review of Systems   Gastrointestinal:        No b/b incontinence   Musculoskeletal: Positive for back pain. Negative for gait problem.   Skin: Negative for wound.   Neurological: Negative for weakness and numbness (tinglilng in feet intermittently).   Psychiatric/Behavioral: Negative for sleep disturbance.       Objective   Physical Exam  Constitutional:       Appearance: Normal appearance.   Pulmonary:      Effort: Pulmonary effort is normal.   Musculoskeletal:      Lumbar back: He exhibits pain.   Skin:     General: Skin is warm and dry.      Comments: Lumbar incision well approximated. No erythema, edema or drainage.    Neurological:      Mental Status: He is alert and oriented to person, place, and time.      Deep Tendon Reflexes:      Reflex Scores:       Patellar reflexes are 1+ on the right side and 1+ on the left side.       Achilles reflexes are 0 on the right side and 0 on the left side.      Neurologic Exam     Mental Status   Oriented to person, place, and time.   Level of consciousness: alert    Motor Exam   Muscle bulk: normal    Strength    Right iliopsoas: 5/5  Left iliopsoas: 5/5  Right quadriceps: 5/5  Left quadriceps: 5/5  Right hamstrin/5  Left hamstrin/5  Right anterior tibial: 4/5  Left anterior tibial: 4/5  Right gastroc: 4/5  Left gastroc: 4/5Patient is able to stand on toes briefly      Sensory Exam   Right leg light touch: normal  Left leg light touch: normal    Gait, Coordination, and Reflexes     Reflexes   Right patellar: 1+  Left patellar: 1+  Right achilles: 0  Left achilles: 0  Right ankle clonus: absent  Left ankle clonus: absent      Assessment/Plan   Independent Review of Radiographic Studies:    No new imaging.     Medical Decision Making:      The patient is having low back pain. He is taking medication that is providing relief. We will order physical therapy. The patient did have BLE gastroc weakness prior to surgery. He was able to stand on tiptoes briefly and felt that there had been some progress because he couldn't lift his heel off of the floor before surgery.     The patient was instructed to notify the office for concerns of incision with erythema, edema, drainage and/or temp over 100.5. The patient will follow-up in 4-6 weeks.     Diagnoses and all orders for this visit:    1. Status post lumbar spine operative procedure for decompression of spinal cord (Primary)  -     Ambulatory Referral to Physical Therapy Evaluate and treat; Stretching, ROM, Strengthening  -     HYDROcodone-acetaminophen (NORCO) 7.5-325 MG per tablet; Take 1 tablet by mouth Every 6 (Six) Hours As Needed for Moderate Pain .  Dispense: 35 tablet; Refill: 0    2. Spinal stenosis of lumbar region with neurogenic claudication  -     HYDROcodone-acetaminophen (NORCO) 7.5-325 MG per tablet; Take 1 tablet by mouth Every 6 (Six) Hours As Needed for Moderate Pain .  Dispense: 35 tablet; Refill: 0    Other orders  -     cyclobenzaprine (FLEXERIL) 10 MG tablet; Take 1 tablet by mouth 3 (Three) Times a Day As Needed for Muscle Spasms.  Dispense: 30  tablet; Refill: 1      Return in about 4 weeks (around 12/9/2020).

## 2020-12-15 ENCOUNTER — TELEPHONE (OUTPATIENT)
Dept: NEUROSURGERY | Facility: CLINIC | Age: 65
End: 2020-12-15

## 2020-12-15 NOTE — TELEPHONE ENCOUNTER
Patient called requesting a detail report of his surgery.  Patient would like to have for the appointment Friday.    Thank You

## 2020-12-18 ENCOUNTER — OFFICE VISIT (OUTPATIENT)
Dept: NEUROSURGERY | Facility: CLINIC | Age: 65
End: 2020-12-18

## 2020-12-18 VITALS
HEART RATE: 86 BPM | DIASTOLIC BLOOD PRESSURE: 89 MMHG | TEMPERATURE: 97.1 F | BODY MASS INDEX: 27.44 KG/M2 | SYSTOLIC BLOOD PRESSURE: 142 MMHG | WEIGHT: 196 LBS | HEIGHT: 71 IN

## 2020-12-18 DIAGNOSIS — M48.062 SPINAL STENOSIS OF LUMBAR REGION WITH NEUROGENIC CLAUDICATION: Primary | ICD-10-CM

## 2020-12-18 DIAGNOSIS — Z98.890 STATUS POST LUMBAR SPINE OPERATIVE PROCEDURE FOR DECOMPRESSION OF SPINAL CORD: ICD-10-CM

## 2020-12-18 PROCEDURE — 99024 POSTOP FOLLOW-UP VISIT: CPT | Performed by: NURSE PRACTITIONER

## 2020-12-18 NOTE — PROGRESS NOTES
"Subjective   Patient ID: Avni Olivo III is a 65 y.o. male is here today for surgical follow-up. Mr. Olivo had Open bilateral L3-S1 lumbar decompression by Dr. Martinez on 10/29/2020. He was referred to physical therapy at his last visit.    Today he is feeling good, he has back pain. He does have some tingling in both feet.     Prior to surgery he did have right calf atrophy and was unable to lift up on his toes. He worked with a physical therapist until surgery. At his last post-op visit, he was referred back to the physical therapist so that he could monitor changes. He continues to have right calf weakness that was there prior to surgery but feels that he is making progress. He is working with PT to strengthen calves and his core. He can now lift up on his toes on both feet which he could not do before surgery. He feels that his balance is getting better. He has not had any falls.     Patient is wearing a mask in our office today.    History of Present Illness    The following portions of the patient's history were reviewed and updated as appropriate: allergies, current medications, past family history, past medical history, past social history, past surgical history and problem list.    Review of Systems   Constitutional: Negative for fever.   Respiratory: Negative for chest tightness and shortness of breath.    Cardiovascular: Negative for chest pain.   Musculoskeletal: Positive for back pain.   Neurological:        Positive for intermittent tingling in feet         Objective     Vitals:    12/18/20 1400   BP: 142/89   Pulse: 86   Temp: 97.1 °F (36.2 °C)   Weight: 88.9 kg (196 lb)   Height: 180.3 cm (71\")     Body mass index is 27.34 kg/m².      Physical Exam  Constitutional:       Appearance: Normal appearance.   Pulmonary:      Effort: Pulmonary effort is normal.   Skin:     General: Skin is warm and dry.      Comments: Lumbar incision well healed. No erythema, edema, drainage.    Neurological:      Mental " Occupational Therapy    Visit Type: initial evaluation  Precautions:  Medical precautions: ; standard precautions.   Lines:     Basic: IV and telemetry      Lines in chart and on patient reviewed, cautions maintained throughout session.    SUBJECTIVE                                                                                                            Patient agreed to participate in therapy this date.    Patient is a 71 year old female admitted to [facility] for abdominal pain in the setting of large diffuse B-cell lymphoma; Cholangitis s/p ERCP 9/8/2020 with balloon dilation of common bile duct stricture and biliary stent placement.  Patient lives with her two adult sons in a 1st story apartment with 6 steps to enter (with rail).  Pt reports one son works part-time and her other son doesn't work so someone is always at home with her.  At baseline, patient is independent to modified independent with ADLs and household mobility using no device vs furniture walking; uses 4ww when outside her home.  Pt reports sons assist with basement laundry and cleaning; however pt is independent with driving, shopping, and cooking.     Patient / Family Goal: return to previous functional status and maximize function      OBJECTIVE                                                                                                              Level of consciousness: alert    Oriented to person, place, time and situation     Arousal alertness: appropriate responses to stimuli    Affect/Behavior: alert, appropriate and pleasant  Patient activity tolerance: 1 to 1 activity to rest (mildly SOB with ambulation 200' in hallway but VSS)  Hand Dominance: right  Range of Motion (measured in degrees unless otherwise indicated)  WNL: SAVANA DAVIS  Strength (out of 5 unless otherwise indicated)  5/5: LUE  WFL: RUE  Comments / Details:  RUE mildly weak throughout compared to LUE (4/5 to 4+/5), which pt reports is normal for her.   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   Edema:  Pt has h/o RLE lymphedema but no acute swelling noted at this time. Pt managing lymphedema well.   Bed mobility:      Supine to sit: independent    Sit to supine: independent  Training completed:    Tasks: all aspects of bed mobility  Transfers:    Assistive devices: gait belt    Sit to stand: independent    Stand to sit: independent    Bed to chair: independent, type: stand pivot  Training completed:    Tasks: sit to stand, stand to sit, stand pivot and toilet    Education details: body mechanics and patient safety  Functional Ambulation:    Assistance:modified independent   Assistive device:gait belt (with use of IV pole)    Distance (ft): 15;15    Surface: even  Functional ambulation, trial 2:     Assistance: supervision    Assistive device: gait belt and none    Distance (ft): 150;     Surface: even  Details/Comments: No overt LOB noted. Pt initially reaching for walls but improved with time. Pt reports using 4ww for longer distances at home.   Activities of Daily Living (ADLs):  Grooming/Oral Hygiene:     Grooming assist: independent    Position: standing at sink  Lower Body Dressing:     Assist: modified independent    Position: edge of bed    Footwear assistance: modified independent    Footwear position: edge of bed    Assist needed for: increased time to complete (using figure 4 position)  Toilet transfer:     Assist: modified independent    Device: gait belt    Equipment: grab bar use  Toileting:     Assist: independent  Tub Transfer:     Assist: supervision and contact guard/touching/steadying assist    Pattern: forward stepping (simulated transfer)    Equipment: grab bar      Interventions         Status: He is alert and oriented to person, place, and time.      Deep Tendon Reflexes:      Reflex Scores:       Patellar reflexes are 1+ on the right side and 1+ on the left side.      Neurologic Exam     Mental Status   Oriented to person, place, and time.   Level of consciousness: alert    Motor Exam   Right leg tone: decreased  Left leg tone: normal    Strength   Right iliopsoas: 4/5  Left iliopsoas: 4/5  Right quadriceps: 4/5  Left quadriceps: 4/5  Right hamstrin/5  Left hamstrin/5  Right anterior tibial: 4/5  Left anterior tibial: 4/5  Right gastroc: 5/5  Left gastroc: 5/5    Sensory Exam   Right leg light touch: normal  Left leg light touch: normal    Gait, Coordination, and Reflexes     Reflexes   Right patellar: 1+  Left patellar: 1+  Right ankle clonus: absent  Left ankle clonus: absent          Assessment/Plan   Independent Review of Radiographic Studies:      I personally reviewed the images from the following studies.    No new imaging.     Medical Decision Making:      The patient is making progress. He does have some right calf weakness that was there prior to surgery but he feels that he is making progress and gaining strength. He continues to work with PT.     He will f/u in 3 months.     There are no diagnoses linked to this encounter.  Return in about 3 months (around 3/18/2021).                                                                                                                   Training provided: ADL training, activity tolerance, balance retraining, body mechanics, compensatory techniques, transfer training, functional ambulation and positioningHome safety education; discharge planning  Skilled input: verbal instruction/cues and tactile instruction/cues        ASSESSMENT                                                                                                                Personal Occupations Profile Affected: bathing/showering, community mobility, home establishment/managements    Patient presents very near baseline which was independent to modified independent with ADLs and functional mobility tasks using no device vs furniture walking in house and 4ww in community. Patient's overall level of assist is independent to modified independent with self-cares using bathroom DME (which pt has at home) and mod I to supervision with functional mobility tasks using IV pole vs no device for support. With support from IV pole, pt able to ambulate within the room with mod I. However when ambulating without a device, pt fatigues more quickly; however no overt LOB noted. Pt initially reaching for walls but improved with time. Pt reports using 4ww for longer distances at home - will defer to PT on recommendations re: possible use of device within the home. Patient demos mild weakness throughout RUE (compared to LUE); discussed home exercises. Pt c/o neuropathy in bilateral hands/feet and throughout RUE; however not noted to impact FM coordination during functional tasks. Recommended initial assist from sons for ADLs/IADLs as needed. Discussed recommendation for initial supervision/assist for tub/shower transfers at home, along with use of shower chair for balance and energy conservation. Pt verbalizes understanding. Patient may benefit from home health OT (even if 1-2 sessions) to ensure  safety with ADLs and IADLs within patient's home environment. Patient denies further concerns/questions regarding resumption of daily routine on discharge; states that one of her sons is always home with her. No further acute OT needs identified. Therefore, acute OT to sign off on this patient.      Discharge OT.     Discharge Recommendations  Recommendations for Discharge: OT WI: Home, Home therapy (continued assist from sons for cleaning & laundry)               PT/OT ADL Equipment for Discharge: none - pt has grab bars in shower, shower chair, and toilet riser with arms  OT Identified Barriers to Discharge: none     Skilled therapy is not required due to patient meeting all OT goals during today's session.  Clinical decision making: Low - Patient has few limitations (1-3), comorbidities and/or complexities, as noted in problem focused assessment noted above, that impact their occupational profile.  Resulting in few treatment options and no task modification consistent with low clinical decision making complexity.    End of Session:   Location: in bed  Safety measures: call light within reach  Handoff to: nurse    PLAN                                                                                                                          Suggestions for next session as indicated: N/a    Discharge OT.      Interventions: Recommend initial assist from sons for ADLs/IADLs as needed. Discussed recommendation for initial supervision/assist for tub/shower transfers at home, along with use of shower chair for balance and energy conservation. Pt verbalizes understanding.     Agreement to plan and goals: patient agrees with goals and treatment plan      Documented in the chart in the following areas: Prior Level of Function. Pain. Assessment. Plan.

## 2021-03-04 RX ORDER — LOSARTAN POTASSIUM 50 MG/1
TABLET ORAL
Qty: 90 TABLET | Refills: 3 | Status: SHIPPED | OUTPATIENT
Start: 2021-03-04 | End: 2022-01-11 | Stop reason: SDUPTHER

## 2021-03-16 ENCOUNTER — TELEPHONE (OUTPATIENT)
Dept: NEUROSURGERY | Facility: CLINIC | Age: 66
End: 2021-03-16

## 2021-03-16 RX ORDER — PREDNISONE 20 MG/1
TABLET ORAL
COMMUNITY
Start: 2021-02-09 | End: 2021-03-30

## 2021-03-16 RX ORDER — COVID-19 TEST SPECIMEN COLLECT
MISCELLANEOUS MISCELLANEOUS SEE ADMIN INSTRUCTIONS
COMMUNITY
Start: 2021-01-22 | End: 2021-03-30

## 2021-03-16 RX ORDER — BENZONATATE 200 MG/1
200 CAPSULE ORAL 3 TIMES DAILY PRN
COMMUNITY
Start: 2021-02-09 | End: 2021-03-30

## 2021-03-17 ENCOUNTER — OFFICE VISIT (OUTPATIENT)
Dept: NEUROSURGERY | Facility: CLINIC | Age: 66
End: 2021-03-17

## 2021-03-17 ENCOUNTER — TELEPHONE (OUTPATIENT)
Dept: NEUROSURGERY | Facility: CLINIC | Age: 66
End: 2021-03-17

## 2021-03-17 VITALS
HEIGHT: 71 IN | DIASTOLIC BLOOD PRESSURE: 98 MMHG | BODY MASS INDEX: 27.75 KG/M2 | TEMPERATURE: 97.9 F | WEIGHT: 198.2 LBS | SYSTOLIC BLOOD PRESSURE: 120 MMHG

## 2021-03-17 DIAGNOSIS — G89.29 CHRONIC MIDLINE LOW BACK PAIN WITHOUT SCIATICA: Primary | ICD-10-CM

## 2021-03-17 DIAGNOSIS — M54.50 CHRONIC MIDLINE LOW BACK PAIN WITHOUT SCIATICA: Primary | ICD-10-CM

## 2021-03-17 DIAGNOSIS — Z98.890 STATUS POST LUMBAR SPINE OPERATIVE PROCEDURE FOR DECOMPRESSION OF SPINAL CORD: ICD-10-CM

## 2021-03-17 DIAGNOSIS — R29.898 BILATERAL LEG WEAKNESS: ICD-10-CM

## 2021-03-17 PROCEDURE — 99213 OFFICE O/P EST LOW 20 MIN: CPT | Performed by: NEUROLOGICAL SURGERY

## 2021-03-17 NOTE — TELEPHONE ENCOUNTER
Caller: BONNIE MCNEAL III    Relationship: PT    Best call back number: [phone]    What is the best time to reach you: ANYTIME    Who are you requesting to speak with (clinical staff, provider,  specific staff member): CLINICAL STAFF    What was the call regarding: PT HAS TELEPHONE APPT WITH DR LYN  ON 4/7/21. PT WOULD LIKE TO KNOW IF THAT APPT CAN BE MOVED UP ON OR AFTER 3/24/21    Do you require a callback: YES

## 2021-03-22 ENCOUNTER — HOSPITAL ENCOUNTER (OUTPATIENT)
Dept: GENERAL RADIOLOGY | Facility: HOSPITAL | Age: 66
Discharge: HOME | End: 2021-03-22
Admitting: NEUROLOGICAL SURGERY

## 2021-03-22 ENCOUNTER — BULK ORDERING (OUTPATIENT)
Dept: CASE MANAGEMENT | Facility: OTHER | Age: 66
End: 2021-03-22

## 2021-03-22 DIAGNOSIS — Z98.890 STATUS POST LUMBAR SPINE OPERATIVE PROCEDURE FOR DECOMPRESSION OF SPINAL CORD: ICD-10-CM

## 2021-03-22 DIAGNOSIS — Z23 IMMUNIZATION DUE: ICD-10-CM

## 2021-03-22 DIAGNOSIS — G89.29 CHRONIC MIDLINE LOW BACK PAIN WITHOUT SCIATICA: ICD-10-CM

## 2021-03-22 DIAGNOSIS — M54.50 CHRONIC MIDLINE LOW BACK PAIN WITHOUT SCIATICA: ICD-10-CM

## 2021-03-22 DIAGNOSIS — R29.898 BILATERAL LEG WEAKNESS: ICD-10-CM

## 2021-03-22 PROCEDURE — 72114 X-RAY EXAM L-S SPINE BENDING: CPT

## 2021-03-29 ENCOUNTER — TELEPHONE (OUTPATIENT)
Dept: NEUROSURGERY | Facility: CLINIC | Age: 66
End: 2021-03-29

## 2021-03-29 NOTE — TELEPHONE ENCOUNTER
Caller: Avni Olivo III    Relationship to patient: Self    Best call back number: [phone]    Type of visit: TELEPHONE VISIT    Requested date: 03/30/2021    If rescheduling, when is the original appointment: 04/07/2021    Additional notes:PT CALLED AND WANTED TO KNOW IF THE APPT FOR 03/30/2021 WAS STILL AVAILABLE TO SPEAK WITH DR. AGUILAR? OR IF HE CAN GET A SOONER APPT.    PLEASE CALL PT AND LET HIM KNOW.     THANK YOU

## 2021-03-30 ENCOUNTER — OFFICE VISIT (OUTPATIENT)
Dept: CARDIOLOGY | Facility: CLINIC | Age: 66
End: 2021-03-30

## 2021-03-30 VITALS
WEIGHT: 200 LBS | DIASTOLIC BLOOD PRESSURE: 82 MMHG | BODY MASS INDEX: 28 KG/M2 | TEMPERATURE: 98.2 F | HEIGHT: 71 IN | HEART RATE: 63 BPM | OXYGEN SATURATION: 99 % | SYSTOLIC BLOOD PRESSURE: 118 MMHG

## 2021-03-30 DIAGNOSIS — I10 ESSENTIAL HYPERTENSION: ICD-10-CM

## 2021-03-30 DIAGNOSIS — I25.10 CORONARY ARTERY DISEASE INVOLVING NATIVE HEART WITHOUT ANGINA PECTORIS, UNSPECIFIED VESSEL OR LESION TYPE: ICD-10-CM

## 2021-03-30 DIAGNOSIS — I25.84 CORONARY ARTERY CALCIFICATION: Primary | ICD-10-CM

## 2021-03-30 DIAGNOSIS — I49.3 PVC'S (PREMATURE VENTRICULAR CONTRACTIONS): ICD-10-CM

## 2021-03-30 DIAGNOSIS — E78.2 MIXED HYPERLIPIDEMIA: ICD-10-CM

## 2021-03-30 DIAGNOSIS — I25.10 CORONARY ARTERY CALCIFICATION: Primary | ICD-10-CM

## 2021-03-30 PROBLEM — E78.5 DYSLIPIDEMIA: Status: RESOLVED | Noted: 2020-08-26 | Resolved: 2021-03-30

## 2021-03-30 PROCEDURE — 99214 OFFICE O/P EST MOD 30 MIN: CPT | Performed by: INTERNAL MEDICINE

## 2021-03-30 PROCEDURE — 93000 ELECTROCARDIOGRAM COMPLETE: CPT | Performed by: INTERNAL MEDICINE

## 2021-03-30 RX ORDER — ATORVASTATIN CALCIUM 40 MG/1
60 TABLET, FILM COATED ORAL NIGHTLY
Qty: 140 TABLET | Refills: 3 | Status: SHIPPED | OUTPATIENT
Start: 2021-03-30 | End: 2021-05-24 | Stop reason: SDUPTHER

## 2021-03-30 NOTE — PROGRESS NOTES
CARDIOLOGY    Zoraida Kenyon MD    ENCOUNTER DATE:  03/30/2021    Avni Olivo III / 65 y.o. / male        CHIEF COMPLAINT / REASON FOR OFFICE VISIT     Coronary Artery Disease      HISTORY OF PRESENT ILLNESS       HPI    Avni Olivo III is a 65 y.o. male     This gentleman saw Dr. Ramírez in 2013 with chest pain. She did an exercise echocardiogram on 06/06/2013 which showed normal LV systolic function with ejection fraction of 64%, trace mitral regurgitation. He exercised for 11.5 minutes under Jacques protocol. His stress echocardiogram images were normal. His stress EKG was normal, but the patient did complain of some chest pain. On 06/10/2013, the patient underwent a heart catheterization, which showed no evidence of coronary artery disease.      I saw him for the first time in October 2016.  He was on vacation in [place] and had a bradycardic and presyncopal episode at the airport. Paramedics were called and he was noted to have a pulse in the low 50s and elevated blood pressure systolic of about 200 over 120. He was taken to the emergency room. I have reviewed that visit. He had an EKG which showed sinus bradycardia, but was otherwise normal. He was hypertensive there. His troponin and proBNP were normal. His creatinine was elevated at 1.48. He was treated with IV fluids. He did not really feel much better, so a CT scan of his head was performed which looked okay. Eventually, he was discharged from the emergency room still feeling somewhat dizzy and they were able to continue their trip home.      I saw him back in April 2018 when he was complaining about palpitations and was diagnosed with symptomatic PVCs.  He had a stress echo in December 2019 which showed normal LV systolic function.  No significant valvular heart disease.  However, he went on to have a heart catheterization.  In December 2019 which showed a mid LAD lesion of 85%.  2 drug-eluting stents were placed.     He had back surgery  "in October 2020.  He completed rehab and felt good.  Unfortunately he got Covid in January 2021.  That really laid him up for a few weeks and since then he has had a lot of stiffness and back pain and has not been able to do much physically.  He is following with Dr. Martinez.  He has the same discomfort in his left chest and shoulder with exertion that he has had for the last 15 years.  No changes.  He notices an occasional palpitation.  He denies shortness of breath or edema.    REVIEW OF SYSTEMS     Review of Systems   Constitutional: Negative for chills, fever, weight gain and weight loss.   Cardiovascular: Negative for leg swelling.   Respiratory: Positive for snoring. Negative for cough and wheezing.    Hematologic/Lymphatic: Negative for bleeding problem. Does not bruise/bleed easily.   Skin: Negative for color change.   Musculoskeletal: Positive for back pain and joint pain. Negative for falls and myalgias.   Gastrointestinal: Negative for melena.   Genitourinary: Negative for hematuria.   Neurological: Negative for excessive daytime sleepiness.   Psychiatric/Behavioral: Negative for depression. The patient is not nervous/anxious.          VITAL SIGNS     Visit Vitals  /82 (BP Location: Left arm)   Pulse 63   Temp 98.2 °F (36.8 °C)   Ht 180.3 cm (71\")   Wt 90.7 kg (200 lb)   SpO2 99%   BMI 27.89 kg/m²         Wt Readings from Last 3 Encounters:   03/30/21 90.7 kg (200 lb)   03/17/21 89.9 kg (198 lb 3.2 oz)   12/18/20 88.9 kg (196 lb)     Body mass index is 27.89 kg/m².      PHYSICAL EXAMINATION     Constitutional:       General: Not in acute distress.  Neck:      Vascular: No carotid bruit or JVD.   Pulmonary:      Effort: Pulmonary effort is normal.      Breath sounds: Normal breath sounds.   Cardiovascular:      Normal rate. Regular rhythm.      Murmurs: There is no murmur.   Psychiatric:         Mood and Affect: Mood and affect normal.           REVIEWED DATA       ECG 12 Lead    Date/Time: " 3/30/2021 11:26 AM  Performed by: Zoraida Kenyon MD  Authorized by: Zoraida Kenyon MD   Comparison: compared with previous ECG from 8/26/2020  Similar to previous ECG  Rhythm: sinus rhythm  BPM: 59  Conduction: conduction normal  ST Segments: ST segments normal  T Waves: T waves normal    Clinical impression: normal ECG              Lipid Panel    Lipid Panel 7/30/20   Total Cholesterol 148   Triglycerides 156 (A)   HDL Cholesterol 53   VLDL Cholesterol 31.2   LDL Cholesterol  64   LDL/HDL Ratio 1.20   (A) Abnormal value              Lab Results   Component Value Date    GLUCOSE 114 (H) 07/30/2020    BUN 15 07/30/2020    CREATININE 1.07 07/30/2020    EGFRIFNONA 70 07/30/2020    BCR 14.0 07/30/2020    K 4.4 07/30/2020    CO2 26.1 07/30/2020    CALCIUM 9.4 07/30/2020    ALBUMIN 4.30 07/30/2020    AST 43 (H) 07/30/2020    ALT 42 (H) 07/30/2020       ASSESSMENT & PLAN      Diagnosis Plan   1. Coronary artery calcification  Comprehensive Metabolic Panel    Lipid Panel    CBC (No Diff)   2. Mixed hyperlipidemia  Comprehensive Metabolic Panel    Lipid Panel    CBC (No Diff)   3. PVC's (premature ventricular contractions)  Comprehensive Metabolic Panel    Lipid Panel    CBC (No Diff)   4. Coronary artery disease involving native heart without angina pectoris, unspecified vessel or lesion type  Comprehensive Metabolic Panel    Lipid Panel    CBC (No Diff)   5. Essential hypertension  Comprehensive Metabolic Panel    Lipid Panel    CBC (No Diff)       1.  Coronary disease status post stent to the mid LAD in December 2019.  I am going to discontinue aspirin and decrease his Brilinta to 60 mg twice a day.  Hopefully, his back will start feeling better soon and he can get into more regular exercise.  2.  Hypertension.  Currently well controlled.  I am not going to adjust his medications.  3.  Hyperlipidemia.  I reviewed his lipids from Dr. Sims's office from October 2020.  At that time he was on atorvastatin 60 mg a  day.  He started running out and has dropped down to 40 mg a day.  I am going to increase it back up to 60 mg a day and recheck lab work before his follow-up appointment in 3 months.  4.  History of premature ventricular contractions.    Currently not very symptomatic.    He will follow up with Radha in 3 months with lab work 1 week prior.  Goal LDL of less than 70 ideally.  He was reluctant to increase atorvastatin to 80 mg a day and wants to see what his lipids look like when he gets back on 60 mg a day.  He was agreeable to stopping aspirin and decreasing the Brilinta though he was leaning towards wanting to just be on baby aspirin alone as he tolerated it before.  If he is continuing to have bruising or problems I would be okay with switching him back to a baby aspirin a day and stopping Brilinta.    Orders Placed This Encounter   Procedures   • Comprehensive Metabolic Panel     Standing Status:   Future     Standing Expiration Date:   3/30/2022   • Lipid Panel     Standing Status:   Future     Standing Expiration Date:   3/30/2022   • CBC (No Diff)     Standing Status:   Future     Standing Expiration Date:   3/30/2022   • ECG 12 Lead     This order was created via procedure documentation           MEDICATIONS         Discharge Medications          Accurate as of March 30, 2021 11:26 AM. If you have any questions, ask your nurse or doctor.            Changes to Medications      Instructions Start Date   ticagrelor 60 MG tablet tablet  Commonly known as: Brilinta  What changed:   · medication strength  · how much to take  Changed by: Zoraida Kenyon MD   60 mg, Oral, 2 Times Daily         Continue These Medications      Instructions Start Date   7-Keto Lean capsule   1 tablet, Oral, Daily, HOLD PRIOR TO SURGERY      atorvastatin 40 MG tablet  Commonly known as: LIPITOR   60 mg, Oral, Nightly      CALCIUM PO   Oral, Daily, HOLD PRIOR TO SURGERY      carvedilol 12.5 MG tablet  Commonly known as: COREG   12.5  mg, Oral, 2 Times Daily      cholecalciferol 25 MCG (1000 UT) tablet  Commonly known as: VITAMIN D3   1,000 Units, Oral, 2 times daily, HOLD PRIOR TO SURGERY      DHEA 50 MG tablet   50 mg, Oral, 2 times daily, HOLD PRIOR TO SURGERY      eszopiclone 2 MG tablet  Commonly known as: LUNESTA   1 mg, Oral, Nightly, Takes 1/2 tablet qhs      FISH OIL PO   1,280 mg, Oral, 2 Times Daily, HOLD PRIOR TO SURGERY      losartan 50 MG tablet  Commonly known as: COZAAR   TAKE 1 TABLET DAILY      vitamin b complex capsule capsule   1 capsule, Oral, Daily, HOLD PRIOR TO SURGERY      vitamin C 500 MG tablet  Commonly known as: ASCORBIC ACID   1,000 mg, Oral, Daily, HOLD PRIOR TO SURGERY         Stop These Medications    aspirin 81 MG tablet  Stopped by: Zoraida Kenyon MD     benzonatate 200 MG capsule  Commonly known as: TESSALON  Stopped by: Zoraida Kenyon MD     COVID-19 Specimen Collection kit  Stopped by: Zoraida Kenyon MD     predniSONE 20 MG tablet  Commonly known as: DELTASONE  Stopped by: MD Zoraida Bah MD  03/30/21  11:26 EDT    **Dragon Disclaimer:   Much of this encounter note is an electronic transcription/translation of spoken language to printed text. The electronic translation of spoken language may permit erroneous, or at times, nonsensical words or phrases to be inadvertently transcribed. Although I have reviewed the note for such errors, some may still exist.

## 2021-04-06 NOTE — PROGRESS NOTES
Subjective   Patient ID: Avni Olivo III is a 65 y.o. male is here today for follow-up. Patient was last seen 3/17/21 for chronic midline low back pain without sciatica.    X-ray Lspine 3/22/21 [facility]    Patient reports today with back pain, very low and radiates to the hips, right worse than left.  Numbness and tingling intermittent in feet.  You have chosen to receive care through a telephone visit. Do you consent to use a telephone visit for your medical care today? Yes  This was a televisit from my office lasting 12 minutes.  The patient was at home.  Review his x-rays which showed a mild spondylolisthesis at L5-S1 but no outright instability.  I described this to him.  He had lots of questions.  We will watch the spondylolisthesis.  I do not think he needs to have any kind of fusion surgery but that is theoretically possible.  What bothered him was a long car ride and the pain in the low back and a little bit off to the right possibly in the SI joint region.  I told him that we should send him to the pain clinic to see Dr. Davila possibly try some caudal epidural blocks and may be a right sacroiliac joint injection and even potentially RFA of the sacroiliac joints.  He is frustrated because he wants to try and get back into more physical activity and hopefully this will help.  If he begins having more radicular symptoms I might be more concerned about the possibility of fusion.  He did mention he had numbness in his feet but she said he did not have before the decompressive surgery and seem to be triggered by this long period of sitting in a car.  We will keep an eye on that for now and see him in 4 months after some of these injections.    Back Pain  The pain is present in the lumbar spine. The quality of the pain is described as stabbing. The pain is at a severity of 6/10. The pain is worse during the day. The symptoms are aggravated by standing (weightbearing). Pertinent negatives include no fever. He has  tried analgesics and bed rest for the symptoms. The treatment provided mild relief.       The following portions of the patient's history were reviewed and updated as appropriate: allergies, current medications, past family history, past medical history, past social history, past surgical history and problem list.    Review of Systems   Constitutional: Positive for activity change. Negative for chills and fever.   HENT: Negative for congestion.    Musculoskeletal: Positive for back pain and gait problem.   All other systems reviewed and are negative.          Objective     There were no vitals filed for this visit.  There is no height or weight on file to calculate BMI.      Physical Exam  Constitutional:       Appearance: He is well-developed.   HENT:      Head: Normocephalic and atraumatic.   Eyes:      Extraocular Movements: EOM normal.      Conjunctiva/sclera: Conjunctivae normal.      Pupils: Pupils are equal, round, and reactive to light.   Neck:      Vascular: No carotid bruit.   Neurological:      Mental Status: He is oriented to person, place, and time.      Coordination: Finger-Nose-Finger Test and Heel to Shin Test normal.      Gait: Gait is intact.      Deep Tendon Reflexes:      Reflex Scores:       Tricep reflexes are 2+ on the right side and 2+ on the left side.       Bicep reflexes are 2+ on the right side and 2+ on the left side.       Brachioradialis reflexes are 2+ on the right side and 2+ on the left side.       Patellar reflexes are 2+ on the right side and 2+ on the left side.       Achilles reflexes are 2+ on the right side and 2+ on the left side.  Psychiatric:         Speech: Speech normal.       Neurologic Exam     Mental Status   Oriented to person, place, and time.   Registration of memory: Good recent and remote memory.   Attention: normal. Concentration: normal.   Speech: speech is normal   Level of consciousness: alert  Knowledge: consistent with education.     Cranial Nerves     CN II    Visual fields full to confrontation.   Visual acuity: normal    CN III, IV, VI   Pupils are equal, round, and reactive to light.  Extraocular motions are normal.     CN V   Facial sensation intact.   Right corneal reflex: normal  Left corneal reflex: normal    CN VII   Facial expression full, symmetric.   Right facial weakness: none  Left facial weakness: none    CN VIII   Hearing: intact    CN IX, X   Palate: symmetric    CN XI   Right sternocleidomastoid strength: normal  Left sternocleidomastoid strength: normal    CN XII   Tongue: not atrophic  Tongue deviation: none    Motor Exam   Muscle bulk: normal  Right arm tone: normal  Left arm tone: normal  Right leg tone: normal  Left leg tone: normal    Strength   Strength 5/5 except as noted.     Sensory Exam   Light touch normal.     Gait, Coordination, and Reflexes     Gait  Gait: normal    Coordination   Finger to nose coordination: normal  Heel to shin coordination: normal    Reflexes   Right brachioradialis: 2+  Left brachioradialis: 2+  Right biceps: 2+  Left biceps: 2+  Right triceps: 2+  Left triceps: 2+  Right patellar: 2+  Left patellar: 2+  Right achilles: 2+  Left achilles: 2+  Right : 2+  Left : 2+          Assessment/Plan   Independent Review of Radiographic Studies:      I personally reviewed the images from the following studies.    I reviewed the x-rays done on 3/22/2021 which show postoperative changes from L4-S1 with a small grade 1 spondylolisthesis at L5-S1 with collapse of disc height.  There is no instability on flexion-extension.  Agree with the report.        Medical Decision Making:      We will send him to see Dr. Davila some caudal epidural blocks, possible sacroiliac joint injections and possible sacroiliac RFA.  We will reevaluate in 4 months.      Diagnoses and all orders for this visit:    1. Chronic midline low back pain without sciatica (Primary)  -     Ambulatory Referral to Hospital Pain Management Department    2.  Status post lumbar spine operative procedure for decompression of spinal cord  -     Ambulatory Referral to Hospital Pain Management Department    3. DDD (degenerative disc disease), lumbar  -     Ambulatory Referral to Hospital Pain Management Department      Return in about 4 months (around 8/7/2021) for Face-to-face.

## 2021-04-07 ENCOUNTER — TELEPHONE (OUTPATIENT)
Dept: NEUROSURGERY | Facility: CLINIC | Age: 66
End: 2021-04-07

## 2021-04-07 ENCOUNTER — OFFICE VISIT (OUTPATIENT)
Dept: NEUROSURGERY | Facility: CLINIC | Age: 66
End: 2021-04-07

## 2021-04-07 DIAGNOSIS — M54.50 CHRONIC MIDLINE LOW BACK PAIN WITHOUT SCIATICA: Primary | ICD-10-CM

## 2021-04-07 DIAGNOSIS — Z98.890 STATUS POST LUMBAR SPINE OPERATIVE PROCEDURE FOR DECOMPRESSION OF SPINAL CORD: ICD-10-CM

## 2021-04-07 DIAGNOSIS — M51.36 DDD (DEGENERATIVE DISC DISEASE), LUMBAR: ICD-10-CM

## 2021-04-07 DIAGNOSIS — G89.29 CHRONIC MIDLINE LOW BACK PAIN WITHOUT SCIATICA: Primary | ICD-10-CM

## 2021-04-07 PROCEDURE — 99442 PR PHYS/QHP TELEPHONE EVALUATION 11-20 MIN: CPT | Performed by: NEUROLOGICAL SURGERY

## 2021-04-07 NOTE — TELEPHONE ENCOUNTER
Patient is aware Dr. Davila is at the [facility] pain mgmt group. Someone will call him to get scheduled.

## 2021-04-07 NOTE — TELEPHONE ENCOUNTER
Caller: Avni Olivo III    Relationship: Self    Best call back number: [phone]    What orders are you requesting (i.e. lab or imaging): PAIN MANAGEMENT DR SHORE    In what timeframe would the patient need to come in: ASAP    Where will you receive your lab/imaging services: DR SHORE    Additional notes: PT STATES THAT THE REF WAS SENT TO [facility] PAIN MANAGEMENT AND DR SHORE IS NOT WITH THEM.  PLEASE ISSUE A NEW REF TO DR SHORE FOR PAIN MANAGEMENT.    PLEASE ADVISE  THANK YOU

## 2021-04-08 ENCOUNTER — TELEPHONE (OUTPATIENT)
Dept: NEUROSURGERY | Facility: CLINIC | Age: 66
End: 2021-04-08

## 2021-04-08 NOTE — TELEPHONE ENCOUNTER
PATIENT CALLED TO SEE IF DR AGUILAR CAN CALL DR SHORE'S OFFICE TO SEE IF HE COULD BE SEEN SOONER THAN 4/22.  PLEASE CALL PATIENT BACK AND ADVISE.    [phone]

## 2021-04-09 NOTE — TELEPHONE ENCOUNTER
Left message for patient regarding patient wanting us to call Dr. Davila and try to get him an earlier appt. I told patient we had no control over Dr. Davila schedule.

## 2021-04-22 ENCOUNTER — APPOINTMENT (OUTPATIENT)
Dept: PAIN MEDICINE | Facility: HOSPITAL | Age: 66
End: 2021-04-22

## 2021-04-22 ENCOUNTER — ANESTHESIA (OUTPATIENT)
Dept: PAIN MEDICINE | Facility: HOSPITAL | Age: 66
End: 2021-04-22

## 2021-04-22 ENCOUNTER — ANESTHESIA EVENT (OUTPATIENT)
Dept: PAIN MEDICINE | Facility: HOSPITAL | Age: 66
End: 2021-04-22

## 2021-04-22 ENCOUNTER — HOSPITAL ENCOUNTER (OUTPATIENT)
Dept: PAIN MEDICINE | Facility: HOSPITAL | Age: 66
Discharge: HOME | End: 2021-04-22
Admitting: ANESTHESIOLOGY

## 2021-04-22 DIAGNOSIS — M54.50 CHRONIC MIDLINE LOW BACK PAIN WITHOUT SCIATICA: ICD-10-CM

## 2021-04-22 DIAGNOSIS — G89.29 CHRONIC MIDLINE LOW BACK PAIN WITHOUT SCIATICA: ICD-10-CM

## 2021-04-22 DIAGNOSIS — Z98.890 STATUS POST LUMBAR SPINE OPERATIVE PROCEDURE FOR DECOMPRESSION OF SPINAL CORD: ICD-10-CM

## 2021-04-22 DIAGNOSIS — M51.36 DDD (DEGENERATIVE DISC DISEASE), LUMBAR: ICD-10-CM

## 2021-04-22 PROCEDURE — G0463 HOSPITAL OUTPT CLINIC VISIT: HCPCS

## 2021-04-22 NOTE — H&P (VIEW-ONLY)
CHIEF COMPLAINT:  Low back pain        HISTORY OF PRESENT ILLNESS:  This patient is complaining of low back pain which radiates into both of his hips.  He states that the majority of the pain is actually in his sacrum and it radiates to his saddle area.   He thinks that his hip pain is a separate issue.  It ranges between a 5 and 10 out of 10 on the pain scale.  The pain is described as numb, tingling, sharp, stabbing in nature. The pain is exacerbated by exercise, lifting, standing, walking, and sitting, and relieved by lying down, and rest.  The patient has tried conservative therapy for greater than 6 weeks including: Rest, over-the-counter medication, and prescription medication.  The pain is significantly decreasing the patient's Activities of Daily Living.      Diagnostic imaging, x-rays from March 2021 show postop changes from L4-S1 with grade 1 spondylolisthesis of L5-S1.  Instability is not noted on flexion-extension.    This patient was referred to our clinic by Dr. Jeremy Martinez for caudal epidural steroid injections with possible future sacroiliac joint injections and/or radiofrequency ablation if appropriate.       PAST MEDICAL HISTORY:  Current Outpatient Medications on File Prior to Encounter   Medication Sig Dispense Refill   • atorvastatin (LIPITOR) 40 MG tablet Take 1.5 tablets by mouth Every Night. 140 tablet 3   • B Complex Vitamins (VITAMIN B COMPLEX) capsule capsule Take 1 capsule by mouth Daily. HOLD PRIOR TO SURGERY     • CALCIUM PO Take  by mouth Daily. HOLD PRIOR TO SURGERY     • carvedilol (COREG) 12.5 MG tablet Take 12.5 mg by mouth 2 (Two) Times a Day.     • cholecalciferol (VITAMIN D3) 25 MCG (1000 UT) tablet Take 1,000 Units by mouth 2 (two) times a day. HOLD PRIOR TO SURGERY     • DHEA 50 MG tablet Take 50 mg by mouth 2 (two) times a day. HOLD PRIOR TO SURGERY     • eszopiclone (LUNESTA) 2 MG tablet Take 1 mg by mouth Every Night. Takes 1/2 tablet qhs     • losartan (COZAAR) 50 MG  tablet TAKE 1 TABLET DAILY 90 tablet 3   • Misc Natural Products (7-KETO LEAN) capsule Take 1 tablet by mouth Daily. HOLD PRIOR TO SURGERY     • Omega-3 Fatty Acids (FISH OIL PO) Take 1,280 mg by mouth 2 (Two) Times a Day. HOLD PRIOR TO SURGERY     • ticagrelor (Brilinta) 60 MG tablet tablet Take 1 tablet by mouth 2 (Two) Times a Day. 180 tablet 3   • vitamin C (ASCORBIC ACID) 500 MG tablet Take 1,000 mg by mouth Daily. HOLD PRIOR TO SURGERY       No current facility-administered medications on file prior to encounter.       Past Medical History:   Diagnosis Date   • Arm pain    • Chest pain    • DDD (degenerative disc disease), lumbar    • Dizziness    • History of EKG 08/06/2013   • HTN (hypertension)    • Hyperlipidemia    • Limb pain    • Low back pain    • Lower extremity edema    • Lumbar canal stenosis    • Melanoma (CMS/HCC) 07/05/2017   • Mitral regurgitation     trace   • Nausea and vomiting    • Palpitations    • Peripheral neuropathy    • Sinus bradycardia    • Snoring    • Spinal stenosis    • Sprain of right foot        SOCIAL HISTORY:  Counseled to avoid tobacco     REVIEW OF SYSTEMS:  No pertinent hematologic, infectious, or constitutional symptoms.  Other review of systems non-contributory     PHYSICAL EXAM:  There were no vitals taken for this visit.  Constitutional: Well-developed well-nourished no acute distress  General: Alert and oriented  Neuromuscular: Straight leg raise test does not really irritate him very much.  He has pain with bending, twisting, and extension of his lumbar spine.  He does not have tenderness to palpation over the lumbar spine but has little tenderness over the coccyx.  Sacroiliac: Sacroiliac provocative maneuvers do irritate approximately the sacroiliac joint areas, but the pain seems to be little bit more medial than typical.     DIAGNOSIS:  Post-Op Diagnosis Codes:  Post-Op Diagnosis Codes:     * DDD (degenerative disc disease), lumbar [M51.36]     * Status post  lumbar spine operative procedure for decompression of spinal cord [Z98.890]     * Bilateral sacroiliitis (CMS/[facility]) [M46.1]    PLAN:  -At the end of my evaluation, I do agree with Dr. Jermey Martinez that this patient will possibly benefit from caudal epidural steroid injections.  He is unable to have this today because this visit is a consult only.  These will likely be spaced approximately 2-4 weeks apart.    Will be bring him back to perform the first 1 next available once he has insurance approval.  If pain control is acceptable after this injection, it was discussed with the patient that they may return for the subsequent injections if and when their pain returns.    - Risks were discussed with the patient, including but not limited to: failure of relief, worsening pain, tissue, nerve, blood vessel or spinal cord damage, post-dural-puncture headache, bleeding, epidural hematoma, infection, and epidural abscess. Benefits and alternatives were also discussed. No promises were made. Risks/benefits/alternatives of procedural medications were also discussed, including but not limited to hyperglycemia, fluid retention, decreased immune system, osteoporosis, and anaphylactoid reactions. The patient voiced understanding and agreed to proceed.   -  Physical therapy exercises at home should be considered, as tolerated.  -  It is recommended that the patient use the least amount of opioid medication possible.     -  Heat and ice to the affected area as tolerated for pain control.  It was discussed that heating pads can cause burns.  -  Daily low impact exercise such as walking or water exercise was recommended to maintain overall health and aid in weight control.   -  Patient was counseled to abstain from tobacco products.  -  Follow up as needed for subsequent injections.  -It is possible that this gentleman will benefit from future procedures for his sacroiliac joints.  Examination regarding the sacroiliac joints was a  little bit confusing today.  I do think it is possible that the pain is coming from his sacroiliac joints.  We will keep this as a backup option and perform only if necessary.  He certainly seems to be having some pain in his sacroiliac joints.  -I would consider future work-up for his hip pain as well.  Hopefully, he will improve significantly with a caudal epidural steroid injections/and/or sacroiliac joint injections and will not end up being much of an issue.  We will certainly consider an orthopedic consult if necessary in the future should we believe the origin of the pain is coming from his hip.  - My total time based on preparing to see the patient, obtaining and/or reviewing separately obtained history, performing a medically appropriate exam and/or evaluation, counseling and educating the patient/family/caregiver, documenting clinical information in the electronic or other health record, independently interpreting results and communicating results to the patient/family/caregiver, and in care coordination was 31 minutes consistent with a [id number] billing code.       Edward Davila M.D.  [place], [place], [facility], [facility]  Board Certified in Pain Medicine by the American Board of Anesthesiology  Board Certified in Anesthesiology by the American Board of Anesthesiology     Much of this encounter note is an electronic transcription/translation of spoken language to printed text. The electronic translation of spoken language may permit erroneous, or at times, nonsensical words or phrases to be inadvertently transcribed. Although I have reviewed the note for such errors, some may still exist.

## 2021-04-22 NOTE — H&P
CHIEF COMPLAINT:  Low back pain        HISTORY OF PRESENT ILLNESS:  This patient is complaining of low back pain which radiates into both of his hips.  He states that the majority of the pain is actually in his sacrum and it radiates to his saddle area.   He thinks that his hip pain is a separate issue.  It ranges between a 5 and 10 out of 10 on the pain scale.  The pain is described as numb, tingling, sharp, stabbing in nature. The pain is exacerbated by exercise, lifting, standing, walking, and sitting, and relieved by lying down, and rest.  The patient has tried conservative therapy for greater than 6 weeks including: Rest, over-the-counter medication, and prescription medication.  The pain is significantly decreasing the patient's Activities of Daily Living.      Diagnostic imaging, x-rays from March 2021 show postop changes from L4-S1 with grade 1 spondylolisthesis of L5-S1.  Instability is not noted on flexion-extension.    This patient was referred to our clinic by Dr. Jeremy Martinez for caudal epidural steroid injections with possible future sacroiliac joint injections and/or radiofrequency ablation if appropriate.       PAST MEDICAL HISTORY:  Current Outpatient Medications on File Prior to Encounter   Medication Sig Dispense Refill   • atorvastatin (LIPITOR) 40 MG tablet Take 1.5 tablets by mouth Every Night. 140 tablet 3   • B Complex Vitamins (VITAMIN B COMPLEX) capsule capsule Take 1 capsule by mouth Daily. HOLD PRIOR TO SURGERY     • CALCIUM PO Take  by mouth Daily. HOLD PRIOR TO SURGERY     • carvedilol (COREG) 12.5 MG tablet Take 12.5 mg by mouth 2 (Two) Times a Day.     • cholecalciferol (VITAMIN D3) 25 MCG (1000 UT) tablet Take 1,000 Units by mouth 2 (two) times a day. HOLD PRIOR TO SURGERY     • DHEA 50 MG tablet Take 50 mg by mouth 2 (two) times a day. HOLD PRIOR TO SURGERY     • eszopiclone (LUNESTA) 2 MG tablet Take 1 mg by mouth Every Night. Takes 1/2 tablet qhs     • losartan (COZAAR) 50 MG  tablet TAKE 1 TABLET DAILY 90 tablet 3   • Misc Natural Products (7-KETO LEAN) capsule Take 1 tablet by mouth Daily. HOLD PRIOR TO SURGERY     • Omega-3 Fatty Acids (FISH OIL PO) Take 1,280 mg by mouth 2 (Two) Times a Day. HOLD PRIOR TO SURGERY     • ticagrelor (Brilinta) 60 MG tablet tablet Take 1 tablet by mouth 2 (Two) Times a Day. 180 tablet 3   • vitamin C (ASCORBIC ACID) 500 MG tablet Take 1,000 mg by mouth Daily. HOLD PRIOR TO SURGERY       No current facility-administered medications on file prior to encounter.       Past Medical History:   Diagnosis Date   • Arm pain    • Chest pain    • DDD (degenerative disc disease), lumbar    • Dizziness    • History of EKG 08/06/2013   • HTN (hypertension)    • Hyperlipidemia    • Limb pain    • Low back pain    • Lower extremity edema    • Lumbar canal stenosis    • Melanoma (CMS/HCC) 07/05/2017   • Mitral regurgitation     trace   • Nausea and vomiting    • Palpitations    • Peripheral neuropathy    • Sinus bradycardia    • Snoring    • Spinal stenosis    • Sprain of right foot        SOCIAL HISTORY:  Counseled to avoid tobacco     REVIEW OF SYSTEMS:  No pertinent hematologic, infectious, or constitutional symptoms.  Other review of systems non-contributory     PHYSICAL EXAM:  There were no vitals taken for this visit.  Constitutional: Well-developed well-nourished no acute distress  General: Alert and oriented  Neuromuscular: Straight leg raise test does not really irritate him very much.  He has pain with bending, twisting, and extension of his lumbar spine.  He does not have tenderness to palpation over the lumbar spine but has little tenderness over the coccyx.  Sacroiliac: Sacroiliac provocative maneuvers do irritate approximately the sacroiliac joint areas, but the pain seems to be little bit more medial than typical.     DIAGNOSIS:  Post-Op Diagnosis Codes:  Post-Op Diagnosis Codes:     * DDD (degenerative disc disease), lumbar [M51.36]     * Status post  lumbar spine operative procedure for decompression of spinal cord [Z98.890]     * Bilateral sacroiliitis (CMS/[facility]) [M46.1]    PLAN:  -At the end of my evaluation, I do agree with Dr. Jeremy Martinez that this patient will possibly benefit from caudal epidural steroid injections.  He is unable to have this today because this visit is a consult only.  These will likely be spaced approximately 2-4 weeks apart.    Will be bring him back to perform the first 1 next available once he has insurance approval.  If pain control is acceptable after this injection, it was discussed with the patient that they may return for the subsequent injections if and when their pain returns.    - Risks were discussed with the patient, including but not limited to: failure of relief, worsening pain, tissue, nerve, blood vessel or spinal cord damage, post-dural-puncture headache, bleeding, epidural hematoma, infection, and epidural abscess. Benefits and alternatives were also discussed. No promises were made. Risks/benefits/alternatives of procedural medications were also discussed, including but not limited to hyperglycemia, fluid retention, decreased immune system, osteoporosis, and anaphylactoid reactions. The patient voiced understanding and agreed to proceed.   -  Physical therapy exercises at home should be considered, as tolerated.  -  It is recommended that the patient use the least amount of opioid medication possible.     -  Heat and ice to the affected area as tolerated for pain control.  It was discussed that heating pads can cause burns.  -  Daily low impact exercise such as walking or water exercise was recommended to maintain overall health and aid in weight control.   -  Patient was counseled to abstain from tobacco products.  -  Follow up as needed for subsequent injections.  -It is possible that this gentleman will benefit from future procedures for his sacroiliac joints.  Examination regarding the sacroiliac joints was a  little bit confusing today.  I do think it is possible that the pain is coming from his sacroiliac joints.  We will keep this as a backup option and perform only if necessary.  He certainly seems to be having some pain in his sacroiliac joints.  -I would consider future work-up for his hip pain as well.  Hopefully, he will improve significantly with a caudal epidural steroid injections/and/or sacroiliac joint injections and will not end up being much of an issue.  We will certainly consider an orthopedic consult if necessary in the future should we believe the origin of the pain is coming from his hip.  - My total time based on preparing to see the patient, obtaining and/or reviewing separately obtained history, performing a medically appropriate exam and/or evaluation, counseling and educating the patient/family/caregiver, documenting clinical information in the electronic or other health record, independently interpreting results and communicating results to the patient/family/caregiver, and in care coordination was 31 minutes consistent with a [id number] billing code.       Edward Davila M.D.  [place], [place], [facility], [facility]  Board Certified in Pain Medicine by the American Board of Anesthesiology  Board Certified in Anesthesiology by the American Board of Anesthesiology     Much of this encounter note is an electronic transcription/translation of spoken language to printed text. The electronic translation of spoken language may permit erroneous, or at times, nonsensical words or phrases to be inadvertently transcribed. Although I have reviewed the note for such errors, some may still exist.

## 2021-04-22 NOTE — ANESTHESIA PROCEDURE NOTES
No procedure was performed today.  This was counsult only.      Patient reassessed immediately prior to procedure    Performed by  Anesthesiologist: Edward Davila MD          Procedure was performed today.  This was a consult only.

## 2021-04-28 ENCOUNTER — ANESTHESIA (OUTPATIENT)
Dept: PAIN MEDICINE | Facility: HOSPITAL | Age: 66
End: 2021-04-28

## 2021-04-28 ENCOUNTER — HOSPITAL ENCOUNTER (OUTPATIENT)
Dept: GENERAL RADIOLOGY | Facility: HOSPITAL | Age: 66
Discharge: HOME | End: 2021-04-28

## 2021-04-28 ENCOUNTER — HOSPITAL ENCOUNTER (OUTPATIENT)
Dept: PAIN MEDICINE | Facility: HOSPITAL | Age: 66
Discharge: HOME | End: 2021-04-28

## 2021-04-28 ENCOUNTER — ANESTHESIA EVENT (OUTPATIENT)
Dept: PAIN MEDICINE | Facility: HOSPITAL | Age: 66
End: 2021-04-28

## 2021-04-28 VITALS
DIASTOLIC BLOOD PRESSURE: 89 MMHG | OXYGEN SATURATION: 98 % | RESPIRATION RATE: 14 BRPM | SYSTOLIC BLOOD PRESSURE: 161 MMHG | TEMPERATURE: 97.7 F | HEART RATE: 56 BPM

## 2021-04-28 DIAGNOSIS — M51.36 DDD (DEGENERATIVE DISC DISEASE), LUMBAR: ICD-10-CM

## 2021-04-28 DIAGNOSIS — R52 PAIN: ICD-10-CM

## 2021-04-28 PROCEDURE — 25010000002 FENTANYL CITRATE (PF) 100 MCG/2ML SOLUTION: Performed by: ANESTHESIOLOGY

## 2021-04-28 PROCEDURE — 25010000002 MIDAZOLAM PER 1 MG: Performed by: ANESTHESIOLOGY

## 2021-04-28 PROCEDURE — 25010000002 METHYLPREDNISOLONE PER 80 MG: Performed by: ANESTHESIOLOGY

## 2021-04-28 PROCEDURE — 77003 FLUOROGUIDE FOR SPINE INJECT: CPT

## 2021-04-28 PROCEDURE — 0 IOPAMIDOL 41 % SOLUTION: Performed by: ANESTHESIOLOGY

## 2021-04-28 RX ORDER — LIDOCAINE HYDROCHLORIDE 10 MG/ML
1 INJECTION, SOLUTION INFILTRATION; PERINEURAL ONCE
Status: DISCONTINUED | OUTPATIENT
Start: 2021-04-28 | End: 2021-04-29 | Stop reason: HOSPADM

## 2021-04-28 RX ORDER — METHYLPREDNISOLONE ACETATE 80 MG/ML
80 INJECTION, SUSPENSION INTRA-ARTICULAR; INTRALESIONAL; INTRAMUSCULAR; SOFT TISSUE ONCE
Status: COMPLETED | OUTPATIENT
Start: 2021-04-28 | End: 2021-04-28

## 2021-04-28 RX ORDER — SODIUM CHLORIDE 0.9 % (FLUSH) 0.9 %
3-10 SYRINGE (ML) INJECTION AS NEEDED
Status: DISCONTINUED | OUTPATIENT
Start: 2021-04-28 | End: 2021-04-29 | Stop reason: HOSPADM

## 2021-04-28 RX ORDER — FENTANYL CITRATE 50 UG/ML
50 INJECTION, SOLUTION INTRAMUSCULAR; INTRAVENOUS AS NEEDED
Status: DISCONTINUED | OUTPATIENT
Start: 2021-04-28 | End: 2021-04-29 | Stop reason: HOSPADM

## 2021-04-28 RX ORDER — MIDAZOLAM HYDROCHLORIDE 1 MG/ML
1 INJECTION INTRAMUSCULAR; INTRAVENOUS AS NEEDED
Status: DISCONTINUED | OUTPATIENT
Start: 2021-04-28 | End: 2021-04-29 | Stop reason: HOSPADM

## 2021-04-28 RX ORDER — SODIUM CHLORIDE 0.9 % (FLUSH) 0.9 %
3 SYRINGE (ML) INJECTION EVERY 12 HOURS SCHEDULED
Status: DISCONTINUED | OUTPATIENT
Start: 2021-04-28 | End: 2021-04-29 | Stop reason: HOSPADM

## 2021-04-28 RX ADMIN — FENTANYL CITRATE 50 MCG: 50 INJECTION, SOLUTION INTRAMUSCULAR; INTRAVENOUS at 13:09

## 2021-04-28 RX ADMIN — IOPAMIDOL 10 ML: 408 INJECTION, SOLUTION INTRATHECAL at 13:11

## 2021-04-28 RX ADMIN — MIDAZOLAM 1 MG: 1 INJECTION INTRAMUSCULAR; INTRAVENOUS at 13:09

## 2021-04-28 RX ADMIN — METHYLPREDNISOLONE ACETATE 80 MG: 80 INJECTION, SUSPENSION INTRA-ARTICULAR; INTRALESIONAL; INTRAMUSCULAR; SOFT TISSUE at 13:11

## 2021-04-28 NOTE — DISCHARGE INSTRUCTIONS
What can I expect after the procedure?  Follow these instructions at home:  Injection site care  1. You may remove the bandage (dressing) after 24 hours.  2. Check your injection site every day for signs of infection. Check for:  ? Redness, swelling, or pain.  ? Fluid or blood.  ? Warmth.  ? Pus or a bad smell.  Managing pain, stiffness, and swelling  1. For 24 hours after the procedure:  ? Avoid using heat on the injection site.  ? Do not take baths, swim, or use a hot tub. You may take a shower.   2. If directed, put ice on the injection site. To do this:     3.    ? Put ice in a plastic bag.  ? Place a towel between your skin and the bag.  ? Leave the ice on for 20 minutes, 2-3 times a day.     Activity  · NO DRIVING FOR THE REST OF THE DAY IF receiving a sedative during your procedure.  · Return to your normal activities the next day as tolerated.  General instructions  · The injection site may feel sore.  · Take over-the-counter medications as directed on packaging and prescription medicines only as told by your health care provider who prescribes these.  · Keep all follow-up visits as told by your health care provider.   Contact a health care provider if:  1. You have any of these signs of infection:  ? Redness, swelling, or pain around your injection site.  ? Fluid or blood coming from your injection site.  ? Warmth coming from your injection site.  ? Pus or a bad smell coming from your injection site.  ? A fever.  2. You continue to have pain and soreness around the injection site, even after taking over-the-counter pain medicine.  3. You have severe, sudden, or lasting nausea or vomiting.  Get help right away if:  · You have severe pain at the injection site that is not relieved by medicines.  · You develop a severe headache or a stiff neck.  · You become sensitive to light.  · You have any new numbness or weakness in your legs or arms.  · You lose control of your bladder or bowel movements.  · You have  trouble breathing.  Summary  · An epidural steroid block is an injection of steroid medication and numbing medicine that is given into the epidural space.  This injection helps relieve pain caused by an irritated or swollen nerve root.What can I expect after the procedure?  Follow these instructions at home:  Injection site care  You may remove the bandage (dressing) after 24 hours.  Check your injection site every day for signs of infection. Check for:  Redness, swelling, or pain.  Fluid or blood.  Warmth.  Pus or a bad smell.  Managing pain, stiffness, and swelling  For 24 hours after the procedure:  Avoid using heat on the injection site.  Do not take baths, swim, or use a hot tub. You may take a shower.   If directed, put ice on the injection site. To do this:        Put ice in a plastic bag.  Place a towel between your skin and the bag.  Leave the ice on for 20 minutes, 2-3 times a day.     Activity  NO DRIVING FOR THE REST OF THE DAY IF receiving a sedative during your procedure.  Return to your normal activities the next day as tolerated.  General instructions  The injection site may feel sore.  Take over-the-counter medications as directed on packaging and prescription medicines only as told by your health care provider who prescribes these.  Keep all follow-up visits as told by your health care provider.   Contact a health care provider if:  You have any of these signs of infection:  Redness, swelling, or pain around your injection site.  Fluid or blood coming from your injection site.  Warmth coming from your injection site.  Pus or a bad smell coming from your injection site.  A fever.  You continue to have pain and soreness around the injection site, even after taking over-the-counter pain medicine.  You have severe, sudden, or lasting nausea or vomiting.  Get help right away if:  You have severe pain at the injection site that is not relieved by medicines.  You develop a severe headache or a stiff  neck.  You become sensitive to light.  You have any new numbness or weakness in your legs or arms.  You lose control of your bladder or bowel movements.  You have trouble breathing.  Summary  An epidural steroid block is an injection of steroid medication and numbing medicine that is given into the epidural space.  · This injection helps relieve pain caused by an irritated or swollen nerve root.

## 2021-04-28 NOTE — ANESTHESIA PROCEDURE NOTES
Caudal epidural steroid injection    Pre-sedation assessment completed: 4/28/2021 12:54 PM    Patient reassessed immediately prior to procedure    Patient location during procedure: pain clinic  Start Time: 4/28/2021 1:05 PM  Stop Time: 4/28/2021 1:12 PM  Indication:procedure for pain  Performed By  Anesthesiologist: Edward Davila MD  Preanesthetic Checklist  Completed: patient identified, IV checked, site marked, risks and benefits discussed, surgical consent, monitors and equipment checked, pre-op evaluation and timeout performed  Additional Notes  Post-Op Diagnosis Codes:     * DDD (degenerative disc disease), lumbar (M51.36)     * Status post lumbar spine operative procedure for decompression of spinal cord (Z98.890)     * Bilateral sacroiliitis (CMS/HCC) (M46.1)    The patient was observed in recovery with no evidence of neurological deficits or other problems. All questions were answered. The patient was discharged with appropriate instructions.    Prep:  Pt Position:prone  Sterile Tech:cap, gloves, mask and sterile barrier  Prep:chlorhexidine gluconate and isopropyl alcohol  Monitoring:blood pressure monitoring, continuous pulse oximetry and EKG  Procedure:Sedation: no     Approach:midline  Guidance: fluoroscopy  Location:caudal  Interspace: caudal.  Epidural needle type: Quinke.  Epidural needle gauge: 25 G.  Aspiration:negative  Medications:  Depomedrol:80 mg  Preservative Free Saline:9mL  Isovue:1mL  Comments:Appropriate epidural spread of contrast.   Post Assessment:  Post-procedure: Dressing per nursing.  Pt Tolerance:patient tolerated the procedure well with no apparent complications  Complications:no

## 2021-04-28 NOTE — INTERVAL H&P NOTE
[facility]  H&P reviewed. No changes since last visit.  He is here today for caudal epidural steroid injection as planned.    Diagnosis     * DDD (degenerative disc disease), lumbar [M51.36]     * Status post lumbar spine operative procedure for decompression of spinal cord [Z98.890]     * Bilateral sacroiliitis (CMS/HCC) [M46.1]      Airway assessed since last visit. Airway class equals: 1.

## 2021-05-20 ENCOUNTER — ANESTHESIA EVENT (OUTPATIENT)
Dept: PAIN MEDICINE | Facility: HOSPITAL | Age: 66
End: 2021-05-20

## 2021-05-20 ENCOUNTER — ANESTHESIA (OUTPATIENT)
Dept: PAIN MEDICINE | Facility: HOSPITAL | Age: 66
End: 2021-05-20

## 2021-05-20 ENCOUNTER — HOSPITAL ENCOUNTER (OUTPATIENT)
Dept: PAIN MEDICINE | Facility: HOSPITAL | Age: 66
Discharge: HOME | End: 2021-05-20

## 2021-05-20 ENCOUNTER — HOSPITAL ENCOUNTER (OUTPATIENT)
Dept: GENERAL RADIOLOGY | Facility: HOSPITAL | Age: 66
Discharge: HOME | End: 2021-05-20

## 2021-05-20 VITALS
DIASTOLIC BLOOD PRESSURE: 80 MMHG | HEART RATE: 55 BPM | OXYGEN SATURATION: 93 % | SYSTOLIC BLOOD PRESSURE: 127 MMHG | RESPIRATION RATE: 16 BRPM

## 2021-05-20 DIAGNOSIS — M51.36 DDD (DEGENERATIVE DISC DISEASE), LUMBAR: ICD-10-CM

## 2021-05-20 DIAGNOSIS — M46.1 BILATERAL SACROILIITIS (HCC): Primary | ICD-10-CM

## 2021-05-20 DIAGNOSIS — R52 PAIN: ICD-10-CM

## 2021-05-20 PROCEDURE — 25010000002 METHYLPREDNISOLONE PER 80 MG: Performed by: ANESTHESIOLOGY

## 2021-05-20 PROCEDURE — 77003 FLUOROGUIDE FOR SPINE INJECT: CPT

## 2021-05-20 PROCEDURE — 25010000002 MIDAZOLAM PER 1 MG: Performed by: ANESTHESIOLOGY

## 2021-05-20 PROCEDURE — 0 IOPAMIDOL 41 % SOLUTION: Performed by: ANESTHESIOLOGY

## 2021-05-20 PROCEDURE — 25010000002 FENTANYL CITRATE (PF) 50 MCG/ML SOLUTION: Performed by: ANESTHESIOLOGY

## 2021-05-20 RX ORDER — SODIUM CHLORIDE 0.9 % (FLUSH) 0.9 %
3-10 SYRINGE (ML) INJECTION AS NEEDED
Status: DISCONTINUED | OUTPATIENT
Start: 2021-05-20 | End: 2021-05-21 | Stop reason: HOSPADM

## 2021-05-20 RX ORDER — SODIUM CHLORIDE 0.9 % (FLUSH) 0.9 %
3 SYRINGE (ML) INJECTION EVERY 12 HOURS SCHEDULED
Status: DISCONTINUED | OUTPATIENT
Start: 2021-05-20 | End: 2021-05-21 | Stop reason: HOSPADM

## 2021-05-20 RX ORDER — METHYLPREDNISOLONE ACETATE 80 MG/ML
80 INJECTION, SUSPENSION INTRA-ARTICULAR; INTRALESIONAL; INTRAMUSCULAR; SOFT TISSUE ONCE
Status: COMPLETED | OUTPATIENT
Start: 2021-05-20 | End: 2021-05-20

## 2021-05-20 RX ORDER — LIDOCAINE HYDROCHLORIDE 10 MG/ML
0.5 INJECTION, SOLUTION INFILTRATION; PERINEURAL ONCE AS NEEDED
Status: DISCONTINUED | OUTPATIENT
Start: 2021-05-20 | End: 2021-05-21 | Stop reason: HOSPADM

## 2021-05-20 RX ORDER — FENTANYL CITRATE 50 UG/ML
50 INJECTION, SOLUTION INTRAMUSCULAR; INTRAVENOUS AS NEEDED
Status: DISCONTINUED | OUTPATIENT
Start: 2021-05-20 | End: 2021-05-21 | Stop reason: HOSPADM

## 2021-05-20 RX ORDER — LIDOCAINE HYDROCHLORIDE 10 MG/ML
1 INJECTION, SOLUTION INFILTRATION; PERINEURAL ONCE
Status: DISCONTINUED | OUTPATIENT
Start: 2021-05-20 | End: 2021-05-21 | Stop reason: HOSPADM

## 2021-05-20 RX ORDER — MIDAZOLAM HYDROCHLORIDE 1 MG/ML
1 INJECTION INTRAMUSCULAR; INTRAVENOUS AS NEEDED
Status: DISCONTINUED | OUTPATIENT
Start: 2021-05-20 | End: 2021-05-21 | Stop reason: HOSPADM

## 2021-05-20 RX ADMIN — FENTANYL CITRATE 50 MCG: 50 INJECTION INTRAMUSCULAR; INTRAVENOUS at 09:36

## 2021-05-20 RX ADMIN — IOPAMIDOL 10 ML: 408 INJECTION, SOLUTION INTRATHECAL at 09:41

## 2021-05-20 RX ADMIN — MIDAZOLAM 1 MG: 1 INJECTION INTRAMUSCULAR; INTRAVENOUS at 09:36

## 2021-05-20 RX ADMIN — METHYLPREDNISOLONE ACETATE 80 MG: 80 INJECTION, SUSPENSION INTRA-ARTICULAR; INTRALESIONAL; INTRAMUSCULAR; SOFT TISSUE at 09:45

## 2021-05-20 NOTE — ANESTHESIA PROCEDURE NOTES
Caudal epidural steroid injection    Pre-sedation assessment completed: 5/20/2021 9:27 AM    Patient reassessed immediately prior to procedure    Patient location during procedure: pain clinic  Start Time: 5/20/2021 9:35 AM  Stop Time: 5/20/2021 9:47 AM  Indication:procedure for pain  Performed By  Anesthesiologist: Edward Davila MD  Preanesthetic Checklist  Completed: patient identified, IV checked, site marked, risks and benefits discussed, surgical consent, monitors and equipment checked, pre-op evaluation and timeout performed  Additional Notes  Post-Op Diagnosis Codes:     * DDD (degenerative disc disease), lumbar (M51.36)     * Status post lumbar spine operative procedure for decompression of spinal cord (Z98.890)     * Bilateral sacroiliitis (CMS/HCC) (M46.1)    The patient was observed in recovery with no evidence of neurological deficits or other problems. All questions were answered. The patient was discharged with appropriate instructions.    Prep:  Pt Position:prone  Sterile Tech:cap, gloves, mask and sterile barrier  Prep:chlorhexidine gluconate and isopropyl alcohol  Monitoring:blood pressure monitoring, continuous pulse oximetry and EKG  Procedure:Sedation: yes     Approach:midline  Guidance: fluoroscopy  Location:caudal  Interspace: caudal.  Epidural needle type: Quinke.  Epidural needle gauge: 25 G.  Aspiration:negative  Medications:  Depomedrol:80 mg  Preservative Free Saline:9mL  Isovue:1mL  Comments:Initial contrast spread was in the epidural space but also seem to be possibly leaking anteriorly from the foramen.  I reposition the needle in a more posterior portion of the epidural space and achieved appropriate epidural spread of contrast.   Post Assessment:  Post-procedure: Dressing per nursing.  Pt Tolerance:patient tolerated the procedure well with no apparent complications  Complications:no

## 2021-05-20 NOTE — H&P
CHIEF COMPLAINT:  Low back pain        HISTORY OF PRESENT ILLNESS:  This patient is complaining of low back pain which radiates to both hips.  He continues to have pain in the sacral area that radiates to the saddle area. The pain is rated 8 out of 10 on the pain scale.  The pain is significantly decreasing the patient's Activities of Daily Living.      Diagnostic imaging, x-rays from March 2021 show postop changes from L4-S1 with grade 1 spondylolisthesis of L5-S1.    This patient was referred to our clinic by Dr. Jeremy Martinez for caudal epidural steroid injections with possible future sacroiliac joint injections and/or radiofrequency ablation if appropriate.    Prior injections afforded between 25-50% relief of pain and significantly increased activities of daily living.      PAST MEDICAL HISTORY:  Current Outpatient Medications on File Prior to Encounter   Medication Sig Dispense Refill   • atorvastatin (LIPITOR) 40 MG tablet Take 1.5 tablets by mouth Every Night. 140 tablet 3   • B Complex Vitamins (VITAMIN B COMPLEX) capsule capsule Take 1 capsule by mouth Daily. HOLD PRIOR TO SURGERY     • CALCIUM PO Take  by mouth Daily. HOLD PRIOR TO SURGERY     • carvedilol (COREG) 12.5 MG tablet Take 12.5 mg by mouth 2 (Two) Times a Day.     • cholecalciferol (VITAMIN D3) 25 MCG (1000 UT) tablet Take 1,000 Units by mouth 2 (two) times a day. HOLD PRIOR TO SURGERY     • DHEA 50 MG tablet Take 50 mg by mouth 2 (two) times a day. HOLD PRIOR TO SURGERY     • eszopiclone (LUNESTA) 2 MG tablet Take 1 mg by mouth Every Night. Takes 1/2 tablet qhs     • losartan (COZAAR) 50 MG tablet TAKE 1 TABLET DAILY 90 tablet 3   • Misc Natural Products (7-KETO LEAN) capsule Take 1 tablet by mouth Daily. HOLD PRIOR TO SURGERY     • Omega-3 Fatty Acids (FISH OIL PO) Take 1,280 mg by mouth 2 (Two) Times a Day. HOLD PRIOR TO SURGERY     • ticagrelor (Brilinta) 60 MG tablet tablet Take 1 tablet by mouth 2 (Two) Times a Day. 180 tablet 3   •  vitamin C (ASCORBIC ACID) 500 MG tablet Take 1,000 mg by mouth Daily. HOLD PRIOR TO SURGERY       No current facility-administered medications on file prior to encounter.       Past Medical History:   Diagnosis Date   • Arm pain    • Chest pain    • DDD (degenerative disc disease), lumbar    • Dizziness    • History of EKG 08/06/2013   • HTN (hypertension)    • Hyperlipidemia    • Limb pain    • Low back pain    • Lower extremity edema    • Lumbar canal stenosis    • Melanoma (CMS/HCC) 07/05/2017   • Mitral regurgitation     trace   • Nausea and vomiting    • Palpitations    • Peripheral neuropathy    • Sinus bradycardia    • Snoring    • Spinal stenosis    • Sprain of right foot        SOCIAL HISTORY:  Counseled to avoid tobacco     REVIEW OF SYSTEMS:  No pertinent hematologic, infectious, or constitutional symptoms.  Other review of systems non-contributory     PHYSICAL EXAM:  There were no vitals taken for this visit.  Constitutional: Well-developed well-nourished no acute distress  General: Alert and oriented  Neuromuscular: He continues have pain with bending, twisting, and extension of his lumbar spine.  Tenderness palpation over his coccyx.  Sacroiliac: He continues to have pain seemingly over the sacroiliac joint areas with sacroiliac provocative maneuvers but also has pain little bit more medial than would typically be expected.       DIAGNOSIS:  Post-Op Diagnosis Codes:  Post-Op Diagnosis Codes:     * DDD (degenerative disc disease), lumbar [M51.36]     * Status post lumbar spine operative procedure for decompression of spinal cord [Z98.890]     * Bilateral sacroiliitis (CMS/HCC) [M46.1]    PLAN:  -  Caudal epidural steroid injection #2 in this series.  If pain control is acceptable after this injection, it was discussed with the patient that they may return for the subsequent injections if and when their pain returns.    - Risks were discussed with the patient, including but not limited to: failure of  relief, worsening pain, tissue, nerve, blood vessel or spinal cord damage, post-dural-puncture headache, bleeding, epidural hematoma, infection, and epidural abscess. Benefits and alternatives were also discussed. No promises were made. Risks/benefits/alternatives of procedural medications were also discussed, including but not limited to hyperglycemia, fluid retention, decreased immune system, osteoporosis, and anaphylactoid reactions. The patient voiced understanding and agreed to proceed.   -  Physical therapy exercises at home should be considered, as tolerated.  -  It is recommended that the patient use the least amount of opioid medication possible.     -  Heat and ice to the affected area as tolerated for pain control.  It was discussed that heating pads can cause burns.  -  Daily low impact exercise such as walking or water exercise was recommended to maintain overall health and aid in weight control.   -  Patient was counseled to abstain from tobacco products.  -  Follow up as needed for subsequent injections.  -We will schedule him for sacroiliac joint injections at his next visit.  I will possibly transition back to a caudal epidural steroid injection if appropriate, and will make my decision the next time I see him.  Seems that some of his pain is coming from his sacroiliac joints.  -We will consider future work-up for hip pain with possible orthopedic consult necessary.    Edward Davila M.D.  Partner, [facility], [facility]  Board Certified in Pain Medicine by the American Board of Anesthesiology  Board Certified in Anesthesiology by the American Board of Anesthesiology     Much of this encounter note is an electronic transcription/translation of spoken language to printed text. The electronic translation of spoken language may permit erroneous, or at times, nonsensical words or phrases to be inadvertently transcribed. Although I have reviewed the note for such errors,  some may still exist.

## 2021-05-24 RX ORDER — ATORVASTATIN CALCIUM 40 MG/1
60 TABLET, FILM COATED ORAL NIGHTLY
Qty: 135 TABLET | Refills: 3 | Status: SHIPPED | OUTPATIENT
Start: 2021-05-24 | End: 2022-06-09 | Stop reason: SDUPTHER

## 2021-06-15 ENCOUNTER — HOSPITAL ENCOUNTER (OUTPATIENT)
Dept: GENERAL RADIOLOGY | Facility: HOSPITAL | Age: 66
Discharge: HOME | End: 2021-06-15

## 2021-06-15 ENCOUNTER — ANESTHESIA (OUTPATIENT)
Dept: PAIN MEDICINE | Facility: HOSPITAL | Age: 66
End: 2021-06-15

## 2021-06-15 ENCOUNTER — HOSPITAL ENCOUNTER (OUTPATIENT)
Dept: PAIN MEDICINE | Facility: HOSPITAL | Age: 66
Discharge: HOME | End: 2021-06-15

## 2021-06-15 ENCOUNTER — ANESTHESIA EVENT (OUTPATIENT)
Dept: PAIN MEDICINE | Facility: HOSPITAL | Age: 66
End: 2021-06-15

## 2021-06-15 VITALS
BODY MASS INDEX: 27.3 KG/M2 | TEMPERATURE: 97.7 F | DIASTOLIC BLOOD PRESSURE: 85 MMHG | HEART RATE: 55 BPM | OXYGEN SATURATION: 96 % | WEIGHT: 195 LBS | HEIGHT: 71 IN | SYSTOLIC BLOOD PRESSURE: 152 MMHG | RESPIRATION RATE: 14 BRPM

## 2021-06-15 DIAGNOSIS — G89.29 CHRONIC SI JOINT PAIN: ICD-10-CM

## 2021-06-15 DIAGNOSIS — M53.3 CHRONIC SI JOINT PAIN: ICD-10-CM

## 2021-06-15 DIAGNOSIS — M46.1 BILATERAL SACROILIITIS (HCC): ICD-10-CM

## 2021-06-15 PROCEDURE — 25010000002 METHYLPREDNISOLONE PER 40 MG: Performed by: ANESTHESIOLOGY

## 2021-06-15 PROCEDURE — 25010000002 MIDAZOLAM PER 1 MG: Performed by: ANESTHESIOLOGY

## 2021-06-15 PROCEDURE — 25010000002 FENTANYL CITRATE (PF) 50 MCG/ML SOLUTION: Performed by: ANESTHESIOLOGY

## 2021-06-15 PROCEDURE — 25010000002 ROPIVACAINE PER 1 MG: Performed by: ANESTHESIOLOGY

## 2021-06-15 RX ORDER — SODIUM CHLORIDE 0.9 % (FLUSH) 0.9 %
3 SYRINGE (ML) INJECTION EVERY 12 HOURS SCHEDULED
Status: DISCONTINUED | OUTPATIENT
Start: 2021-06-15 | End: 2021-06-16 | Stop reason: HOSPADM

## 2021-06-15 RX ORDER — FENTANYL CITRATE 50 UG/ML
50 INJECTION, SOLUTION INTRAMUSCULAR; INTRAVENOUS AS NEEDED
Status: DISCONTINUED | OUTPATIENT
Start: 2021-06-15 | End: 2021-06-16 | Stop reason: HOSPADM

## 2021-06-15 RX ORDER — LIDOCAINE HYDROCHLORIDE 10 MG/ML
0.5 INJECTION, SOLUTION INFILTRATION; PERINEURAL ONCE AS NEEDED
Status: DISCONTINUED | OUTPATIENT
Start: 2021-06-15 | End: 2021-06-16 | Stop reason: HOSPADM

## 2021-06-15 RX ORDER — SODIUM CHLORIDE 0.9 % (FLUSH) 0.9 %
3-10 SYRINGE (ML) INJECTION AS NEEDED
Status: DISCONTINUED | OUTPATIENT
Start: 2021-06-15 | End: 2021-06-16 | Stop reason: HOSPADM

## 2021-06-15 RX ORDER — LIDOCAINE HYDROCHLORIDE 10 MG/ML
1 INJECTION, SOLUTION INFILTRATION; PERINEURAL ONCE
Status: DISCONTINUED | OUTPATIENT
Start: 2021-06-15 | End: 2021-06-16 | Stop reason: HOSPADM

## 2021-06-15 RX ORDER — ROPIVACAINE HYDROCHLORIDE 5 MG/ML
10 INJECTION, SOLUTION EPIDURAL; INFILTRATION; PERINEURAL ONCE
Status: COMPLETED | OUTPATIENT
Start: 2021-06-15 | End: 2021-06-15

## 2021-06-15 RX ORDER — MIDAZOLAM HYDROCHLORIDE 1 MG/ML
1 INJECTION INTRAMUSCULAR; INTRAVENOUS AS NEEDED
Status: DISCONTINUED | OUTPATIENT
Start: 2021-06-15 | End: 2021-06-16 | Stop reason: HOSPADM

## 2021-06-15 RX ORDER — METHYLPREDNISOLONE ACETATE 40 MG/ML
40 INJECTION, SUSPENSION INTRA-ARTICULAR; INTRALESIONAL; INTRAMUSCULAR; SOFT TISSUE ONCE
Status: COMPLETED | OUTPATIENT
Start: 2021-06-15 | End: 2021-06-15

## 2021-06-15 RX ADMIN — FENTANYL CITRATE 50 MCG: 50 INJECTION, SOLUTION INTRAMUSCULAR; INTRAVENOUS at 12:38

## 2021-06-15 RX ADMIN — MIDAZOLAM 1 MG: 1 INJECTION INTRAMUSCULAR; INTRAVENOUS at 12:38

## 2021-06-15 RX ADMIN — METHYLPREDNISOLONE ACETATE 40 MG: 40 INJECTION, SUSPENSION INTRA-ARTICULAR; INTRALESIONAL; INTRAMUSCULAR; SOFT TISSUE at 12:46

## 2021-06-15 RX ADMIN — ROPIVACAINE HYDROCHLORIDE 30 ML: 5 INJECTION EPIDURAL; INFILTRATION; PERINEURAL at 12:46

## 2021-06-15 NOTE — ANESTHESIA PROCEDURE NOTES
Bilateral sacroiliac joint injections    Pre-sedation assessment completed: 6/15/2021 12:34 PM    Patient reassessed immediately prior to procedure    Patient location during procedure: Pain Clinic  Start time: 6/15/2021 12:38 PM  Stop time: 6/15/2021 12:49 PM    Reason for block: procedure for pain  Performed by  Anesthesiologist: Edward Davila MD  Preanesthetic Checklist  Completed: patient identified, IV checked, site marked, risks and benefits discussed, surgical consent, monitors and equipment checked, pre-op evaluation and timeout performed  Prep:  Patient position: prone  Sterile barriers:cap, gloves, mask and sterile barrier  Prep: ChloraPrep  Patient monitoring: blood pressure monitoring, continuous pulse oximetry and EKG  Procedure:  Sedation:yes  Guidance:fluoroscopy  Contrast Medium:yes  Location:Bilateral  Needle Type:Quincke  Needle Gauge:25 G  Aspiration:negative  Medications:  Depomedrol:40 mg  Amount (mL): 0.5mls.  Comment:Ropivacaine 0.5% 1.5cc mixed with steroid for injection on each side (1.5 cc ropivacaine and 20mg Depo medrol per side). Contrast spread appropriate by fluoroscopy imaging.       Post Assessment  Injection Assessment: negative  Patient Tolerance:comfortable throughout block  Complications:no  Additional Notes  Post-Op Diagnosis Codes:     * Sacroiliitis (CMS/[facility]) (M46.1)     * Status post lumbar spine operative procedure for decompression of spinal cord (Z98.890)     * DDD (degenerative disc disease), lumbar (M51.36)    The patient was observed in recovery with no evidence of neurological deficits or other problems. All questions were answered. The patient was discharged with appropriate instructions.

## 2021-06-15 NOTE — H&P
CHIEF COMPLAINT:  Low back pain        HISTORY OF PRESENT ILLNESS:  This patient is complaining of low back pain which radiates to bilateral hips and buttocks.  He also has pain in the sacral area.    The pain is rated 8 out of 10 on the pain scale.  The pain is significantly decreasing the patient's Activities of Daily Living.      Diagnostic imaging, x-rays from March 2021 show postop changes from L4-S1 with grade 1 spondylolisthesis of L5-S1.     This patient was referred to our clinic by Dr. Jeremy Martinez for caudal epidural steroid injections with possible future sacroiliac joint injections and/or radiofrequency ablation if appropriate.     The patient has had 2 prior caudal epidural steroid injections that were not as helpful as he would have liked.  He is requesting that we perform sacroiliac joint injections today.     PAST MEDICAL HISTORY:  Current Outpatient Medications on File Prior to Encounter   Medication Sig Dispense Refill   • atorvastatin (LIPITOR) 40 MG tablet Take 1.5 tablets by mouth Every Night. 135 tablet 3   • B Complex Vitamins (VITAMIN B COMPLEX) capsule capsule Take 1 capsule by mouth Daily. HOLD PRIOR TO SURGERY     • CALCIUM PO Take  by mouth Daily. HOLD PRIOR TO SURGERY     • carvedilol (COREG) 12.5 MG tablet Take 12.5 mg by mouth 2 (Two) Times a Day.     • cholecalciferol (VITAMIN D3) 25 MCG (1000 UT) tablet Take 1,000 Units by mouth 2 (two) times a day. HOLD PRIOR TO SURGERY     • DHEA 50 MG tablet Take 50 mg by mouth 2 (two) times a day. HOLD PRIOR TO SURGERY     • eszopiclone (LUNESTA) 2 MG tablet Take 1 mg by mouth Every Night. Takes 1/2 tablet qhs     • losartan (COZAAR) 50 MG tablet TAKE 1 TABLET DAILY 90 tablet 3   • Misc Natural Products (7-KETO LEAN) capsule Take 1 tablet by mouth Daily. HOLD PRIOR TO SURGERY     • vitamin C (ASCORBIC ACID) 500 MG tablet Take 1,000 mg by mouth Daily. HOLD PRIOR TO SURGERY     • Omega-3 Fatty Acids (FISH OIL PO) Take 1,280 mg by mouth 2 (Two)  "Times a Day. HOLD PRIOR TO SURGERY     • ticagrelor (Brilinta) 60 MG tablet tablet Take 1 tablet by mouth 2 (Two) Times a Day. 180 tablet 3     No current facility-administered medications on file prior to encounter.       Past Medical History:   Diagnosis Date   • Arm pain    • Bilateral sacroiliitis (CMS/HCC) 05/20/2021   • Chest pain    • DDD (degenerative disc disease), lumbar    • Dizziness    • History of EKG 08/06/2013   • HTN (hypertension)    • Hyperlipidemia    • Limb pain    • Low back pain    • Lower extremity edema    • Lumbar canal stenosis    • Melanoma (CMS/HCC) 07/05/2017   • Mitral regurgitation     trace   • Nausea and vomiting    • Palpitations    • Peripheral neuropathy    • Sinus bradycardia    • Snoring    • Spinal stenosis    • Sprain of right foot        SOCIAL HISTORY:  Counseled to avoid tobacco     REVIEW OF SYSTEMS:  No pertinent hematologic, infectious, or constitutional symptoms.  Other review of systems non-contributory     PHYSICAL EXAM:  /95 (BP Location: Left arm, Patient Position: Sitting)   Pulse 61   Temp 36.5 °C (97.7 °F) (Infrared)   Resp 16   Ht 180.3 cm (71\")   Wt 88.5 kg (195 lb)   SpO2 95%   BMI 27.20 kg/m²   Constitutional: Well-developed well-nourished no acute distress  General: Alert and oriented  Neuromuscular: He continues have pain with bending, twisting, and extension of his lumbar spine.  Tenderness palpation over his coccyx.  Sacroiliac: He continues to have pain seemingly over the sacroiliac joint areas with positive sacroiliac provocative maneuvers but continues to have pain a little bit more medial than would typically be expected.     DIAGNOSIS:  Post-Op Diagnosis Codes:  Post-Op Diagnosis Codes:     * Sacroiliitis (CMS/HCC) [M46.1]     * Status post lumbar spine operative procedure for decompression of spinal cord [Z98.890]     * DDD (degenerative disc disease), lumbar [M51.36]     PLAN:  -  Bilateral Sacroiliac joint injections spaced " approximately 4 weeks apart.  If pain control is acceptable after this injection, it was discussed with the patient that they may return for the subsequent injections if and when their pain returns. Most likely, the patient will receive 3 in this series.  - Risks were discussed with the patient, including but not limited to: failure of relief, worsening pain, tissue or nerve damage, bleeding, and infection. Benefits and alternatives were also discussed. No promises were made. Risks/benefits/alternatives of procedural medications were also discussed, including but not limited to hyperglycemia, fluid retention, decreased immune system, osteoporosis, joint degradation and anaphylactoid reactions. The patient voiced understanding and agreed to proceed.   -  Physical therapy exercises at home should be considered, as tolerated.  -  It is recommended that the patient use the least amount of opioid medication possible.     -  Heat and ice to the affected area as tolerated for pain control.  It was discussed that heating pads can cause burns.  -  Daily low impact exercise such as walking or water exercise was recommended to maintain overall health and aid in weight control.   -  Patient was counseled to abstain from tobacco products.  -  Follow up as needed for subsequent injections.  -He has tried and failed caudal epidural steroid injections  -We will consider future work-up for hip pain with possible orthopedic consult necessary.    Edward Davila M.D.  [place], [facility], [facility]  Board Certified in Pain Medicine by the American Board of Anesthesiology  Board Certified in Anesthesiology by the American Board of Anesthesiology     Much of this encounter note is an electronic transcription/translation of spoken language to printed text. The electronic translation of spoken language may permit erroneous, or at times, nonsensical words or phrases to be inadvertently transcribed. Although I  have reviewed the note for such errors, some may still exist.

## 2021-06-18 ENCOUNTER — OFFICE VISIT (OUTPATIENT)
Dept: CARDIOLOGY | Facility: CLINIC | Age: 66
End: 2021-06-18

## 2021-06-18 ENCOUNTER — TELEPHONE (OUTPATIENT)
Dept: CARDIOLOGY | Facility: CLINIC | Age: 66
End: 2021-06-18

## 2021-06-18 ENCOUNTER — LAB (OUTPATIENT)
Dept: LAB | Facility: HOSPITAL | Age: 66
End: 2021-06-18

## 2021-06-18 VITALS
HEART RATE: 56 BPM | HEIGHT: 71 IN | BODY MASS INDEX: 27.72 KG/M2 | SYSTOLIC BLOOD PRESSURE: 126 MMHG | WEIGHT: 198 LBS | DIASTOLIC BLOOD PRESSURE: 78 MMHG

## 2021-06-18 DIAGNOSIS — I10 ESSENTIAL HYPERTENSION: ICD-10-CM

## 2021-06-18 DIAGNOSIS — I25.10 CORONARY ARTERY CALCIFICATION: Primary | ICD-10-CM

## 2021-06-18 DIAGNOSIS — E78.5 HYPERLIPIDEMIA LDL GOAL <70: ICD-10-CM

## 2021-06-18 DIAGNOSIS — I25.10 CORONARY ARTERY CALCIFICATION: ICD-10-CM

## 2021-06-18 DIAGNOSIS — I25.84 CORONARY ARTERY CALCIFICATION: Primary | ICD-10-CM

## 2021-06-18 DIAGNOSIS — I25.10 CORONARY ARTERY DISEASE INVOLVING NATIVE HEART WITHOUT ANGINA PECTORIS, UNSPECIFIED VESSEL OR LESION TYPE: ICD-10-CM

## 2021-06-18 DIAGNOSIS — I49.3 PVC'S (PREMATURE VENTRICULAR CONTRACTIONS): ICD-10-CM

## 2021-06-18 DIAGNOSIS — E78.2 MIXED HYPERLIPIDEMIA: ICD-10-CM

## 2021-06-18 DIAGNOSIS — I25.84 CORONARY ARTERY CALCIFICATION: ICD-10-CM

## 2021-06-18 LAB
ALBUMIN SERPL-MCNC: 4.7 G/DL (ref 3.5–5.2)
ALBUMIN/GLOB SERPL: 2 G/DL
ALP SERPL-CCNC: 44 U/L (ref 39–117)
ALT SERPL W P-5'-P-CCNC: 31 U/L (ref 1–41)
ANION GAP SERPL CALCULATED.3IONS-SCNC: 10.9 MMOL/L (ref 5–15)
AST SERPL-CCNC: 29 U/L (ref 1–40)
BILIRUB SERPL-MCNC: 0.6 MG/DL (ref 0–1.2)
BUN SERPL-MCNC: 22 MG/DL (ref 8–23)
BUN/CREAT SERPL: 18.2 (ref 7–25)
CALCIUM SPEC-SCNC: 10.1 MG/DL (ref 8.6–10.5)
CHLORIDE SERPL-SCNC: 105 MMOL/L (ref 98–107)
CHOLEST SERPL-MCNC: 149 MG/DL (ref 0–200)
CO2 SERPL-SCNC: 25.1 MMOL/L (ref 22–29)
CREAT SERPL-MCNC: 1.21 MG/DL (ref 0.76–1.27)
DEPRECATED RDW RBC AUTO: 49.2 FL (ref 37–54)
ERYTHROCYTE [DISTWIDTH] IN BLOOD BY AUTOMATED COUNT: 13.1 % (ref 12.3–15.4)
GFR SERPL CREATININE-BSD FRML MDRD: 60 ML/MIN/1.73
GLOBULIN UR ELPH-MCNC: 2.4 GM/DL
GLUCOSE SERPL-MCNC: 107 MG/DL (ref 65–99)
HCT VFR BLD AUTO: 46.7 % (ref 37.5–51)
HDLC SERPL-MCNC: 67 MG/DL (ref 40–60)
HGB BLD-MCNC: 15.8 G/DL (ref 13–17.7)
LDLC SERPL CALC-MCNC: 64 MG/DL (ref 0–100)
LDLC/HDLC SERPL: 0.93 {RATIO}
MCH RBC QN AUTO: 33.8 PG (ref 26.6–33)
MCHC RBC AUTO-ENTMCNC: 33.8 G/DL (ref 31.5–35.7)
MCV RBC AUTO: 99.8 FL (ref 79–97)
PLATELET # BLD AUTO: 193 10*3/MM3 (ref 140–450)
PMV BLD AUTO: 9.9 FL (ref 6–12)
POTASSIUM SERPL-SCNC: 4.7 MMOL/L (ref 3.5–5.2)
PROT SERPL-MCNC: 7.1 G/DL (ref 6–8.5)
RBC # BLD AUTO: 4.68 10*6/MM3 (ref 4.14–5.8)
SODIUM SERPL-SCNC: 141 MMOL/L (ref 136–145)
TRIGL SERPL-MCNC: 99 MG/DL (ref 0–150)
VLDLC SERPL-MCNC: 18 MG/DL (ref 5–40)
WBC # BLD AUTO: 4.27 10*3/MM3 (ref 3.4–10.8)

## 2021-06-18 PROCEDURE — 93000 ELECTROCARDIOGRAM COMPLETE: CPT | Performed by: NURSE PRACTITIONER

## 2021-06-18 PROCEDURE — 80053 COMPREHEN METABOLIC PANEL: CPT

## 2021-06-18 PROCEDURE — 36415 COLL VENOUS BLD VENIPUNCTURE: CPT

## 2021-06-18 PROCEDURE — 99214 OFFICE O/P EST MOD 30 MIN: CPT | Performed by: NURSE PRACTITIONER

## 2021-06-18 PROCEDURE — 80061 LIPID PANEL: CPT

## 2021-06-18 PROCEDURE — 85027 COMPLETE CBC AUTOMATED: CPT

## 2021-06-18 NOTE — TELEPHONE ENCOUNTER
I spoke with the patient about his results and informed him that everything looks good and to continue the medications that he is taking. He asked what the MCV and MCH are on his results? I was not sure and he said he would look them up

## 2021-06-18 NOTE — PROGRESS NOTES
Date of Office Visit: 21  Encounter Provider: JESSICA Underwood  Place of Service: [facility] CARDIOLOGY  Patient Name: Avni Olivo III  :1955    Chief Complaint   Patient presents with   • Coronary artery calcification   • Palpitations   • Follow-up   :     HPI: Avni Olivo III is a 66 y.o. male  with history of hypertension, premature ventricular contraction, hyperlipidemia, obstructive sleep apnea, coronary artery disease status post coronary artery stenting.        He is followed by Dr Can. I will see him in follow up and have reviewed his medical record.      In  he was evaluated for chest pain.  He had a exercise echocardiogram which showed normal left ventricular systolic function with an ejection fraction of 64% and trace mitral regurgitation.  He exercised for 11.5 minutes and his stress echocardiogram images were normal.  He did complain of some chest pain although his stress ECG was normal.  He underwent heart catheterization on 6/10/2013 which showed no evidence of coronary artery disease.  In 2016 while on vacation in [place], he had bradycardia and a presyncopal episode in the airport.  His pulse was in the low 50s but his blood pressure was elevated with a systolic above 200/120.  He was taken to the emergency room.  ECG confirms sinus bradycardia but otherwise normal.  He was hypertensive.  Troponin and proBNP were normal.  Creatinine was elevated at 1.48 he received IV fluids.  He had a CT scan of the head which showed no acute abnormality.  He apparently had taken his carvedilol less than 12 hours apart and not been eating well or sleeping well due to traveling.  In 2018 he was complaining of palpitations and was diagnosed with symptomatic PVCs.  The palpitations decreased after lowering his caffeine consumption.  In 2019 he complained of some chest tightness in the left chest while exercising.  He did not have to stop  exercising and reporting that it went away on its own.  CT cardiac calcium scoring was recommended and he had markedly elevated Agatston calcification score of 2068.  The right coronary artery had a score of 1048 the  circumflex 160 on the diagonal 1 213 the marginal 213, and .  Follow-up stress echocardiogram showed normal left ventricular systolic function.  Stress ECG had nonspecific ST-T wave changes with rare PVCs during stress.  He reported chest pain, shortness of breath and non-exercise limiting angina during the stress test with complaint of 3 out of 10 chest tightness.  However there were no ischemic echo findings noted with submaximal stress as he did not achieve target heart rate.  On 12/27/2019 he had left heart catheterization performed by Dr. Wood ( who is his current neighbor) which showed mildly calcified left main with no critical narrowing.  The left anterior descending artery had normal proximal segment with mid vessel 30% stenosis which narrowed to an 85% stenosis after the origin of the large septal .  The remainder was normal.  The diagonal system was mildly calcified with no critical narrowing.  The left circumflex was large mildly calcified in the mid segment with first marginal branch small with 50% narrowing proximally.  The right coronary artery was large with luminal irregularities with a 20-30% narrowing.  The PDA was normal the posterior lateral branches were normal.  Ejection fraction was 55-60%.  He received 2 drug-eluting stents to the mid LAD with 3.25 x 15 mm and 3.25 x 12 mm Xience Chandrika Everolimus stents.  Troponins were negative.  He was started on Plavix in addition to aspirin which he was already taking at home.  He was continued on carvedilol 12.5 mg twice daily as well as atorvastatin 40 mg daily which was increased to 60 mg daily.  Aspirin was later stopped due to significant bruising and he was transitioned from Brilinta 90 to 60 mg twice daily  "which helped.    He now presents for reassessment.  He continues to have some left shoulder/upper chest atypical chest pain which she has had for approximately 15 years.  That is unchanged.  He is taking Brilinta 90 until he runs out and then he will transition to Brilinta 60 twice daily.  He is off aspirin and his bruising is slightly better.  He does some walking and bike riding 1 day a week.  Has been having some back issues following with neurosurgery.          No Known Allergies        Family and social history reviewed.     ROS  All other systems were reviewed and are negative          Objective:     Vitals:    06/18/21 0910   BP: 126/78   BP Location: Left arm   Patient Position: Sitting   Pulse: 56   Weight: 89.8 kg (198 lb)   Height: 180.3 cm (71\")     Body mass index is 27.62 kg/m².    PHYSICAL EXAM:  Constitutional:       General: Not in acute distress.     Appearance: Well-developed. Not diaphoretic.   HENT:      Head: Normocephalic.   Pulmonary:      Effort: Pulmonary effort is normal. No respiratory distress.      Breath sounds: Normal breath sounds. No wheezing. No rhonchi. No rales.   Cardiovascular:      Normal rate. Regular rhythm.   Pulses:     Radial: 2+ bilaterally.  Skin:     General: Skin is warm and dry. There is no cyanosis.      Findings: No rash.   Neurological:      Mental Status: Alert and oriented to person, place, and time.   Psychiatric:         Behavior: Behavior normal.         Thought Content: Thought content normal.         Judgment: Judgment normal.           ECG 12 Lead    Date/Time: 6/18/2021 9:55 AM  Performed by: Radha Martini APRN  Authorized by: Radha Martini APRN   Comparison: compared with previous ECG from 3/30/2021  Similar to previous ECG  Rhythm: sinus rhythm  Rate: normal  Comments: No ischemic changes              Current Outpatient Medications   Medication Sig Dispense Refill   • atorvastatin (LIPITOR) 40 MG tablet Take 1.5 tablets by mouth Every Night. 135 " tablet 3   • B Complex Vitamins (VITAMIN B COMPLEX) capsule capsule Take 1 capsule by mouth Daily. HOLD PRIOR TO SURGERY     • CALCIUM PO Take  by mouth Daily. HOLD PRIOR TO SURGERY     • carvedilol (COREG) 12.5 MG tablet Take 12.5 mg by mouth 2 (Two) Times a Day.     • cholecalciferol (VITAMIN D3) 25 MCG (1000 UT) tablet Take 1,000 Units by mouth 2 (two) times a day. HOLD PRIOR TO SURGERY     • DHEA 50 MG tablet Take 50 mg by mouth 2 (two) times a day. HOLD PRIOR TO SURGERY     • eszopiclone (LUNESTA) 2 MG tablet Take 1 mg by mouth Every Night. Takes 1/2 tablet qhs     • losartan (COZAAR) 50 MG tablet TAKE 1 TABLET DAILY 90 tablet 3   • Misc Natural Products (7-KETO LEAN) capsule Take 1 tablet by mouth Daily. HOLD PRIOR TO SURGERY     • Omega-3 Fatty Acids (FISH OIL PO) Take 1,280 mg by mouth 2 (Two) Times a Day. HOLD PRIOR TO SURGERY     • ticagrelor (Brilinta) 60 MG tablet tablet Take 1 tablet by mouth 2 (Two) Times a Day. 180 tablet 3   • vitamin C (ASCORBIC ACID) 500 MG tablet Take 1,000 mg by mouth Daily. HOLD PRIOR TO SURGERY       No current facility-administered medications for this visit.     Assessment:      No diagnosis found.     No orders of the defined types were placed in this encounter.        Plan:       1.  66-year-old gentleman with markedly abnormal calcium scoring who received 2 drug-eluting stents to the mid LAD 12/27/2019.    He is now off aspirin and on Brilinta.  We will transition down to 60 mg twice daily after completing his 90 mg tablets.     2.  Hypertension blood pressure today appears well controlled  3.  Hyperlipidemia on target dose atorvastatin. Last LDL 07/2020 was 64 and the target LDL is  70 or less.  He is to have repeat today  4.  Obstructive sleep apnea on CPAP follows with Dr. Chu ames  5. Chronic back pain has received epidurals follows with Dr. Martinez   6. History of melanoma   7.  History of premature ventricular contractions stable  8.  Normal bilateral lower  extremity arterial study September 2020    Follow-up in 6 months call questions or concerns.          It has been a pleasure to participate in this patient's care.      Thank you,  JESSICA Underwood      **I used Dragon to dictate this note:**

## 2021-06-18 NOTE — TELEPHONE ENCOUNTER
----- Message from Zoraida Kenyon MD sent at 6/18/2021 12:32 PM EDT -----  He saw Radha today.  I am not sure if she went over the results of his blood work with him we just call him and let him know everything looks good and he should continue his current medications.

## 2021-06-28 ENCOUNTER — TELEPHONE (OUTPATIENT)
Dept: NEUROSURGERY | Facility: CLINIC | Age: 66
End: 2021-06-28

## 2021-06-28 NOTE — TELEPHONE ENCOUNTER
PATIENT HAS HAD SEVERAL EPIDURAL BLOCKS AND NONE HAS WORKED. HE HAS A FOLLOW UP IN AUG AND WANTS TO KNOW IF HE SHOULD GET ANOTHER MRI PRIOR TO THIS VISIT.  PLEASE ADVISE    [phone]

## 2021-07-02 NOTE — PROGRESS NOTES
Subjective   Patient ID: Avni Olivo III is a 66 y.o. male is here today for follow-up.  Patient was last seen 4/7/21 for Chronic midline low back pain without Sciatica. Patient was referred to Pain management.    You have chosen to receive care through a telephone visit. Do you consent to use a telephone visit for your medical care today? Yes    Patient reports today to discuss possible imaging.  Patient feels that his back is worse than before the injections.    This was a televisit from my office lasting 11 minutes.  The patient was at home.  He does have 3 injections since I last saw him.  They include caudal epidural blocks and a sacroiliac block but he does not think these have helped.  He has another and fourth block scheduled for 7/21/2021.  I encouraged him to get it.  He wants to know whether or not he should have another MRI of his low back.  I think that is unreasonable but I did say that I am doubtful that any resolved would necessarily point to need for additional surgery in the absence of any significant radiculopathy.  And less his back pain is caused by instability or fracture, I am not sure that there is anything further from a surgical standpoint today.  I do not know that he has had any medial branch blocks but that should be considered if it is the appropriate thing to do.  I will need to examine him again after the imaging is done.  We will arrange for that and have him come back.        History of Present Illness    The following portions of the patient's history were reviewed and updated as appropriate: allergies, current medications, past family history, past medical history, past social history, past surgical history and problem list.    Review of Systems        Objective     There were no vitals filed for this visit.  There is no height or weight on file to calculate BMI.      Physical Exam   Deferred  Neurologic Exam   Deferred        Assessment/Plan   Independent Review of Radiographic  Studies:      I personally reviewed the images from the following studies.    I reviewed the x-rays done on 3/22/2021 which show postoperative changes from L4-S1 with a small grade 1 spondylolisthesis at L5-S1 with collapse of disc height.  There is no instability on flexion-extension.  Agree with the report.    Medical Decision Making:      I encouraged him to get his next block on 7/21/2021 but will go ahead and get a new MRI with and without contrast of the low back as well as flexion-extension films and have him come back.  I am not so sure that there is going to be anything reoperative to do but we can discuss his options after we get the new information.      Diagnoses and all orders for this visit:    1. Chronic midline low back pain without sciatica (Primary)  -     MRI Lumbar Spine With & Without Contrast; Future  -     XR Spine Lumbar Complete With Flex & Ext; Future    2. DDD (degenerative disc disease), lumbar  -     MRI Lumbar Spine With & Without Contrast; Future  -     XR Spine Lumbar Complete With Flex & Ext; Future    3. Status post lumbar spine operative procedure for decompression of spinal cord  -     MRI Lumbar Spine With & Without Contrast; Future  -     XR Spine Lumbar Complete With Flex & Ext; Future      Return in about 2 weeks (around 7/20/2021) for After MRI and x-rays.

## 2021-07-06 ENCOUNTER — OFFICE VISIT (OUTPATIENT)
Dept: NEUROSURGERY | Facility: CLINIC | Age: 66
End: 2021-07-06

## 2021-07-06 DIAGNOSIS — Z98.890 STATUS POST LUMBAR SPINE OPERATIVE PROCEDURE FOR DECOMPRESSION OF SPINAL CORD: ICD-10-CM

## 2021-07-06 DIAGNOSIS — M54.50 CHRONIC MIDLINE LOW BACK PAIN WITHOUT SCIATICA: Primary | ICD-10-CM

## 2021-07-06 DIAGNOSIS — G89.29 CHRONIC MIDLINE LOW BACK PAIN WITHOUT SCIATICA: Primary | ICD-10-CM

## 2021-07-06 DIAGNOSIS — M51.36 DDD (DEGENERATIVE DISC DISEASE), LUMBAR: ICD-10-CM

## 2021-07-06 PROCEDURE — 99442 PR PHYS/QHP TELEPHONE EVALUATION 11-20 MIN: CPT | Performed by: NEUROLOGICAL SURGERY

## 2021-07-06 RX ORDER — TRAMADOL HYDROCHLORIDE 50 MG/1
50 TABLET ORAL DAILY PRN
COMMUNITY
Start: 2021-06-27 | End: 2022-01-11 | Stop reason: ALTCHOICE

## 2021-07-20 ENCOUNTER — APPOINTMENT (OUTPATIENT)
Dept: MRI IMAGING | Facility: HOSPITAL | Age: 66
End: 2021-07-20

## 2021-07-21 ENCOUNTER — APPOINTMENT (OUTPATIENT)
Dept: PAIN MEDICINE | Facility: HOSPITAL | Age: 66
End: 2021-07-21

## 2021-07-23 ENCOUNTER — TELEPHONE (OUTPATIENT)
Dept: NEUROSURGERY | Facility: CLINIC | Age: 66
End: 2021-07-23

## 2021-07-23 NOTE — TELEPHONE ENCOUNTER
Caller: Avni Olivo III    Relationship: Self    Best call back number: [phone]    What was the call regarding:   PATIENT STATED THAT HIS INSURANCE WOULD PREFER HIM TO GO TO [facility] TO HAVE THE XR AND MRI THAT WERE ORDERED PREFORMED.    PATIENT WOULD LIKE TO KNOW IF  WOULD BE OK WITH HIM HAVING THE ORDERED MRI AND XR PREFORMED AT A LOCATION OTHER THAN THE HOSPITAL.     PATIENT WOULD PREFER TO STAY AT [facility] [place], operated by [facility] TO GET THE IMAGING, BUT DOES NOT KNOW WHAT THE COSTS ASSOCIATED WITH GETTING THE IMAGING AT THE [facility] VS. AT [facility] WOULD BE FOR HIM.    Do you require a callback:   PLEASE CALL PATIENT BACK [phone]

## 2021-07-27 ENCOUNTER — APPOINTMENT (OUTPATIENT)
Dept: MRI IMAGING | Facility: HOSPITAL | Age: 66
End: 2021-07-27

## 2021-07-29 ENCOUNTER — HOSPITAL ENCOUNTER (OUTPATIENT)
Dept: MRI IMAGING | Facility: HOSPITAL | Age: 66
Discharge: HOME | End: 2021-07-29

## 2021-07-29 ENCOUNTER — HOSPITAL ENCOUNTER (OUTPATIENT)
Dept: GENERAL RADIOLOGY | Facility: HOSPITAL | Age: 66
Discharge: HOME | End: 2021-07-29

## 2021-07-29 DIAGNOSIS — M54.50 CHRONIC MIDLINE LOW BACK PAIN WITHOUT SCIATICA: ICD-10-CM

## 2021-07-29 DIAGNOSIS — G89.29 CHRONIC MIDLINE LOW BACK PAIN WITHOUT SCIATICA: ICD-10-CM

## 2021-07-29 DIAGNOSIS — Z98.890 STATUS POST LUMBAR SPINE OPERATIVE PROCEDURE FOR DECOMPRESSION OF SPINAL CORD: ICD-10-CM

## 2021-07-29 DIAGNOSIS — M51.36 DDD (DEGENERATIVE DISC DISEASE), LUMBAR: ICD-10-CM

## 2021-07-29 PROCEDURE — A9577 INJ MULTIHANCE: HCPCS | Performed by: NEUROLOGICAL SURGERY

## 2021-07-29 PROCEDURE — 72114 X-RAY EXAM L-S SPINE BENDING: CPT

## 2021-07-29 PROCEDURE — 72158 MRI LUMBAR SPINE W/O & W/DYE: CPT

## 2021-07-29 PROCEDURE — 82565 ASSAY OF CREATININE: CPT

## 2021-07-29 PROCEDURE — 0 GADOBENATE DIMEGLUMINE 529 MG/ML SOLUTION: Performed by: NEUROLOGICAL SURGERY

## 2021-07-29 RX ADMIN — GADOBENATE DIMEGLUMINE 18 ML: 529 INJECTION, SOLUTION INTRAVENOUS at 19:18

## 2021-07-31 LAB — CREAT BLDA-MCNC: 1.6 MG/DL (ref 0.6–1.3)

## 2021-08-04 ENCOUNTER — APPOINTMENT (OUTPATIENT)
Dept: MRI IMAGING | Facility: HOSPITAL | Age: 66
End: 2021-08-04

## 2021-08-11 ENCOUNTER — OFFICE VISIT (OUTPATIENT)
Dept: NEUROSURGERY | Facility: CLINIC | Age: 66
End: 2021-08-11

## 2021-08-11 VITALS — DIASTOLIC BLOOD PRESSURE: 86 MMHG | TEMPERATURE: 97.7 F | SYSTOLIC BLOOD PRESSURE: 128 MMHG

## 2021-08-11 DIAGNOSIS — M54.50 CHRONIC MIDLINE LOW BACK PAIN WITHOUT SCIATICA: Primary | ICD-10-CM

## 2021-08-11 DIAGNOSIS — Z98.890 STATUS POST LUMBAR SPINE OPERATIVE PROCEDURE FOR DECOMPRESSION OF SPINAL CORD: ICD-10-CM

## 2021-08-11 DIAGNOSIS — G89.29 CHRONIC MIDLINE LOW BACK PAIN WITHOUT SCIATICA: Primary | ICD-10-CM

## 2021-08-11 DIAGNOSIS — M51.36 DDD (DEGENERATIVE DISC DISEASE), LUMBAR: ICD-10-CM

## 2021-08-11 PROCEDURE — 99214 OFFICE O/P EST MOD 30 MIN: CPT | Performed by: NEUROLOGICAL SURGERY

## 2021-08-11 NOTE — PROGRESS NOTES
Subjective   Patient ID: Avni Olivo III is a 66 y.o. male is here today for follow-up. He was last seen 7/6/21 for Chronic midline low back pain without sciatica, Degenerative disc disease. Lumbar, and Status post lumbar spine operative procedure procedure for decompression of spinal cord.     7/29/21 XR L spine [facility]  7/29/21 MRI L spine [facility]    Patient today reports he started Physical Therapy and his back is better.  The patient had new imaging and we discussed it.  There is no instability and he does have a small residual synovial cyst but is really got no leg pain.  Most of his pain is in the back itself.   tried some caudal epidural blocks and a sacroiliac block with only modest improvement.  The plan was to try some medial branch blocks and a possible radiofrequency ablation but the patient has been going to therapy and feels that he is improved and wants to hold off on all blocks altogether which I think is fine.  We went over exercise guidelines.  Will write this crest of improvement and will hold off on doing anything except his exercises.  We will see him in 8 months.  If symptoms recur and he wants to try the medial branch blocks and the possible ablation he will let us know.        Back Pain  The pain is present in the lumbar spine. The quality of the pain is described as stabbing. The pain does not radiate. The pain is at a severity of 4/10. The pain is worse during the day. The symptoms are aggravated by standing. Pertinent negatives include no fever, numbness or tingling. He has tried NSAIDs and home exercises (PT) for the symptoms. The treatment provided mild relief.       The following portions of the patient's history were reviewed and updated as appropriate: allergies, current medications, past family history, past medical history, past social history, past surgical history and problem list.    Review of Systems   Constitutional: Negative for chills and fever.   HENT: Negative for  congestion.    Musculoskeletal: Positive for back pain.   Neurological: Negative for tingling and numbness.   All other systems reviewed and are negative.          Objective     Vitals:    08/11/21 1414   BP: 128/86   Temp: 97.7 °F (36.5 °C)     There is no height or weight on file to calculate BMI.      Physical Exam  Constitutional:       Appearance: He is well-developed.   HENT:      Head: Normocephalic and atraumatic.   Eyes:      Extraocular Movements: EOM normal.      Conjunctiva/sclera: Conjunctivae normal.      Pupils: Pupils are equal, round, and reactive to light.   Neck:      Vascular: No carotid bruit.   Neurological:      Mental Status: He is oriented to person, place, and time.      Coordination: Finger-Nose-Finger Test and Heel to Shin Test normal.      Gait: Gait is intact.      Deep Tendon Reflexes:      Reflex Scores:       Tricep reflexes are 2+ on the right side and 2+ on the left side.       Bicep reflexes are 2+ on the right side and 2+ on the left side.       Brachioradialis reflexes are 2+ on the right side and 2+ on the left side.       Patellar reflexes are 2+ on the right side and 2+ on the left side.       Achilles reflexes are 2+ on the right side and 2+ on the left side.  Psychiatric:         Speech: Speech normal.       Neurologic Exam     Mental Status   Oriented to person, place, and time.   Registration of memory: Good recent and remote memory.   Attention: normal. Concentration: normal.   Speech: speech is normal   Level of consciousness: alert  Knowledge: consistent with education.     Cranial Nerves     CN II   Visual fields full to confrontation.   Visual acuity: normal    CN III, IV, VI   Pupils are equal, round, and reactive to light.  Extraocular motions are normal.     CN V   Facial sensation intact.   Right corneal reflex: normal  Left corneal reflex: normal    CN VII   Facial expression full, symmetric.   Right facial weakness: none  Left facial weakness: none    CN VIII    Hearing: intact    CN IX, X   Palate: symmetric    CN XI   Right sternocleidomastoid strength: normal  Left sternocleidomastoid strength: normal    CN XII   Tongue: not atrophic  Tongue deviation: none    Motor Exam   Muscle bulk: normal  Right arm tone: normal  Left arm tone: normal  Right leg tone: normal  Left leg tone: normal    Strength   Strength 5/5 except as noted.     Sensory Exam   Light touch normal.     Gait, Coordination, and Reflexes     Gait  Gait: normal    Coordination   Finger to nose coordination: normal  Heel to shin coordination: normal    Reflexes   Right brachioradialis: 2+  Left brachioradialis: 2+  Right biceps: 2+  Left biceps: 2+  Right triceps: 2+  Left triceps: 2+  Right patellar: 2+  Left patellar: 2+  Right achilles: 2+  Left achilles: 2+  Right : 2+  Left : 2+          Assessment/Plan   Independent Review of Radiographic Studies:      I personally reviewed the images from the following studies.    The new MRIs and plain x-rays done on 7/29/2021 show postoperative changes but no instability.  The spinal canal seems fairly patent from L3-S1.  There is a little bit of a residual synovial cyst at L4-5 on the left.  Agree with the report.        Medical Decision Making:      Given that he is better I told him to hold off on any kind of injections.  He will let Dr. Davila and me know if he wants to proceed with medial branch blocks if things get worse.  He will do his core exercises then work on resistance training.  We will see him in 8 months and if things get worse he will let us know if he wants to pursue the medial branch blocks as well as the pain clinic.      Diagnoses and all orders for this visit:    1. Chronic midline low back pain without sciatica (Primary)    2. DDD (degenerative disc disease), lumbar    3. Status post lumbar spine operative procedure for decompression of spinal cord      Return in about 8 months (around 4/11/2022) for Face-to-face.

## 2021-08-13 ENCOUNTER — TELEPHONE (OUTPATIENT)
Dept: NEUROSURGERY | Facility: CLINIC | Age: 66
End: 2021-08-13

## 2021-08-13 DIAGNOSIS — G89.29 CHRONIC MIDLINE LOW BACK PAIN WITHOUT SCIATICA: Primary | ICD-10-CM

## 2021-08-13 DIAGNOSIS — M51.36 DDD (DEGENERATIVE DISC DISEASE), LUMBAR: ICD-10-CM

## 2021-08-13 DIAGNOSIS — M54.50 CHRONIC MIDLINE LOW BACK PAIN WITHOUT SCIATICA: Primary | ICD-10-CM

## 2021-08-13 NOTE — TELEPHONE ENCOUNTER
The hub called and the patient is requesting us to fax his brace order to [facility]. Fax order to [facility] once I confirmed the brace order.

## 2021-08-17 ENCOUNTER — APPOINTMENT (OUTPATIENT)
Dept: PAIN MEDICINE | Facility: HOSPITAL | Age: 66
End: 2021-08-17

## 2021-09-27 ENCOUNTER — TRANSCRIBE ORDERS (OUTPATIENT)
Dept: ADMINISTRATIVE | Facility: HOSPITAL | Age: 66
End: 2021-09-27

## 2021-09-27 DIAGNOSIS — R10.9 ABDOMINAL PAIN, UNSPECIFIED ABDOMINAL LOCATION: Primary | ICD-10-CM

## 2021-10-15 ENCOUNTER — HOSPITAL ENCOUNTER (OUTPATIENT)
Dept: CT IMAGING | Facility: HOSPITAL | Age: 66
Discharge: HOME | End: 2021-10-15
Admitting: INTERNAL MEDICINE

## 2021-10-15 DIAGNOSIS — R10.9 ABDOMINAL PAIN, UNSPECIFIED ABDOMINAL LOCATION: ICD-10-CM

## 2021-10-15 PROCEDURE — 74176 CT ABD & PELVIS W/O CONTRAST: CPT

## 2021-10-15 PROCEDURE — 0 DIATRIZOATE MEGLUMINE & SODIUM PER 1 ML: Performed by: INTERNAL MEDICINE

## 2021-10-15 RX ADMIN — DIATRIZOATE MEGLUMINE AND DIATRIZOATE SODIUM 30 ML: 660; 100 LIQUID ORAL; RECTAL at 15:19

## 2021-11-02 ENCOUNTER — TELEPHONE (OUTPATIENT)
Dept: NEUROSURGERY | Facility: CLINIC | Age: 66
End: 2021-11-02

## 2021-11-02 NOTE — TELEPHONE ENCOUNTER
Caller: Avni Olivo III    Relationship: Self    Best call back number: [phone]    What orders are you requesting (i.e. lab or imaging): MEDIAL BRANCH BLOCKS    In what timeframe would the patient need to come in: HAS 8 MO F/U 04/13/22    Where will you receive your lab/imaging services:     Additional notes: PATIENT STATES HE IS READY TO PROCEED WITH MEDIAL BRANCH BLOCKS SUGGESTED BY DR AGUILAR AT VISIT ON 08/11/21. HE WOULD LIKE A CALL BACK ONCE ORDERS HAVE BEEN PLACED. THANK YOU.

## 2021-11-04 DIAGNOSIS — M51.36 DDD (DEGENERATIVE DISC DISEASE), LUMBAR: Primary | ICD-10-CM

## 2021-11-05 NOTE — TELEPHONE ENCOUNTER
LMR for pt that medial branch blocks/ablation with Dr Davila order is in and gave # to call if they do not hear from hime.

## 2021-11-08 ENCOUNTER — TRANSCRIBE ORDERS (OUTPATIENT)
Dept: ADMINISTRATIVE | Facility: HOSPITAL | Age: 66
End: 2021-11-08

## 2021-11-08 DIAGNOSIS — M51.36 DDD (DEGENERATIVE DISC DISEASE), LUMBAR: Primary | ICD-10-CM

## 2021-11-19 ENCOUNTER — TRANSCRIBE ORDERS (OUTPATIENT)
Dept: ADMINISTRATIVE | Facility: HOSPITAL | Age: 66
End: 2021-11-19

## 2021-11-19 ENCOUNTER — LAB (OUTPATIENT)
Dept: LAB | Facility: HOSPITAL | Age: 66
End: 2021-11-19

## 2021-11-19 DIAGNOSIS — I10 ESSENTIAL HYPERTENSION, MALIGNANT: ICD-10-CM

## 2021-11-19 DIAGNOSIS — N17.9 ACUTE RENAL FAILURE, UNSPECIFIED ACUTE RENAL FAILURE TYPE (HCC): ICD-10-CM

## 2021-11-19 DIAGNOSIS — N17.9 ACUTE RENAL FAILURE, UNSPECIFIED ACUTE RENAL FAILURE TYPE (HCC): Primary | ICD-10-CM

## 2021-11-19 DIAGNOSIS — Z29.9 UNSPECIFIED PROPHYLACTIC OR TREATMENT MEASURE: Primary | ICD-10-CM

## 2021-11-19 DIAGNOSIS — Z29.9 UNSPECIFIED PROPHYLACTIC OR TREATMENT MEASURE: ICD-10-CM

## 2021-11-19 LAB
ALBUMIN SERPL-MCNC: 4.4 G/DL (ref 3.5–5.2)
ALBUMIN UR-MCNC: <1.2 MG/DL
ALBUMIN/GLOB SERPL: 1.7 G/DL
ALP SERPL-CCNC: 58 U/L (ref 39–117)
ALT SERPL W P-5'-P-CCNC: 43 U/L (ref 1–41)
ANION GAP SERPL CALCULATED.3IONS-SCNC: 9 MMOL/L (ref 5–15)
AST SERPL-CCNC: 41 U/L (ref 1–40)
BASOPHILS # BLD AUTO: 0.02 10*3/MM3 (ref 0–0.2)
BASOPHILS NFR BLD AUTO: 0.5 % (ref 0–1.5)
BILIRUB SERPL-MCNC: 0.5 MG/DL (ref 0–1.2)
BILIRUB UR QL STRIP: NEGATIVE
BUN SERPL-MCNC: 19 MG/DL (ref 8–23)
BUN/CREAT SERPL: 15.3 (ref 7–25)
CALCIUM SPEC-SCNC: 9.6 MG/DL (ref 8.6–10.5)
CHLORIDE SERPL-SCNC: 105 MMOL/L (ref 98–107)
CLARITY UR: CLEAR
CO2 SERPL-SCNC: 26 MMOL/L (ref 22–29)
COLOR UR: YELLOW
CREAT SERPL-MCNC: 1.24 MG/DL (ref 0.76–1.27)
CREAT UR-MCNC: 41 MG/DL
DEPRECATED RDW RBC AUTO: 42.3 FL (ref 37–54)
EOSINOPHIL # BLD AUTO: 0.16 10*3/MM3 (ref 0–0.4)
EOSINOPHIL NFR BLD AUTO: 3.7 % (ref 0.3–6.2)
ERYTHROCYTE [DISTWIDTH] IN BLOOD BY AUTOMATED COUNT: 12.1 % (ref 12.3–15.4)
GFR SERPL CREATININE-BSD FRML MDRD: 59 ML/MIN/1.73
GLOBULIN UR ELPH-MCNC: 2.6 GM/DL
GLUCOSE SERPL-MCNC: 136 MG/DL (ref 65–99)
GLUCOSE UR STRIP-MCNC: NEGATIVE MG/DL
HBA1C MFR BLD: 5.25 % (ref 4.8–5.6)
HCT VFR BLD AUTO: 42.4 % (ref 37.5–51)
HGB BLD-MCNC: 14.8 G/DL (ref 13–17.7)
HGB UR QL STRIP.AUTO: NEGATIVE
IMM GRANULOCYTES # BLD AUTO: 0.01 10*3/MM3 (ref 0–0.05)
IMM GRANULOCYTES NFR BLD AUTO: 0.2 % (ref 0–0.5)
KETONES UR QL STRIP: NEGATIVE
LEUKOCYTE ESTERASE UR QL STRIP.AUTO: NEGATIVE
LYMPHOCYTES # BLD AUTO: 1.18 10*3/MM3 (ref 0.7–3.1)
LYMPHOCYTES NFR BLD AUTO: 27.5 % (ref 19.6–45.3)
MCH RBC QN AUTO: 33.1 PG (ref 26.6–33)
MCHC RBC AUTO-ENTMCNC: 34.9 G/DL (ref 31.5–35.7)
MCV RBC AUTO: 94.9 FL (ref 79–97)
MICROALBUMIN/CREAT UR: NORMAL MG/G{CREAT}
MONOCYTES # BLD AUTO: 0.62 10*3/MM3 (ref 0.1–0.9)
MONOCYTES NFR BLD AUTO: 14.5 % (ref 5–12)
NEUTROPHILS NFR BLD AUTO: 2.3 10*3/MM3 (ref 1.7–7)
NEUTROPHILS NFR BLD AUTO: 53.6 % (ref 42.7–76)
NITRITE UR QL STRIP: NEGATIVE
NRBC BLD AUTO-RTO: 0 /100 WBC (ref 0–0.2)
PH UR STRIP.AUTO: 7.5 [PH] (ref 5–8)
PHOSPHATE SERPL-MCNC: 3.2 MG/DL (ref 2.5–4.5)
PLATELET # BLD AUTO: 211 10*3/MM3 (ref 140–450)
PMV BLD AUTO: 10 FL (ref 6–12)
POTASSIUM SERPL-SCNC: 4.6 MMOL/L (ref 3.5–5.2)
PROT ?TM UR-MCNC: 5 MG/DL
PROT SERPL-MCNC: 7 G/DL (ref 6–8.5)
PROT UR QL STRIP: NEGATIVE
RBC # BLD AUTO: 4.47 10*6/MM3 (ref 4.14–5.8)
SODIUM SERPL-SCNC: 140 MMOL/L (ref 136–145)
SODIUM UR-SCNC: 63 MMOL/L
SP GR UR STRIP: 1.01 (ref 1–1.03)
UROBILINOGEN UR QL STRIP: NORMAL
WBC NRBC COR # BLD: 4.29 10*3/MM3 (ref 3.4–10.8)

## 2021-11-19 PROCEDURE — 36415 COLL VENOUS BLD VENIPUNCTURE: CPT

## 2021-11-19 PROCEDURE — 84300 ASSAY OF URINE SODIUM: CPT

## 2021-11-19 PROCEDURE — 82570 ASSAY OF URINE CREATININE: CPT

## 2021-11-19 PROCEDURE — 83036 HEMOGLOBIN GLYCOSYLATED A1C: CPT

## 2021-11-19 PROCEDURE — 84156 ASSAY OF PROTEIN URINE: CPT

## 2021-11-19 PROCEDURE — 85025 COMPLETE CBC W/AUTO DIFF WBC: CPT

## 2021-11-19 PROCEDURE — 84153 ASSAY OF PSA TOTAL: CPT

## 2021-11-19 PROCEDURE — 82043 UR ALBUMIN QUANTITATIVE: CPT

## 2021-11-19 PROCEDURE — 81003 URINALYSIS AUTO W/O SCOPE: CPT

## 2021-11-19 PROCEDURE — 84100 ASSAY OF PHOSPHORUS: CPT

## 2021-11-19 PROCEDURE — 80053 COMPREHEN METABOLIC PANEL: CPT

## 2021-11-20 LAB
PSA SERPL-MCNC: 1.1 NG/ML (ref 0–4)
REFLEX: NORMAL

## 2021-12-20 ENCOUNTER — HOSPITAL ENCOUNTER (OUTPATIENT)
Dept: PAIN MEDICINE | Facility: HOSPITAL | Age: 66
Discharge: HOME | End: 2021-12-20

## 2021-12-20 ENCOUNTER — ANESTHESIA (OUTPATIENT)
Dept: PAIN MEDICINE | Facility: HOSPITAL | Age: 66
End: 2021-12-20

## 2021-12-20 ENCOUNTER — HOSPITAL ENCOUNTER (OUTPATIENT)
Dept: GENERAL RADIOLOGY | Facility: HOSPITAL | Age: 66
Discharge: HOME | End: 2021-12-20

## 2021-12-20 ENCOUNTER — ANESTHESIA EVENT (OUTPATIENT)
Dept: PAIN MEDICINE | Facility: HOSPITAL | Age: 66
End: 2021-12-20

## 2021-12-20 VITALS
HEART RATE: 57 BPM | TEMPERATURE: 97.1 F | WEIGHT: 195 LBS | DIASTOLIC BLOOD PRESSURE: 79 MMHG | OXYGEN SATURATION: 95 % | BODY MASS INDEX: 27.3 KG/M2 | HEIGHT: 71 IN | RESPIRATION RATE: 16 BRPM | SYSTOLIC BLOOD PRESSURE: 129 MMHG

## 2021-12-20 DIAGNOSIS — M47.816 FACET ARTHROPATHY, LUMBAR: Primary | ICD-10-CM

## 2021-12-20 DIAGNOSIS — R52 PAIN: ICD-10-CM

## 2021-12-20 PROCEDURE — 25010000002 MIDAZOLAM PER 1 MG: Performed by: ANESTHESIOLOGY

## 2021-12-20 PROCEDURE — 77003 FLUOROGUIDE FOR SPINE INJECT: CPT

## 2021-12-20 PROCEDURE — 25010000002 ROPIVACAINE PER 1 MG: Performed by: ANESTHESIOLOGY

## 2021-12-20 RX ORDER — SODIUM CHLORIDE 0.9 % (FLUSH) 0.9 %
3 SYRINGE (ML) INJECTION EVERY 12 HOURS SCHEDULED
Status: DISCONTINUED | OUTPATIENT
Start: 2021-12-20 | End: 2021-12-21 | Stop reason: HOSPADM

## 2021-12-20 RX ORDER — LIDOCAINE HYDROCHLORIDE 10 MG/ML
1 INJECTION, SOLUTION INFILTRATION; PERINEURAL ONCE
Status: DISCONTINUED | OUTPATIENT
Start: 2021-12-20 | End: 2021-12-21 | Stop reason: HOSPADM

## 2021-12-20 RX ORDER — ROPIVACAINE HYDROCHLORIDE 5 MG/ML
20 INJECTION, SOLUTION EPIDURAL; INFILTRATION; PERINEURAL ONCE
Status: COMPLETED | OUTPATIENT
Start: 2021-12-20 | End: 2021-12-20

## 2021-12-20 RX ORDER — FENTANYL CITRATE 50 UG/ML
50 INJECTION, SOLUTION INTRAMUSCULAR; INTRAVENOUS AS NEEDED
Status: DISCONTINUED | OUTPATIENT
Start: 2021-12-20 | End: 2021-12-21 | Stop reason: HOSPADM

## 2021-12-20 RX ORDER — SODIUM CHLORIDE 0.9 % (FLUSH) 0.9 %
3-10 SYRINGE (ML) INJECTION AS NEEDED
Status: DISCONTINUED | OUTPATIENT
Start: 2021-12-20 | End: 2021-12-21 | Stop reason: HOSPADM

## 2021-12-20 RX ORDER — MIDAZOLAM HYDROCHLORIDE 1 MG/ML
1 INJECTION INTRAMUSCULAR; INTRAVENOUS AS NEEDED
Status: DISCONTINUED | OUTPATIENT
Start: 2021-12-20 | End: 2021-12-21 | Stop reason: HOSPADM

## 2021-12-20 RX ADMIN — ROPIVACAINE HYDROCHLORIDE 30 ML: 5 INJECTION, SOLUTION EPIDURAL; INFILTRATION; PERINEURAL at 11:10

## 2021-12-20 RX ADMIN — MIDAZOLAM 1 MG: 1 INJECTION INTRAMUSCULAR; INTRAVENOUS at 10:58

## 2021-12-20 NOTE — ANESTHESIA PROCEDURE NOTES
Bilateral diagnostic lumbar facet medial branch blocks to cover the levels of L3-L4, L4-5, L5-S1    Pre-sedation assessment completed: 12/20/2021 10:51 AM    Patient reassessed immediately prior to procedure    Patient location during procedure: Pain Clinic  Start time: 12/20/2021 10:56 AM  Stop Time: 12/20/2021 11:10 AM    Reason for block: procedure for pain  Performed by  Anesthesiologist: Edward Davila MD  Preanesthetic Checklist  Completed: patient identified, IV checked, site marked, risks and benefits discussed, surgical consent, monitors and equipment checked, pre-op evaluation and timeout performed  Prep:  Patient position: prone  Sterile barriers:cap, gloves, mask and sterile barrier  Prep: ChloraPrep  Patient monitoring: blood pressure monitoring, continuous pulse oximetry and EKG  Procedure:  Sedation:yes  Approach:bilateral  Guidance:fluoroscopy  Location:lumbar  Interspace: to cover the levels of L3-L4, L4-5, L5-S1.  Needle Type:Quincke  Needle Gauge:25 G  Aspiration:negative  Medications:  Comment:With 0.5% Ropivacaine, 0.5 mls per injection site.     Post Assessment  Injection Assessment: negative  Patient Tolerance:comfortable throughout block  Complications:no  Additional Notes  Post-Op Diagnosis Codes:     * Lumbar facet arthropathy (M47.816)     * Sacroiliitis (HCC) (M46.1)     * Status post lumbar spine operative procedure for decompression of spinal cord (Z98.890)     * DDD (degenerative disc disease), lumbar (M51.36)    The patient was observed in recovery with no evidence of neurological deficits or other problems. All questions were answered. The patient was discharged with appropriate instructions.

## 2021-12-20 NOTE — H&P
CHIEF COMPLAINT:  Low back pain        HISTORY OF PRESENT ILLNESS:  This patient is complaining of low back pain which radiates in a bandlike pattern across his low back.  It ranges between a 7 and 10 out of 10 on the pain scale.    He has had prior caudal epidural steroid injections and sacroiliac joint injections.  Both the patient and the spine surgeon are requesting that we focus on the bandlike pattern of pain across his low back that is most likely coming from his facets. The pain is significantly decreasing the patient's Activities of Daily Living.      Diagnostic imaging: MRI of the lumbar spine with and without contrast from 7/29/2021 shows multilevel facet degenerative disease.  There is been a prior decompressive laminectomy and partial medial facetectomies at L4-5 and L5-S1    This patient was referred to our clinic by Dr. Jeremy Martinez for diagnostic lumbar facet medial branch blocks possibly leading to future radiofrequency ablation.         PAST MEDICAL HISTORY:  Current Outpatient Medications on File Prior to Encounter   Medication Sig Dispense Refill   • atorvastatin (LIPITOR) 40 MG tablet Take 1.5 tablets by mouth Every Night. 135 tablet 3   • B Complex Vitamins (VITAMIN B COMPLEX) capsule capsule Take 1 capsule by mouth Daily. HOLD PRIOR TO SURGERY     • CALCIUM PO Take  by mouth Daily. HOLD PRIOR TO SURGERY     • carvedilol (COREG) 12.5 MG tablet Take 12.5 mg by mouth 2 (Two) Times a Day.     • cholecalciferol (VITAMIN D3) 25 MCG (1000 UT) tablet Take 1,000 Units by mouth 2 (two) times a day. HOLD PRIOR TO SURGERY     • DHEA 50 MG tablet Take 50 mg by mouth 2 (two) times a day. HOLD PRIOR TO SURGERY     • eszopiclone (LUNESTA) 2 MG tablet Take 1 mg by mouth Every Night. Takes 1/2 tablet qhs     • losartan (COZAAR) 50 MG tablet TAKE 1 TABLET DAILY 90 tablet 3   • Misc Natural Products (7-KETO LEAN) capsule Take 1 tablet by mouth Daily. HOLD PRIOR TO SURGERY     • Omega-3 Fatty Acids (FISH OIL  "PO) Take 1,280 mg by mouth 2 (Two) Times a Day. HOLD PRIOR TO SURGERY     • ticagrelor (Brilinta) 60 MG tablet tablet Take 1 tablet by mouth 2 (Two) Times a Day. 180 tablet 3   • traMADol (ULTRAM) 50 MG tablet Take 50 mg by mouth Daily As Needed.     • vitamin C (ASCORBIC ACID) 500 MG tablet Take 1,000 mg by mouth Daily. HOLD PRIOR TO SURGERY       No current facility-administered medications on file prior to encounter.       Past Medical History:   Diagnosis Date   • Arm pain    • Bilateral sacroiliitis (HCC) 05/20/2021   • Chest pain    • DDD (degenerative disc disease), lumbar    • Dizziness    • History of EKG 08/06/2013   • HTN (hypertension)    • Hyperlipidemia    • Limb pain    • Low back pain    • Lower extremity edema    • Lumbar canal stenosis    • Melanoma (HCC) 07/05/2017   • Mitral regurgitation     trace   • Nausea and vomiting    • Palpitations    • Peripheral neuropathy    • Sinus bradycardia    • Snoring    • Spinal stenosis    • Sprain of right foot        SOCIAL HISTORY:  Counseled to avoid tobacco     REVIEW OF SYSTEMS:  No pertinent hematologic, infectious, or constitutional symptoms.  Other review of systems non-contributory     PHYSICAL EXAM:  /99 (BP Location: Left arm, Patient Position: Lying)   Pulse 59   Temp 36.2 °C (97.1 °F) (Infrared)   Resp 16   Ht 180.3 cm (71\")   Wt 88.5 kg (195 lb)   SpO2 98%   BMI 27.20 kg/m²   Constitutional: Well-developed well-nourished no acute distress  General: Alert and oriented  Neuromuscular: He has tenderness palpation over bilateral lumbar facets and positive facet loading bilaterally.       DIAGNOSIS:  Post-Op Diagnosis Codes:  Post-Op Diagnosis Codes:     * Lumbar facet arthropathy [M47.816]     * Sacroiliitis (HCC) [M46.1]     * Status post lumbar spine operative procedure for decompression of spinal cord [Z98.890]     * DDD (degenerative disc disease), lumbar [M51.36]     PLAN:  -Bilateral Lumbar facet medial branch injections to cover " the level(s) of L3-4, L4-L5, and L5-S1 spaced approximately 2-4 weeks apart. These injections will be for diagnostic purposes. If pain control is good but temporary, it was discussed with the patient that they may be a candidate for radiofrequency lesioning in the future.     - Risks were discussed with the patient, including but not limited to: failure of relief, worsening pain, tissue, nerve, or blood vessel damage, bleeding, infection, and abscess formation. Benefits and alternatives were also discussed. No promises were made. The patient voiced understanding.  -  Physical therapy exercises at home should be considered, as tolerated.  -  It is recommended that the patient use the least amount of opioid medication possible.     -  Heat and ice to the affected area as tolerated for pain control.  It was discussed that heating pads can cause burns.  -  Daily low impact exercise such as walking or water exercise was recommended to maintain overall health and aid in weight control.   -  Patient was counseled to abstain from tobacco products.  -  Follow up as needed for subsequent injections.  -He has had prior caudal epidural steroid injections and sacroiliac joint injections.    Edward Davila M.D.  Partner, [place], [facility] and [facility], [facility]  Board Certified in Pain Medicine by the American Board of Anesthesiology  Board Certified in Anesthesiology by the American Board of Anesthesiology     Much of this encounter note is an electronic transcription/translation of spoken language to printed text. The electronic translation of spoken language may permit erroneous, or at times, nonsensical words or phrases to be inadvertently transcribed. Although I have reviewed the note for such errors, some may still exist.

## 2022-01-06 ENCOUNTER — TELEPHONE (OUTPATIENT)
Dept: PAIN MEDICINE | Facility: HOSPITAL | Age: 67
End: 2022-01-06

## 2022-01-06 NOTE — TELEPHONE ENCOUNTER
..On the day of your procedure are you comfortable saying that your pain in the area that was treated was relieved temporarily 80% or more?  Yes or No    YES for a few hours 90%

## 2022-01-11 ENCOUNTER — OFFICE VISIT (OUTPATIENT)
Dept: CARDIOLOGY | Facility: CLINIC | Age: 67
End: 2022-01-11

## 2022-01-11 VITALS
HEIGHT: 71 IN | BODY MASS INDEX: 28.56 KG/M2 | OXYGEN SATURATION: 98 % | SYSTOLIC BLOOD PRESSURE: 128 MMHG | WEIGHT: 204 LBS | HEART RATE: 66 BPM | DIASTOLIC BLOOD PRESSURE: 82 MMHG | RESPIRATION RATE: 16 BRPM

## 2022-01-11 DIAGNOSIS — I10 ESSENTIAL HYPERTENSION: Primary | ICD-10-CM

## 2022-01-11 DIAGNOSIS — I49.3 PVC'S (PREMATURE VENTRICULAR CONTRACTIONS): ICD-10-CM

## 2022-01-11 DIAGNOSIS — I25.10 CORONARY ARTERY DISEASE INVOLVING NATIVE CORONARY ARTERY OF NATIVE HEART WITHOUT ANGINA PECTORIS: ICD-10-CM

## 2022-01-11 DIAGNOSIS — E78.2 MIXED HYPERLIPIDEMIA: ICD-10-CM

## 2022-01-11 PROCEDURE — 93000 ELECTROCARDIOGRAM COMPLETE: CPT | Performed by: INTERNAL MEDICINE

## 2022-01-11 PROCEDURE — 99214 OFFICE O/P EST MOD 30 MIN: CPT | Performed by: INTERNAL MEDICINE

## 2022-01-11 RX ORDER — LOSARTAN POTASSIUM 100 MG/1
50 TABLET ORAL DAILY
Qty: 990 TABLET | Refills: 3 | Status: SHIPPED | OUTPATIENT
Start: 2022-01-11 | End: 2022-06-09 | Stop reason: SDUPTHER

## 2022-01-11 NOTE — PROGRESS NOTES
CARDIOLOGY    Zoraida Kenyon MD    ENCOUNTER DATE:  01/11/2022    Avni Olivo III / 66 y.o. / male        CHIEF COMPLAINT / REASON FOR OFFICE VISIT     Coronary Artery Disease (6 month follow up)      HISTORY OF PRESENT ILLNESS       HPI    Avni Olivo III is a 66 y.o. male       This gentleman saw Dr. Ramírez in 2013 with chest pain. She did an exercise echocardiogram on 06/06/2013 which showed normal LV systolic function with ejection fraction of 64%, trace mitral regurgitation. He exercised for 11.5 minutes under Jacques protocol. His stress echocardiogram images were normal. His stress EKG was normal, but the patient did complain of some chest pain. On 06/10/2013, the patient underwent a heart catheterization, which showed no evidence of coronary artery disease.      I saw him for the first time in October 2016.  He was on vacation in [place] and had a bradycardic and presyncopal episode at the airport. Paramedics were called and he was noted to have a pulse in the low 50s and elevated blood pressure systolic of about 200 over 120. He was taken to the emergency room. I have reviewed that visit. He had an EKG which showed sinus bradycardia, but was otherwise normal. He was hypertensive there. His troponin and proBNP were normal. His creatinine was elevated at 1.48. He was treated with IV fluids. He did not really feel much better, so a CT scan of his head was performed which looked okay. Eventually, he was discharged from the emergency room still feeling somewhat dizzy and they were able to continue their trip home.      I saw him back in April 2018 when he was complaining about palpitations and was diagnosed with symptomatic PVCs.  He had a stress echo in December 2019 which showed normal LV systolic function.  No significant valvular heart disease.  However, he went on to have a heart catheterization.  In December 2019 which showed a mid LAD lesion of 85%.  2 drug-eluting stents were  "placed.     He had back surgery in October 2020.  He completed rehab and felt good.  Unfortunately he got Covid in January 2021.  That really laid him up for a few weeks.          REVIEW OF SYSTEMS     Review of Systems   Constitutional: Negative for fever, malaise/fatigue, weight gain and weight loss.   HENT: Negative for ear pain, hearing loss, nosebleeds and sore throat.    Eyes: Negative for double vision, pain, vision loss in left eye and vision loss in right eye.   Cardiovascular:        See history of present illness.   Respiratory: Negative for cough, shortness of breath, sleep disturbances due to breathing, snoring and wheezing.    Endocrine: Negative for cold intolerance, heat intolerance and polyuria.   Skin: Negative for itching, poor wound healing and rash.   Musculoskeletal: Negative for joint pain, joint swelling and myalgias.   Gastrointestinal: Negative for abdominal pain, diarrhea, hematochezia, nausea and vomiting.   Genitourinary: Negative for hematuria and hesitancy.   Neurological: Negative for numbness, paresthesias and seizures.   Psychiatric/Behavioral: Negative for depression. The patient is not nervous/anxious.          VITAL SIGNS     Visit Vitals  /82 (BP Location: Right arm, Patient Position: Sitting, Cuff Size: Adult)   Pulse 66   Resp 16   Ht 180.3 cm (71\")   Wt 92.5 kg (204 lb)   SpO2 98%   BMI 28.45 kg/m²         Wt Readings from Last 3 Encounters:   01/11/22 92.5 kg (204 lb)   12/20/21 88.5 kg (195 lb)   07/29/21 89.8 kg (198 lb)     Body mass index is 28.45 kg/m².      PHYSICAL EXAMINATION     Constitutional:       General: Not in acute distress.  Neck:      Vascular: No carotid bruit or JVD.   Pulmonary:      Effort: Pulmonary effort is normal.      Breath sounds: Normal breath sounds.   Cardiovascular:      Normal rate. Regular rhythm.      Murmurs: There is no murmur.   Psychiatric:         Mood and Affect: Mood and affect normal.           REVIEWED DATA       ECG 12 " Lead    Date/Time: 1/11/2022 12:55 PM  Performed by: Zoraida Kenyon MD  Authorized by: Zoraida Kenyon MD   Comparison: compared with previous ECG from 6/18/2021  Similar to previous ECG  Rhythm: sinus rhythm  Conduction: conduction normal  ST Segments: ST segments normal  T Waves: T waves normal    Clinical impression: normal ECG              Lipid Panel    Lipid Panel 6/18/21   Total Cholesterol 149   Triglycerides 99   HDL Cholesterol 67 (A)   VLDL Cholesterol 18   LDL Cholesterol  64   LDL/HDL Ratio 0.93   (A) Abnormal value              Lab Results   Component Value Date    GLUCOSE 136 (H) 11/19/2021    BUN 19 11/19/2021    CREATININE 1.24 11/19/2021    EGFRIFNONA 59 (L) 11/19/2021    BCR 15.3 11/19/2021    K 4.6 11/19/2021    CO2 26.0 11/19/2021    CALCIUM 9.6 11/19/2021    ALBUMIN 4.40 11/19/2021    AST 41 (H) 11/19/2021    ALT 43 (H) 11/19/2021       ASSESSMENT & PLAN      Diagnosis Plan   1. Essential hypertension  Basic Metabolic Panel   2. Coronary artery disease involving native coronary artery of native heart without angina pectoris     3. Mixed hyperlipidemia     4. PVC's (premature ventricular contractions)         1.  Coronary disease status post stent to the mid LAD in December 2019.    Continue Brilinta and statin therapy.  2.  Hypertension.  He has seen a nephrologist.  The nephrologist thought he was only on 25 of losartan today and told him to increase to 50 mg a day but when he got home he realized he is already on 50 a day.  I am going to bump him up to 100 mg a day.  He is going to get a basic metabolic panel in a week and I can forward that onto his nephrologist.  He is going to monitor his blood pressure and have numbers available when we go over the laboratory data next week.  3.  Hyperlipidemia.   I reviewed his lipid panel as above.  Continue current dose of atorvastatin.  4.  History of premature ventricular contractions.    Currently not very symptomatic.     Follow-up in 1 year  unless he has symptoms sooner.  Basic metabolic panel in 1 week.      Orders Placed This Encounter   Procedures   • Basic Metabolic Panel     Standing Status:   Future     Standing Expiration Date:   1/11/2023     Order Specific Question:   Release to patient     Answer:   Immediate   • ECG 12 Lead     This order was created via procedure documentation     Order Specific Question:   Release to patient     Answer:   Immediate           MEDICATIONS         Discharge Medications          Accurate as of January 11, 2022 12:56 PM. If you have any questions, ask your nurse or doctor.            Changes to Medications      Instructions Start Date   losartan 100 MG tablet  Commonly known as: COZAAR  What changed: medication strength  Changed by: Zoraida Kenyon MD   50 mg, Oral, Daily         Continue These Medications      Instructions Start Date   7-Keto Lean capsule   1 tablet, Oral, Daily, HOLD PRIOR TO SURGERY      atorvastatin 40 MG tablet  Commonly known as: LIPITOR   60 mg, Oral, Nightly      carvedilol 12.5 MG tablet  Commonly known as: COREG   12.5 mg, Oral, 2 Times Daily      cholecalciferol 25 MCG (1000 UT) tablet  Commonly known as: VITAMIN D3   1,000 Units, Oral, 2 times daily, HOLD PRIOR TO SURGERY      DHEA 50 MG tablet   50 mg, Oral, 2 times daily, HOLD PRIOR TO SURGERY      eszopiclone 2 MG tablet  Commonly known as: LUNESTA   1 mg, Oral, Nightly, Takes 1/2 tablet qhs      FISH OIL PO   1,280 mg, Oral, 2 Times Daily, HOLD PRIOR TO SURGERY      ticagrelor 60 MG tablet tablet  Commonly known as: Brilinta   60 mg, Oral, 2 Times Daily      vitamin b complex capsule capsule   1 capsule, Oral, Daily, HOLD PRIOR TO SURGERY               Zoraida Kenyon MD  01/11/22  12:56 EST    **Dragon Disclaimer:   Much of this encounter note is an electronic transcription/translation of spoken language to printed text. The electronic translation of spoken language may permit erroneous, or at times, nonsensical words or  phrases to be inadvertently transcribed. Although I have reviewed the note for such errors, some may still exist.

## 2022-01-18 ENCOUNTER — LAB (OUTPATIENT)
Dept: LAB | Facility: HOSPITAL | Age: 67
End: 2022-01-18

## 2022-01-18 PROCEDURE — 80048 BASIC METABOLIC PNL TOTAL CA: CPT | Performed by: INTERNAL MEDICINE

## 2022-01-19 ENCOUNTER — APPOINTMENT (OUTPATIENT)
Dept: PAIN MEDICINE | Facility: HOSPITAL | Age: 67
End: 2022-01-19

## 2022-02-07 ENCOUNTER — TELEMEDICINE (OUTPATIENT)
Dept: FAMILY MEDICINE CLINIC | Facility: TELEHEALTH | Age: 67
End: 2022-02-07

## 2022-02-07 DIAGNOSIS — J32.9 SINUSITIS, UNSPECIFIED CHRONICITY, UNSPECIFIED LOCATION: Primary | ICD-10-CM

## 2022-02-07 PROCEDURE — 99213 OFFICE O/P EST LOW 20 MIN: CPT | Performed by: NURSE PRACTITIONER

## 2022-02-07 RX ORDER — BENZONATATE 200 MG/1
200 CAPSULE ORAL 3 TIMES DAILY PRN
Qty: 30 CAPSULE | Refills: 0 | Status: SHIPPED | OUTPATIENT
Start: 2022-02-07 | End: 2022-02-17

## 2022-02-07 RX ORDER — METHYLPREDNISOLONE 4 MG/1
TABLET ORAL
Qty: 1 EACH | Refills: 0 | Status: SHIPPED | OUTPATIENT
Start: 2022-02-07 | End: 2022-04-13

## 2022-02-07 RX ORDER — AMOXICILLIN AND CLAVULANATE POTASSIUM 875; 125 MG/1; MG/1
1 TABLET, FILM COATED ORAL 2 TIMES DAILY
Qty: 14 TABLET | Refills: 0 | Status: SHIPPED | OUTPATIENT
Start: 2022-02-07 | End: 2022-02-14

## 2022-02-07 NOTE — PROGRESS NOTES
Mode of Visit: Video  Location of patient: home  You have chosen to receive care through a telehealth visit.  The patient has signed the video visit consent form.  The visit included audio and video interaction. No technical issues occurred during this visit.     Chief Complaint  Cough (had Covid and recovered a few weeks ago, but then had worsening of cough and nasal congestion) and Sinusitis    Subjective          Avni Olivo III presents to [facility]  Sinus congestion, pressure and pain that has worsened over the last few weeks. He had Covid but can't seem to get the sinuses cleared.     Cough  This is a new problem. The current episode started 1 to 4 weeks ago. The problem has been gradually worsening. The cough is productive of sputum. Associated symptoms include nasal congestion and postnasal drip. Pertinent negatives include no fever, sore throat, shortness of breath or wheezing.   Sinusitis  This is a new problem. The current episode started 1 to 4 weeks ago. The problem has been gradually worsening since onset. There has been no fever. Associated symptoms include congestion, coughing and sinus pressure. Pertinent negatives include no shortness of breath or sore throat.     Objective   Vital Signs:   There were no vitals taken for this visit.    Physical Exam   Constitutional: He appears well-developed and well-nourished. No distress.   HENT:   Nose: Congestion present. Right sinus exhibits maxillary sinus tenderness. Left sinus exhibits maxillary sinus tenderness.   Pulmonary/Chest: Effort normal.  No respiratory distress.    Result Review :                 Assessment and Plan    Diagnoses and all orders for this visit:    1. Sinusitis, unspecified chronicity, unspecified location (Primary)  -     amoxicillin-clavulanate (AUGMENTIN) 875-125 MG per tablet; Take 1 tablet by mouth 2 (Two) Times a Day for 7 days.  Dispense: 14 tablet; Refill: 0  -     methylPREDNISolone (MEDROL)  4 MG dose pack; Take as directed on package instructions.  Dispense: 1 each; Refill: 0  -     benzonatate (TESSALON) 200 MG capsule; Take 1 capsule by mouth 3 (Three) Times a Day As Needed for Cough for up to 10 days.  Dispense: 30 capsule; Refill: 0        Take antibiotic until gone. May take probiotic with antibiotic if prone to antibiotic induced diarrhea. Flonase is recommended for daily use by most ENTs if you have Allergic or Reactive sinus problems. It will help with nasal congestion, but takes several days to become fully effective and is not a fast acting medication. It is also recommended to start and continue Claritin, Zyrtec or Allegra daily to control postnasal drainage, if you are prone to reactive sinus issues due to weather or seasonal changes. Cool mist humidifier at bedside will help secretions remain thin and more easily drain, relieving the pressure in your sinuses. Follow up with PCP, Urgent Care or Video Visit if symptoms have not resolved in 7-10 days. If symptoms worse, go to Urgent Care or follow up with PCP. If you develop high fever, chest pain, shortness of breath or any life-threatening symptoms, go to nearest Emergency Department.        I spent 20 minutes caring for Avni on this date of service. This time includes time spent by me in the following activities:preparing for the visit, obtaining and/or reviewing a separately obtained history, performing a medically appropriate examination and/or evaluation , counseling and educating the patient/family/caregiver, ordering medications, tests, or procedures, and documenting information in the medical record  Follow Up   Return if symptoms worsen or fail to improve.  Patient was given instructions and counseling regarding his condition or for health maintenance advice. Please see specific information pulled into the AVS if appropriate.

## 2022-04-04 NOTE — PROGRESS NOTES
Subjective   Patient ID: Avni Olivo III is a 66 y.o. male is here today for an 8 month follow up of chronic midline low back pain.  Pt last seen on 8/11/22 and reported stabbing back pain that did not radiate.  Pt to continue with exercises and resistance training.    Today, Mr. Olivo reports that he is doing okay. He reports back pain and weakness in bilateral legs. He denies numbness and tingling. He reports that his balance and stability is off.     Its been about a year and a half since his L3-S1 lumbar decompression.  He did try 1 lumbar medial branch block which produced some transient numbness in his left leg.  He got a modest amount of improvement of the low back pain but in general the back pain did not bother him enough in his view to justify getting another block so he is held off on that for now.  We can revisit this issue later if the overall back pain worsens.  He is working with Hunter Del Valle at [facility] on his strength.  The calf muscles on both sides were weak particular the right.  The left side has made some progress since his surgery and the right is still lagging behind.  He probably will not get all of his strength back but it is possible with time that he could improve a little bit more.  I encouraged him to continue that.  He does have some early signs of recurrent stenosis based on MRI from last summer but generally speaking the canal is patent.  I will see him in 9 months.  If things get worse he will let me know.  If he wants to try the ablation approach again between now and the next visit he will let us know and we can reschedule it.      Back Pain  The pain is present in the lumbar spine. The pain radiates to the right thigh, right foot, right knee, left knee, left thigh and left foot. Associated symptoms include weakness. Pertinent negatives include no chest pain, fever, numbness or tingling.       The following portions of the patient's history were reviewed and updated as  appropriate: allergies, current medications, past family history, past medical history, past social history, past surgical history and problem list.    Review of Systems   Constitutional: Negative for fever.   Respiratory: Negative for chest tightness and shortness of breath.    Cardiovascular: Negative for chest pain.   Musculoskeletal: Positive for back pain.   Neurological: Positive for weakness. Negative for tingling and numbness.   All other systems reviewed and are negative.      Objective   Physical Exam  Constitutional:       Appearance: He is well-developed.   HENT:      Head: Normocephalic and atraumatic.   Eyes:      Extraocular Movements: EOM normal.      Conjunctiva/sclera: Conjunctivae normal.      Pupils: Pupils are equal, round, and reactive to light.   Neck:      Vascular: No carotid bruit.   Neurological:      Mental Status: He is oriented to person, place, and time.      Coordination: Finger-Nose-Finger Test and Heel to Shin Test normal.      Gait: Gait is intact.      Deep Tendon Reflexes:      Reflex Scores:       Tricep reflexes are 2+ on the right side and 2+ on the left side.       Bicep reflexes are 2+ on the right side and 2+ on the left side.       Brachioradialis reflexes are 2+ on the right side and 2+ on the left side.       Patellar reflexes are 2+ on the right side and 2+ on the left side.       Achilles reflexes are 2+ on the right side and 2+ on the left side.  Psychiatric:         Speech: Speech normal.       Neurologic Exam     Mental Status   Oriented to person, place, and time.   Registration of memory: Good recent and remote memory.   Attention: normal. Concentration: normal.   Speech: speech is normal   Level of consciousness: alert  Knowledge: consistent with education.     Cranial Nerves     CN II   Visual fields full to confrontation.   Visual acuity: normal    CN III, IV, VI   Pupils are equal, round, and reactive to light.  Extraocular motions are normal.     CN V    Facial sensation intact.   Right corneal reflex: normal  Left corneal reflex: normal    CN VII   Facial expression full, symmetric.   Right facial weakness: none  Left facial weakness: none    CN VIII   Hearing: intact    CN IX, X   Palate: symmetric    CN XI   Right sternocleidomastoid strength: normal  Left sternocleidomastoid strength: normal    CN XII   Tongue: not atrophic  Tongue deviation: none    Motor Exam   Muscle bulk: normal  Right arm tone: normal  Left arm tone: normal  Right leg tone: normal  Left leg tone: normal    Strength   Strength 5/5 except as noted.   Right gastroc: 3/5  Left gastroc: 4/5    Sensory Exam   Light touch normal.     Gait, Coordination, and Reflexes     Gait  Gait: normal    Coordination   Finger to nose coordination: normal  Heel to shin coordination: normal    Reflexes   Right brachioradialis: 2+  Left brachioradialis: 2+  Right biceps: 2+  Left biceps: 2+  Right triceps: 2+  Left triceps: 2+  Right patellar: 2+  Left patellar: 2+  Right achilles: 2+  Left achilles: 2+  Right : 2+  Left : 2+      Assessment/Plan   Independent Review of Radiographic Studies:      The new MRIs and plain x-rays done on 7/29/2021 show postoperative changes but no instability.  The spinal canal seems fairly patent from L3-S1.  There is a little bit of a residual synovial cyst at L4-5 on the left.  Agree with the report.    Medical Decision Making:      Overall doing well and for now we will hold off on trying the medial branch blocks again.  We will continue working with his  and try and maintain and improve his strength in his legs.  I will see him in 9 months, sooner if there is some retrogressed in.      Diagnoses and all orders for this visit:    1. Status post lumbar spine operative procedure for decompression of spinal cord (Primary)    2. Chronic midline low back pain without sciatica    3. DDD (degenerative disc disease), lumbar      Return in about 9 months (around  1/13/2023) for Face-to-face.

## 2022-04-13 ENCOUNTER — OFFICE VISIT (OUTPATIENT)
Dept: NEUROSURGERY | Facility: CLINIC | Age: 67
End: 2022-04-13

## 2022-04-13 VITALS
BODY MASS INDEX: 27.3 KG/M2 | DIASTOLIC BLOOD PRESSURE: 84 MMHG | HEIGHT: 71 IN | SYSTOLIC BLOOD PRESSURE: 142 MMHG | TEMPERATURE: 96.8 F | WEIGHT: 195 LBS | HEART RATE: 58 BPM | OXYGEN SATURATION: 97 %

## 2022-04-13 DIAGNOSIS — M51.36 DDD (DEGENERATIVE DISC DISEASE), LUMBAR: ICD-10-CM

## 2022-04-13 DIAGNOSIS — M54.50 CHRONIC MIDLINE LOW BACK PAIN WITHOUT SCIATICA: ICD-10-CM

## 2022-04-13 DIAGNOSIS — G89.29 CHRONIC MIDLINE LOW BACK PAIN WITHOUT SCIATICA: ICD-10-CM

## 2022-04-13 DIAGNOSIS — Z98.890 STATUS POST LUMBAR SPINE OPERATIVE PROCEDURE FOR DECOMPRESSION OF SPINAL CORD: Primary | ICD-10-CM

## 2022-04-13 PROCEDURE — 99213 OFFICE O/P EST LOW 20 MIN: CPT | Performed by: NEUROLOGICAL SURGERY

## 2022-04-14 ENCOUNTER — TRANSCRIBE ORDERS (OUTPATIENT)
Dept: ADMINISTRATIVE | Facility: HOSPITAL | Age: 67
End: 2022-04-14

## 2022-04-14 DIAGNOSIS — R14.0 ABDOMINAL BLOATING: Primary | ICD-10-CM

## 2022-04-27 ENCOUNTER — TELEMEDICINE (OUTPATIENT)
Dept: FAMILY MEDICINE CLINIC | Facility: TELEHEALTH | Age: 67
End: 2022-04-27

## 2022-04-27 VITALS — BODY MASS INDEX: 27.3 KG/M2 | HEIGHT: 71 IN | WEIGHT: 195 LBS | TEMPERATURE: 97 F

## 2022-04-27 DIAGNOSIS — J40 BRONCHITIS: Primary | ICD-10-CM

## 2022-04-27 PROBLEM — M48.061 SPINAL STENOSIS OF LUMBAR REGION: Status: RESOLVED | Noted: 2020-10-29 | Resolved: 2022-04-27

## 2022-04-27 PROBLEM — M48.00 SPINAL STENOSIS: Status: RESOLVED | Noted: 2020-10-30 | Resolved: 2022-04-27

## 2022-04-27 PROBLEM — R07.89 PRESSURE IN CHEST: Status: RESOLVED | Noted: 2019-05-08 | Resolved: 2022-04-27

## 2022-04-27 PROCEDURE — 99213 OFFICE O/P EST LOW 20 MIN: CPT | Performed by: NURSE PRACTITIONER

## 2022-04-27 RX ORDER — GUAIFENESIN 600 MG/1
600 TABLET, EXTENDED RELEASE ORAL 2 TIMES DAILY
Qty: 28 TABLET | Refills: 0 | Status: SHIPPED | OUTPATIENT
Start: 2022-04-27 | End: 2022-05-11

## 2022-04-27 RX ORDER — PREDNISONE 20 MG/1
TABLET ORAL
Qty: 13 TABLET | Refills: 0 | Status: SHIPPED | OUTPATIENT
Start: 2022-04-27 | End: 2022-07-07

## 2022-04-27 RX ORDER — DOXYCYCLINE 100 MG/1
100 CAPSULE ORAL 2 TIMES DAILY
Qty: 14 CAPSULE | Refills: 0 | Status: SHIPPED | OUTPATIENT
Start: 2022-04-27 | End: 2022-05-04

## 2022-04-27 NOTE — PATIENT INSTRUCTIONS
Acute Bronchitis, Adult    Acute bronchitis is sudden or acute swelling of the air tubes (bronchi) in the lungs. Acute bronchitis causes these tubes to fill with mucus, which can make it hard to breathe. It can also cause coughing or wheezing.  In adults, acute bronchitis usually goes away within 2 weeks. A cough caused by bronchitis may last up to 3 weeks. Smoking, allergies, and asthma can make the condition worse.  What are the causes?  This condition can be caused by germs and by substances that irritate the lungs, including:  Cold and flu viruses. The most common cause of this condition is the virus that causes the common cold.  Bacteria.  Substances that irritate the lungs, including:  Smoke from cigarettes and other forms of tobacco.  Dust and pollen.  Fumes from chemical products, gases, or burned fuel.  Other materials that pollute indoor or outdoor air.  Close contact with someone who has acute bronchitis.  What increases the risk?  The following factors may make you more likely to develop this condition:  A weak body's defense system, also called the immune system.  A condition that affects your lungs and breathing, such as asthma.  What are the signs or symptoms?  Common symptoms of this condition include:  Lung and breathing problems, such as:  Coughing. This may bring up clear, yellow, or green mucus from your lungs (sputum).  Wheezing.  Having too much mucus in your lungs (chest congestion).  Having shortness of breath.  A fever.  Chills.  Aches and pains, including:  Tightness in your chest and other body aches.  A sore throat.  How is this diagnosed?  This condition is usually diagnosed based on:  Your symptoms and medical history.  A physical exam.  You may also have other tests, including tests to rule out other conditions, such pneumonia. These tests include:  A test of lung function.  Test of a mucus sample to look for the presence of bacteria.  Tests to check the oxygen level in your  blood.  Blood tests.  Chest X-ray.  How is this treated?  Most cases of acute bronchitis clear up over time without treatment. Your health care provider may recommend:  Drinking more fluids. This can thin your mucus, which may improve your breathing.  Taking a medicine for a fever or cough.  Using a device that gets medicine into your lungs (inhaler) to help improve breathing and control coughing.  Using a vaporizer or a humidifier. These are machines that add water to the air to help you breathe better.  Follow these instructions at home:  Activity  Get plenty of rest.  Return to your normal activities as told by your health care provider. Ask your health care provider what activities are safe for you.  Lifestyle  Drink enough fluid to keep your urine pale yellow.  Do not drink alcohol.  Do not use any products that contain nicotine or tobacco, such as cigarettes, e-cigarettes, and chewing tobacco. If you need help quitting, ask your health care provider. Be aware that:  Your bronchitis will get worse if you smoke or breathe in other people's smoke (secondhand smoke).  Your lungs will heal faster if you quit smoking.  General instructions    Take over-the-counter and prescription medicines only as told by your health care provider.  Use an inhaler, vaporizer, or humidifier as told by your health care provider.  If you have a sore throat, gargle with a salt-water mixture 3-4 times a day or as needed. To make a salt-water mixture, completely dissolve ½-1 tsp (3-6 g) of salt in 1 cup (237 mL) of warm water.  Keep all follow-up visits as told by your health care provider. This is important.    How is this prevented?  To lower your risk of getting this condition again:  Wash your hands often with soap and water. If soap and water are not available, use hand .  Avoid contact with people who have cold symptoms.  Try not to touch your mouth, nose, or eyes with your hands.  Avoid places where there are fumes from  chemicals. Breathing these fumes will make your condition worse.  Get the flu shot every year.  Contact a health care provider if:  Your symptoms do not improve after 2 weeks of treatment.  You vomit more than once or twice.  You have symptoms of dehydration such as:  Dark urine.  Dry skin or eyes.  Increased thirst.  Headaches.  Confusion.  Muscle cramps.  Get help right away if you:  Cough up blood.  Feel pain in your chest.  Have severe shortness of breath.  Faint or keep feeling like you are going to faint.  Have a severe headache.  Have fever or chills that get worse.  These symptoms may represent a serious problem that is an emergency. Do not wait to see if the symptoms will go away. Get medical help right away. Call your local emergency services (911 in the [place] Do not drive yourself to the hospital.  Summary  Acute bronchitis is sudden (acute) inflammation of the air tubes (bronchi) between the windpipe and the lungs. In adults, acute bronchitis usually goes away within 2 weeks, although coughing may last 3 weeks or longer  Take over-the-counter and prescription medicines only as told by your health care provider.  Drink enough fluid to keep your urine pale yellow.  Contact a health care provider if your symptoms do not improve after 2 weeks of treatment.  Get help right away if you cough up blood, faint, or have chest pain or shortness of breath.  This information is not intended to replace advice given to you by your health care provider. Make sure you discuss any questions you have with your health care provider.  Document Revised: 08/31/2020 Document Reviewed: 07/10/2020  [employer] Patient Education © 2021 [employer]

## 2022-04-27 NOTE — PROGRESS NOTES
You have chosen to receive care through a telehealth visit.  Do you consent to use a video/audio connection for your medical care today? Yes     CHIEF COMPLAINT  Cc: chest congestion    HPI  Avni Olivo III is a 66 y.o. male  presents with complaint of cough and chest congestion. He reports that he has had the cough for about 1.5 weeks but that it has worsened over the past couple of days. He is now coughing up yellow and once it had a trace of blood in it. He also reports that he now generally feels bad. No fever is reported. He does report a headache. Additional symptoms are noted in the ROS portion of this visit.m No COVID exposure that he is aware of but his two year old grandson was sick and diagnosed with an ear infection. The child was tested for COVID and the results of that test was negative. The patient does have a COVID test at home which he agrees to do. The patient did have COVID 01/2022. He is fully vaccinated for COVID via the Pfizer COVID vaccine. He has also had a flu shot for this flu season.     Review of Systems   Constitutional: Positive for fatigue. Negative for fever.   HENT: Positive for congestion (mild, mostly clear, some yellow), postnasal drip, rhinorrhea, sneezing (some), sore throat (mild) and tinnitus (baseline). Negative for ear pain, sinus pressure and sinus pain.         No loss of taste and smell   Respiratory: Positive for cough. Negative for chest tightness, shortness of breath and wheezing.    Cardiovascular: Negative for chest pain.   Gastrointestinal: Positive for diarrhea. Negative for nausea and vomiting.   Musculoskeletal: Myalgias: baseline.   Neurological: Positive for headaches.       Past Medical History:   Diagnosis Date   • Arm pain    • Bilateral sacroiliitis (HCC) 05/20/2021   • Chest pain    • DDD (degenerative disc disease), lumbar    • Dizziness    • Facet arthropathy, lumbar 12/20/2021   • History of EKG 08/06/2013   • HTN (hypertension)    • Hyperlipidemia   "  • Limb pain    • Low back pain    • Lower extremity edema    • Lumbar canal stenosis    • Melanoma ([facility]) 07/05/2017   • Mitral regurgitation     trace   • Nausea and vomiting    • Palpitations    • Peripheral neuropathy    • Sinus bradycardia    • Snoring    • Spinal stenosis    • Sprain of right foot        Family History   Problem Relation Age of Onset   • Coronary artery disease Other    • Hyperlipidemia Other    • Hypertension Other    • Other Other         heart surgery   • Heart disease Father    • Hypertension Father    • Heart disease Sister    • Stroke Maternal Grandmother    • Heart disease Maternal Grandfather    • Malig Hyperthermia Neg Hx        Social History     Socioeconomic History   • Marital status:    • Years of education: college graduate   Tobacco Use   • Smoking status: Never Smoker   • Smokeless tobacco: Never Used   • Tobacco comment: caffeine - 2 cups coffee daily    Vaping Use   • Vaping Use: Never used   Substance and Sexual Activity   • Alcohol use: Yes     Comment: most days   • Drug use: No   • Sexual activity: Defer       Avni Olivo III  reports that he has never smoked. He has never used smokeless tobacco..      Temp 97 °F (36.1 °C)   Ht 180.3 cm (71\")   Wt 88.5 kg (195 lb)   BMI 27.20 kg/m²     PHYSICAL EXAM  Physical Exam   Constitutional: He is oriented to person, place, and time. He appears well-developed and well-nourished.   HENT:   Head: Normocephalic and atraumatic.   Right Ear: External ear normal.   Left Ear: External ear normal.   Nose: Congestion present. Right sinus exhibits no maxillary sinus tenderness and no frontal sinus tenderness. Left sinus exhibits no maxillary sinus tenderness and no frontal sinus tenderness.   Eyes: Lids are normal. Right eye exhibits no discharge and no exudate. Left eye exhibits no discharge and no exudate. Right conjunctiva is not injected. Left conjunctiva is not injected.   Pulmonary/Chest: No accessory muscle usage. No " tachypnea and no bradypnea.  No respiratory distress (intermittent cough at visit).No use of oxygen by nasal cannulaNo use of oxygen by mask noted.  Abdominal: Abdomen appears normal.   Neurological: He is alert and oriented to person, place, and time. No cranial nerve deficit.   Skin: His skin appears normal.  Psychiatric: He has a normal mood and affect. His speech is normal and behavior is normal. Judgment and thought content normal.       Results for orders placed or performed in visit on 01/18/22   Basic Metabolic Panel    Specimen: Blood   Result Value Ref Range    Glucose 133 (H) 65 - 99 mg/dL    BUN 15 8 - 23 mg/dL    Creatinine 1.29 (H) 0.76 - 1.27 mg/dL    Sodium 140 136 - 145 mmol/L    Potassium 4.0 3.5 - 5.2 mmol/L    Chloride 103 98 - 107 mmol/L    CO2 25.7 22.0 - 29.0 mmol/L    Calcium 10.0 8.6 - 10.5 mg/dL    eGFR Non African Amer 56 (L) >60 mL/min/1.73    BUN/Creatinine Ratio 11.6 7.0 - 25.0    Anion Gap 11.3 5.0 - 15.0 mmol/L       Diagnoses and all orders for this visit:    1. Bronchitis (Primary)    Other orders  -     doxycycline (MONODOX) 100 MG capsule; Take 1 capsule by mouth 2 (Two) Times a Day for 7 days.  Dispense: 14 capsule; Refill: 0  -     guaiFENesin (Mucinex) 600 MG 12 hr tablet; Take 1 tablet by mouth 2 (Two) Times a Day for 14 days.  Dispense: 28 tablet; Refill: 0  -     predniSONE (DELTASONE) 20 MG tablet; Prednisone 20mg tabs, 3 for 2 days, 2 for 2 days, 1 for 2 days, 1/2 for 2 days take with food or milk  Dispense: 13 tablet; Refill: 0    Take the doxycycline on an empty stomach with a full glass of water. Do not take minerals or vitamins with minerals with this antibiotic as it decreases the therapeutic value of this antibiotic related to absorption.  Probiotics for two weeks related to taking antibiotics. The pharmacist can help you with this if needed. Do not take within two hours of antibiotic.  Mucinex with plenty of fluids especially water to thin secretions and help with  congestion.  Take prednisone with food as early in the day as possible  Do not take prednisone with nsaids such as ibuprofen, aleve, or aspirin  May take tylenol for pain or fever    FOLLOW-UP  If symptoms worsen or persist follow up with PCP, Urgent Care or ER dependent on severity of symptoms for in person evaluation    Patient verbalizes understanding of medication dosage, comfort measures, instructions for treatment and follow-up.    Vianca Prasad, JESSICA  04/27/2022  13:03 EDT    This visit was performed via Telehealth.  The use of a video visit has been reviewed with the patient and verbal informed consent has been obtained. Myself and  Avni Olivo participated in this visit. The patient is located in [place], [place]. I am located in [place], [place]. Mychart and Zoom were utilized. I spent 20 minutes in the patient's chart for this visit

## 2022-06-09 RX ORDER — LOSARTAN POTASSIUM 100 MG/1
100 TABLET ORAL DAILY
Qty: 90 TABLET | Refills: 3 | Status: SHIPPED | OUTPATIENT
Start: 2022-06-09

## 2022-06-09 RX ORDER — ATORVASTATIN CALCIUM 40 MG/1
60 TABLET, FILM COATED ORAL NIGHTLY
Qty: 135 TABLET | Refills: 3 | Status: SHIPPED | OUTPATIENT
Start: 2022-06-09

## 2022-06-23 ENCOUNTER — HOSPITAL ENCOUNTER (OUTPATIENT)
Dept: NUCLEAR MEDICINE | Facility: HOSPITAL | Age: 67
Discharge: HOME | End: 2022-06-23

## 2022-06-23 DIAGNOSIS — R14.0 ABDOMINAL BLOATING: ICD-10-CM

## 2022-06-23 PROCEDURE — 78264 GASTRIC EMPTYING IMG STUDY: CPT

## 2022-06-23 PROCEDURE — 0 TECHNETIUM SULFUR COLLOID: Performed by: INTERNAL MEDICINE

## 2022-06-23 PROCEDURE — A9541 TC99M SULFUR COLLOID: HCPCS | Performed by: INTERNAL MEDICINE

## 2022-06-23 RX ADMIN — TECHNETIUM TC 99M SULFUR COLLOID 1 DOSE: KIT at 08:04

## 2022-06-30 ENCOUNTER — TELEPHONE (OUTPATIENT)
Dept: NEUROSURGERY | Facility: CLINIC | Age: 67
End: 2022-06-30

## 2022-07-01 NOTE — TELEPHONE ENCOUNTER
Pt called back to get in.  Was scheduled with  to discuss getting an epidural on 7/7/22 @ 11:30am.  Pt notified.

## 2022-07-01 NOTE — TELEPHONE ENCOUNTER
"Per Teresa, \"I'm sorry, since I am not familiar with this patient and because it has been over 6 mos since he had his last injection, I'm afraid this patient will have to wait until you speak to Dr. LEHMAN or he can be seen in office. Are there any APC appointments open?\"    Will schedule patient a FU in the office to discuss, he will have to wait until next available follow-up appointment.   "

## 2022-07-05 ENCOUNTER — HOSPITAL ENCOUNTER (OUTPATIENT)
Dept: GENERAL RADIOLOGY | Facility: HOSPITAL | Age: 67
Discharge: HOME | End: 2022-07-05

## 2022-07-05 ENCOUNTER — ANESTHESIA (OUTPATIENT)
Dept: PAIN MEDICINE | Facility: HOSPITAL | Age: 67
End: 2022-07-05

## 2022-07-05 ENCOUNTER — ANESTHESIA EVENT (OUTPATIENT)
Dept: PAIN MEDICINE | Facility: HOSPITAL | Age: 67
End: 2022-07-05

## 2022-07-05 ENCOUNTER — HOSPITAL ENCOUNTER (OUTPATIENT)
Dept: PAIN MEDICINE | Facility: HOSPITAL | Age: 67
Discharge: HOME | End: 2022-07-05

## 2022-07-05 VITALS
DIASTOLIC BLOOD PRESSURE: 66 MMHG | OXYGEN SATURATION: 97 % | RESPIRATION RATE: 16 BRPM | WEIGHT: 200 LBS | BODY MASS INDEX: 28 KG/M2 | HEART RATE: 61 BPM | SYSTOLIC BLOOD PRESSURE: 120 MMHG | TEMPERATURE: 98 F | HEIGHT: 71 IN

## 2022-07-05 DIAGNOSIS — R52 PAIN: ICD-10-CM

## 2022-07-05 DIAGNOSIS — M48.062 SPINAL STENOSIS OF LUMBAR REGION WITH NEUROGENIC CLAUDICATION: Primary | ICD-10-CM

## 2022-07-05 PROCEDURE — 25010000002 MIDAZOLAM PER 1 MG: Performed by: ANESTHESIOLOGY

## 2022-07-05 PROCEDURE — 77003 FLUOROGUIDE FOR SPINE INJECT: CPT

## 2022-07-05 PROCEDURE — 25010000002 METHYLPREDNISOLONE PER 80 MG: Performed by: ANESTHESIOLOGY

## 2022-07-05 PROCEDURE — 25010000002 FENTANYL CITRATE (PF) 50 MCG/ML SOLUTION: Performed by: ANESTHESIOLOGY

## 2022-07-05 PROCEDURE — 0 IOPAMIDOL 41 % SOLUTION: Performed by: ANESTHESIOLOGY

## 2022-07-05 RX ORDER — LIDOCAINE HYDROCHLORIDE 10 MG/ML
0.5 INJECTION, SOLUTION INFILTRATION; PERINEURAL ONCE AS NEEDED
Status: DISCONTINUED | OUTPATIENT
Start: 2022-07-05 | End: 2022-07-06 | Stop reason: HOSPADM

## 2022-07-05 RX ORDER — SODIUM CHLORIDE 0.9 % (FLUSH) 0.9 %
1-10 SYRINGE (ML) INJECTION AS NEEDED
Status: DISCONTINUED | OUTPATIENT
Start: 2022-07-05 | End: 2022-07-06 | Stop reason: HOSPADM

## 2022-07-05 RX ORDER — METHYLPREDNISOLONE ACETATE 80 MG/ML
80 INJECTION, SUSPENSION INTRA-ARTICULAR; INTRALESIONAL; INTRAMUSCULAR; SOFT TISSUE ONCE
Status: COMPLETED | OUTPATIENT
Start: 2022-07-05 | End: 2022-07-05

## 2022-07-05 RX ORDER — SODIUM CHLORIDE 0.9 % (FLUSH) 0.9 %
10 SYRINGE (ML) INJECTION EVERY 12 HOURS SCHEDULED
Status: DISCONTINUED | OUTPATIENT
Start: 2022-07-05 | End: 2022-07-06 | Stop reason: HOSPADM

## 2022-07-05 RX ORDER — LIDOCAINE HYDROCHLORIDE 10 MG/ML
1 INJECTION, SOLUTION INFILTRATION; PERINEURAL ONCE AS NEEDED
Status: DISCONTINUED | OUTPATIENT
Start: 2022-07-05 | End: 2022-07-06 | Stop reason: HOSPADM

## 2022-07-05 RX ORDER — MIDAZOLAM HYDROCHLORIDE 1 MG/ML
1 INJECTION INTRAMUSCULAR; INTRAVENOUS AS NEEDED
Status: DISCONTINUED | OUTPATIENT
Start: 2022-07-05 | End: 2022-07-06 | Stop reason: HOSPADM

## 2022-07-05 RX ORDER — SODIUM CHLORIDE 0.9 % (FLUSH) 0.9 %
10 SYRINGE (ML) INJECTION AS NEEDED
Status: DISCONTINUED | OUTPATIENT
Start: 2022-07-05 | End: 2022-07-06 | Stop reason: HOSPADM

## 2022-07-05 RX ORDER — FENTANYL CITRATE 50 UG/ML
50 INJECTION, SOLUTION INTRAMUSCULAR; INTRAVENOUS AS NEEDED
Status: DISCONTINUED | OUTPATIENT
Start: 2022-07-05 | End: 2022-07-06 | Stop reason: HOSPADM

## 2022-07-05 RX ADMIN — METHYLPREDNISOLONE ACETATE 80 MG: 80 INJECTION, SUSPENSION INTRA-ARTICULAR; INTRALESIONAL; INTRAMUSCULAR; SOFT TISSUE at 09:03

## 2022-07-05 RX ADMIN — FENTANYL CITRATE 50 MCG: 50 INJECTION INTRAMUSCULAR; INTRAVENOUS at 08:57

## 2022-07-05 RX ADMIN — IOPAMIDOL 10 ML: 408 INJECTION, SOLUTION INTRATHECAL at 09:02

## 2022-07-05 RX ADMIN — MIDAZOLAM 1 MG: 1 INJECTION INTRAMUSCULAR; INTRAVENOUS at 08:58

## 2022-07-05 NOTE — ANESTHESIA PROCEDURE NOTES
PAIN Epidural block      Patient reassessed immediately prior to procedure    Patient location during procedure: pain clinic  Start Time: 7/5/2022 8:42 AM  Stop Time: 7/5/2022 9:04 AM  Indication:procedure for pain  Performed By  Anesthesiologist: Víctor Gomez MD  Preanesthetic Checklist  Completed: patient identified, site marked, risks and benefits discussed, surgical consent, monitors and equipment checked, pre-op evaluation and timeout performed  Additional Notes  Post-Op Diagnosis Codes:     * Spinal stenosis of lumbar region with neurogenic claudication (M48.062)    The patient was observed in recovery with no evidence of neurological deficits or other problems. All questions were answered. The patient was discharged with appropriate instructions.  Prep:  Pt Position:prone  Sterile Tech:cap, gloves, mask and sterile barrier  Prep:chlorhexidine gluconate and isopropyl alcohol  Monitoring:blood pressure monitoring, continuous pulse oximetry and EKG  Procedure:Sedation: yes (Midazolam 1 mg and fentanyl 50 mcg IV)     Approach:right paramedian  Guidance: fluoroscopy  Location:lumbar  Level:3-4 (Interlaminar)  Needle Type:Tuohy  Needle Gauge:20 G  Aspiration:negative  Medications:  Preservative Free Saline:3mL  Isovue:2mL  Comments:Isovue dye spread was consistent with epidural placement.Depomedrol:80 mg  Post Assessment:  Dressing:occlusive dressing applied  Pt Tolerance:patient tolerated the procedure well with no apparent complications  Complications:no

## 2022-07-05 NOTE — H&P
[facility]    History and Physical    Patient Name: Avni Olivo III  :  1955  MRN:  [id number]  Date of Admission: 2022    Subjective     Patient is a 67 y.o. male presents with chief complaint of chronic, intermitent, severe low back pain.  Onset of symptoms was gradual starting 15 years ago.  Symptoms are associated/aggravated by activity, exercise, sitting, standing, straining, twisting or walking for more than a few minutes. Symptoms improve with pain medication.  He is undergone a previous lumbar decompression.  On a pain scale from 0-10, he rates his pain is an 8 while at rest and also an 8 with activity.  He describes the pain as sharp and stabbing in nature.  He is responded favorably to lumbar epidural steroid injections in the past.  He was referred to the pain clinic for a series of lumbar epidural steroid injections.    The following portions of the patients history were reviewed and updated as appropriate: current medications, allergies, past medical history, past surgical history, past family history, past social history and problem list                Objective     Past Medical History:   Past Medical History:   Diagnosis Date   • Arm pain    • Bilateral sacroiliitis (HCC) 2021   • Chest pain    • DDD (degenerative disc disease), lumbar    • Dizziness    • Facet arthropathy, lumbar 2021   • History of EKG 2013   • HTN (hypertension)    • Hyperlipidemia    • Limb pain    • Low back pain    • Lower extremity edema    • Lumbar canal stenosis    • Melanoma (HCC) 2017   • Mitral regurgitation     trace   • Nausea and vomiting    • Palpitations    • Peripheral neuropathy    • Sinus bradycardia    • Snoring    • Spinal stenosis    • Sprain of right foot      Past Surgical History:   Past Surgical History:   Procedure Laterality Date   • CARDIAC CATHETERIZATION  06/10/2013   • CARDIAC CATHETERIZATION N/A 2019    Procedure: Coronary angiography;  Surgeon:  Terrance Wood MD;  Location: [facility] CATH INVASIVE LOCATION;  Service: Cardiovascular   • CARDIAC CATHETERIZATION N/A 12/27/2019    Procedure: Left Heart Cath;  Surgeon: Terrance Wood MD;  Location: [facility] CATH INVASIVE LOCATION;  Service: Cardiovascular   • CARDIAC CATHETERIZATION N/A 12/27/2019    Procedure: Left ventriculography;  Surgeon: Terrance Wood MD;  Location: [facility] CATH INVASIVE LOCATION;  Service: Cardiovascular   • CARDIAC CATHETERIZATION N/A 12/27/2019    Procedure: Stent KALYAN coronary;  Surgeon: Terrance Wood MD;  Location: [facility] CATH INVASIVE LOCATION;  Service: Cardiovascular   • COLONOSCOPY     • ENDOSCOPY     • EPIDURAL BLOCK     • FOOT FRACTURE SURGERY Left    • KNEE ARTHROSCOPY Left    • KNEE SURGERY Right    • LUMBAR LAMINECTOMY DISCECTOMY DECOMPRESSION Bilateral 10/29/2020    Procedure: LUMBAR DECOMPRESSION, OPEN LUMBAR DECOMPRESSION, LUMBAR 3 TO LUMBAR 4, LUMBAR 4 TO LUMBAR 5, LUMBAR 5 TO SACRAL 1 (has transitional anatomy);  Surgeon: Jeremy Martinez MD;  Location: [facility] MAIN OR;  Service: Neurosurgery;  Laterality: Bilateral;   • MOHS SURGERY      nose   • SKIN CANCER EXCISION Bilateral 07/05/2017    MELANOMA   • TONSILLECTOMY       Family History:   Family History   Problem Relation Age of Onset   • Coronary artery disease Other    • Hyperlipidemia Other    • Hypertension Other    • Other Other         heart surgery   • Heart disease Father    • Hypertension Father    • Heart disease Sister    • Stroke Maternal Grandmother    • Heart disease Maternal Grandfather    • Malig Hyperthermia Neg Hx      Social History:   Social History     Socioeconomic History   • Marital status:    • Years of education: college graduate   Tobacco Use   • Smoking status: Never Smoker   • Smokeless tobacco: Never Used   • Tobacco comment: caffeine - 2 cups coffee daily    Vaping Use   • Vaping Use: Never used   Substance and Sexual Activity   • Alcohol use: Yes     Comment:  "most days   • Drug use: No   • Sexual activity: Defer       Vital Signs Range for the last 24 hours  Temperature: Temp:  [36.7 °C (98 °F)] 36.7 °C (98 °F)   Temp Source: Temp src: Infrared   BP: BP: (149)/(77) 149/77   Pulse: Heart Rate:  [63] 63   Respirations: Resp:  [16] 16   SPO2: SpO2:  [94 %] 94 %   O2 Amount (l/min):     O2 Devices Device (Oxygen Therapy): room air   Weight: Weight:  [90.7 kg (200 lb)] 90.7 kg (200 lb)     Flowsheet Rows    Flowsheet Row First Filed Value   Admission Height 180.3 cm (71\") Documented at 07/05/2022 0818   Admission Weight 90.7 kg (200 lb) Documented at 07/05/2022 0818          --------------------------------------------------------------------------------    Current Outpatient Medications   Medication Sig Dispense Refill   • atorvastatin (LIPITOR) 40 MG tablet Take 1.5 tablets by mouth Every Night. 135 tablet 3   • B Complex Vitamins (VITAMIN B COMPLEX) capsule capsule Take 1 capsule by mouth Daily. HOLD PRIOR TO SURGERY     • carvedilol (COREG) 12.5 MG tablet Take 12.5 mg by mouth 2 (Two) Times a Day.     • DHEA 50 MG tablet Take 50 mg by mouth 2 (two) times a day. HOLD PRIOR TO SURGERY     • eszopiclone (LUNESTA) 2 MG tablet Take 1 mg by mouth Every Night. Takes 1/2 tablet qhs     • losartan (COZAAR) 100 MG tablet Take 1 tablet by mouth Daily. 90 tablet 3   • Misc Natural Products (7-KETO LEAN) capsule Take 1 tablet by mouth Daily. HOLD PRIOR TO SURGERY     • Omega-3 Fatty Acids (FISH OIL PO) Take 1,280 mg by mouth 2 (Two) Times a Day. HOLD PRIOR TO SURGERY     • ticagrelor (Brilinta) 60 MG tablet tablet Take 1 tablet by mouth 2 (Two) Times a Day. 180 tablet 3   • cholecalciferol (VITAMIN D3) 25 MCG (1000 UT) tablet Take 1,000 Units by mouth 2 (two) times a day. HOLD PRIOR TO SURGERY     • predniSONE (DELTASONE) 20 MG tablet Prednisone 20mg tabs, 3 for 2 days, 2 for 2 days, 1 for 2 days, 1/2 for 2 days take with food or milk 13 tablet 0     Current Facility-Administered " Medications   Medication Dose Route Frequency Provider Last Rate Last Admin   • lidocaine (XYLOCAINE) 1 % injection 0.5 mL  0.5 mL Intradermal Once PRN Víctor Gomez MD       • sodium chloride 0.9 % flush 10 mL  10 mL Intravenous Q12H Víctor Gomez MD       • sodium chloride 0.9 % flush 10 mL  10 mL Intravenous PRN Víctor Gomez MD           --------------------------------------------------------------------------------  Assessment & Plan      Anesthesia Evaluation     Patient summary reviewed and Nursing notes reviewed   NPO Solid Status: > 8 hours  NPO Liquid Status: > 2 hours           Airway   Mallampati: II  TM distance: >3 FB  Neck ROM: full  Dental - normal exam     Pulmonary - negative pulmonary ROS and normal exam    breath sounds clear to auscultation  Cardiovascular - normal exam    Patient on routine beta blocker  Rhythm: regular  Rate: normal    (+) hypertension, valvular problems/murmurs MR, CAD, cardiac stents more than 12 months ago dysrhythmias PVC, hyperlipidemia,   (-) angina, orthopnea, PND, DESOUZA      Neuro/Psych  (+) dizziness/light headedness, numbness,    GI/Hepatic/Renal/Endo - negative ROS     Musculoskeletal     Abdominal    Substance History - negative use     OB/GYN negative ob/gyn ROS         Other   arthritis,    history of cancer               Diagnosis and Plan    Treatment Plan  ASA 3      Procedures: Lumbar Epidural Steroid Injection(LESI), With fluoroscopy,       Anesthetic plan and risks discussed with patient.        Alternative management options include physical therapy, medical management with nonsteroidal anti-inflammatory medications or narcotics, chiropractic manipulation and surgical intervention.    1.  Lumbar epidural steroid injections, up to 3, spaced 1-2 weeks apart.  If pain control is acceptable after 1 or 2 injections, it was discussed with the patient that they may return for the subsequent injections if and when their pain returns.  The risks were  discussed with the patient including failure of relief, worsening pain, Headache (post dural puncture headache), bleeding (epidural hematoma) and infection (epidural abscess or skin infection).  2.  Physical therapy exercises at home as prescribed by physical therapy or from the pain clinic handout (given to the patient).  Continuation of these exercises every day, or multiple times per week, even when the patient has good pain relief, was stressed to the patient as a preventative measure to decrease the frequency and severity of future pain episodes.  3.  Continue pain medicines as already prescribed.  If patient not currently taking any, it is recommended to begin Acetaminophen 1000 mg po q 8 hours.  If other medicines containing Acetaminophen are currently prescribed, maintain daily dose at 3000 mg.    4.  If they can tolerate NSAIDS, it is recommended to take Ibuprofen 600 mg po q 6 hours for 7 days during pain exacerbations.  Alternatively, they may substitute an NSAID of their choice (e.g. Aleve).  This may be taken at the same time as Acetaminophen.  5.  Heat and ice to the affected area as tolerated for pain control.  It was discussed that heating pads can cause burns.  6.  Daily low impact exercise such as walking or water exercise was recommended to maintain overall health and aid in weight control.   7.  Follow up as needed for subsequent injections.  8.  Patient was counseled to abstain from tobacco products.      Diagnosis     * Spinal stenosis of lumbar region with neurogenic claudication [M48.062]

## 2022-07-06 NOTE — PROGRESS NOTES
"Subjective   Patient ID: Avni Olivo III is a 67 y.o. male is here today for follow-up of chronic midline low back pain. He was last seen in office 04/13/2022. He has no recent imaging.  He underwent L3-S1 lumbar decompression in October 2020 with Dr. Martinez.    Today Mr. Olivo reports he is doing well.  He reports a recent flareup of low back pain in which he received an epidural for on Tuesday 7/5/2022, ordered by pain management.  He reports significant improvement in his back pain.  He continues to deny any lower extremity pain, weakness, numbness, or tingling. He denies any bowel or bladder incontinence. He reports he is not taking any prescription pain medication, but does on occasion take Tylenol arthritis which provides him with mild to moderate relief.  He denies any new pain since the epidural.      History of Present Illness    The following portions of the patient's history were reviewed and updated as appropriate: allergies, current medications, past medical history, past social history, past surgical history and problem list.    Review of Systems   Constitutional: Positive for activity change.   Musculoskeletal: Positive for back pain.   Neurological: Negative for weakness and numbness.   Psychiatric/Behavioral: Negative for sleep disturbance.       Objective     Vitals:    07/07/22 1142   BP: 142/88   Pulse: 88   Temp: 98.6 °F (37 °C)   SpO2: 97%   Weight: 90.7 kg (200 lb)   Height: 180.3 cm (71\")     Body mass index is 27.89 kg/m².      Physical Exam  Vitals reviewed.   Constitutional:       General: He is awake. He is not in acute distress.     Appearance: Normal appearance. He is not ill-appearing, toxic-appearing or diaphoretic.   Pulmonary:      Effort: Pulmonary effort is normal.   Musculoskeletal:         General: Normal range of motion.   Skin:     General: Skin is warm and dry.   Neurological:      General: No focal deficit present.      Mental Status: He is alert and oriented to person, " place, and time.      Gait: Gait is intact.      Deep Tendon Reflexes: Strength normal.   Psychiatric:         Mood and Affect: Mood normal.         Speech: Speech normal.         Behavior: Behavior normal. Behavior is cooperative.         Thought Content: Thought content normal.         Judgment: Judgment normal.       Neurologic Exam     Mental Status   Oriented to person, place, and time.   Attention: normal. Concentration: normal.   Speech: speech is normal   Level of consciousness: alert  Knowledge: good.   Normal comprehension.     Motor Exam   Muscle bulk: normal  Overall muscle tone: normal    Strength   Strength 5/5 throughout.     Gait, Coordination, and Reflexes     Gait  Gait: normal        Assessment & Plan   Independent Review of Radiographic Studies:      I personally reviewed the images from the following studies:  No new imaging to review    Medical Decision Making:      Mr. Olivo is here today for chronic low back pain follow-up.  He was last seen in the office on 4/13/2022 with Dr. Martinez. He underwent L3-S1 lumbar decompression in October 2020 with Dr. Martinez.  The patient has been intermittently receiving lumbar epidurals and has also undergone ablation type testing that provided him with modest improvement, however has not undergone a radiofrequency ablation as of yet.  He recently underwent lumbar epidural on Tuesday of this week with significant improvement.  He reports that he will be going out of town for 2 weeks on Saturday and was afraid that he would not be able to get in for an injection appointment before then, but was able to get an appointment to have this done.  He states he is still on the fence about whether or not he would like to move forward with the radiofrequency ablation or if he would like to just get a second epidural injection as this 1 has seemed to help him significantly.  Advised the patient that he could just call us letting us know what he decides to do and we  could order it appropriately for him.  Patient expressed concern about possible flareup of back pain while he is on a 2-week family vacation.  Advised him that I could prescribe him a 6-day Medrol Dosepak along with a few days worth of muscle relaxers in the event that this does happen.  Based on Dr. Martinez's last office note, the patient is to follow-up with him in 9 months or sooner if his back pain worsens or he develops any new symptoms.  At this time, I think it is okay to leave the appointment as is and encourage the patient to follow-up with any questions, concerns, or worsening symptoms.  Patient verbalized understanding is in agreement with the above plan.       Diagnoses and all orders for this visit:    1. Chronic midline low back pain without sciatica (Primary)    2. DDD (degenerative disc disease), lumbar    Other orders  -     methylPREDNISolone (MEDROL) 4 MG dose pack; Take as directed on package instructions.  Dispense: 1 each; Refill: 0  -     methocarbamol (ROBAXIN) 750 MG tablet; Take 1 tablet by mouth 4 (Four) Times a Day As Needed for Muscle Spasms.  Dispense: 40 tablet; Refill: 0      Return in about 9 months (around 4/7/2023) for Dr. Martinez.

## 2022-07-07 ENCOUNTER — OFFICE VISIT (OUTPATIENT)
Dept: NEUROSURGERY | Facility: CLINIC | Age: 67
End: 2022-07-07

## 2022-07-07 VITALS
HEIGHT: 71 IN | WEIGHT: 200 LBS | HEART RATE: 88 BPM | DIASTOLIC BLOOD PRESSURE: 88 MMHG | TEMPERATURE: 98.6 F | SYSTOLIC BLOOD PRESSURE: 142 MMHG | BODY MASS INDEX: 28 KG/M2 | OXYGEN SATURATION: 97 %

## 2022-07-07 DIAGNOSIS — M51.36 DDD (DEGENERATIVE DISC DISEASE), LUMBAR: ICD-10-CM

## 2022-07-07 DIAGNOSIS — M54.50 CHRONIC MIDLINE LOW BACK PAIN WITHOUT SCIATICA: Primary | ICD-10-CM

## 2022-07-07 DIAGNOSIS — G89.29 CHRONIC MIDLINE LOW BACK PAIN WITHOUT SCIATICA: Primary | ICD-10-CM

## 2022-07-07 PROCEDURE — 99213 OFFICE O/P EST LOW 20 MIN: CPT | Performed by: NURSE PRACTITIONER

## 2022-07-07 RX ORDER — METHOCARBAMOL 750 MG/1
750 TABLET, FILM COATED ORAL 4 TIMES DAILY PRN
Qty: 40 TABLET | Refills: 0 | Status: ON HOLD | OUTPATIENT
Start: 2022-07-07 | End: 2022-09-15

## 2022-07-07 RX ORDER — METHYLPREDNISOLONE 4 MG/1
TABLET ORAL
Qty: 1 EACH | Refills: 0 | Status: SHIPPED | OUTPATIENT
Start: 2022-07-07 | End: 2022-08-17

## 2022-07-26 ENCOUNTER — TELEPHONE (OUTPATIENT)
Dept: NEUROSURGERY | Facility: CLINIC | Age: 67
End: 2022-07-26

## 2022-07-26 NOTE — TELEPHONE ENCOUNTER
Caller: Avni Olivo III    Relationship to patient: Self    Best call back number: [phone]    Patient is needing: PATIENT IS WANTING TO GO AHEAD WITH ABLATION, IF UNABLE TO BE SEEN SOON, HE WOULD LIKE TO HAVE THE EPIDURAL    LAST OV NOTES 07/07/22    PLEASE CALL THE PATIENT TO ADVISE

## 2022-07-27 DIAGNOSIS — M48.062 SPINAL STENOSIS OF LUMBAR REGION WITH NEUROGENIC CLAUDICATION: ICD-10-CM

## 2022-07-27 DIAGNOSIS — Z98.890 STATUS POST LUMBAR SPINE OPERATIVE PROCEDURE FOR DECOMPRESSION OF SPINAL CORD: ICD-10-CM

## 2022-07-27 DIAGNOSIS — M47.816 FACET ARTHROPATHY, LUMBAR: ICD-10-CM

## 2022-07-27 DIAGNOSIS — M51.36 DDD (DEGENERATIVE DISC DISEASE), LUMBAR: Primary | ICD-10-CM

## 2022-07-27 NOTE — TELEPHONE ENCOUNTER
"I called and spoek with Mr. Olivo and advised him per Cassidy VENTURA \"Ok. So I will put in an OP pain management to see Dr. Monte. Can you please call the patient and let him know all of this. Thanks \" He voiced understanding.   "

## 2022-07-27 NOTE — TELEPHONE ENCOUNTER
I called and spoke with Mr. Olivo and he reports he would like to see Dr. Barth for pain management to do the ablation as he reports he is the only one who will do it.

## 2022-08-05 ENCOUNTER — ANESTHESIA EVENT (OUTPATIENT)
Dept: PAIN MEDICINE | Facility: HOSPITAL | Age: 67
End: 2022-08-05

## 2022-08-05 ENCOUNTER — HOSPITAL ENCOUNTER (OUTPATIENT)
Dept: PAIN MEDICINE | Facility: HOSPITAL | Age: 67
Discharge: HOME | End: 2022-08-05

## 2022-08-05 ENCOUNTER — HOSPITAL ENCOUNTER (OUTPATIENT)
Dept: GENERAL RADIOLOGY | Facility: HOSPITAL | Age: 67
Discharge: HOME | End: 2022-08-05

## 2022-08-05 ENCOUNTER — ANESTHESIA (OUTPATIENT)
Dept: PAIN MEDICINE | Facility: HOSPITAL | Age: 67
End: 2022-08-05

## 2022-08-05 VITALS
TEMPERATURE: 98.7 F | SYSTOLIC BLOOD PRESSURE: 174 MMHG | DIASTOLIC BLOOD PRESSURE: 97 MMHG | OXYGEN SATURATION: 96 % | HEART RATE: 55 BPM | RESPIRATION RATE: 14 BRPM

## 2022-08-05 DIAGNOSIS — M54.50 LUMBAR PAIN: ICD-10-CM

## 2022-08-05 DIAGNOSIS — M48.062 SPINAL STENOSIS OF LUMBAR REGION WITH NEUROGENIC CLAUDICATION: ICD-10-CM

## 2022-08-05 PROCEDURE — 25010000002 METHYLPREDNISOLONE PER 80 MG: Performed by: ANESTHESIOLOGY

## 2022-08-05 PROCEDURE — 0 IOPAMIDOL 41 % SOLUTION: Performed by: ANESTHESIOLOGY

## 2022-08-05 PROCEDURE — 77003 FLUOROGUIDE FOR SPINE INJECT: CPT

## 2022-08-05 RX ORDER — MIDAZOLAM HYDROCHLORIDE 1 MG/ML
1 INJECTION INTRAMUSCULAR; INTRAVENOUS AS NEEDED
Status: DISCONTINUED | OUTPATIENT
Start: 2022-08-05 | End: 2022-08-06 | Stop reason: HOSPADM

## 2022-08-05 RX ORDER — FENTANYL CITRATE 50 UG/ML
50 INJECTION, SOLUTION INTRAMUSCULAR; INTRAVENOUS AS NEEDED
Status: DISCONTINUED | OUTPATIENT
Start: 2022-08-05 | End: 2022-08-06 | Stop reason: HOSPADM

## 2022-08-05 RX ORDER — LIDOCAINE HYDROCHLORIDE 10 MG/ML
1 INJECTION, SOLUTION INFILTRATION; PERINEURAL ONCE AS NEEDED
Status: DISCONTINUED | OUTPATIENT
Start: 2022-08-05 | End: 2022-08-06 | Stop reason: HOSPADM

## 2022-08-05 RX ORDER — METHYLPREDNISOLONE ACETATE 80 MG/ML
80 INJECTION, SUSPENSION INTRA-ARTICULAR; INTRALESIONAL; INTRAMUSCULAR; SOFT TISSUE ONCE
Status: COMPLETED | OUTPATIENT
Start: 2022-08-05 | End: 2022-08-05

## 2022-08-05 RX ORDER — TRAMADOL HYDROCHLORIDE 50 MG/1
50 TABLET ORAL EVERY 6 HOURS PRN
Status: ON HOLD | COMMUNITY
End: 2022-09-15

## 2022-08-05 RX ORDER — SODIUM CHLORIDE 0.9 % (FLUSH) 0.9 %
1-10 SYRINGE (ML) INJECTION AS NEEDED
Status: DISCONTINUED | OUTPATIENT
Start: 2022-08-05 | End: 2022-08-06 | Stop reason: HOSPADM

## 2022-08-05 RX ADMIN — IOPAMIDOL 10 ML: 408 INJECTION, SOLUTION INTRATHECAL at 10:43

## 2022-08-05 RX ADMIN — METHYLPREDNISOLONE ACETATE 80 MG: 80 INJECTION, SUSPENSION INTRA-ARTICULAR; INTRALESIONAL; INTRAMUSCULAR; SOFT TISSUE at 10:43

## 2022-08-05 NOTE — H&P
[facility]    History and Physical    Patient Name: Avni Olivo III  :  1955  MRN:  [id number]  Date of Admission: 2022    Subjective     Patient is a 67-year-old male who has pain which is mainly confined to his lower back.  He reports approximate 75% relief following his last injection.  Unfortunately, his pain is gradually returned.  Today his pain ranges between a 7 and a 9/10.  He is here for lumbar epidural steroid injection #2.    The following portions of the patients history were reviewed and updated as appropriate: current medications, allergies, past medical history, past surgical history, past family history, past social history and problem list                Objective     Past Medical History:   Past Medical History:   Diagnosis Date   • Arm pain    • Bilateral sacroiliitis (HCC) 2021   • Chest pain    • DDD (degenerative disc disease), lumbar    • Dizziness    • Facet arthropathy, lumbar 2021   • History of EKG 2013   • HTN (hypertension)    • Hyperlipidemia    • Limb pain    • Low back pain    • Lower extremity edema    • Lumbar canal stenosis    • Melanoma (HCC) 2017   • Mitral regurgitation     trace   • Nausea and vomiting    • Palpitations    • Peripheral neuropathy    • Sinus bradycardia    • Snoring    • Spinal stenosis    • Sprain of right foot      Past Surgical History:   Past Surgical History:   Procedure Laterality Date   • CARDIAC CATHETERIZATION  06/10/2013   • CARDIAC CATHETERIZATION N/A 2019    Procedure: Coronary angiography;  Surgeon: Terrance Wood MD;  Location: [facility] INVASIVE LOCATION;  Service: Cardiovascular   • CARDIAC CATHETERIZATION N/A 2019    Procedure: Left Heart Cath;  Surgeon: Terrance Wood MD;  Location: [facility] CATH INVASIVE LOCATION;  Service: Cardiovascular   • CARDIAC CATHETERIZATION N/A 2019    Procedure: Left ventriculography;  Surgeon: Terrance Wood MD;  Location: [facility] CATH  "INVASIVE LOCATION;  Service: Cardiovascular   • CARDIAC CATHETERIZATION N/A 12/27/2019    Procedure: Stent KALYAN coronary;  Surgeon: Terrance Wood MD;  Location:  [facility] CATH INVASIVE LOCATION;  Service: Cardiovascular   • COLONOSCOPY     • ENDOSCOPY     • EPIDURAL BLOCK     • FOOT FRACTURE SURGERY Left    • KNEE ARTHROSCOPY Left    • KNEE SURGERY Right    • LUMBAR LAMINECTOMY DISCECTOMY DECOMPRESSION Bilateral 10/29/2020    Procedure: LUMBAR DECOMPRESSION, OPEN LUMBAR DECOMPRESSION, LUMBAR 3 TO LUMBAR 4, LUMBAR 4 TO LUMBAR 5, LUMBAR 5 TO SACRAL 1 (has transitional anatomy);  Surgeon: Jeremy Martinez MD;  [facility];  Service: Neurosurgery;  Laterality: Bilateral;   • MOHS SURGERY      nose   • SKIN CANCER EXCISION Bilateral 07/05/2017    MELANOMA   • TONSILLECTOMY       Family History:   Family History   Problem Relation Age of Onset   • Coronary artery disease Other    • Hyperlipidemia Other    • Hypertension Other    • Other Other         heart surgery   • Heart disease Father    • Hypertension Father    • Heart disease Sister    • Stroke Maternal Grandmother    • Heart disease Maternal Grandfather    • Malig Hyperthermia Neg Hx      Social History:   Social History     Socioeconomic History   • Marital status:    • Years of education: college graduate   Tobacco Use   • Smoking status: Never Smoker   • Smokeless tobacco: Never Used   • Tobacco comment: caffeine - 2 cups coffee daily    Vaping Use   • Vaping Use: Never used   Substance and Sexual Activity   • Alcohol use: Yes     Comment: most days   • Drug use: No   • Sexual activity: Defer       Vital Signs Range for the last 24 hours  Temperature:     Temp Source:     BP:     Pulse:     Respirations:     SPO2:     O2 Amount (l/min):     O2 Devices     Weight:       Flowsheet Rows    Flowsheet Row First Filed Value   Admission Height 180.3 cm (71\") Documented at 07/05/2022 0818   Admission Weight 90.7 kg (200 lb) Documented at " 07/05/2022 0818          --------------------------------------------------------------------------------    Current Outpatient Medications   Medication Sig Dispense Refill   • atorvastatin (LIPITOR) 40 MG tablet Take 1.5 tablets by mouth Every Night. 135 tablet 3   • B Complex Vitamins (VITAMIN B COMPLEX) capsule capsule Take 1 capsule by mouth Daily. HOLD PRIOR TO SURGERY     • carvedilol (COREG) 12.5 MG tablet Take 12.5 mg by mouth 2 (Two) Times a Day.     • DHEA 50 MG tablet Take 50 mg by mouth 2 (two) times a day. HOLD PRIOR TO SURGERY     • eszopiclone (LUNESTA) 2 MG tablet Take 1 mg by mouth Every Night. Takes 1/2 tablet qhs     • losartan (COZAAR) 100 MG tablet Take 1 tablet by mouth Daily. 90 tablet 3   • methocarbamol (ROBAXIN) 750 MG tablet Take 1 tablet by mouth 4 (Four) Times a Day As Needed for Muscle Spasms. 40 tablet 0   • methylPREDNISolone (MEDROL) 4 MG dose pack Take as directed on package instructions. 1 each 0   • Misc Natural Products (7-KETO LEAN) capsule Take 1 tablet by mouth Daily. HOLD PRIOR TO SURGERY     • Omega-3 Fatty Acids (FISH OIL PO) Take 1,280 mg by mouth 2 (Two) Times a Day. HOLD PRIOR TO SURGERY     • ticagrelor (Brilinta) 60 MG tablet tablet Take 1 tablet by mouth 2 (Two) Times a Day. 180 tablet 3     No current facility-administered medications for this encounter.       --------------------------------------------------------------------------------  Assessment & Plan      Anesthesia Evaluation     Patient summary reviewed and Nursing notes reviewed   NPO Solid Status: > 8 hours  NPO Liquid Status: > 2 hours           Airway   Mallampati: II  TM distance: >3 FB  Neck ROM: full  Dental - normal exam     Pulmonary - negative pulmonary ROS and normal exam    breath sounds clear to auscultation  Cardiovascular - normal exam    Patient on routine beta blocker  Rhythm: regular  Rate: normal    (+) hypertension, valvular problems/murmurs MR, CAD, cardiac stents more than 12 months  ago dysrhythmias PVC, hyperlipidemia,   (-) angina, orthopnea, PND, DESOUZA      Neuro/Psych  (+) dizziness/light headedness, numbness,    GI/Hepatic/Renal/Endo - negative ROS     Musculoskeletal     Abdominal    Substance History - negative use     OB/GYN negative ob/gyn ROS         Other   arthritis,    history of cancer               Diagnosis and Plan      Treatment Plan  ASA 3   Patient has had previous injection/procedure with % improvement.   Procedures: Lumbar Epidural Steroid Injection(LESI), With fluoroscopy,       Anesthetic plan and risks discussed with patient.        Alternative management options include physical therapy, medical management with nonsteroidal anti-inflammatory medications or narcotics, chiropractic manipulation and surgical intervention.    1.  Lumbar epidural steroid injections, up to 3, spaced 1-2 weeks apart.  If pain control is acceptable after 1 or 2 injections, it was discussed with the patient that they may return for the subsequent injections if and when their pain returns.  The risks were discussed with the patient including failure of relief, worsening pain, Headache (post dural puncture headache), bleeding (epidural hematoma) and infection (epidural abscess or skin infection).  2.  Physical therapy exercises at home as prescribed by physical therapy or from the pain clinic handout (given to the patient).  Continuation of these exercises every day, or multiple times per week, even when the patient has good pain relief, was stressed to the patient as a preventative measure to decrease the frequency and severity of future pain episodes.  3.  Continue pain medicines as already prescribed.  If patient not currently taking any, it is recommended to begin Acetaminophen 1000 mg po q 8 hours.  If other medicines containing Acetaminophen are currently prescribed, maintain daily dose at 3000 mg.    4.  If they can tolerate NSAIDS, it is recommended to take Ibuprofen 600 mg po q 6  hours for 7 days during pain exacerbations.  Alternatively, they may substitute an NSAID of their choice (e.g. Aleve).  This may be taken at the same time as Acetaminophen.  5.  Heat and ice to the affected area as tolerated for pain control.  It was discussed that heating pads can cause burns.  6.  Daily low impact exercise such as walking or water exercise was recommended to maintain overall health and aid in weight control.   7.  Follow up as needed for subsequent injections.  8.  Patient was counseled to abstain from tobacco products.      Diagnosis     * Spinal stenosis of lumbar region with neurogenic claudication [M48.062]

## 2022-08-05 NOTE — ANESTHESIA PROCEDURE NOTES
PAIN Epidural block      Patient reassessed immediately prior to procedure    Patient location during procedure: pain clinic  Start Time: 8/5/2022 10:30 AM  Stop Time: 8/5/2022 10:45 AM  Indication:procedure for pain  Performed By  Anesthesiologist: Víctor Gomez MD  Preanesthetic Checklist  Completed: patient identified, site marked, risks and benefits discussed, surgical consent, monitors and equipment checked, pre-op evaluation and timeout performed  Additional Notes  Post-Op Diagnosis Codes:     * Spinal stenosis of lumbar region with neurogenic claudication (M48.062)    The patient was observed in recovery with no evidence of neurological deficits or other problems. All questions were answered. The patient was discharged with appropriate instructions.  Prep:  Pt Position:prone  Sterile Tech:cap, gloves, mask and sterile barrier  Prep:chlorhexidine gluconate and isopropyl alcohol  Monitoring:blood pressure monitoring, continuous pulse oximetry and EKG  Procedure:Sedation: no     Approach:right paramedian  Guidance: fluoroscopy  Location:lumbar  Level:3-4 (Interlaminar)  Needle Type:Tuohy  Needle Gauge:20 G  Aspiration:negative  Medications:  Preservative Free Saline:3mL  Isovue:2mL  Comments:Isovue dye spread was consistent with epidural placement.Depomedrol:80 mg  Post Assessment:  Dressing:occlusive dressing applied  Pt Tolerance:patient tolerated the procedure well with no apparent complications  Complications:no

## 2022-08-08 ENCOUNTER — TELEPHONE (OUTPATIENT)
Dept: NEUROSURGERY | Facility: CLINIC | Age: 67
End: 2022-08-08

## 2022-08-08 ENCOUNTER — TELEPHONE (OUTPATIENT)
Dept: PAIN MEDICINE | Facility: CLINIC | Age: 67
End: 2022-08-08

## 2022-08-08 NOTE — TELEPHONE ENCOUNTER
Provider: HIGDON  Caller: EARNESTINE  Relationship to Patient: SELF  Pharmacy: [facility]  Phone Number:  [phone]  Reason for Call: LOW BACK PAIN, FEELS DIFFERENT THAN BEFORE, LAST EPIDURAL WAS 8/5/2022,  When was the patient last seen: 7/5/2022  When did it start: 7/21/2022- WAS BENT OVERSTRAINING TO GET THING PICKED UP BEFORE STORM CAME IN AND HAS SHARP SHOOTING PAIN  Where is it located: LOW BACK  Characteristics of symptom/severity: SHARP SHOOTING PAIN 9-10/10 AND AND ACHING 7/10  Timing- Is it constant or intermittent: CONSTANT WITH INTERMITTENT SHOOTING PAIN   What makes it worse: IN AM AND CHANGING FROM STANDING TO SITTING,  What makes it better:   What therapies/medications have you tried: PAIN  MED (TRAMADOL) BETTER, EPIDURAL HELPED A LITTLE  WAS TAKING MUSCLE RELAXER WITH TRAMADOL.  LAST DOES 8/7/2022.   PREDNISONE DID NOT HELP.    IF MRI IS NEEDED PT IS REQUESTING IT TO BE DONE BEFORE VISIT.    ASAP, PLEASE

## 2022-08-09 NOTE — TELEPHONE ENCOUNTER
"Per Dr. Martinez, \" Schedule a televisit with me so I can decide if he needs new imaging.\"    I called and spoke with the patient and scheduled a televisit.   "

## 2022-08-11 ENCOUNTER — OFFICE VISIT (OUTPATIENT)
Dept: NEUROSURGERY | Facility: CLINIC | Age: 67
End: 2022-08-11

## 2022-08-11 DIAGNOSIS — Z98.890 STATUS POST LUMBAR SPINE OPERATIVE PROCEDURE FOR DECOMPRESSION OF SPINAL CORD: Primary | ICD-10-CM

## 2022-08-11 DIAGNOSIS — R29.898 BILATERAL LEG WEAKNESS: ICD-10-CM

## 2022-08-11 DIAGNOSIS — M54.50 CHRONIC MIDLINE LOW BACK PAIN WITHOUT SCIATICA: ICD-10-CM

## 2022-08-11 DIAGNOSIS — G89.29 CHRONIC MIDLINE LOW BACK PAIN WITHOUT SCIATICA: ICD-10-CM

## 2022-08-11 DIAGNOSIS — M51.36 DDD (DEGENERATIVE DISC DISEASE), LUMBAR: ICD-10-CM

## 2022-08-11 PROCEDURE — 99442 PR PHYS/QHP TELEPHONE EVALUATION 11-20 MIN: CPT | Performed by: NEUROLOGICAL SURGERY

## 2022-08-11 NOTE — PROGRESS NOTES
Subjective   Patient ID: Avni Olivo III is a 67 y.o. male is here today for follow-up for back pain.    You have chosen to receive care through a telephone visit. Do you consent to use a telephone visit for your medical care today? Yes    Mr. Olivo reports the injection that he had on 8/5/22 did help some but not a lot. He reports midline low back pain with numbness and tingling in his feet and his toes, but doesn't really notice any in his legs. He denies any leg pain. He reports pain is worse in the morning and when he is going from a standing to sitting or vice versa. He is taking tramadol for pain.     Unable to complete visit using a video connection to the patient. A phone visit was used to complete this visits. Total time of discussion was 11 minutes.    I was calling from the hospital.  The patient was at home.  He has been having increasingly more pain in the back and increased numbness and weakness in the legs.  Not a lot more actual pain down the legs however.  He had 2 epidural blocks 1 and early July in the last 1 in early August.  At the end of the July he was bending over to  something and felt more pain afterwards.  He has baseline gastrocnemius weakness but he feels the symptoms are different so I think it is reasonable to go ahead and get another MRI and plain x-rays and have him come back to discuss it.  We might try another ablation but I like to see what structurally has changed if anything at all.      Back Pain  This is a chronic problem. The current episode started more than 1 year ago. The problem occurs constantly. The problem has been gradually worsening since onset. The pain is present in the lumbar spine. The quality of the pain is described as aching, shooting and stabbing. The pain does not radiate. The pain is at a severity of 7/10. The pain is moderate. The symptoms are aggravated by bending, sitting, standing and position. Associated symptoms include numbness and tingling.  Pertinent negatives include no bladder incontinence, bowel incontinence, leg pain or weakness.       The following portions of the patient's history were reviewed and updated as appropriate: allergies, current medications, past family history, past medical history, past social history, past surgical history and problem list.    Review of Systems   Constitutional: Negative for activity change.   Gastrointestinal: Negative for bowel incontinence.   Genitourinary: Negative for bladder incontinence.   Musculoskeletal: Positive for back pain.   Neurological: Positive for tingling and numbness. Negative for weakness.   Psychiatric/Behavioral: Positive for sleep disturbance.           Objective     There were no vitals filed for this visit.  There is no height or weight on file to calculate BMI.      Physical Exam  Neurologic Exam        Assessment & Plan   Independent Review of Radiographic Studies:      I personally reviewed the images from the following studies.    The new MRIs and plain x-rays done on 7/29/2021 show postoperative changes but no instability.  The spinal canal seems fairly patent from L3-S1.  There is a little bit of a residual synovial cyst at L4-5 on the left.  Agree with the report.    Medical Decision Making:      We will go ahead and get a new MRI with and without contrast and flexion-extension x-rays and have him come back to discuss it.  Perhaps another ablation could be considered but we will have to see what he looks like structurally first.      Diagnoses and all orders for this visit:    1. Status post lumbar spine operative procedure for decompression of spinal cord (Primary)  -     MRI Lumbar Spine With & Without Contrast; Future  -     XR Spine Lumbar Complete With Flex & Ext; Future    2. Chronic midline low back pain without sciatica  -     MRI Lumbar Spine With & Without Contrast; Future  -     XR Spine Lumbar Complete With Flex & Ext; Future    3. DDD (degenerative disc disease),  lumbar  -     MRI Lumbar Spine With & Without Contrast; Future  -     XR Spine Lumbar Complete With Flex & Ext; Future    4. Bilateral leg weakness  -     MRI Lumbar Spine With & Without Contrast; Future  -     XR Spine Lumbar Complete With Flex & Ext; Future      Return in about 2 weeks (around 8/25/2022) for After MRI and x-rays.

## 2022-08-12 ENCOUNTER — TELEPHONE (OUTPATIENT)
Dept: NEUROSURGERY | Facility: CLINIC | Age: 67
End: 2022-08-12

## 2022-08-12 NOTE — TELEPHONE ENCOUNTER
"Per Leo's conversation with the patient, \"I called pt and gave him appt info. I explained to him that Dr LEHMAN's schedule is really overbooked right now and that is why the appt is scheduled in Oct. I advised him that if the MRI and XR would show anything concerning we would contact him. Pt states he hopes someone will contact him and that if something is wrong he will drive somewhere else to see another neurosurgeon because waiting until Oct is not going to work.\"  "

## 2022-08-12 NOTE — TELEPHONE ENCOUNTER
PT HAD A TELEHEALTH VISIT WITH DR LEHMAN ON 8/11/22 AND PER DR LEHMAN Return in about 2 weeks (around 8/25/2022) for After MRI and x-rays.

## 2022-08-17 ENCOUNTER — OFFICE VISIT (OUTPATIENT)
Dept: PAIN MEDICINE | Facility: CLINIC | Age: 67
End: 2022-08-17

## 2022-08-17 ENCOUNTER — PREP FOR SURGERY (OUTPATIENT)
Dept: SURGERY | Facility: SURGERY CENTER | Age: 67
End: 2022-08-17

## 2022-08-17 VITALS
HEART RATE: 65 BPM | OXYGEN SATURATION: 97 % | WEIGHT: 194 LBS | RESPIRATION RATE: 16 BRPM | HEIGHT: 71 IN | BODY MASS INDEX: 27.16 KG/M2 | SYSTOLIC BLOOD PRESSURE: 153 MMHG | DIASTOLIC BLOOD PRESSURE: 92 MMHG | TEMPERATURE: 97.7 F

## 2022-08-17 DIAGNOSIS — G89.4 CHRONIC PAIN SYNDROME: ICD-10-CM

## 2022-08-17 DIAGNOSIS — M47.817 LUMBOSACRAL SPONDYLOSIS WITHOUT MYELOPATHY: Primary | ICD-10-CM

## 2022-08-17 DIAGNOSIS — M51.36 DDD (DEGENERATIVE DISC DISEASE), LUMBAR: ICD-10-CM

## 2022-08-17 DIAGNOSIS — M47.816 SPONDYLOSIS OF LUMBAR REGION WITHOUT MYELOPATHY OR RADICULOPATHY: Primary | ICD-10-CM

## 2022-08-17 PROCEDURE — 99204 OFFICE O/P NEW MOD 45 MIN: CPT | Performed by: PHYSICIAN ASSISTANT

## 2022-08-17 RX ORDER — SODIUM CHLORIDE 0.9 % (FLUSH) 0.9 %
10 SYRINGE (ML) INJECTION EVERY 12 HOURS SCHEDULED
Status: CANCELLED | OUTPATIENT
Start: 2022-08-17

## 2022-08-17 RX ORDER — SODIUM CHLORIDE 0.9 % (FLUSH) 0.9 %
10 SYRINGE (ML) INJECTION AS NEEDED
Status: CANCELLED | OUTPATIENT
Start: 2022-08-17

## 2022-08-17 NOTE — PROGRESS NOTES
CHIEF COMPLAINT    New patient referred by for evaluation of back pain    Subjective   Avni Olivo III is a 67 y.o. male.   He presents to the office for initial pain evaluation for chronic low back pain. He was referred here by JESSICA Pinto.He reports pain initiating over 12+ years ago without inciting trauma or event. This patient illustrates pain that originates in the midline lumbar spine extending into the bilateral paraspinalis muscles without LE radicular symptoms. Patient reports very rare tingling and numbness affecting the bilateral feet. Chronic pain is described as a throbbing, dully achey sensation which can occasionally be sharp in nature. Reports lumbar decompression without hardware on 10/29/20 with Dr Vaughan. Patient reinjured his lumbar spine in July and is scheduled for updated lumbar MRI for 8/30/22 ordered by Dr Martinez.  Surgical consult on 8/11/22 is with recommendation to consider ablation however Dr Martinez would like to see what his lumbar spine looks like structurally first.    Aggravating factors include twisting, repetitive movements and household chores.  Alleviating factors include LESI, resting, and being seated.    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  Patient has recently undergone LESI at [facility] on 7/5/22 and 8/5/22 with 75% pain relief which is ongoing.    Previous treatments have included PT with minimal pain relief; ice therapy and TENS unit with slight relief.  Recent injective therapy has provided moderate pain relief.  Patient denies any chiropractic, massage or aquatic therapy.    Today pain is 4/10 VAS in severity.      Back Pain  This is a chronic problem. The current episode started more than 1 year ago. The problem occurs constantly. The problem has been waxing and waning since onset. The pain is present in the lumbar  spine. The quality of the pain is described as aching, stabbing and shooting. The pain radiates to the left foot and right foot. The pain is at a severity of 4/10. The pain is moderate. The pain is the same all the time. The symptoms are aggravated by bending, sitting, standing, twisting and position. Associated symptoms include numbness (bilateral feet occasionally), tingling (bilateral feet occasionally) and weakness (Bilateral legs). Pertinent negatives include no chest pain, dysuria or fever. He has tried home exercises, heat, ice and NSAIDs for the symptoms. The treatment provided no relief.        PEG Assessment   What number best describes your pain on average in the past week?4  What number best describes how, during the past week, pain has interfered with your enjoyment of life?4  What number best describes how, during the past week, pain has interfered with your general activity?  4        Current Outpatient Medications:   •  atorvastatin (LIPITOR) 40 MG tablet, Take 1.5 tablets by mouth Every Night., Disp: 135 tablet, Rfl: 3  •  B Complex Vitamins (VITAMIN B COMPLEX) capsule capsule, Take 1 capsule by mouth Daily. HOLD PRIOR TO SURGERY, Disp: , Rfl:   •  carvedilol (COREG) 12.5 MG tablet, Take 12.5 mg by mouth 2 (Two) Times a Day., Disp: , Rfl:   •  DHEA 50 MG tablet, Take 50 mg by mouth 2 (two) times a day. HOLD PRIOR TO SURGERY, Disp: , Rfl:   •  eszopiclone (LUNESTA) 2 MG tablet, Take 1 mg by mouth Every Night. Takes 1/2 tablet qhs, Disp: , Rfl:   •  losartan (COZAAR) 100 MG tablet, Take 1 tablet by mouth Daily., Disp: 90 tablet, Rfl: 3  •  Misc Natural Products (7-KETO LEAN) capsule, Take 1 tablet by mouth Daily. HOLD PRIOR TO SURGERY, Disp: , Rfl:   •  Omega-3 Fatty Acids (FISH OIL PO), Take 1,280 mg by mouth 2 (Two) Times a Day. HOLD PRIOR TO SURGERY, Disp: , Rfl:   •  ticagrelor (Brilinta) 60 MG tablet tablet, Take 1 tablet by mouth 2 (Two) Times a Day., Disp: 180 tablet, Rfl: 3  •  methocarbamol  (ROBAXIN) 750 MG tablet, Take 1 tablet by mouth 4 (Four) Times a Day As Needed for Muscle Spasms., Disp: 40 tablet, Rfl: 0  •  traMADol (ULTRAM) 50 MG tablet, Take 50 mg by mouth Every 6 (Six) Hours As Needed for Moderate Pain ., Disp: , Rfl:     The following portions of the patient's history were reviewed and updated as appropriate: allergies, current medications, past family history, past medical history, past social history, past surgical history and problem list.       REVIEW OF PERTINENT MEDICAL DATA--Reviewed Lumbar MRI 7/29/21    IMPRESSION:  1.  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 Careful correlation prior to any intervention is  required given the presence of transitional anatomy.  2.  The patient has undergone decompressive laminectomies at L4-L5 and  L5-S1, which has reduced the canal stenosis and lateral recess narrowing  at each level.  3.  A small synovial cyst was present to the left at L4-L5 and the prior  examination. On the current examination there is an area of increased  signal intensity on the axial T2 sequence anterior and medial to the  facet. This may represent a small residual or recurrent synovial cyst.  This curvilinear area of T2 hyperintensity measures approximately 8 x 3  mm in size and contributes to lateral recess narrowing on the left.     This report was finalized on 7/30/2021 10:25 AM by Dr. Ryder Sahh    Review of Systems   Constitutional: Negative for fever.   HENT: Negative for congestion.    Eyes: Negative for visual disturbance.   Respiratory: Negative for shortness of breath.    Cardiovascular: Negative for chest pain.   Gastrointestinal: Negative for constipation and diarrhea.   Genitourinary: Negative for difficulty urinating and dysuria.   Musculoskeletal: Positive for back pain. Negative for joint swelling and myalgias.   Neurological: Positive for tingling (bilateral feet occasionally), weakness (Bilateral  "legs) and numbness (bilateral feet occasionally).   Psychiatric/Behavioral: Negative for sleep disturbance and suicidal ideas.     I have reviewed and confirmed the accuracy of the ROS as documented by the MA/LPN/RN DARWIN Conklin      Vitals:    08/17/22 1509   BP: 153/92   Pulse: 65   Resp: 16   Temp: 97.7 °F (36.5 °C)   SpO2: 97%   Weight: 88 kg (194 lb)   Height: 180.3 cm (70.98\")   PainSc:   4   PainLoc: Back         Objective   Physical Exam  Vitals and nursing note reviewed.   Constitutional:       Appearance: Normal appearance. He is normal weight.   HENT:      Head: Normocephalic.   Pulmonary:      Effort: Pulmonary effort is normal.   Musculoskeletal:      Lumbar back: Spasms and tenderness present. Decreased range of motion.      Comments: Increased pain with lumbar extension/lateral rotation   Skin:     General: Skin is warm and dry.   Neurological:      General: No focal deficit present.      Mental Status: He is alert and oriented to person, place, and time.      Cranial Nerves: Cranial nerves are intact.      Sensory: Sensation is intact.      Motor: Weakness (right gastrocnemius weakness) present.      Gait: Gait abnormal.      Deep Tendon Reflexes:      Reflex Scores:       Patellar reflexes are 1+ on the right side and 1+ on the left side.       Achilles reflexes are 1+ on the right side and 1+ on the left side.  Psychiatric:         Mood and Affect: Mood normal.         Behavior: Behavior normal.         Thought Content: Thought content normal.         Judgment: Judgment normal.         Assessment & Plan   Diagnoses and all orders for this visit:    1. Spondylosis of lumbar region without myelopathy or radiculopathy (Primary)    2. DDD (degenerative disc disease), lumbar    3. Chronic pain syndrome        --- Follow-up after injection  ----RTC for #1 diagnostic bilateral L3-5 MBB with plan to progress to RFA assuming favorable response (pt will wait until after upcoming MRI before " "scheduling)      -------  Education about Medial Branch Blockade and RF Therapy:    This medial branch blockade (MBB) suggested is intended for diagnostic purposes, with the intent of offering the patient Radiofrequency thermal rhizotomy (RF) if the MBB is diagnostically effective.  The diagnostic blockade is necessary to determine the likelihood that RF therapy could be efficacious in providing long term relief to the patient.    Medial branches are sensory nerve branches that connect to a facet joint and transmit sensations & pain signals from that joint.  Facet is a term for the type of joints found in the spine.  Medial branches are the nerves that go to a facet, and therefore are also sometimes called \"facet joint nerves\" (FJNs).      In a medial branch blockade procedure, xray fluoroscopy is used to verify the locations of the outside of the joint lines which are being targeted.  Under xray guidance, needles are placed to these areas.  Contrast dye is injected to confirm proper placement, with dye flowing over the joint area, and to ensure that the dye does not flow into unintended areas such as a vein.  When this is confirmed, local anesthetic is injected to block the medial branch at that joint level.      If MBBs are diagnostically successful in blocking pain, then the patient is most likely a great candidate for Radiofrequency of those facet joint nerves.  In the RF procedure, needles are placed to the joint lines in the same fashion, and after testing, the needle tips are heated to thermally treat the nerves, blocking the nerves by in essence damaging the nerves with the heat treatment.       Medically, a successful RF procedure should provide a patient with 50% pain relief or more for at least 6 months.  Clinical experience suggests that successful patients receive relief more in the range of 12 months on average.  We also discussed that a fortunate minority of patients receive therapeutic success from " the MBB, and may not require RF ablation.  If a patient receives more than 8 weeks of relief from MBB, then occasional repeat MBB for therapeutic purposes is a very reasonable alternative therapy.  This course of therapy is consistent with our LCDs according to our CMS  in the area, and therefore other insurance providers should follow accordingly.  We will monitor our patients to screen for these therapeutic responders and will offer RF therapy only when necessary.        We discussed that MBB & RF are not without risks.  Guidelines regarding anticoagulant use & neuraxial procedures will be respected.  Patients that are ill or otherwise may be at risk for sepsis will not have their spines accessed by neuraxial injections of any type.  This patient will not be offered these therapies if there is an increased risk.   We discussed that there is a risk of postprocedural pain and also a risk of worsening of clinical picture with these procedures as with any neuraxial procedure.        JANET REPORT    JANET report has not been obtained as the patient is not on any controlled substances. No request is made today for narcotics.     Dictated utilizing Dragon dictation.     This document is intended for medical expert use only. Reading of this document by patients and/or patient's family without participating medical staff guidance may result in misinterpretation and unintended morbidity.   Any interpretation of such data is the responsibility of the patient and/or family member responsible for the patient in concert with their primary or specialist providers, not to be left for sources of online searches such as [employer], [employer] or similar queries. Relying on these approaches to knowledge may result in misinterpretation, misguided goals of care and even death should patients or family members try recommendations outside of the realm of professional medical care in a supervised way.

## 2022-08-18 ENCOUNTER — TRANSCRIBE ORDERS (OUTPATIENT)
Dept: SURGERY | Facility: SURGERY CENTER | Age: 67
End: 2022-08-18

## 2022-08-18 DIAGNOSIS — M47.817 LUMBOSACRAL SPONDYLOSIS WITHOUT MYELOPATHY: Primary | ICD-10-CM

## 2022-08-26 ENCOUNTER — HOSPITAL ENCOUNTER (OUTPATIENT)
Dept: GENERAL RADIOLOGY | Facility: HOSPITAL | Age: 67
Discharge: HOME | End: 2022-08-26

## 2022-08-26 ENCOUNTER — HOSPITAL ENCOUNTER (OUTPATIENT)
Dept: MRI IMAGING | Facility: HOSPITAL | Age: 67
Discharge: HOME | End: 2022-08-26

## 2022-08-26 DIAGNOSIS — M51.36 DDD (DEGENERATIVE DISC DISEASE), LUMBAR: ICD-10-CM

## 2022-08-26 DIAGNOSIS — M54.50 CHRONIC MIDLINE LOW BACK PAIN WITHOUT SCIATICA: ICD-10-CM

## 2022-08-26 DIAGNOSIS — G89.29 CHRONIC MIDLINE LOW BACK PAIN WITHOUT SCIATICA: ICD-10-CM

## 2022-08-26 DIAGNOSIS — Z98.890 STATUS POST LUMBAR SPINE OPERATIVE PROCEDURE FOR DECOMPRESSION OF SPINAL CORD: ICD-10-CM

## 2022-08-26 DIAGNOSIS — R29.898 BILATERAL LEG WEAKNESS: ICD-10-CM

## 2022-08-26 PROCEDURE — 72158 MRI LUMBAR SPINE W/O & W/DYE: CPT

## 2022-08-26 PROCEDURE — 0 GADOBENATE DIMEGLUMINE 529 MG/ML SOLUTION: Performed by: NEUROLOGICAL SURGERY

## 2022-08-26 PROCEDURE — 82565 ASSAY OF CREATININE: CPT

## 2022-08-26 PROCEDURE — A9577 INJ MULTIHANCE: HCPCS | Performed by: NEUROLOGICAL SURGERY

## 2022-08-26 PROCEDURE — 72114 X-RAY EXAM L-S SPINE BENDING: CPT

## 2022-08-26 RX ADMIN — GADOBENATE DIMEGLUMINE 18 ML: 529 INJECTION, SOLUTION INTRAVENOUS at 17:30

## 2022-08-27 LAB — CREAT BLDA-MCNC: 1.2 MG/DL (ref 0.6–1.3)

## 2022-08-30 ENCOUNTER — HOSPITAL ENCOUNTER (OUTPATIENT)
Dept: MRI IMAGING | Facility: HOSPITAL | Age: 67
End: 2022-08-30

## 2022-09-15 ENCOUNTER — HOSPITAL ENCOUNTER (OUTPATIENT)
Facility: SURGERY CENTER | Age: 67
Setting detail: HOSPITAL OUTPATIENT SURGERY
Discharge: HOME | End: 2022-09-15
Attending: ANESTHESIOLOGY | Admitting: ANESTHESIOLOGY

## 2022-09-15 ENCOUNTER — HOSPITAL ENCOUNTER (OUTPATIENT)
Dept: GENERAL RADIOLOGY | Facility: SURGERY CENTER | Age: 67
Setting detail: HOSPITAL OUTPATIENT SURGERY
End: 2022-09-15

## 2022-09-15 VITALS
RESPIRATION RATE: 16 BRPM | SYSTOLIC BLOOD PRESSURE: 170 MMHG | OXYGEN SATURATION: 97 % | HEART RATE: 61 BPM | BODY MASS INDEX: 27.3 KG/M2 | TEMPERATURE: 98 F | HEIGHT: 71 IN | DIASTOLIC BLOOD PRESSURE: 94 MMHG | WEIGHT: 195 LBS

## 2022-09-15 DIAGNOSIS — M47.817 LUMBOSACRAL SPONDYLOSIS WITHOUT MYELOPATHY: ICD-10-CM

## 2022-09-15 PROCEDURE — 64493 INJ PARAVERT F JNT L/S 1 LEV: CPT | Performed by: ANESTHESIOLOGY

## 2022-09-15 PROCEDURE — 64494 INJ PARAVERT F JNT L/S 2 LEV: CPT | Performed by: ANESTHESIOLOGY

## 2022-09-15 PROCEDURE — 77002 NEEDLE LOCALIZATION BY XRAY: CPT

## 2022-09-15 PROCEDURE — 0 IOHEXOL 300 MG/ML SOLUTION 10 ML VIAL: Performed by: ANESTHESIOLOGY

## 2022-09-15 PROCEDURE — 76000 FLUOROSCOPY <1 HR PHYS/QHP: CPT

## 2022-09-15 RX ORDER — SODIUM CHLORIDE 0.9 % (FLUSH) 0.9 %
10 SYRINGE (ML) INJECTION EVERY 12 HOURS SCHEDULED
Status: DISCONTINUED | OUTPATIENT
Start: 2022-09-15 | End: 2022-09-15 | Stop reason: HOSPADM

## 2022-09-15 RX ORDER — SODIUM CHLORIDE 0.9 % (FLUSH) 0.9 %
10 SYRINGE (ML) INJECTION AS NEEDED
Status: DISCONTINUED | OUTPATIENT
Start: 2022-09-15 | End: 2022-09-15 | Stop reason: HOSPADM

## 2022-09-15 NOTE — DISCHARGE INSTRUCTIONS
[facility] - Post-procedure Instructions          --  While there are no absolute restrictions, it is recommended that you do not perform strenuous activity today. In the morning, you may resume your level of activity as before your block.    --  If you have a band-aid at your injection site, please remove it later today. Observe the area for any redness, swelling, pus-like drainage, or a temperature over 101°. If any of these symptoms occur, please call your doctor at [phone]. If after office hours, leave a message and the on-call provider will return your call.    --  Ice may be applied to your injection site. It is recommended you avoid direct heat (heating pad; hot tub) for 1-2 days.    --  Call [facility] at [phone] if you experience persistent headache, persistent bleeding from the injection site, or severe pain not relieved by heat or oral medication.    --  Do not make important decisions today.    --  Due to the effects of the block and/or the I.V. Sedation, DO NOT drive or operate hazardous machinery for 12 hours.  Local anesthetics may cause numbness after procedure and precautions must be taken with regards to operating equipment as well as with walking, even if ambulating with assistance of another person or with an assistive device.    --  Do not drink alcohol for 12 hours.    -- You may return to work tomorrow, or as directed by your referring doctor.    --  Occasionally you may notice a slight increase in your pain after the procedure. This should start to improve within the next 24-48 hours. Radiofrequency ablation procedure pain may last 3-4 weeks.    --  It may take as long as 3-4 days before you notice a gradual improvement in your pain and/or other symptoms.    -- You may continue to take your prescribed pain medication as needed.    --  Some normal possible side effects of steroid use could include fluid retention, increased blood sugar, dull headache,  increased sweating, increased appetite, mood swings and flushing.    --  Diabetics are recommended to watch their blood glucose level closely for 24-48 hours after the injection.    --  Must stay in PACU for 20 min upon arrival and prove no leg weakness before being discharged.    --  IN THE EVENT OF A LIFE THREATENING EMERGENCY, (CHEST PAIN, BREATHING DIFFICULTIES, PARALYSIS…) YOU SHOULD GO TO YOUR NEAREST EMERGENCY ROOM.    --  You should be contacted by our office within 2-3 days to schedule follow up or next appointment date.  If not contacted within 7 days, please call the office at [phone]

## 2022-09-15 NOTE — OP NOTE
Bilateral L3-5 Lumbar Medial Branch Blockade  [facility]      PREOPERATIVE DIAGNOSIS:  Lumbar spondylosis without myelopathy    POSTOPERATIVE DIAGNOSIS:  Lumbar spondylosis without myelopathy    PROCEDURE:   Diagnostic Bilateral Lumbar Medial Branch Nerve Blockades, with fluoroscopy:  L3, L4, and L5 nerves (at the L4 & L5 transverse processes and the sacral alar groove) to block facet joints L4-5, and L5-S1  1. 09297-08 -- Bilateral Lumbar Facet blocks, 1st Level  2. 47197-10 -- Bilateral Lumbar Facet blocks, 2nd  Level    PRE-PROCEDURE DISCUSSION WITH PATIENT:    Risks and complications were discussed with the patient prior to starting the procedure and informed consent was obtained.      SURGEON:  Cali Monte MD    REASON FOR PROCEDURE:    The patient complains of pain that seems to have a significant axial component, Tenderness of the affected facet joints on palpation, Tenderness of the affected facet joints on exam under fluoroscopy, Increased back pain on range of motion exams, Pain on extension of the lumbar spine and Positive lumbar facet loading maneuver    SEDATION:  Patient declined administration of moderate sedation    ANESTHETIC:  Marcaine 0.5%  STEROID:  NONE  TOTAL VOLUME OF SOLUTION:  6ml    DESCRIPTON OF PROCEDURE:  After obtaining informed consent, IV access was not obtained in the preoperative area.   The patient was taken to the operating room.  The patient was placed in the prone position with a pillow under the abdomen. All pressure points were well padded.  EKG, blood pressure, and pulse oximeter were monitored.  The patient was monitored and sedated by the RN under my direction. The lumbosacral area was prepped with Chloraprep and draped in a sterile fashion. Under fluoroscopic guidance the transverse processes of the L4 and L5 vertebrae at the junctions of the superior articular processes were identified on the right. Also identified was the groove between the ala  and the superior articular process of the sacrum on the ipsilateral side.  Skin and subcutaneous tissue were anesthetized with 1% lidocaine above each of these points. A 22-gauge spinal needle was introduced under fluoroscopic guidance at the above junctions. Aspiration was negative for blood and CSF.  After confirming the position of the needle with fluoroscope in all views, 0.25 mL of Omnipaque was injected, and after seeing the proper spread a total of 1 mL of the anesthetic solution noted above was injected at each of these points.  Needles were removed intact from each of the areas.  A similar procedure was repeated to block the L3, L4, and L5 nerves on the contralateral side.   Onset of analgesia was noted.  Vital signs remained stable throughout.      ESTIMATED BLOOD LOSS:  <5 mL  SPECIMENS:  none    COMPLICATIONS:   No complications were noted., There was no indication of vascular uptake on live injection of contrast dye., There was no indication of intrathecal uptake on live injection of contrast dye., There was not any evidence of dural puncture.   and The patient did not have any signs of postprocedure numbness nor weakness.    TOLERANCE & DISCHARGE CONDITION:    The patient tolerated the procedure well.  The patient was transported to the recovery area without difficulties.  The patient was discharged to home under the care of family in stable and satisfactory condition.    PLAN OF CARE:  1. The patient was given our standard instruction sheet.  2. We discussed that Lumbar Medial Branch Blockade is a diagnostic procedure in consideration for radiofrequency ablation if two diagnostic procedures prove to be positive for significant benefit.  If sustained relief of 6 to eight weeks is obtained, then an alternative plan could be therapeutic lumbar branch blockades.  3. The patient is asked to keep a pain log each hour for 8 hours after the procedure today.  4. The patient will  Return to clinic 1-2  wks.  5. The patient will resume all medications as per the medication reconciliation sheet.

## 2022-10-05 ENCOUNTER — OFFICE VISIT (OUTPATIENT)
Dept: NEUROSURGERY | Facility: CLINIC | Age: 67
End: 2022-10-05

## 2022-10-05 VITALS
DIASTOLIC BLOOD PRESSURE: 86 MMHG | SYSTOLIC BLOOD PRESSURE: 146 MMHG | RESPIRATION RATE: 16 BRPM | HEIGHT: 71 IN | HEART RATE: 72 BPM | WEIGHT: 199 LBS | BODY MASS INDEX: 27.86 KG/M2 | OXYGEN SATURATION: 97 % | TEMPERATURE: 96.9 F

## 2022-10-05 DIAGNOSIS — G89.29 CHRONIC MIDLINE LOW BACK PAIN WITHOUT SCIATICA: Primary | ICD-10-CM

## 2022-10-05 DIAGNOSIS — M51.16 HERNIATION OF LUMBAR INTERVERTEBRAL DISC WITH RADICULOPATHY: ICD-10-CM

## 2022-10-05 DIAGNOSIS — M51.36 DDD (DEGENERATIVE DISC DISEASE), LUMBAR: ICD-10-CM

## 2022-10-05 DIAGNOSIS — Z98.890 STATUS POST LUMBAR SPINE OPERATIVE PROCEDURE FOR DECOMPRESSION OF SPINAL CORD: ICD-10-CM

## 2022-10-05 DIAGNOSIS — M54.50 CHRONIC MIDLINE LOW BACK PAIN WITHOUT SCIATICA: Primary | ICD-10-CM

## 2022-10-05 PROCEDURE — 99214 OFFICE O/P EST MOD 30 MIN: CPT | Performed by: NEUROLOGICAL SURGERY

## 2022-10-13 ENCOUNTER — OFFICE VISIT (OUTPATIENT)
Dept: PAIN MEDICINE | Facility: CLINIC | Age: 67
End: 2022-10-13

## 2022-10-13 ENCOUNTER — PREP FOR SURGERY (OUTPATIENT)
Dept: SURGERY | Facility: SURGERY CENTER | Age: 67
End: 2022-10-13

## 2022-10-13 VITALS
HEART RATE: 68 BPM | WEIGHT: 200 LBS | SYSTOLIC BLOOD PRESSURE: 124 MMHG | OXYGEN SATURATION: 98 % | DIASTOLIC BLOOD PRESSURE: 79 MMHG | BODY MASS INDEX: 28 KG/M2 | HEIGHT: 71 IN | RESPIRATION RATE: 16 BRPM | TEMPERATURE: 96.7 F

## 2022-10-13 DIAGNOSIS — G89.4 CHRONIC PAIN SYNDROME: ICD-10-CM

## 2022-10-13 DIAGNOSIS — M47.816 SPONDYLOSIS OF LUMBAR REGION WITHOUT MYELOPATHY OR RADICULOPATHY: Primary | ICD-10-CM

## 2022-10-13 DIAGNOSIS — M47.817 LUMBOSACRAL SPONDYLOSIS WITHOUT MYELOPATHY: Primary | ICD-10-CM

## 2022-10-13 DIAGNOSIS — M51.16 HERNIATION OF LUMBAR INTERVERTEBRAL DISC WITH RADICULOPATHY: ICD-10-CM

## 2022-10-13 PROCEDURE — 99214 OFFICE O/P EST MOD 30 MIN: CPT | Performed by: PHYSICIAN ASSISTANT

## 2022-10-13 RX ORDER — SODIUM CHLORIDE 0.9 % (FLUSH) 0.9 %
10 SYRINGE (ML) INJECTION AS NEEDED
Status: CANCELLED | OUTPATIENT
Start: 2022-10-13

## 2022-10-13 RX ORDER — SODIUM CHLORIDE 0.9 % (FLUSH) 0.9 %
10 SYRINGE (ML) INJECTION EVERY 12 HOURS SCHEDULED
Status: CANCELLED | OUTPATIENT
Start: 2022-10-13

## 2022-10-13 NOTE — PROGRESS NOTES
CHIEF COMPLAINT    Procedure follow up     Subjective   Avni Olivo III is a 67 y.o. male  who presents to the office for follow-up of procedure.  He completed a Bilateral L3-5 Lumbar Medial Branch Blockade  on 9/15/22 performed by Dr. Monte for management of low back pain. Patient reports 80% relief from the procedure x 1 day.  Patient has noted gradual recrudescence of symptoms with entirely axial lumbosacral spine pain which is progressively worsened after his immediate relief.    Patient currently uses Brilinta which is managed by his cardiologist.    Pain today 3/10 VAS in severity.    Back Pain  This is a chronic problem. The current episode started more than 1 year ago. The problem occurs constantly. The problem has been gradually worsening since onset. The pain is present in the lumbar spine. The quality of the pain is described as aching. The pain does not radiate. The pain is at a severity of 3/10. The pain is mild. The pain is the same all the time. The symptoms are aggravated by bending, position and twisting. Pertinent negatives include no chest pain, dysuria, fever or numbness.        PEG Assessment   What number best describes your pain on average in the past week?5  What number best describes how, during the past week, pain has interfered with your enjoyment of life?5  What number best describes how, during the past week, pain has interfered with your general activity?  5    Review of Pertinent Medical Data ---  MRI OF THE LUMBAR SPINE WITH AND WITHOUT CONTRAST 08/26/2022     CLINICAL HISTORY: Low back pain, bilateral leg weakness, status post  lumbar spine decompression on 10/29/2020.     TECHNIQUE: Sagittal T1, proton density fat-suppressed T2 and  postcontrast sagittal T1-weighted images were obtained of the lumbar  spine in addition thin cut axial pre and postcontrast T1-weighted images  were obtained angled through the interspaces from L3 to S1.     This is correlated to 4 prior MRIs of the  lumbar spine dating all the  way back to 07/24/2014 and 09/04/2015 and preoperative MRI lumbar spine  08/31/2020 and postoperative MRI of the lumbar spine 07/29/2021. This  also correlated to intraoperative C-arm views of lumbar spine  10/29/2021, lumbar spine plain film series with flexion-extension views  today 08/26/2022.     FINDINGS: There is transitional anatomy at the lumbosacral junction for  the purposes of this report is a transitional vertebra is labeled as a  partially lumbarized S1 vertebrae with the last-inferior most partially  hydrated hypoplastic disc space labeled as the S1-S2 disc space. Above  this are 5 nonrib-bearing vertebra labeled as L1 through L5 and this is  the same numbering system used on prior MRIs of the lumbar spine.     The distal thoracic cord and the conus is normal in signal intensity  conus terminates at the mid body of L2 lumbar level which is normal.     At T11-12 and T12-L1 the disc space and facets are normal with no canal  or foraminal narrowing.     At L1-2 there is a right paracentral posterior lateral tiny disc  protrusion only minimally indents the right ventral aspect thecal sac is  essentially no canal or foraminal narrowing.     At L2-3, the disc space and facets are normal with no canal or foraminal  narrowing.     At L3-4 there is mild-to-moderate bilateral facet overgrowth some fluid  in the facet joints. There is a posterior central to left paracentral  disc herniation with extruded disc material extending along the  posterior central to left paracentral inferior endplate of L4. There is  severe narrowing of the thecal sac at L3-4 with diminished spinal fluid  bathing the cauda equina at L3-4 lumbar level. There is only minimal  foraminal narrowing.     At L4-5 the patient has had prior lumbar spine surgery on 10/29/2020  during which there is performed to bilateral laminectomies at L4, medial  facetectomies at L4-5. There is moderate bilateral facet overgrowth  at  L4-5, minimal posterior disc bulge. Due to posterior decompression there  is no canal narrowing at L4-5. There is mild left but no right foraminal  narrowing at L4-5.     At L5-S1 the patient had prior lumbar spine surgery with bilateral  laminectomies at L5. There is severe bilateral facet overgrowth at L5-S1  with 3 mm anterolisthesis of L5 on S1. There is no canal narrowing into  the medial facet spurs perhaps slightly narrow the lateral recesses but  there is some loss of vertical height of the foramen and bulging disc  material into the inferior foramen spurring synovial thickening off the  facets extending the posterior aspect of the foramen and there is  mild-to-moderate bilateral foraminal narrowing.     IMPRESSION:  1. This patient has transitional anatomy at the lumbosacral junction for  the purposes of this report the transitional vertebra is labeled as a  partially lumbarized S1 vertebra with the last-inferior most partially  hydrated hypoplastic disc space labeled as the S1-S2 disc space and  above the partially lumbarized S1 vertebra are 5 nonrib-bearing vertebra  labeled as L1-L5 and lowest-most rib-bearing vertebra is labeled as T12.  This same numbering system is used on prior lumbar spine MRIs.  2. Patient has had previous lumbar spine surgery with bilateral  laminectomies at L4 and L5 and S1, bilateral medial facetectomies at  L4-5 and L5-S1. There is no residual canal narrowing at L4-5 or L5-S1  there is mild left but no right foraminal narrowing at L4-5. There is  mild-to-moderate bilateral foraminal narrowing at L5-S1.  3. At L3-4 there is mild-to-moderate bilateral facet overgrowth and  there is posterior central left paracentral disc herniation with some  extruded disc material extending along the midline left paracentral  inferior body and endplate of L3. There is severe narrowing of the  thecal sac at the L3-4 level with diminished spinal fluid bathing the  cauda equina at the L3-4  "level. There is only mild bilateral foraminal  narrowing at L3-4.  4. L1-2 there is a tiny right paracentral posterior lateral disc  protrusion only minimally indents the ventral aspect of the thecal sac  and there is no canal or foraminal narrowing. The remainder of the  lumbar spine MRI is unremarkable.     This report was finalized on 8/29/2022 2:10 PM by Dr. Vargas Adorno M.D.    The following portions of the patient's history were reviewed and updated as appropriate: allergies, current medications, past family history, past medical history, past social history, past surgical history and problem list.    Review of Systems   Constitutional: Negative for fever.   HENT: Negative for congestion.    Eyes: Negative for visual disturbance.   Respiratory: Negative for shortness of breath.    Cardiovascular: Negative for chest pain.   Gastrointestinal: Negative for constipation and diarrhea.   Genitourinary: Negative for difficulty urinating and dysuria.   Musculoskeletal: Positive for back pain.   Neurological: Negative for numbness.   Psychiatric/Behavioral: Negative for sleep disturbance and suicidal ideas.     I have reviewed and confirmed the accuracy of the ROS as documented by the MA/LPN/RN DARWIN Conklin     Vitals:    10/13/22 1249   BP: 124/79   Pulse: 68   Resp: 16   Temp: 96.7 °F (35.9 °C)   SpO2: 98%   Weight: 90.7 kg (200 lb)   Height: 180.3 cm (70.98\")   PainSc:   3   PainLoc: Back         Objective   Physical Exam  Vitals and nursing note reviewed.   Constitutional:       Appearance: Normal appearance.   HENT:      Head: Normocephalic.   Pulmonary:      Effort: Pulmonary effort is normal.   Musculoskeletal:      Lumbar back: Spasms and tenderness (tenderness over the bilateral facet joint spaces) present. Decreased range of motion.   Skin:     General: Skin is warm and dry.   Neurological:      General: No focal deficit present.      Mental Status: He is alert and oriented to person, place, and time. "      Cranial Nerves: Cranial nerves 2-12 are intact.      Sensory: Sensation is intact.      Motor: Motor function is intact.      Gait: Gait is intact.   Psychiatric:         Mood and Affect: Mood normal.         Behavior: Behavior normal.         Thought Content: Thought content normal.         Judgment: Judgment normal.       Assessment & Plan   Diagnoses and all orders for this visit:    1. Spondylosis of lumbar region without myelopathy or radiculopathy (Primary)    2. Herniation of lumbar intervertebral disc with radiculopathy    3. Chronic pain syndrome        --- I will submit authorization for the patient to return for #2 diagnostic/confirmatory bilateral L3-5 MBB with plan to progress to RFA assuming continued favorable response.  --- Patient is to return in 1-2 weeks after MBB for further evaluation.       Dictated utilizing Dragon dictation.      This document is intended for medical expert use only. Reading of this document by patients and/or patient's family without participating medical staff guidance may result in misinterpretation and unintended morbidity.   Any interpretation of such data is the responsibility of the patient and/or family member responsible for the patient in concert with their primary or specialist providers, not to be left for sources of online searches such as [employer], [employer] or similar queries. Relying on these approaches to knowledge may result in misinterpretation, misguided goals of care and even death should patients or family members try recommendations outside of the realm of professional medical care in a supervised way.    Patient remained masked during entire encounter. No cough present. I donned a mask and eye protection throughout entire visit. Prior to donning mask and eye protection, hand hygiene was performed, as well as when it was doffed.  I was closer than 6 feet, but not for an extended period of time. No obvious exposure to any bodily fluids.

## 2022-10-14 ENCOUNTER — TRANSCRIBE ORDERS (OUTPATIENT)
Dept: SURGERY | Facility: SURGERY CENTER | Age: 67
End: 2022-10-14

## 2022-10-14 DIAGNOSIS — Z41.9 SURGERY, ELECTIVE: Primary | ICD-10-CM

## 2022-11-08 ENCOUNTER — HOSPITAL ENCOUNTER (OUTPATIENT)
Facility: SURGERY CENTER | Age: 67
Setting detail: HOSPITAL OUTPATIENT SURGERY
Discharge: HOME | End: 2022-11-08
Attending: ANESTHESIOLOGY | Admitting: ANESTHESIOLOGY

## 2022-11-08 ENCOUNTER — HOSPITAL ENCOUNTER (OUTPATIENT)
Dept: GENERAL RADIOLOGY | Facility: SURGERY CENTER | Age: 67
Setting detail: HOSPITAL OUTPATIENT SURGERY
End: 2022-11-08

## 2022-11-08 ENCOUNTER — TELEPHONE (OUTPATIENT)
Dept: PAIN MEDICINE | Facility: CLINIC | Age: 67
End: 2022-11-08

## 2022-11-08 VITALS
OXYGEN SATURATION: 97 % | BODY MASS INDEX: 28 KG/M2 | WEIGHT: 200 LBS | DIASTOLIC BLOOD PRESSURE: 100 MMHG | RESPIRATION RATE: 18 BRPM | HEIGHT: 71 IN | HEART RATE: 58 BPM | TEMPERATURE: 97.8 F | SYSTOLIC BLOOD PRESSURE: 182 MMHG

## 2022-11-08 DIAGNOSIS — Z41.9 SURGERY, ELECTIVE: ICD-10-CM

## 2022-11-08 PROCEDURE — 64494 INJ PARAVERT F JNT L/S 2 LEV: CPT | Performed by: ANESTHESIOLOGY

## 2022-11-08 PROCEDURE — 25010000002 IOPAMIDOL 61 % SOLUTION 30 ML VIAL: Performed by: ANESTHESIOLOGY

## 2022-11-08 PROCEDURE — 77002 NEEDLE LOCALIZATION BY XRAY: CPT

## 2022-11-08 PROCEDURE — 64493 INJ PARAVERT F JNT L/S 1 LEV: CPT | Performed by: ANESTHESIOLOGY

## 2022-11-08 PROCEDURE — 76000 FLUOROSCOPY <1 HR PHYS/QHP: CPT

## 2022-11-08 RX ORDER — SODIUM CHLORIDE 0.9 % (FLUSH) 0.9 %
10 SYRINGE (ML) INJECTION AS NEEDED
Status: DISCONTINUED | OUTPATIENT
Start: 2022-11-08 | End: 2022-11-08 | Stop reason: HOSPADM

## 2022-11-08 RX ORDER — SODIUM CHLORIDE 0.9 % (FLUSH) 0.9 %
10 SYRINGE (ML) INJECTION EVERY 12 HOURS SCHEDULED
Status: DISCONTINUED | OUTPATIENT
Start: 2022-11-08 | End: 2022-11-08 | Stop reason: HOSPADM

## 2022-11-08 NOTE — DISCHARGE INSTRUCTIONS
[facility] - Post-procedure Instructions          --  While there are no absolute restrictions, it is recommended that you do not perform strenuous activity today. In the morning, you may resume your level of activity as before your block.    --  If you have a band-aid at your injection site, please remove it later today. Observe the area for any redness, swelling, pus-like drainage, or a temperature over 101°. If any of these symptoms occur, please call your doctor at [phone]. If after office hours, leave a message and the on-call provider will return your call.    --  Ice may be applied to your injection site. It is recommended you avoid direct heat (heating pad; hot tub) for 1-2 days.    --  Call [facility] at [phone] if you experience persistent headache, persistent bleeding from the injection site, or severe pain not relieved by heat or oral medication.    --  Do not make important decisions today.    --  Due to the effects of the block and/or the I.V. Sedation, DO NOT drive or operate hazardous machinery for 12 hours.  Local anesthetics may cause numbness after procedure and precautions must be taken with regards to operating equipment as well as with walking, even if ambulating with assistance of another person or with an assistive device.    --  Do not drink alcohol for 12 hours.    -- You may return to work tomorrow, or as directed by your referring doctor.    --  Occasionally you may notice a slight increase in your pain after the procedure. This should start to improve within the next 24-48 hours. Radiofrequency ablation procedure pain may last 3-4 weeks.    --  It may take as long as 3-4 days before you notice a gradual improvement in your pain and/or other symptoms.    -- You may continue to take your prescribed pain medication as needed.    --  Some normal possible side effects of steroid use could include fluid retention, increased blood sugar, dull headache,  increased sweating, increased appetite, mood swings and flushing.    --  Diabetics are recommended to watch their blood glucose level closely for 24-48 hours after the injection.    --  Must stay in PACU for 20 min upon arrival and prove no leg weakness before being discharged.    --  IN THE EVENT OF A LIFE THREATENING EMERGENCY, (CHEST PAIN, BREATHING DIFFICULTIES, PARALYSIS…) YOU SHOULD GO TO YOUR NEAREST EMERGENCY ROOM.    --  You should be contacted by our office within 2-3 days to schedule follow up or next appointment date.  If not contacted within 7 days, please call the office at [phone]

## 2022-11-08 NOTE — TELEPHONE ENCOUNTER
Hub staff attempted to follow warm transfer process and was unsuccessful     Caller: BONNIE MCNEAL    Relationship to patient: SELF    Best call back number: [phone]    Patient is needing: PT WOULD LIKE A CALL BACK TO DISCUSS IF HE IS ABLE TO EAT THIS MORNING PRIOR TO PROCEDURE TODAY.  PT STATED HE LEFT VM THIS MORNING ALSO BUT HASN'T HEARD BACK FROM ANYONE YET.

## 2022-11-08 NOTE — OP NOTE
Bilateral L3-5 Lumbar Medial Branch Blockade  [facility]      PREOPERATIVE DIAGNOSIS:  Lumbar spondylosis without myelopathy    POSTOPERATIVE DIAGNOSIS:  Lumbar spondylosis without myelopathy    PROCEDURE:   Diagnostic Bilateral Lumbar Medial Branch Nerve Blockades, with fluoroscopy:  L3, L4, and L5 nerves (at the L4 & L5 transverse processes and the sacral alar groove) to block facet joints L4-5, and L5-S1  1. [id number] -- Bilateral Lumbar Facet blocks, 1st Level  2. 16462-00 -- Bilateral Lumbar Facet blocks, 2nd  Level    PRE-PROCEDURE DISCUSSION WITH PATIENT:    Risks and complications were discussed with the patient prior to starting the procedure and informed consent was obtained.      SURGEON:  Cali Monte MD    REASON FOR PROCEDURE:    The patient complains of pain that seems to have a significant axial component, Previous diagnostic positivity of a Lumbar Medial Branch Blockade at the same levels and The last blockade was helpful with 80 % relief diagnostically.    SEDATION:  Patient declined administration of moderate sedation    ANESTHETIC:  Marcaine 0.5%  STEROID:  NONE  TOTAL VOLUME OF SOLUTION:  6ml    DESCRIPTON OF PROCEDURE:  After obtaining informed consent, IV access was not obtained in the preoperative area.   The patient was taken to the operating room.  The patient was placed in the prone position with a pillow under the abdomen. All pressure points were well padded.  EKG, blood pressure, and pulse oximeter were monitored.  The patient was monitored and sedated by the RN under my direction. The lumbosacral area was prepped with Chloraprep and draped in a sterile fashion. Under fluoroscopic guidance the transverse processes of the L4 and L5 vertebrae at the junctions of the superior articular processes were identified on the right. Also identified was the groove between the ala and the superior articular process of the sacrum on the ipsilateral side.  Skin and subcutaneous  tissue were anesthetized with 1% lidocaine above each of these points. A 22-gauge spinal needle was introduced under fluoroscopic guidance at the above junctions. Aspiration was negative for blood and CSF.  After confirming the position of the needle with fluoroscope in all views, 0.25 mL of Omnipaque was injected, and after seeing the proper spread a total of 1 mL of the anesthetic solution noted above was injected at each of these points.  Needles were removed intact from each of the areas.  A similar procedure was repeated to block the L3, L4, and L5 nerves on the contralateral side.   Onset of analgesia was noted.  Vital signs remained stable throughout.      ESTIMATED BLOOD LOSS:  <5 mL  SPECIMENS:  none    COMPLICATIONS:   No complications were noted., There was no indication of vascular uptake on live injection of contrast dye., There was no indication of intrathecal uptake on live injection of contrast dye., There was not any evidence of dural puncture.   and The patient did not have any signs of postprocedure numbness nor weakness.    TOLERANCE & DISCHARGE CONDITION:    The patient tolerated the procedure well.  The patient was transported to the recovery area without difficulties.  The patient was discharged to home under the care of family in stable and satisfactory condition.    PLAN OF CARE:  1. The patient was given our standard instruction sheet.  2. We discussed that Lumbar Medial Branch Blockade is a diagnostic procedure in consideration for radiofrequency ablation if two diagnostic procedures prove to be positive for significant benefit.  If sustained relief of 6 to eight weeks is obtained, then an alternative plan could be therapeutic lumbar branch blockades.  3. The patient is asked to keep a pain log each hour for 8 hours after the procedure today.  4. The patient will  Return to clinic 1 wk.  5. The patient will resume all medications as per the medication reconciliation sheet.

## 2022-11-15 ENCOUNTER — OFFICE VISIT (OUTPATIENT)
Dept: PAIN MEDICINE | Facility: CLINIC | Age: 67
End: 2022-11-15

## 2022-11-15 ENCOUNTER — PREP FOR SURGERY (OUTPATIENT)
Dept: SURGERY | Facility: SURGERY CENTER | Age: 67
End: 2022-11-15

## 2022-11-15 VITALS
DIASTOLIC BLOOD PRESSURE: 84 MMHG | BODY MASS INDEX: 28.84 KG/M2 | HEART RATE: 65 BPM | TEMPERATURE: 97.3 F | SYSTOLIC BLOOD PRESSURE: 156 MMHG | OXYGEN SATURATION: 99 % | WEIGHT: 206 LBS | HEIGHT: 71 IN

## 2022-11-15 DIAGNOSIS — M47.817 LUMBOSACRAL SPONDYLOSIS WITHOUT MYELOPATHY: Primary | ICD-10-CM

## 2022-11-15 DIAGNOSIS — G89.29 CHRONIC MIDLINE LOW BACK PAIN WITHOUT SCIATICA: ICD-10-CM

## 2022-11-15 DIAGNOSIS — M54.50 CHRONIC MIDLINE LOW BACK PAIN WITHOUT SCIATICA: ICD-10-CM

## 2022-11-15 DIAGNOSIS — M46.1 BILATERAL SACROILIITIS: ICD-10-CM

## 2022-11-15 PROCEDURE — 99214 OFFICE O/P EST MOD 30 MIN: CPT | Performed by: PHYSICIAN ASSISTANT

## 2022-11-15 RX ORDER — SODIUM CHLORIDE 0.9 % (FLUSH) 0.9 %
10 SYRINGE (ML) INJECTION AS NEEDED
Status: CANCELLED | OUTPATIENT
Start: 2022-11-15

## 2022-11-15 RX ORDER — SODIUM CHLORIDE 0.9 % (FLUSH) 0.9 %
10 SYRINGE (ML) INJECTION EVERY 12 HOURS SCHEDULED
Status: CANCELLED | OUTPATIENT
Start: 2022-11-15

## 2022-11-15 NOTE — PROGRESS NOTES
CHIEF COMPLAINT  F/U back pain- LUMBAR MEDIAL BRANCH BLOCK BILATERAL L3-5 #2/CONFIRMATORY- patient states that he had 80% improvement for 2 hours.       Subjective   Avni Olivo III is a 67 y.o. male  who presents to the office for follow-up of procedure.  He completed a #2/confirmatory bilateral L3-5 MBB   on 11/8/2022 performed by Dr. Monte for management of low back pain. Patient reports 80% relief from the procedure x2 hours.  Patient continues with report of significant pain in a transverse distribution across the lumbosacral spine referred into the bilateral hips and buttocks without lower extremity radicular symptoms.    Pain today 5/10 VAS in severity.        Back Pain  This is a chronic problem. The current episode started more than 1 year ago. The problem occurs constantly. The problem has been gradually worsening since onset. The pain is present in the lumbar spine. The quality of the pain is described as aching and cramping. The pain does not radiate. The pain is at a severity of 5/10. The pain is moderate. The pain is the same all the time. The symptoms are aggravated by standing, twisting, position and bending. Associated symptoms include weakness (alhaji legs). Pertinent negatives include no abdominal pain, chest pain, dysuria, fever, headaches or numbness.        PEG Assessment   What number best describes your pain on average in the past week?7  What number best describes how, during the past week, pain has interfered with your enjoyment of life?5  What number best describes how, during the past week, pain has interfered with your general activity?  5    Review of Pertinent Medical Data ---  MRI OF THE LUMBAR SPINE WITH AND WITHOUT CONTRAST 08/26/2022     CLINICAL HISTORY: Low back pain, bilateral leg weakness, status post  lumbar spine decompression on 10/29/2020.     TECHNIQUE: Sagittal T1, proton density fat-suppressed T2 and  postcontrast sagittal T1-weighted images were obtained of the  lumbar  spine in addition thin cut axial pre and postcontrast T1-weighted images  were obtained angled through the interspaces from L3 to S1.     This is correlated to 4 prior MRIs of the lumbar spine dating all the  way back to 07/24/2014 and 09/04/2015 and preoperative MRI lumbar spine  08/31/2020 and postoperative MRI of the lumbar spine 07/29/2021. This  also correlated to intraoperative C-arm views of lumbar spine  10/29/2021, lumbar spine plain film series with flexion-extension views  today 08/26/2022.     FINDINGS: There is transitional anatomy at the lumbosacral junction for  the purposes of this report is a transitional vertebra is labeled as a  partially lumbarized S1 vertebrae with the last-inferior most partially  hydrated hypoplastic disc space labeled as the S1-S2 disc space. Above  this are 5 nonrib-bearing vertebra labeled as L1 through L5 and this is  the same numbering system used on prior MRIs of the lumbar spine.     The distal thoracic cord and the conus is normal in signal intensity  conus terminates at the mid body of L2 lumbar level which is normal.     At T11-12 and T12-L1 the disc space and facets are normal with no canal  or foraminal narrowing.     At L1-2 there is a right paracentral posterior lateral tiny disc  protrusion only minimally indents the right ventral aspect thecal sac is  essentially no canal or foraminal narrowing.     At L2-3, the disc space and facets are normal with no canal or foraminal  narrowing.     At L3-4 there is mild-to-moderate bilateral facet overgrowth some fluid  in the facet joints. There is a posterior central to left paracentral  disc herniation with extruded disc material extending along the  posterior central to left paracentral inferior endplate of L4. There is  severe narrowing of the thecal sac at L3-4 with diminished spinal fluid  bathing the cauda equina at L3-4 lumbar level. There is only minimal  foraminal narrowing.     At L4-5 the patient has  had prior lumbar spine surgery on 10/29/2020  during which there is performed to bilateral laminectomies at L4, medial  facetectomies at L4-5. There is moderate bilateral facet overgrowth at  L4-5, minimal posterior disc bulge. Due to posterior decompression there  is no canal narrowing at L4-5. There is mild left but no right foraminal  narrowing at L4-5.     At L5-S1 the patient had prior lumbar spine surgery with bilateral  laminectomies at L5. There is severe bilateral facet overgrowth at L5-S1  with 3 mm anterolisthesis of L5 on S1. There is no canal narrowing into  the medial facet spurs perhaps slightly narrow the lateral recesses but  there is some loss of vertical height of the foramen and bulging disc  material into the inferior foramen spurring synovial thickening off the  facets extending the posterior aspect of the foramen and there is  mild-to-moderate bilateral foraminal narrowing.     IMPRESSION:  1. This patient has transitional anatomy at the lumbosacral junction for  the purposes of this report the transitional vertebra is labeled as a  partially lumbarized S1 vertebra with the last-inferior most partially  hydrated hypoplastic disc space labeled as the S1-S2 disc space and  above the partially lumbarized S1 vertebra are 5 nonrib-bearing vertebra  labeled as L1-L5 and lowest-most rib-bearing vertebra is labeled as T12.  This same numbering system is used on prior lumbar spine MRIs.  2. Patient has had previous lumbar spine surgery with bilateral  laminectomies at L4 and L5 and S1, bilateral medial facetectomies at  L4-5 and L5-S1. There is no residual canal narrowing at L4-5 or L5-S1  there is mild left but no right foraminal narrowing at L4-5. There is  mild-to-moderate bilateral foraminal narrowing at L5-S1.  3. At L3-4 there is mild-to-moderate bilateral facet overgrowth and  there is posterior central left paracentral disc herniation with some  extruded disc material extending along the  "midline left paracentral  inferior body and endplate of L3. There is severe narrowing of the  thecal sac at the L3-4 level with diminished spinal fluid bathing the  cauda equina at the L3-4 level. There is only mild bilateral foraminal  narrowing at L3-4.  4. L1-2 there is a tiny right paracentral posterior lateral disc  protrusion only minimally indents the ventral aspect of the thecal sac  and there is no canal or foraminal narrowing. The remainder of the  lumbar spine MRI is unremarkable.     This report was finalized on 8/29/2022 2:10 PM by Dr. Vargas Adorno M.D.    The following portions of the patient's history were reviewed and updated as appropriate: allergies, current medications, past family history, past medical history, past social history, past surgical history and problem list.    Review of Systems   Constitutional: Negative for activity change, chills, fatigue and fever.   HENT: Negative for congestion.    Eyes: Negative for visual disturbance.   Respiratory: Negative for chest tightness and shortness of breath.    Cardiovascular: Negative for chest pain.   Gastrointestinal: Negative for abdominal pain, constipation and diarrhea.   Genitourinary: Negative for difficulty urinating and dysuria.   Musculoskeletal: Positive for back pain.   Neurological: Positive for weakness (alhaji legs). Negative for dizziness, light-headedness, numbness and headaches.   Psychiatric/Behavioral: Negative for agitation and sleep disturbance. The patient is not nervous/anxious.      I have reviewed and confirmed the accuracy of the ROS as documented by the MA/LPN/RN DARWIN Conklin     Vitals:    11/15/22 1325   BP: 156/84   Pulse: 65   Temp: 97.3 °F (36.3 °C)   SpO2: 99%   Weight: 93.4 kg (206 lb)   Height: 180.3 cm (71\")   PainSc:   5   PainLoc: Back         Objective   Physical Exam  Vitals and nursing note reviewed.   Constitutional:       Appearance: Normal appearance.   HENT:      Head: Normocephalic.   Pulmonary:    "   Effort: Pulmonary effort is normal.   Musculoskeletal:      Lumbar back: Spasms and tenderness (Tenderness to palpation within the overlying bilateral lumbar facet joint spaces) present. Decreased range of motion.      Comments: Pain is increased with lumbar facet loading maneuvers   Skin:     General: Skin is warm and dry.   Neurological:      General: No focal deficit present.      Mental Status: He is alert and oriented to person, place, and time.      Cranial Nerves: Cranial nerves 2-12 are intact.      Sensory: Sensation is intact.      Motor: Motor function is intact.      Gait: Gait is intact.   Psychiatric:         Mood and Affect: Mood normal.         Behavior: Behavior normal.         Thought Content: Thought content normal.         Judgment: Judgment normal.         Assessment & Plan   Diagnoses and all orders for this visit:    1. Lumbosacral spondylosis without myelopathy (Primary)    2. Bilateral sacroiliitis (HCC)    3. Chronic midline low back pain without sciatica        --- Return to the office for RFA bilateral L3-5 based on favorable response to diagnostic and confirmatory MBB's  --- RTC 4 weeks after ablation procedure         --------  Education about Radiofrequency Lesioning of Medial Branches:    The medial branch blockade was intended for diagnostic purposes, with the intent of offering the patient Radiofrequency thermal rhizotomy if the MBB is diagnostically effective.  The diagnostic blockade is necessary to determine the likelihood that RF therapy could be efficacious in providing long term relief to the patient.  As indicated above, diagnostic efficacy was established.      In the RF procedure, needles are placed to the joint lines in the same fashion, and after testing, the needle tips are heated to thermally treat the nerves, blocking the nerves by in essence damaging the nerves with the heat treatment.      Medically, a successful RF procedure should provide a patient with 50% pain  relief or more for at least 6 months.  We estimate a likelihood of about an 80% chance that medical success will be realized.  We discussed & educated once again that not all patients have a medically successful result, and the patient voices understanding.  However, our clinical experience suggests that successful patients receive relief more in the range of 12 months on average.  (We also discussed that a fortunate minority of patients receive therapeutic success from the MBB, and may not require RF ablation.  If a patient receives more than 8 weeks of relief from MBB, then occasional repeat MBB for therapeutic purposes is a very reasonable alternative therapy.  This course of therapy is consistent with our LCDs according to our CMS  in the area, and therefore other insurance providers should follow accordingly.  We will monitor our patients to screen for these therapeutic responders and will offer RF therapy only when necessary.  However, in this clinical scenario, this therapeutic result was not realized, and therefore Radiofrequency Lesioning is medically necessary.)      We discussed that MBB & RF are not without risks.  Guidelines regarding anticoagulant use & neuraxial procedures will be respected.  Patients that are ill or otherwise may be at risk for sepsis will not have their spines accessed by neuraxial injections of any type.  This patient will not be offered these therapies if there is an increased risk.   We discussed that there is a risk of postprocedural pain and also a risk of worsening of clinical picture with these procedures as with any neuraxial procedure.  All invasive procedures have risks including but not limited to bleeding, infection, injury, nerve injury, paralysis, coma, death, lack of pain relief, and worsening of clinical picture.      In this education session, all of these topics were covered and the patient voiced understanding.    ---------       Dictated utilizing  Dragon dictation.      Patient remained masked during entire encounter. No cough present. I donned a mask and eye protection throughout entire visit. Prior to donning mask and eye protection, hand hygiene was performed, as well as when it was doffed.  I was closer than 6 feet, but not for an extended period of time. No obvious exposure to any bodily fluids.

## 2022-11-23 ENCOUNTER — TRANSCRIBE ORDERS (OUTPATIENT)
Dept: SURGERY | Facility: SURGERY CENTER | Age: 67
End: 2022-11-23

## 2022-11-23 DIAGNOSIS — Z41.9 SURGERY, ELECTIVE: Primary | ICD-10-CM

## 2023-01-17 ENCOUNTER — HOSPITAL ENCOUNTER (OUTPATIENT)
Dept: GENERAL RADIOLOGY | Facility: SURGERY CENTER | Age: 68
Setting detail: HOSPITAL OUTPATIENT SURGERY
End: 2023-01-17
Payer: MEDICARE

## 2023-01-17 ENCOUNTER — HOSPITAL ENCOUNTER (OUTPATIENT)
Facility: SURGERY CENTER | Age: 68
Setting detail: HOSPITAL OUTPATIENT SURGERY
Discharge: HOME | End: 2023-01-17
Attending: ANESTHESIOLOGY | Admitting: ANESTHESIOLOGY
Payer: MEDICARE

## 2023-01-17 VITALS
SYSTOLIC BLOOD PRESSURE: 163 MMHG | WEIGHT: 195 LBS | BODY MASS INDEX: 27.3 KG/M2 | OXYGEN SATURATION: 97 % | RESPIRATION RATE: 20 BRPM | HEIGHT: 71 IN | DIASTOLIC BLOOD PRESSURE: 91 MMHG | HEART RATE: 59 BPM | TEMPERATURE: 98.4 F

## 2023-01-17 DIAGNOSIS — Z41.9 SURGERY, ELECTIVE: ICD-10-CM

## 2023-01-17 PROCEDURE — 64635 DESTROY LUMB/SAC FACET JNT: CPT | Performed by: ANESTHESIOLOGY

## 2023-01-17 PROCEDURE — 99152 MOD SED SAME PHYS/QHP 5/>YRS: CPT | Performed by: ANESTHESIOLOGY

## 2023-01-17 PROCEDURE — 25010000002 FENTANYL CITRATE (PF) 50 MCG/ML SOLUTION: Performed by: ANESTHESIOLOGY

## 2023-01-17 PROCEDURE — 76000 FLUOROSCOPY <1 HR PHYS/QHP: CPT

## 2023-01-17 PROCEDURE — 64636 DESTROY L/S FACET JNT ADDL: CPT | Performed by: ANESTHESIOLOGY

## 2023-01-17 PROCEDURE — S0260 H&P FOR SURGERY: HCPCS | Performed by: ANESTHESIOLOGY

## 2023-01-17 RX ORDER — SODIUM CHLORIDE 0.9 % (FLUSH) 0.9 %
10 SYRINGE (ML) INJECTION AS NEEDED
Status: DISCONTINUED | OUTPATIENT
Start: 2023-01-17 | End: 2023-01-17 | Stop reason: HOSPADM

## 2023-01-17 RX ORDER — FENTANYL CITRATE 50 UG/ML
INJECTION, SOLUTION INTRAMUSCULAR; INTRAVENOUS AS NEEDED
Status: DISCONTINUED | OUTPATIENT
Start: 2023-01-17 | End: 2023-01-17 | Stop reason: HOSPADM

## 2023-01-17 RX ORDER — SODIUM CHLORIDE 0.9 % (FLUSH) 0.9 %
10 SYRINGE (ML) INJECTION EVERY 12 HOURS SCHEDULED
Status: DISCONTINUED | OUTPATIENT
Start: 2023-01-17 | End: 2023-01-17 | Stop reason: HOSPADM

## 2023-01-17 NOTE — H&P
Brief Pre-procedural / Pre-operative H&P        -----    Pertinent Diagnosis:   Lumbar facet arthropathy.  Lumbar spondylosis.    Proposed Procedure: Lumbar medial branch radiofrequency ablation at the L3 and L4 and L5 medial branches on both sides      Subjective   Avni Olivo III is a 67 y.o. male  who presents for intervention.  He has a history of neck pain.      History of Present Illness     Previously had 80% diagnostic relief x2 medial branch blocks.  Transverse radiating back pain across the lumbosacral area without sciatica symptoms.  Aching and cramping symptoms.  He has had pain with facet loading maneuvers.  -------    The following portions of the patient's history were reviewed and updated as appropriate: allergies, current medications, past family history, past medical history, past social history, past surgical history and problem list.    No Known Allergies      Current Facility-Administered Medications:   •  fentaNYL citrate (PF) (SUBLIMAZE) injection, , , PRN, Cali Monte MD, 100 mcg at 01/17/23 1010  •  sodium chloride 0.9 % flush 10 mL, 10 mL, Intravenous, Q12H, Cali Monte MD  •  sodium chloride 0.9 % flush 10 mL, 10 mL, Intravenous, PRN, Cali Monte MD    No current facility-administered medications on file prior to encounter.     Current Outpatient Medications on File Prior to Encounter   Medication Sig Dispense Refill   • atorvastatin (LIPITOR) 40 MG tablet Take 1.5 tablets by mouth Every Night. 135 tablet 3   • B Complex Vitamins (VITAMIN B COMPLEX) capsule capsule Take 1 capsule by mouth Daily. HOLD PRIOR TO SURGERY     • carvedilol (COREG) 12.5 MG tablet Take 12.5 mg by mouth 2 (Two) Times a Day.     • DHEA 50 MG tablet Take 50 mg by mouth 2 (two) times a day. HOLD PRIOR TO SURGERY     • eszopiclone (LUNESTA) 2 MG tablet Take 1 mg by mouth Every Night. Takes 1/2 tablet qhs     • losartan (COZAAR) 100 MG tablet Take 1 tablet by mouth Daily. 90 tablet 3   • Misc  Natural Products (7-KETO LEAN) capsule Take 1 tablet by mouth Daily. HOLD PRIOR TO SURGERY     • Omega-3 Fatty Acids (FISH OIL PO) Take 1,280 mg by mouth 2 (Two) Times a Day. HOLD PRIOR TO SURGERY     • Probiotic Product (PROBIOTIC ADVANCED PO) Take  by mouth.     • ticagrelor (Brilinta) 60 MG tablet tablet Take 1 tablet by mouth 2 (Two) Times a Day. 180 tablet 3       Patient Active Problem List   Diagnosis   • Spinal stenosis of lumbar region with neurogenic claudication   • Essential hypertension   • DDD (degenerative disc disease), lumbar   • Mixed hyperlipidemia   • PVC's (premature ventricular contractions)   • Coronary artery disease involving native heart without angina pectoris   • Bilateral leg weakness   • Status post lumbar spine operative procedure for decompression of spinal cord   • Chronic midline low back pain without sciatica   • Bilateral sacroiliitis (HCC)   • Spondylosis of lumbar region without myelopathy or radiculopathy   • Chronic pain syndrome   • Lumbosacral spondylosis without myelopathy   • Herniation of lumbar intervertebral disc with radiculopathy       Past Medical History:   Diagnosis Date   • Arm pain    • Bilateral sacroiliitis (HCC) 05/20/2021   • Chest pain    • DDD (degenerative disc disease), lumbar    • Dizziness    • Facet arthropathy, lumbar 12/20/2021   • History of EKG 08/06/2013   • HTN (hypertension)    • Hyperlipidemia    • Limb pain    • Low back pain    • Lower extremity edema    • Lumbar canal stenosis    • Melanoma (HCC) 07/05/2017   • Mitral regurgitation     trace   • Nausea and vomiting    • Palpitations    • Peripheral neuropathy    • Sinus bradycardia    • Snoring    • Spinal stenosis    • Sprain of right foot        Past Surgical History:   Procedure Laterality Date   • CARDIAC CATHETERIZATION  06/10/2013   • CARDIAC CATHETERIZATION N/A 12/27/2019    Procedure: Coronary angiography;  Surgeon: Terrance Wood MD;  Location: [facility] INVASIVE LOCATION;   Service: Cardiovascular   • CARDIAC CATHETERIZATION N/A 12/27/2019    Procedure: Left Heart Cath;  Surgeon: Terrance Wood MD;  Location: [facility] CATH [facility]VASIVE LOC[facility];  Service: Cardiovascular   • CARDIAC CATHETERIZATION N/A 12/27/2019    Procedure: Left ventriculography;  Surgeon: Terrance Wood MD;  Location: [facility] CATH INVASIVE LOC[facility];  Service: Cardiovascular   • CARDIAC CATHETERIZATION N/A 12/27/2019    Procedure: Stent KALYAN coronary;  Surgeon: Terrnace Wood MD;  Location: [facility] C[facility] LOC[facility];  Service: Cardiovascular   • COLONOSCOPY     • ENDOSCOPY     • EPIDURAL BLOCK     • FOOT FRACTURE SURGERY Left    • KNEE ARTHROSCOPY Left    • KNEE SURGERY Right    • LUMBAR LAMINECTOMY DISCECTOMY DECOMPRESSION Bilateral 10/29/2020    Procedure: LUMBAR DECOMPRESSION, OPEN LUMBAR DECOMPRESSION, LUMBAR 3 TO LUMBAR 4, LUMBAR 4 TO LUMBAR 5, LUMBAR 5 TO SACRAL 1 (has transitional anatomy);  Surgeon: Jeremy Martinez MD;  Location: [facility] MAIN OR;  Service: Neurosurgery;  Laterality: Bilateral;   • MEDIAL BRANCH BLOCK Bilateral 9/15/2022    Procedure: LUMBAR MEDIAL BRANCH BLOCK bilateral L3-5;  Surgeon: Cali Monte MD;  Location: [facility] MAIN OR;  Service: Pain Management;  Laterality: Bilateral;   • MEDIAL BRANCH BLOCK Bilateral 11/8/2022    Procedure: LUMBAR MEDIAL BRANCH BLOCK BILATERAL L3-5 #2/CONFIRMATORY;  Surgeon: Cali Monte MD;  Location: [facility] MAIN OR;  Service: Pain Management;  Laterality: Bilateral;   • MOHS SURGERY      nose   • SKIN CANCER EXCISION Bilateral 07/05/2017    MELANOMA   • TONSILLECTOMY         Family History   Problem Relation Age of Onset   • Coronary artery disease Other    • Hyperlipidemia Other    • Hypertension Other    • Other Other         heart surgery   • Heart disease Father    • Hypertension Father    • Heart disease Sister    • Stroke Maternal Grandmother    • Heart disease Maternal Grandfather    • Malig Hyperthermia Neg Hx        Social  "History     Socioeconomic History   • Marital status:    • Years of education: college graduate   Tobacco Use   • Smoking status: Never   • Smokeless tobacco: Former   • Tobacco comments:     caffeine - 2 cups coffee daily    Vaping Use   • Vaping Use: Never used   Substance and Sexual Activity   • Alcohol use: Yes     Comment: most days   • Drug use: No     Comment: CBD oil   • Sexual activity: Defer       -------       Review of Systems  No Fever, No Chills, No ear pain, No sinus pressure or drainage, No eye pain or drainage, No cough, No SOB, No chest tightness nor chest pain, no palpitations.          Vitals:    01/17/23 0910 01/17/23 1006 01/17/23 1008   BP: 136/91 157/90 161/98   BP Location: Left arm     Patient Position: Lying     Pulse: 61 64 64   Resp: 17 16 16   Temp: 97.8 °F (36.6 °C)     TempSrc: Infrared     SpO2: 95% 97% 97%   Weight: 88.5 kg (195 lb)     Height: 180.3 cm (71\")           Objective   Physical Exam  VSS, NNR, NCAT, NMNA, NRD, AAOx3.       -------    Assessment & Plan:  - as noted above, the stated intervention is indicated  - Follow-up plan will be noted in the operative report        Office 4 weeks      EMR Dragon/Transcription disclaimer:   Typed items in this encounter note may have been created by electronic transcription/translation software which converts spoken language to printed text. The electronic translation of spoken language may permit erroneous, or at times, nonsensical words or phrases to be inadvertently transcribed; Although I have reviewed the note for such errors, some may still exist.      "

## 2023-01-17 NOTE — DISCHARGE INSTRUCTIONS
[facility] - Post-procedure Instructions          --  While there are no absolute restrictions, it is recommended that you do not perform strenuous activity today. In the morning, you may resume your level of activity as before your block.    --  If you have a band-aid at your injection site, please remove it later today. Observe the area for any redness, swelling, pus-like drainage, or a temperature over 101°. If any of these symptoms occur, please call your doctor at [phone]. If after office hours, leave a message and the on-call provider will return your call.    --  Ice may be applied to your injection site. It is recommended you avoid direct heat (heating pad; hot tub) for 1-2 days.    --  Call [facility] at [phone] if you experience persistent headache, persistent bleeding from the injection site, or severe pain not relieved by heat or oral medication.    --  Do not make important decisions today.    --  Due to the effects of the block and/or the I.V. Sedation, DO NOT drive or operate hazardous machinery for 12 hours.  Local anesthetics may cause numbness after procedure and precautions must be taken with regards to operating equipment as well as with walking, even if ambulating with assistance of another person or with an assistive device.    --  Do not drink alcohol for 12 hours.    -- You may return to work tomorrow, or as directed by your referring doctor.    --  Occasionally you may notice a slight increase in your pain after the procedure. This should start to improve within the next 24-48 hours. Radiofrequency ablation procedure pain may last 3-4 weeks.    --  It may take as long as 3-4 days before you notice a gradual improvement in your pain and/or other symptoms.    -- You may continue to take your prescribed pain medication as needed.    --  Some normal possible side effects of steroid use could include fluid retention, increased blood sugar, dull headache,  increased sweating, increased appetite, mood swings and flushing.    --  Diabetics are recommended to watch their blood glucose level closely for 24-48 hours after the injection.    --  Must stay in PACU for 20 min upon arrival and prove no leg weakness before being discharged.    --  IN THE EVENT OF A LIFE THREATENING EMERGENCY, (CHEST PAIN, BREATHING DIFFICULTIES, PARALYSIS…) YOU SHOULD GO TO YOUR NEAREST EMERGENCY ROOM.    --  You should be contacted by our office within 2-3 days to schedule follow up or next appointment date.  If not contacted within 7 days, please call the office at [phone]

## 2023-01-17 NOTE — OP NOTE
Bilateral L3-5 Lumbar Medial Branch RADIOFREQUENCY  [facility]      PREOPERATIVE DIAGNOSIS:  Lumbar spondylosis without myelopathy    POSTOPERATIVE DIAGNOSIS:  Lumbar spondylosis without myelopathy    PROCEDURE:   Diagnostic Bilateral Lumbar Medial Branch Nerve thermal radiofrequency lesioning, with fluoroscopy:  L3, L4, and L5 nerves (at the L4 and L5 transverse processes and the sacral alar groove) to thermally treat the innervation to facet joints L4-5 and L5-S1  1. 54203-30 -- Bilateral L/S facet neuro destr., 1st Level  2. 09773-37 -- Bilateral L/S facet neuro destr., 2nd  Level    PRE-PROCEDURE DISCUSSION WITH PATIENT:    Risks and complications were discussed with the patient prior to starting the procedure and informed consent was obtained.      SURGEON:  Cali Monte MD    REASON FOR PROCEDURE:    Previous diagnostic positivity of two Lumbar Medial Branch Blockades at the same levels    SEDATION:  Fentanyl 100 mcg IV  TIME OF PROCEDURE:   The intraoperative procedure time after administration of the sedative was 18 minutes.     ANESTHETIC:  Lidocaine 2%  STEROID:  NONE      DESCRIPTON OF PROCEDURE:  After obtaining informed consent, IV access  was obtained in the preoperative area.   The patient was taken to the operating room.  The patient was placed in the prone position with a pillow under the abdomen. All pressure points were well padded.  EKG, blood pressure, and pulse oximeter were monitored.  The patient was monitored and sedated by the RN under my direction. The lumbosacral area was prepped with Chloraprep and draped in a sterile fashion.     Under fluoroscopic guidance the transverse processes of the L4 and L5 vertebrae at the junctions of the superior articular processes were identified on the right.  Also identified was the groove between the ala and the superior articular process of the sacrum on the ipsilateral side.  Skin and subcutaneous tissue were anesthetized with  1ml of 1% lidocaine above each of these points. Then, radiofrequency probe needles were advanced in this fluoro view to the above junctions.  Aspiration was negative for blood and CSF.  After confirming the position of the needle with fluoroscope in all views, testing was initiated.  First, sensory testing was started on each needle a 1V and 50Hz and slowly decreased until painful pressure stimulation diminished at 0.5V.  Next, motor testing was confirmed to be negative at 3V and 2Hz for any radicular stimulation.  Then 1mL of the local anesthetic was instilled in each needle.  Two minutes elapsed, and during this time a lateral fluoroscopic view was confirmed again to ensure the needles had not advanced nor retracted.  Then, Radiofrequency Lesioning was initiated for 1.5 minutes at 85 degrees Celsius.  Needles were removed intact from each of the areas.     A similar procedure was repeated to address the L3, L4, and L5 nerves on the contralateral side.   Onset of analgesia was noted.  Vital signs remained stable throughout.      ESTIMATED BLOOD LOSS:  <5 mL  SPECIMENS:  none    COMPLICATIONS:   No complications were noted., There was not any evidence of dural puncture.   and The patient did not have any signs of postprocedure numbness nor weakness.    TOLERANCE & DISCHARGE CONDITION:    The patient tolerated the procedure well.  The patient was transported to the recovery area without difficulties.  The patient was discharged to home under the care of family in stable and satisfactory condition.    PLAN OF CARE:  1. The patient was given our standard instruction sheet.  2. The patient will  Return to clinic 4-6 wks.  3. The patient will resume all medications as per the medication reconciliation sheet.

## 2023-02-01 ENCOUNTER — OFFICE VISIT (OUTPATIENT)
Dept: NEUROSURGERY | Facility: CLINIC | Age: 68
End: 2023-02-01
Payer: MEDICARE

## 2023-02-01 VITALS
BODY MASS INDEX: 27.3 KG/M2 | DIASTOLIC BLOOD PRESSURE: 78 MMHG | SYSTOLIC BLOOD PRESSURE: 160 MMHG | HEIGHT: 71 IN | WEIGHT: 195 LBS

## 2023-02-01 DIAGNOSIS — G89.29 CHRONIC MIDLINE LOW BACK PAIN WITHOUT SCIATICA: Primary | ICD-10-CM

## 2023-02-01 DIAGNOSIS — M51.36 DDD (DEGENERATIVE DISC DISEASE), LUMBAR: ICD-10-CM

## 2023-02-01 DIAGNOSIS — M51.16 HERNIATION OF LUMBAR INTERVERTEBRAL DISC WITH RADICULOPATHY: ICD-10-CM

## 2023-02-01 DIAGNOSIS — M54.50 CHRONIC MIDLINE LOW BACK PAIN WITHOUT SCIATICA: Primary | ICD-10-CM

## 2023-02-01 DIAGNOSIS — Z98.890 STATUS POST LUMBAR SPINE OPERATIVE PROCEDURE FOR DECOMPRESSION OF SPINAL CORD: ICD-10-CM

## 2023-02-01 PROCEDURE — 99213 OFFICE O/P EST LOW 20 MIN: CPT | Performed by: NEUROLOGICAL SURGERY

## 2023-02-01 RX ORDER — AMLODIPINE BESYLATE 2.5 MG/1
2.5 TABLET ORAL DAILY
COMMUNITY
End: 2023-02-02 | Stop reason: SDUPTHER

## 2023-02-02 ENCOUNTER — OFFICE VISIT (OUTPATIENT)
Dept: CARDIOLOGY | Facility: CLINIC | Age: 68
End: 2023-02-02
Payer: MEDICARE

## 2023-02-02 VITALS
RESPIRATION RATE: 18 BRPM | HEART RATE: 65 BPM | BODY MASS INDEX: 27.86 KG/M2 | DIASTOLIC BLOOD PRESSURE: 78 MMHG | WEIGHT: 199 LBS | SYSTOLIC BLOOD PRESSURE: 146 MMHG | OXYGEN SATURATION: 98 % | HEIGHT: 71 IN

## 2023-02-02 DIAGNOSIS — I10 ESSENTIAL HYPERTENSION: ICD-10-CM

## 2023-02-02 DIAGNOSIS — E78.2 MIXED HYPERLIPIDEMIA: ICD-10-CM

## 2023-02-02 DIAGNOSIS — I49.3 PVC'S (PREMATURE VENTRICULAR CONTRACTIONS): ICD-10-CM

## 2023-02-02 DIAGNOSIS — I25.10 CORONARY ARTERY DISEASE INVOLVING NATIVE CORONARY ARTERY OF NATIVE HEART WITHOUT ANGINA PECTORIS: Primary | ICD-10-CM

## 2023-02-02 PROCEDURE — 99214 OFFICE O/P EST MOD 30 MIN: CPT | Performed by: INTERNAL MEDICINE

## 2023-02-02 PROCEDURE — 93000 ELECTROCARDIOGRAM COMPLETE: CPT | Performed by: INTERNAL MEDICINE

## 2023-02-02 RX ORDER — AMLODIPINE BESYLATE 5 MG/1
5 TABLET ORAL DAILY
Qty: 90 TABLET | Refills: 3 | Status: SHIPPED | OUTPATIENT
Start: 2023-02-02

## 2023-02-02 RX ORDER — CLOPIDOGREL BISULFATE 75 MG/1
75 TABLET ORAL DAILY
Qty: 90 TABLET | Refills: 3 | Status: SHIPPED | OUTPATIENT
Start: 2023-02-02

## 2023-02-02 NOTE — PROGRESS NOTES
CARDIOLOGY    Zoraida Kenyon MD    ENCOUNTER DATE:  02/02/2023    Avni Olivo III / 67 y.o. / male        CHIEF COMPLAINT / REASON FOR OFFICE VISIT     Heart Problem (Yearly follow up )      HISTORY OF PRESENT ILLNESS       HPI    Avni Olivo III is a 67 y.o. male     This gentleman saw Dr. Ramírez in 2013 with chest pain. She did an exercise echocardiogram on 06/06/2013 which showed normal LV systolic function with ejection fraction of 64%, trace mitral regurgitation. He exercised for 11.5 minutes under Jacques protocol. His stress echocardiogram images were normal. His stress EKG was normal, but the patient did complain of some chest pain. On 06/10/2013, the patient underwent a heart catheterization, which showed no evidence of coronary artery disease.      I saw him for the first time in October 2016.  He was on vacation in [place] and had a bradycardic and presyncopal episode at the airport. Paramedics were called and he was noted to have a pulse in the low 50s and elevated blood pressure systolic of about 200 over 120. He was taken to the emergency room. I have reviewed that visit. He had an EKG which showed sinus bradycardia, but was otherwise normal. He was hypertensive there. His troponin and proBNP were normal. His creatinine was elevated at 1.48. He was treated with IV fluids. He did not really feel much better, so a CT scan of his head was performed which looked okay. Eventually, he was discharged from the emergency room still feeling somewhat dizzy and they were able to continue their trip home.      I saw him back in April 2018 when he was complaining about palpitations and was diagnosed with symptomatic PVCs.  He had a stress echo in December 2019 which showed normal LV systolic function.  No significant valvular heart disease.  However, he went on to have a heart catheterization.  In December 2019 which showed a mid LAD lesion of 85%.  2 drug-eluting stents were placed.     He had  "back surgery in October 2020.  He completed rehab and felt good.  Unfortunately he got Covid in January 2021.  That really laid him up for a few weeks.     His blood pressure has been running high.  No anginal sounding chest pain.  Some acid reflux.    REVIEW OF SYSTEMS     Review of Systems   Constitutional: Negative for chills, fever, weight gain and weight loss.   Cardiovascular: Negative for leg swelling.   Respiratory: Negative for cough, snoring and wheezing.    Hematologic/Lymphatic: Negative for bleeding problem. Does not bruise/bleed easily.   Skin: Negative for color change.   Musculoskeletal: Negative for falls, joint pain and myalgias.   Gastrointestinal: Negative for melena.   Genitourinary: Negative for hematuria.   Neurological: Negative for excessive daytime sleepiness.   Psychiatric/Behavioral: Negative for depression. The patient is not nervous/anxious.          VITAL SIGNS     Visit Vitals  /78 (BP Location: Right arm, Patient Position: Sitting, Cuff Size: Adult)   Pulse 65   Resp 18   Ht 180.3 cm (71\")   Wt 90.3 kg (199 lb)   SpO2 98%   BMI 27.75 kg/m²         Wt Readings from Last 3 Encounters:   02/02/23 90.3 kg (199 lb)   02/01/23 88.5 kg (195 lb)   01/17/23 88.5 kg (195 lb)     Body mass index is 27.75 kg/m².      PHYSICAL EXAMINATION     Constitutional:       General: Not in acute distress.  Neck:      Vascular: No carotid bruit or JVD.   Pulmonary:      Effort: Pulmonary effort is normal.      Breath sounds: Normal breath sounds.   Cardiovascular:      Normal rate. Regular rhythm.      Murmurs: There is no murmur.   Psychiatric:         Mood and Affect: Mood and affect normal.           REVIEWED DATA       ECG 12 Lead    Date/Time: 2/2/2023 2:46 PM  Performed by: Zoraida Kenyon MD  Authorized by: Zoraida Kenyon MD   Comparison: compared with previous ECG from 1/11/2022  Similar to previous ECG  Rhythm: sinus rhythm  BPM: 65  Conduction: conduction normal  ST Segments: ST " segments normal  T Waves: T waves normal    Clinical impression: normal ECG                  Lab Results   Component Value Date    GLUCOSE 133 (H) 01/18/2022    BUN 15 01/18/2022    CREATININE 1.20 08/26/2022    EGFRIFNONA 56 (L) 01/18/2022    BCR 11.6 01/18/2022    K 4.0 01/18/2022    CO2 25.7 01/18/2022    CALCIUM 10.0 01/18/2022    ALBUMIN 4.40 11/19/2021    AST 41 (H) 11/19/2021    ALT 43 (H) 11/19/2021       ASSESSMENT & PLAN      Diagnosis Plan   1. Coronary artery disease involving native coronary artery of native heart without angina pectoris        2. Essential hypertension        3. Mixed hyperlipidemia        4. PVC's (premature ventricular contractions)            1.  Coronary disease status post stent to the mid LAD in December 2019.    He would like to switch over to Plavix and off Brilinta.  I filled the prescription for him.  Continue statin therapy  2.  Hypertension.  Increase amlodipine to 5 mg a day.  Continue carvedilol 12.5 twice daily and losartan 100 mg daily.  He also follows with a nephrologist.  3.  Hyperlipidemia.    4.  History of premature ventricular contractions.    He has occasional palpitations     Follow-up with me to check his blood pressure in 3 months.        Orders Placed This Encounter   Procedures   • ECG 12 Lead     This order was created via procedure documentation     Order Specific Question:   Release to patient     Answer:   Routine Release           MEDICATIONS         Discharge Medications          Accurate as of February 2, 2023  2:50 PM. If you have any questions, ask your nurse or doctor.            New Medications      Instructions Start Date   clopidogrel 75 MG tablet  Commonly known as: PLAVIX  Started by: Zoraida Kenyon MD   75 mg, Oral, Daily         Changes to Medications      Instructions Start Date   amLODIPine 5 MG tablet  Commonly known as: NORVASC  What changed:   · medication strength  · how much to take  Changed by: Zoraida Kenyon MD   5 mg, Oral,  Daily         Continue These Medications      Instructions Start Date   7-Keto Lean capsule   1 tablet, Oral, Daily, HOLD PRIOR TO SURGERY      atorvastatin 40 MG tablet  Commonly known as: LIPITOR   60 mg, Oral, Nightly      carvedilol 12.5 MG tablet  Commonly known as: COREG   12.5 mg, Oral, 2 Times Daily      DHEA 50 MG tablet   50 mg, Oral, 2 times daily, HOLD PRIOR TO SURGERY      eszopiclone 2 MG tablet  Commonly known as: LUNESTA   1 mg, Oral, Nightly, Takes 1/2 tablet qhs      FISH OIL PO   1,280 mg, Oral, 2 Times Daily, HOLD PRIOR TO SURGERY      losartan 100 MG tablet  Commonly known as: COZAAR   100 mg, Oral, Daily      vitamin b complex capsule capsule   1 capsule, Oral, Daily, HOLD PRIOR TO SURGERY         Stop These Medications    ticagrelor 60 MG tablet tablet  Commonly known as: Brilinta  Stopped by: MD Zoraida Bah MD  02/02/23  14:50 EST    Part of this note may be an electronic transcription/translation of spoken language to printed text using the Dragon dictation system.

## 2023-02-14 ENCOUNTER — OFFICE VISIT (OUTPATIENT)
Dept: PAIN MEDICINE | Facility: CLINIC | Age: 68
End: 2023-02-14
Payer: MEDICARE

## 2023-02-14 VITALS
TEMPERATURE: 98 F | WEIGHT: 202.6 LBS | BODY MASS INDEX: 28.36 KG/M2 | SYSTOLIC BLOOD PRESSURE: 141 MMHG | RESPIRATION RATE: 18 BRPM | OXYGEN SATURATION: 97 % | HEIGHT: 71 IN | DIASTOLIC BLOOD PRESSURE: 85 MMHG | HEART RATE: 66 BPM

## 2023-02-14 DIAGNOSIS — M46.1 BILATERAL SACROILIITIS: ICD-10-CM

## 2023-02-14 DIAGNOSIS — G89.29 CHRONIC MIDLINE LOW BACK PAIN WITHOUT SCIATICA: ICD-10-CM

## 2023-02-14 DIAGNOSIS — M54.50 CHRONIC MIDLINE LOW BACK PAIN WITHOUT SCIATICA: ICD-10-CM

## 2023-02-14 DIAGNOSIS — M47.817 LUMBOSACRAL SPONDYLOSIS WITHOUT MYELOPATHY: Primary | ICD-10-CM

## 2023-02-14 PROCEDURE — 99212 OFFICE O/P EST SF 10 MIN: CPT | Performed by: PHYSICIAN ASSISTANT

## 2023-02-14 NOTE — PROGRESS NOTES
CHIEF COMPLAINT    Procedure follow up     Subjective   Avni Olivo III is a 67 y.o. male  who presents to the office for follow-up of procedure.  He completed a Bilateral L3-5 Lumbar Medial Branch RADIOFREQUENCY   on  1/17/23 performed by Dr. Monte for management of low back pain.  As of today's visit the patient is unable to determine his response to the ablation procedure as yet.  The patient states that when he presented for the ablation his pain had decreased significantly however he has noted that his pain levels have remained decreased without waxing and waning presentation.  Continues with report of pain in a transverse distribution across the lumbosacral spine referred into the bilateral buttocks and hips.  Denies lower extremity radicular symptoms.    Pain today 1/10 VAS in severity.      Back Pain  This is a chronic problem. The current episode started more than 1 year ago. The problem occurs constantly. The problem is unchanged. The pain is present in the lumbar spine. The quality of the pain is described as aching. The pain does not radiate. The pain is at a severity of 1/10. The pain is mild. The pain is the same all the time. The symptoms are aggravated by position and standing. Associated symptoms include weakness (Subjective weakness in the lower extremities). Pertinent negatives include no chest pain, dysuria, fever or numbness.        PEG Assessment   What number best describes your pain on average in the past week?4  What number best describes how, during the past week, pain has interfered with your enjoyment of life?0  What number best describes how, during the past week, pain has interfered with your general activity?  0    Review of Pertinent Medical Data ---    MRI OF THE LUMBAR SPINE WITH AND WITHOUT CONTRAST 08/26/2022     CLINICAL HISTORY: Low back pain, bilateral leg weakness, status post  lumbar spine decompression on 10/29/2020.     TECHNIQUE: Sagittal T1, proton density  fat-suppressed T2 and  postcontrast sagittal T1-weighted images were obtained of the lumbar  spine in addition thin cut axial pre and postcontrast T1-weighted images  were obtained angled through the interspaces from L3 to S1.     This is correlated to 4 prior MRIs of the lumbar spine dating all the  way back to 07/24/2014 and 09/04/2015 and preoperative MRI lumbar spine  08/31/2020 and postoperative MRI of the lumbar spine 07/29/2021. This  also correlated to intraoperative C-arm views of lumbar spine  10/29/2021, lumbar spine plain film series with flexion-extension views  today 08/26/2022.     FINDINGS: There is transitional anatomy at the lumbosacral junction for  the purposes of this report is a transitional vertebra is labeled as a  partially lumbarized S1 vertebrae with the last-inferior most partially  hydrated hypoplastic disc space labeled as the S1-S2 disc space. Above  this are 5 nonrib-bearing vertebra labeled as L1 through L5 and this is  the same numbering system used on prior MRIs of the lumbar spine.     The distal thoracic cord and the conus is normal in signal intensity  conus terminates at the mid body of L2 lumbar level which is normal.     At T11-12 and T12-L1 the disc space and facets are normal with no canal  or foraminal narrowing.     At L1-2 there is a right paracentral posterior lateral tiny disc  protrusion only minimally indents the right ventral aspect thecal sac is  essentially no canal or foraminal narrowing.     At L2-3, the disc space and facets are normal with no canal or foraminal  narrowing.     At L3-4 there is mild-to-moderate bilateral facet overgrowth some fluid  in the facet joints. There is a posterior central to left paracentral  disc herniation with extruded disc material extending along the  posterior central to left paracentral inferior endplate of L4. There is  severe narrowing of the thecal sac at L3-4 with diminished spinal fluid  bathing the cauda equina at L3-4  lumbar level. There is only minimal  foraminal narrowing.     At L4-5 the patient has had prior lumbar spine surgery on 10/29/2020  during which there is performed to bilateral laminectomies at L4, medial  facetectomies at L4-5. There is moderate bilateral facet overgrowth at  L4-5, minimal posterior disc bulge. Due to posterior decompression there  is no canal narrowing at L4-5. There is mild left but no right foraminal  narrowing at L4-5.     At L5-S1 the patient had prior lumbar spine surgery with bilateral  laminectomies at L5. There is severe bilateral facet overgrowth at L5-S1  with 3 mm anterolisthesis of L5 on S1. There is no canal narrowing into  the medial facet spurs perhaps slightly narrow the lateral recesses but  there is some loss of vertical height of the foramen and bulging disc  material into the inferior foramen spurring synovial thickening off the  facets extending the posterior aspect of the foramen and there is  mild-to-moderate bilateral foraminal narrowing.     IMPRESSION:  1. This patient has transitional anatomy at the lumbosacral junction for  the purposes of this report the transitional vertebra is labeled as a  partially lumbarized S1 vertebra with the last-inferior most partially  hydrated hypoplastic disc space labeled as the S1-S2 disc space and  above the partially lumbarized S1 vertebra are 5 nonrib-bearing vertebra  labeled as L1-L5 and lowest-most rib-bearing vertebra is labeled as T12.  This same numbering system is used on prior lumbar spine MRIs.  2. Patient has had previous lumbar spine surgery with bilateral  laminectomies at L4 and L5 and S1, bilateral medial facetectomies at  L4-5 and L5-S1. There is no residual canal narrowing at L4-5 or L5-S1  there is mild left but no right foraminal narrowing at L4-5. There is  mild-to-moderate bilateral foraminal narrowing at L5-S1.  3. At L3-4 there is mild-to-moderate bilateral facet overgrowth and  there is posterior central left  "paracentral disc herniation with some  extruded disc material extending along the midline left paracentral  inferior body and endplate of L3. There is severe narrowing of the  thecal sac at the L3-4 level with diminished spinal fluid bathing the  cauda equina at the L3-4 level. There is only mild bilateral foraminal  narrowing at L3-4.  4. L1-2 there is a tiny right paracentral posterior lateral disc  protrusion only minimally indents the ventral aspect of the thecal sac  and there is no canal or foraminal narrowing. The remainder of the  lumbar spine MRI is unremarkable.     This report was finalized on 8/29/2022 2:10 PM by Dr. Vargas Adorno M.D.    The following portions of the patient's history were reviewed and updated as appropriate: allergies, current medications, past family history, past medical history, past social history, past surgical history and problem list.    Review of Systems   Constitutional: Negative for fever.   HENT: Negative for congestion.    Eyes: Negative for visual disturbance.   Respiratory: Negative for shortness of breath.    Cardiovascular: Negative for chest pain.   Gastrointestinal: Negative for constipation and diarrhea.   Genitourinary: Negative for difficulty urinating and dysuria.   Musculoskeletal: Negative for back pain.   Neurological: Positive for weakness (Subjective weakness in the lower extremities). Negative for numbness.   Psychiatric/Behavioral: Negative for sleep disturbance.     I have reviewed and confirmed the accuracy of the ROS as documented by the MA/LPN/RN DARWIN Conklin     Vitals:    02/14/23 1342   BP: 141/85   Pulse: 66   Resp: 18   Temp: 98 °F (36.7 °C)   SpO2: 97%   Weight: 91.9 kg (202 lb 9.6 oz)   Height: 180.3 cm (70.98\")   PainSc:   1   PainLoc: Back         Objective   Physical Exam  Vitals and nursing note reviewed.   Constitutional:       Appearance: Normal appearance.   HENT:      Head: Normocephalic.   Pulmonary:      Effort: Pulmonary effort is " normal.   Musculoskeletal:      Lumbar back: Tenderness present. Decreased range of motion.   Skin:     General: Skin is warm and dry.   Neurological:      General: No focal deficit present.      Mental Status: He is alert and oriented to person, place, and time.      Cranial Nerves: Cranial nerves 2-12 are intact.      Sensory: Sensation is intact.      Motor: Motor function is intact.      Gait: Gait abnormal (Antalgic gait).   Psychiatric:         Mood and Affect: Mood normal.         Behavior: Behavior normal.         Thought Content: Thought content normal.         Judgment: Judgment normal.         Assessment & Plan   Diagnoses and all orders for this visit:    1. Lumbosacral spondylosis without myelopathy (Primary)    2. Chronic midline low back pain without sciatica    3. Bilateral sacroiliitis (HCC)        --- Follow-up in 3 months or sooner as needed for further evaluation and treat recommendations            Dictated utilizing Dragon dictation.      Patient remained masked during entire encounter. No cough present. I donned a mask and eye protection throughout entire visit. Prior to donning mask and eye protection, hand hygiene was performed, as well as when it was doffed.  I was closer than 6 feet, but not for an extended period of time. No obvious exposure to any bodily fluids.

## 2023-04-03 ENCOUNTER — TELEPHONE (OUTPATIENT)
Dept: NEUROSURGERY | Facility: CLINIC | Age: 68
End: 2023-04-03
Payer: MEDICARE

## 2023-04-03 NOTE — TELEPHONE ENCOUNTER
LVM: Told patient Dr. Martinez cannot make 10/4/2023 appt. Rescheduled for 10/16/2023 @10AM. Told to call us back if this appt time does not work. [facility] can read/advise.

## 2023-05-30 ENCOUNTER — OFFICE VISIT (OUTPATIENT)
Dept: CARDIOLOGY | Facility: CLINIC | Age: 68
End: 2023-05-30

## 2023-05-30 VITALS
RESPIRATION RATE: 16 BRPM | HEART RATE: 61 BPM | DIASTOLIC BLOOD PRESSURE: 82 MMHG | OXYGEN SATURATION: 100 % | SYSTOLIC BLOOD PRESSURE: 124 MMHG | HEIGHT: 70 IN | WEIGHT: 201 LBS | BODY MASS INDEX: 28.77 KG/M2

## 2023-05-30 DIAGNOSIS — I49.3 PVC'S (PREMATURE VENTRICULAR CONTRACTIONS): ICD-10-CM

## 2023-05-30 DIAGNOSIS — E78.2 MIXED HYPERLIPIDEMIA: ICD-10-CM

## 2023-05-30 DIAGNOSIS — I10 ESSENTIAL HYPERTENSION: ICD-10-CM

## 2023-05-30 DIAGNOSIS — I25.10 CORONARY ARTERY DISEASE INVOLVING NATIVE CORONARY ARTERY OF NATIVE HEART WITHOUT ANGINA PECTORIS: Primary | ICD-10-CM

## 2023-05-30 RX ORDER — MULTIVIT WITH MINERALS/LUTEIN
250 TABLET ORAL DAILY
COMMUNITY

## 2023-05-30 RX ORDER — CLOPIDOGREL BISULFATE 75 MG/1
75 TABLET ORAL DAILY
Qty: 90 TABLET | Refills: 3 | Status: SHIPPED | OUTPATIENT
Start: 2023-05-30

## 2023-06-02 ENCOUNTER — TELEPHONE (OUTPATIENT)
Dept: CARDIOLOGY | Facility: CLINIC | Age: 68
End: 2023-06-02

## 2023-06-02 RX ORDER — AMLODIPINE BESYLATE 2.5 MG/1
2.5 TABLET ORAL DAILY
Qty: 90 TABLET | Refills: 3 | Status: SHIPPED | OUTPATIENT
Start: 2023-06-02

## 2023-06-02 NOTE — TELEPHONE ENCOUNTER
"    Caller: Avni Olivo III \"Izademarcus\"    Relationship: Self    Best call back number: [phone]    What medications are you currently taking:   Current Outpatient Medications on File Prior to Visit   Medication Sig Dispense Refill   • amLODIPine (NORVASC) 5 MG tablet Take 1 tablet by mouth Daily. (Patient taking differently: Take 2.5 mg by mouth Daily.) 90 tablet 3   • atorvastatin (LIPITOR) 40 MG tablet Take 1.5 tablets by mouth Every Night. 135 tablet 3   • B Complex Vitamins (VITAMIN B COMPLEX) capsule capsule Take 1 capsule by mouth Daily. HOLD PRIOR TO SURGERY     • carvedilol (COREG) 12.5 MG tablet Take 1 tablet by mouth 2 (Two) Times a Day.     • clopidogrel (PLAVIX) 75 MG tablet Take 1 tablet by mouth Daily. 90 tablet 3   • DHEA 50 MG tablet Take 50 mg by mouth Daily. HOLD PRIOR TO SURGERY     • eszopiclone (LUNESTA) 2 MG tablet Take 1 mg by mouth Every Night. Takes 1/2 tablet qhs     • losartan (COZAAR) 100 MG tablet Take 1 tablet by mouth Daily. 90 tablet 3   • Misc Natural Products (7-KETO LEAN) capsule Take 1 tablet by mouth Daily. HOLD PRIOR TO SURGERY     • Omega-3 Fatty Acids (FISH OIL PO) Take 1,280 mg by mouth 2 (Two) Times a Day. HOLD PRIOR TO SURGERY     • vitamin C (ASCORBIC ACID) 250 MG tablet Take 1 tablet by mouth Daily.     • VITAMIN D, CHOLECALCIFEROL, PO Take  by mouth.       No current facility-administered medications on file prior to visit.          When did you start taking these medications: 2 MONTHS AGO    Which medication are you concerned about: AMLODIPINE    Who prescribed you this medication: DR PETERS    What are your concerns: PATIENT STATES MEDICATION DOSAGE WAS CHANGED AND WOULD NEED NEW PRESCRIPTION TO REFLECT CORRECT DOSAGE WITH [facility]. NEW DOSAGE SHOULD REFLECT 2.5 MG, YET IT STILL SHOWS 5 MG. PLEASE SEND NEW PRESCRIPTION TO [facility]      How long have you had these concerns: 1 MONTH    "

## 2023-07-21 ENCOUNTER — TRANSCRIBE ORDERS (OUTPATIENT)
Dept: SURGERY | Facility: SURGERY CENTER | Age: 68
End: 2023-07-21
Payer: MEDICARE

## 2023-07-21 DIAGNOSIS — Z41.9 SURGERY, ELECTIVE: Primary | ICD-10-CM

## 2023-07-31 RX ORDER — ATORVASTATIN CALCIUM 40 MG/1
TABLET, FILM COATED ORAL
Qty: 135 TABLET | Refills: 3 | Status: SHIPPED | OUTPATIENT
Start: 2023-07-31

## 2023-08-23 RX ORDER — LOSARTAN POTASSIUM 100 MG/1
TABLET ORAL
Qty: 90 TABLET | Refills: 3 | Status: SHIPPED | OUTPATIENT
Start: 2023-08-23

## 2023-08-28 RX ORDER — MIDAZOLAM HYDROCHLORIDE 2 MG/2ML
1 INJECTION, SOLUTION INTRAMUSCULAR; INTRAVENOUS ONCE
Status: COMPLETED | OUTPATIENT
Start: 2023-08-29 | End: 2023-08-29

## 2023-08-28 RX ORDER — FENTANYL CITRATE 50 UG/ML
100 INJECTION, SOLUTION INTRAMUSCULAR; INTRAVENOUS ONCE
Status: COMPLETED | OUTPATIENT
Start: 2023-08-29 | End: 2023-08-29

## 2023-08-29 ENCOUNTER — HOSPITAL ENCOUNTER (OUTPATIENT)
Dept: GENERAL RADIOLOGY | Facility: SURGERY CENTER | Age: 68
Setting detail: HOSPITAL OUTPATIENT SURGERY
End: 2023-08-29
Payer: MEDICARE

## 2023-08-29 ENCOUNTER — HOSPITAL ENCOUNTER (OUTPATIENT)
Facility: SURGERY CENTER | Age: 68
Setting detail: HOSPITAL OUTPATIENT SURGERY
Discharge: HOME | End: 2023-08-29
Attending: ANESTHESIOLOGY | Admitting: ANESTHESIOLOGY
Payer: MEDICARE

## 2023-08-29 VITALS
WEIGHT: 195 LBS | BODY MASS INDEX: 27.3 KG/M2 | SYSTOLIC BLOOD PRESSURE: 177 MMHG | OXYGEN SATURATION: 97 % | DIASTOLIC BLOOD PRESSURE: 105 MMHG | RESPIRATION RATE: 16 BRPM | HEART RATE: 64 BPM | TEMPERATURE: 98 F | HEIGHT: 71 IN

## 2023-08-29 DIAGNOSIS — Z41.9 SURGERY, ELECTIVE: ICD-10-CM

## 2023-08-29 DIAGNOSIS — M47.817 LUMBOSACRAL SPONDYLOSIS WITHOUT MYELOPATHY: ICD-10-CM

## 2023-08-29 PROCEDURE — 64636 DESTROY L/S FACET JNT ADDL: CPT | Performed by: ANESTHESIOLOGY

## 2023-08-29 PROCEDURE — 76000 FLUOROSCOPY <1 HR PHYS/QHP: CPT

## 2023-08-29 PROCEDURE — 25010000002 FENTANYL CITRATE (PF) 50 MCG/ML SOLUTION: Performed by: ANESTHESIOLOGY

## 2023-08-29 PROCEDURE — 25010000002 MIDAZOLAM PER 1MG: Performed by: ANESTHESIOLOGY

## 2023-08-29 PROCEDURE — 64635 DESTROY LUMB/SAC FACET JNT: CPT | Performed by: ANESTHESIOLOGY

## 2023-08-29 PROCEDURE — 25010000002 BUPIVACAINE (PF) 0.5 % SOLUTION 10 ML VIAL: Performed by: ANESTHESIOLOGY

## 2023-08-29 RX ORDER — SODIUM CHLORIDE 0.9 % (FLUSH) 0.9 %
10 SYRINGE (ML) INJECTION AS NEEDED
Status: DISCONTINUED | OUTPATIENT
Start: 2023-08-29 | End: 2023-08-29 | Stop reason: HOSPADM

## 2023-08-29 RX ORDER — SODIUM CHLORIDE 0.9 % (FLUSH) 0.9 %
10 SYRINGE (ML) INJECTION EVERY 12 HOURS SCHEDULED
Status: DISCONTINUED | OUTPATIENT
Start: 2023-08-29 | End: 2023-08-29 | Stop reason: HOSPADM

## 2023-08-29 RX ADMIN — FENTANYL CITRATE 100 MCG: 0.05 INJECTION, SOLUTION INTRAMUSCULAR; INTRAVENOUS at 13:44

## 2023-08-29 RX ADMIN — MIDAZOLAM HYDROCHLORIDE 1 MG: 2 INJECTION, SOLUTION INTRAMUSCULAR; INTRAVENOUS at 13:44

## 2023-08-29 NOTE — H&P
Brief Pre-procedural / Pre-operative H&P        -----    Pertinent Diagnosis:   Lumbar facet arthropathy    Proposed Procedure: Lumbar medial branch radiofrequency ablation anticipated bilateral L3 and L4 and L5      Subjective   Avni Olivo III is a 68 y.o. male  who presents for intervention.  He has a history of back pain.      History of Present Illness     He has a history of back pain with axial elements and significant therapeutic effect radiofrequency ablation in the past.  Return of axial elements of back pain.  Known facet arthropathy.  Repeat radiofrequency ablation is definitely indicated.  Nonradiating mid and lower lumbar and lumbosacral area but axial back pain.  Rotational movements increases pain.  There is some referral in the buttocks but its not a true radicular element.  Aching and throbbing is worsening.    -------    The following portions of the patient's history were reviewed and updated as appropriate: allergies, current medications, past family history, past medical history, past social history, past surgical history and problem list.    No Known Allergies      Current Facility-Administered Medications:     sodium chloride 0.9 % flush 10 mL, 10 mL, Intravenous, Q12H, Cali Monte MD    sodium chloride 0.9 % flush 10 mL, 10 mL, Intravenous, PRN, Cali Monte MD    No current facility-administered medications on file prior to encounter.     Current Outpatient Medications on File Prior to Encounter   Medication Sig Dispense Refill    amLODIPine (NORVASC) 2.5 MG tablet Take 1 tablet by mouth Daily. 90 tablet 3    B Complex Vitamins (VITAMIN B COMPLEX) capsule capsule Take 1 capsule by mouth Daily. HOLD PRIOR TO SURGERY      carvedilol (COREG) 12.5 MG tablet Take 1 tablet by mouth 2 (Two) Times a Day.      DHEA 50 MG tablet Take 50 mg by mouth Daily. HOLD PRIOR TO SURGERY      eszopiclone (LUNESTA) 2 MG tablet Take 1 mg by mouth Every Night. Takes 1/2 tablet qhs      Omega-3 Fatty  Acids (FISH OIL PO) Take 1,280 mg by mouth 2 (Two) Times a Day. HOLD PRIOR TO SURGERY      vitamin C (ASCORBIC ACID) 250 MG tablet Take 1 tablet by mouth Daily.      VITAMIN D, CHOLECALCIFEROL, PO Take  by mouth.      clopidogrel (PLAVIX) 75 MG tablet Take 1 tablet by mouth Daily. 90 tablet 3    Misc Natural Products (7-KETO LEAN) capsule Take 1 tablet by mouth Daily. HOLD PRIOR TO SURGERY         Patient Active Problem List   Diagnosis    Spinal stenosis of lumbar region with neurogenic claudication    Essential hypertension    DDD (degenerative disc disease), lumbar    Mixed hyperlipidemia    PVC's (premature ventricular contractions)    Coronary artery disease involving native heart without angina pectoris    Bilateral leg weakness    Status post lumbar spine operative procedure for decompression of spinal cord    Chronic midline low back pain without sciatica    Bilateral sacroiliitis    Spondylosis of lumbar region without myelopathy or radiculopathy    Chronic pain syndrome    Lumbosacral spondylosis without myelopathy    Herniation of lumbar intervertebral disc with radiculopathy       Past Medical History:   Diagnosis Date    Arm pain     Bilateral sacroiliitis 05/20/2021    Chest pain     DDD (degenerative disc disease), lumbar     Dizziness     Facet arthropathy, lumbar 12/20/2021    History of EKG 08/06/2013    HTN (hypertension)     Hyperlipidemia     Limb pain     Low back pain     Lower extremity edema     Lumbar canal stenosis     Melanoma 07/05/2017    Mitral regurgitation     trace    Nausea and vomiting     Palpitations     Peripheral neuropathy     Sinus bradycardia     Snoring     Spinal stenosis     Sprain of right foot        Past Surgical History:   Procedure Laterality Date    CARDIAC CATHETERIZATION  06/10/2013    CARDIAC CATHETERIZATION N/A 12/27/2019    Procedure: Coronary angiography;  Surgeon: Terrance Wood MD;  Location: [facility] INVASIVE LOCATION;  Service: Cardiovascular     CARDIAC CATHETERIZATION N/A 12/27/2019    Procedure: Left Heart Cath;  Surgeon: Terrance Wood MD;  Location:  [facility] INVASIVE LOCATION;  Service: Cardiovascular    CARDIAC CATHETERIZATION N/A 12/27/2019    Procedure: Left ventriculography;  Surgeon: Terrance Wood MD;  Location:  [facility] INVASIVE LOCATION;  Service: Cardiovascular    CARDIAC CATHETERIZATION N/A 12/27/2019    Procedure: Stent KALYAN coronary;  Surgeon: Terrance Wood MD;  Location:  [facility] INVASIVE LOCATION;  Service: Cardiovascular    COLONOSCOPY      ENDOSCOPY      EPIDURAL BLOCK      FOOT FRACTURE SURGERY Left     KNEE ARTHROSCOPY Left     KNEE SURGERY Right     LUMBAR LAMINECTOMY DISCECTOMY DECOMPRESSION Bilateral 10/29/2020    Procedure: LUMBAR DECOMPRESSION, OPEN LUMBAR DECOMPRESSION, LUMBAR 3 TO LUMBAR 4, LUMBAR 4 TO LUMBAR 5, LUMBAR 5 TO SACRAL 1 (has transitional anatomy);  Surgeon: Jeremy Martinez MD;  Location[facility] MAIN OR;  Service: Neurosurgery;  Laterality: Bilateral;    MEDIAL BRANCH BLOCK Bilateral 9/15/2022    Procedure: LUMBAR MEDIAL BRANCH BLOCK bilateral L3-5;  Surgeon: Cali Monte MD;  Location: [facility] MAIN OR;  Service: Pain Management;  Laterality: Bilateral;    MEDIAL BRANCH BLOCK Bilateral 11/8/2022    Procedure: LUMBAR MEDIAL BRANCH BLOCK BILATERAL L3-5 #2/CONFIRMATORY;  Surgeon: Cali Monte MD;  Location: [facility] MAIN OR;  Service: Pain Management;  Laterality: Bilateral;    MOHS SURGERY      nose    NERVE SURGERY Bilateral 1/17/2023    Procedure: LUMBAR RADIOFREQUENCY ABLATION BILATERAL L3-L5;  Surgeon: Cali Monte MD;  Location: [facility] MAIN OR;  Service: Pain Management;  Laterality: Bilateral;    SKIN CANCER EXCISION Bilateral 07/05/2017    MELANOMA    TONSILLECTOMY         Family History   Problem Relation Age of Onset    Coronary artery disease Other     Hyperlipidemia Other     Hypertension Other     Other Other         heart surgery    Heart disease Father      "Hypertension Father     Heart disease Sister     Stroke Maternal Grandmother     Heart disease Maternal Grandfather     Malig Hyperthermia Neg Hx        Social History     Socioeconomic History    Marital status:     Years of education: college graduate   Tobacco Use    Smoking status: Never    Smokeless tobacco: Former    Tobacco comments:     caffeine - 2 cups coffee daily    Vaping Use    Vaping Use: Never used   Substance and Sexual Activity    Alcohol use: Yes     Comment: most days    Drug use: No     Comment: CBD oil    Sexual activity: Defer       -------       Review of Systems  No Fever, No Chills, No ear pain, No sinus pressure or drainage, No eye pain or drainage, No cough, No SOB, No chest tightness nor chest pain, no palpitations.      No lesions in lumbar area    Vitals:    08/28/23 1026 08/29/23 1303   BP:  166/86   BP Location:  Left arm   Patient Position:  Lying   Pulse:  60   Resp:  18   Temp:  96.6 øF (35.9 øC)   TempSrc:  Temporal   SpO2:  96%   Weight: 88.5 kg (195 lb) 88.5 kg (195 lb)   Height: 180.3 cm (71\") 180.3 cm (71\")         Objective   Physical Exam  VSS, NNR, NCAT, NMNA, NRD, AAOx3.  As above    -------    Assessment & Plan:  - as noted above, the stated intervention is indicated  - Follow-up plan will be noted in the operative report        Has a follow-up in 6 weeks      EMR Dragon/Transcription disclaimer:   Typed items in this encounter note may have been created by electronic transcription/translation software which converts spoken language to printed text. The electronic translation of spoken language may permit erroneous, or at times, nonsensical words or phrases to be inadvertently transcribed; Although I have reviewed the note for such errors, some may still exist.     "

## 2023-08-29 NOTE — DISCHARGE INSTRUCTIONS
[facility] - Post-procedure Instructions          --  While there are no absolute restrictions, it is recommended that you do not perform strenuous activity today. In the morning, you may resume your level of activity as before your block.    --  If you have a band-aid at your injection site, please remove it later today. Observe the area for any redness, swelling, pus-like drainage, or a temperature over 101ø. If any of these symptoms occur, please call your doctor at [phone]. If after office hours, leave a message and the on-call provider will return your call.    --  Ice may be applied to your injection site. It is recommended you avoid direct heat (heating pad; hot tub) for 1-2 days.    --  Call [facility] at [phone] if you experience persistent headache, persistent bleeding from the injection site, or severe pain not relieved by heat or oral medication.    --  Do not make important decisions today.    --  Due to the effects of the block and/or the I.V. Sedation, DO NOT drive or operate hazardous machinery for 12 hours.  Local anesthetics may cause numbness after procedure and precautions must be taken with regards to operating equipment as well as with walking, even if ambulating with assistance of another person or with an assistive device.    --  Do not drink alcohol for 12 hours.    -- You may return to work tomorrow, or as directed by your referring doctor.    --  Occasionally you may notice a slight increase in your pain after the procedure. This should start to improve within the next 24-48 hours. Radiofrequency ablation procedure pain may last 3-4 weeks.    --  It may take as long as 3-4 days before you notice a gradual improvement in your pain and/or other symptoms.    -- You may continue to take your prescribed pain medication as needed.    --  Some normal possible side effects of steroid use could include fluid retention, increased blood sugar, dull headache,  increased sweating, increased appetite, mood swings and flushing.    --  Diabetics are recommended to watch their blood glucose level closely for 24-48 hours after the injection.    --  Must stay in PACU for 20 min upon arrival and prove no leg weakness before being discharged.    --  IN THE EVENT OF A LIFE THREATENING EMERGENCY, (CHEST PAIN, BREATHING DIFFICULTIES, PARALYSIS.) YOU SHOULD GO TO YOUR NEAREST EMERGENCY ROOM.    --  You should be contacted by our office within 2-3 days to schedule follow up or next appointment date.  If not contacted within 7 days, please call the office at [phone]

## 2023-08-29 NOTE — OP NOTE
Bilateral L3-5 Lumbar Medial Branch RADIOFREQUENCY  [facility]      PREOPERATIVE DIAGNOSIS:  Lumbar spondylosis without myelopathy    POSTOPERATIVE DIAGNOSIS:  Lumbar spondylosis without myelopathy    PROCEDURE:   Diagnostic Bilateral Lumbar Medial Branch Nerve thermal radiofrequency lesioning, with fluoroscopy:  L3, L4, and L5 nerves (at the L4 and L5 transverse processes and the sacral alar groove) to thermally treat the innervation to facet joints L4-5 and L5-S1  [id number] -- Bilateral L/S facet neuro destr., 1st Level  [id number] -- Bilateral L/S facet neuro destr., 2nd  Level    PRE-PROCEDURE DISCUSSION WITH PATIENT:    Risks and complications were discussed with the patient prior to starting the procedure and informed consent was obtained.      SURGEON:  Cali Monte MD    REASON FOR PROCEDURE:    Previous clinically significant therapeutic effect after prior Radiofrequency procedure    SEDATION:   Versed 1mg  and Fentanyl 100 mcg IV  TIME OF PROCEDURE:   The intraoperative procedure time after administration of the sedative was 16 (13:50-14:06) minutes.     ANESTHETIC:  Lidocaine 2%  STEROID:  NONE      DESCRIPTON OF PROCEDURE:  After obtaining informed consent, IV access  was obtained in the preoperative area.   The patient was taken to the operating room.  The patient was placed in the prone position with a pillow under the abdomen. All pressure points were well padded.  EKG, blood pressure, and pulse oximeter were monitored.  The patient was monitored and sedated by the RN under my direction. The lumbosacral area was prepped with Chloraprep and draped in a sterile fashion.     Under fluoroscopic guidance the transverse processes of the L4 and L5 vertebrae at the junctions of the superior articular processes were identified on the right.  Also identified was the groove between the ala and the superior articular process of the sacrum on the ipsilateral side.  Skin and subcutaneous tissue  were anesthetized with 1ml of 1% lidocaine above each of these points. Then, radiofrequency probe needles were advanced in this fluoro view to the above junctions.  Aspiration was negative for blood and CSF.  After confirming the position of the needle with fluoroscope in all views, testing was initiated.  First, sensory testing was started on each needle a 1V and 50Hz and slowly decreased until painful pressure stimulation diminished at 0.5V.  Next, motor testing was confirmed to be negative at 3V and 2Hz for any radicular stimulation.  Then 1mL of the local anesthetic was instilled in each needle.  Two minutes elapsed, and during this time a lateral fluoroscopic view was confirmed again to ensure the needles had not advanced nor retracted.  Then, Radiofrequency Lesioning was initiated for 3 minutes at 80 degrees Celsius.  Needles were removed intact from each of the areas.     A similar procedure was repeated to address the L3, L4, and L5 nerves on the contralateral side.   Onset of analgesia was noted.  Vital signs remained stable throughout.      ESTIMATED BLOOD LOSS:  <5 mL  SPECIMENS:  none    COMPLICATIONS:   No complications were noted. and The patient did not have any signs of postprocedure numbness nor weakness.    TOLERANCE & DISCHARGE CONDITION:    The patient tolerated the procedure well.  The patient was transported to the recovery area without difficulties.  The patient was discharged to home under the care of family in stable and satisfactory condition.    PLAN OF CARE:  The patient was given our standard instruction sheet.  The patient will  Return to clinic 6 wks.  The patient will resume all medications as per the medication reconciliation sheet.

## 2023-09-25 ENCOUNTER — TRANSCRIBE ORDERS (OUTPATIENT)
Dept: ADMINISTRATIVE | Facility: HOSPITAL | Age: 68
End: 2023-09-25

## 2023-09-25 DIAGNOSIS — N18.30 STAGE 3 CHRONIC KIDNEY DISEASE, UNSPECIFIED WHETHER STAGE 3A OR 3B CKD: Primary | ICD-10-CM

## 2023-10-03 ENCOUNTER — HOSPITAL ENCOUNTER (OUTPATIENT)
Dept: ULTRASOUND IMAGING | Facility: HOSPITAL | Age: 68
Discharge: HOME | End: 2023-10-03
Admitting: HOSPITALIST
Payer: MEDICARE

## 2023-10-03 DIAGNOSIS — N18.30 STAGE 3 CHRONIC KIDNEY DISEASE, UNSPECIFIED WHETHER STAGE 3A OR 3B CKD: ICD-10-CM

## 2023-10-03 PROCEDURE — 76775 US EXAM ABDO BACK WALL LIM: CPT

## 2023-10-10 ENCOUNTER — OFFICE VISIT (OUTPATIENT)
Dept: PAIN MEDICINE | Facility: CLINIC | Age: 68
End: 2023-10-10
Payer: MEDICARE

## 2023-10-10 VITALS
SYSTOLIC BLOOD PRESSURE: 120 MMHG | TEMPERATURE: 97.6 F | WEIGHT: 201.2 LBS | DIASTOLIC BLOOD PRESSURE: 66 MMHG | HEART RATE: 66 BPM | OXYGEN SATURATION: 97 % | BODY MASS INDEX: 28.17 KG/M2 | HEIGHT: 71 IN

## 2023-10-10 DIAGNOSIS — M47.817 LUMBOSACRAL SPONDYLOSIS WITHOUT MYELOPATHY: Primary | ICD-10-CM

## 2023-10-10 DIAGNOSIS — M51.16 HERNIATION OF LUMBAR INTERVERTEBRAL DISC WITH RADICULOPATHY: ICD-10-CM

## 2023-10-10 DIAGNOSIS — M46.1 BILATERAL SACROILIITIS: ICD-10-CM

## 2023-10-10 DIAGNOSIS — M51.36 DDD (DEGENERATIVE DISC DISEASE), LUMBAR: ICD-10-CM

## 2023-10-10 PROCEDURE — 3074F SYST BP LT 130 MM HG: CPT | Performed by: PHYSICIAN ASSISTANT

## 2023-10-10 PROCEDURE — 3078F DIAST BP <80 MM HG: CPT | Performed by: PHYSICIAN ASSISTANT

## 2023-10-10 PROCEDURE — 1160F RVW MEDS BY RX/DR IN RCRD: CPT | Performed by: PHYSICIAN ASSISTANT

## 2023-10-10 PROCEDURE — 1125F AMNT PAIN NOTED PAIN PRSNT: CPT | Performed by: PHYSICIAN ASSISTANT

## 2023-10-10 PROCEDURE — 99213 OFFICE O/P EST LOW 20 MIN: CPT | Performed by: PHYSICIAN ASSISTANT

## 2023-10-10 PROCEDURE — 1159F MED LIST DOCD IN RCRD: CPT | Performed by: PHYSICIAN ASSISTANT

## 2023-10-10 RX ORDER — TERAZOSIN 2 MG/1
2 CAPSULE ORAL NIGHTLY
COMMUNITY
Start: 2023-09-21 | End: 2024-09-20

## 2023-10-10 NOTE — PROGRESS NOTES
CHIEF COMPLAINT  F/U back pain- LUMBAR RADIOFREQUENCY ABLATION BILATERAL L3-L5- patient states that he has had 50% improvement over the past week.       Subjective   Avni Olivo III is a 68 y.o. male  who presents to the office for follow-up of procedure.  He completed a lumbar radiofrequency ablation bilateral L3-L5   on 8/29/2023 performed by Dr. Monte for management of low back pain. Patient reports 50% ongoing relief from the procedure.  The patient has noted improvement of being able to play golf as well as slight improvement of physical activity since having the ablation procedure.  Denies any lower extremity radicular symptoms.    He is scheduled to see Dr. Martinez on 10/16/2023 for further neurosurgical evaluation.    Pain today 4/10 VAS in severity.        Back Pain  This is a chronic problem. The current episode started more than 1 year ago. The problem occurs constantly. The problem has been gradually worsening since onset. The pain is present in the lumbar spine. The quality of the pain is described as aching (throbbing). The pain does not radiate. The pain is at a severity of 4/10. The pain is moderate. The pain is Worse during the day. The symptoms are aggravated by position, twisting and bending. Stiffness is present In the morning. Pertinent negatives include no abdominal pain, chest pain, dysuria, fever, headaches, numbness or weakness.        PEG Assessment   What number best describes your pain on average in the past week?3  What number best describes how, during the past week, pain has interfered with your enjoyment of life?1  What number best describes how, during the past week, pain has interfered with your general activity?  2    Review of Pertinent Medical Data ---  MRI OF THE LUMBAR SPINE WITH AND WITHOUT CONTRAST 08/26/2022     CLINICAL HISTORY: Low back pain, bilateral leg weakness, status post  lumbar spine decompression on 10/29/2020.     TECHNIQUE: Sagittal T1, proton density  fat-suppressed T2 and  postcontrast sagittal T1-weighted images were obtained of the lumbar  spine in addition thin cut axial pre and postcontrast T1-weighted images  were obtained angled through the interspaces from L3 to S1.     This is correlated to 4 prior MRIs of the lumbar spine dating all the  way back to 07/24/2014 and 09/04/2015 and preoperative MRI lumbar spine  08/31/2020 and postoperative MRI of the lumbar spine 07/29/2021. This  also correlated to intraoperative C-arm views of lumbar spine  10/29/2021, lumbar spine plain film series with flexion-extension views  today 08/26/2022.     FINDINGS: There is transitional anatomy at the lumbosacral junction for  the purposes of this report is a transitional vertebra is labeled as a  partially lumbarized S1 vertebrae with the last-inferior most partially  hydrated hypoplastic disc space labeled as the S1-S2 disc space. Above  this are 5 nonrib-bearing vertebra labeled as L1 through L5 and this is  the same numbering system used on prior MRIs of the lumbar spine.     The distal thoracic cord and the conus is normal in signal intensity  conus terminates at the mid body of L2 lumbar level which is normal.     At T11-12 and T12-L1 the disc space and facets are normal with no canal  or foraminal narrowing.     At L1-2 there is a right paracentral posterior lateral tiny disc  protrusion only minimally indents the right ventral aspect thecal sac is  essentially no canal or foraminal narrowing.     At L2-3, the disc space and facets are normal with no canal or foraminal  narrowing.     At L3-4 there is mild-to-moderate bilateral facet overgrowth some fluid  in the facet joints. There is a posterior central to left paracentral  disc herniation with extruded disc material extending along the  posterior central to left paracentral inferior endplate of L4. There is  severe narrowing of the thecal sac at L3-4 with diminished spinal fluid  bathing the cauda equina at L3-4  lumbar level. There is only minimal  foraminal narrowing.     At L4-5 the patient has had prior lumbar spine surgery on 10/29/2020  during which there is performed to bilateral laminectomies at L4, medial  facetectomies at L4-5. There is moderate bilateral facet overgrowth at  L4-5, minimal posterior disc bulge. Due to posterior decompression there  is no canal narrowing at L4-5. There is mild left but no right foraminal  narrowing at L4-5.     At L5-S1 the patient had prior lumbar spine surgery with bilateral  laminectomies at L5. There is severe bilateral facet overgrowth at L5-S1  with 3 mm anterolisthesis of L5 on S1. There is no canal narrowing into  the medial facet spurs perhaps slightly narrow the lateral recesses but  there is some loss of vertical height of the foramen and bulging disc  material into the inferior foramen spurring synovial thickening off the  facets extending the posterior aspect of the foramen and there is  mild-to-moderate bilateral foraminal narrowing.     IMPRESSION:  1. This patient has transitional anatomy at the lumbosacral junction for  the purposes of this report the transitional vertebra is labeled as a  partially lumbarized S1 vertebra with the last-inferior most partially  hydrated hypoplastic disc space labeled as the S1-S2 disc space and  above the partially lumbarized S1 vertebra are 5 nonrib-bearing vertebra  labeled as L1-L5 and lowest-most rib-bearing vertebra is labeled as T12.  This same numbering system is used on prior lumbar spine MRIs.  2. Patient has had previous lumbar spine surgery with bilateral  laminectomies at L4 and L5 and S1, bilateral medial facetectomies at  L4-5 and L5-S1. There is no residual canal narrowing at L4-5 or L5-S1  there is mild left but no right foraminal narrowing at L4-5. There is  mild-to-moderate bilateral foraminal narrowing at L5-S1.  3. At L3-4 there is mild-to-moderate bilateral facet overgrowth and  there is posterior central left  "paracentral disc herniation with some  extruded disc material extending along the midline left paracentral  inferior body and endplate of L3. There is severe narrowing of the  thecal sac at the L3-4 level with diminished spinal fluid bathing the  cauda equina at the L3-4 level. There is only mild bilateral foraminal  narrowing at L3-4.  4. L1-2 there is a tiny right paracentral posterior lateral disc  protrusion only minimally indents the ventral aspect of the thecal sac  and there is no canal or foraminal narrowing. The remainder of the  lumbar spine MRI is unremarkable.     This report was finalized on 8/29/2022 2:10 PM by Dr. Vargas Adorno M.D.       The following portions of the patient's history were reviewed and updated as appropriate: allergies, current medications, past family history, past medical history, past social history, past surgical history, and problem list.    Review of Systems   Constitutional:  Negative for activity change, chills, fatigue and fever.   HENT:  Positive for congestion.    Eyes:  Negative for visual disturbance.   Respiratory:  Negative for chest tightness and shortness of breath.    Cardiovascular:  Negative for chest pain.   Gastrointestinal:  Negative for abdominal pain, constipation and diarrhea.   Genitourinary:  Negative for difficulty urinating and dysuria.   Musculoskeletal:  Positive for back pain.   Neurological:  Negative for dizziness, weakness, light-headedness, numbness and headaches.   Psychiatric/Behavioral:  Negative for agitation and sleep disturbance. The patient is not nervous/anxious.    I have reviewed and confirmed the accuracy of the ROS as documented by the MA/LPN/DARWIN Riojas   Vitals:    10/10/23 1314   BP: 120/66   Pulse: 66   Temp: 97.6 øF (36.4 øC)   SpO2: 97%   Weight: 91.3 kg (201 lb 3.2 oz)   Height: 180.3 cm (71\")   PainSc:   2   PainLoc: Back         Objective   Physical Exam  Vitals reviewed.   Constitutional:       Appearance: Normal " appearance. He is normal weight.   HENT:      Head: Normocephalic.   Pulmonary:      Effort: Pulmonary effort is normal.   Musculoskeletal:      Lumbar back: Tenderness present.      Comments: Improvement noted of range of motion with lumbar extension   Skin:     General: Skin is warm and dry.   Neurological:      General: No focal deficit present.      Mental Status: He is alert and oriented to person, place, and time.      Cranial Nerves: Cranial nerves 2-12 are intact.      Sensory: Sensation is intact.      Motor: Motor function is intact.      Gait: Gait is intact.   Psychiatric:         Mood and Affect: Mood normal.         Behavior: Behavior normal.         Thought Content: Thought content normal.         Judgment: Judgment normal.             Assessment & Plan   Diagnoses and all orders for this visit:    1. Lumbosacral spondylosis without myelopathy (Primary)    2. Herniation of lumbar intervertebral disc with radiculopathy    3. Bilateral sacroiliitis    4. DDD (degenerative disc disease), lumbar        Avni Olivo III reports a pain score of 2.  Given his pain assessment as noted, treatment options were discussed and the following options were decided upon as a follow-up plan to address the patient's pain: continuation of current treatment plan for pain and use of non-medical modalities (ice, heat, stretching and/or behavior modifications).      --- Follow-up as needed.  Patient will contact the office after his appointment with Dr. Martinez if any further injective therapy is warranted.  --- I have discussed with the patient that it may ultimately take 6-8 weeks before he will note significant improvement following the ablation procedure and hopefully his activities will continue to increase.                  Dictated utilizing Dragon dictation.

## 2023-10-16 ENCOUNTER — OFFICE VISIT (OUTPATIENT)
Dept: NEUROSURGERY | Facility: CLINIC | Age: 68
End: 2023-10-16
Payer: MEDICARE

## 2023-10-16 VITALS
DIASTOLIC BLOOD PRESSURE: 72 MMHG | HEIGHT: 71 IN | SYSTOLIC BLOOD PRESSURE: 138 MMHG | WEIGHT: 201 LBS | BODY MASS INDEX: 28.14 KG/M2

## 2023-10-16 DIAGNOSIS — Z98.890 STATUS POST LUMBAR SPINE OPERATIVE PROCEDURE FOR DECOMPRESSION OF SPINAL CORD: ICD-10-CM

## 2023-10-16 DIAGNOSIS — M54.50 CHRONIC MIDLINE LOW BACK PAIN WITHOUT SCIATICA: Primary | ICD-10-CM

## 2023-10-16 DIAGNOSIS — G89.29 CHRONIC MIDLINE LOW BACK PAIN WITHOUT SCIATICA: Primary | ICD-10-CM

## 2023-10-16 DIAGNOSIS — M51.36 DDD (DEGENERATIVE DISC DISEASE), LUMBAR: ICD-10-CM

## 2023-10-16 DIAGNOSIS — M51.16 HERNIATION OF LUMBAR INTERVERTEBRAL DISC WITH RADICULOPATHY: ICD-10-CM

## 2023-10-16 PROCEDURE — 1159F MED LIST DOCD IN RCRD: CPT | Performed by: NEUROLOGICAL SURGERY

## 2023-10-16 PROCEDURE — 3075F SYST BP GE 130 - 139MM HG: CPT | Performed by: NEUROLOGICAL SURGERY

## 2023-10-16 PROCEDURE — 1160F RVW MEDS BY RX/DR IN RCRD: CPT | Performed by: NEUROLOGICAL SURGERY

## 2023-10-16 PROCEDURE — 99213 OFFICE O/P EST LOW 20 MIN: CPT | Performed by: NEUROLOGICAL SURGERY

## 2023-10-16 PROCEDURE — 3078F DIAST BP <80 MM HG: CPT | Performed by: NEUROLOGICAL SURGERY

## 2023-10-16 NOTE — PROGRESS NOTES
"Subjective   Patient ID: Avni Olivo III is a 68 y.o. male is here today for follow-up.    Its been about 3 years since he underwent an L3-S1 decompression for spinal stenosis.  His residual pain is mainly in the back and he works with Dr. Monte and has gotten an ablation approximately every 6 months and it does seem to help.  He does not have any sciatica although he does have baseline Weakness which was present even before the surgery.  He works out with his  and is working on his core muscles.  We will keep an eye on him.  I will see him in 9 months.  If he starts developing sciatica, he will let me know.  He will continue working with Dr. Monte.  He got an ablation a few months ago and it seems to have helped.        History of Present Illness    The following portions of the patient's history were reviewed and updated as appropriate: allergies, current medications, past family history, past medical history, past social history, past surgical history, and problem list.    Review of Systems   Constitutional:  Negative for fever.   Musculoskeletal:  Positive for back pain.   Neurological:  Positive for numbness. Negative for weakness.   All other systems reviewed and are negative.          Objective     Vitals:    10/16/23 1012   BP: 138/72   BP Location: Left arm   Patient Position: Sitting   Cuff Size: Adult   Weight: 91.2 kg (201 lb)   Height: 180.3 cm (70.98\")   PainSc:   4   PainLoc: Back     Body mass index is 28.05 kg/m².    Tobacco Use: Medium Risk (10/16/2023)    Patient History     Smoking Tobacco Use: Never     Smokeless Tobacco Use: Former     Passive Exposure: Not on file          Physical Exam  Constitutional:       Appearance: He is well-developed.   HENT:      Head: Normocephalic and atraumatic.   Eyes:      Extraocular Movements: EOM normal.      Conjunctiva/sclera: Conjunctivae normal.      Pupils: Pupils are equal, round, and reactive to light.   Neck:      Vascular: No carotid " bruit.   Neurological:      Mental Status: He is oriented to person, place, and time.      Coordination: Finger-Nose-Finger Test and Heel to Shin Test normal.      Gait: Gait is intact.      Deep Tendon Reflexes:      Reflex Scores:       Tricep reflexes are 2+ on the right side and 2+ on the left side.       Bicep reflexes are 2+ on the right side and 2+ on the left side.       Brachioradialis reflexes are 2+ on the right side and 2+ on the left side.       Patellar reflexes are 2+ on the right side and 2+ on the left side.       Achilles reflexes are 2+ on the right side and 2+ on the left side.  Psychiatric:         Speech: Speech normal.       Neurologic Exam     Mental Status   Oriented to person, place, and time.   Registration of memory: Good recent and remote memory.   Attention: normal. Concentration: normal.   Speech: speech is normal   Level of consciousness: alert  Knowledge: consistent with education.     Cranial Nerves     CN II   Visual fields full to confrontation.   Visual acuity: normal    CN III, IV, VI   Pupils are equal, round, and reactive to light.  Extraocular motions are normal.     CN V   Facial sensation intact.   Right corneal reflex: normal  Left corneal reflex: normal    CN VII   Facial expression full, symmetric.   Right facial weakness: none  Left facial weakness: none    CN VIII   Hearing: intact    CN IX, X   Palate: symmetric    CN XI   Right sternocleidomastoid strength: normal  Left sternocleidomastoid strength: normal    CN XII   Tongue: not atrophic  Tongue deviation: none    Motor Exam   Muscle bulk: normal  Right arm tone: normal  Left arm tone: normal  Right leg tone: normal  Left leg tone: normal    Strength   Strength 5/5 except as noted.   Right gastroc: 3/5  Left gastroc: 3/5    Sensory Exam   Light touch normal.     Gait, Coordination, and Reflexes     Gait  Gait: normal    Coordination   Finger to nose coordination: normal  Heel to shin coordination: normal    Reflexes    Right brachioradialis: 2+  Left brachioradialis: 2+  Right biceps: 2+  Left biceps: 2+  Right triceps: 2+  Left triceps: 2+  Right patellar: 2+  Left patellar: 2+  Right achilles: 2+  Left achilles: 2+  Right : 2+  Left : 2+          Assessment & Plan   Independent Review of Radiographic Studies:      I personally reviewed the images from the following studies.    The lumbar MRI done on 8/26/2022 does show a new disc extrusion at L3-L4 above the levels that I operated on 2 years ago that seems to narrow the spinal canal and also but worse on the left compared to the right.  Agree with the report.       Medical Decision Making:      He will continue working with his  and with Dr. Monte and get intermittent ablations as needed.  I will see him in 9 months with x-rays.    Diagnoses and all orders for this visit:    1. Chronic midline low back pain without sciatica (Primary)  -     XR Spine Lumbar Complete With Flex & Ext; Future    2. Herniation of lumbar intervertebral disc with radiculopathy  -     XR Spine Lumbar Complete With Flex & Ext; Future    3. Status post lumbar spine operative procedure for decompression of spinal cord  -     XR Spine Lumbar Complete With Flex & Ext; Future    4. DDD (degenerative disc disease), lumbar  -     XR Spine Lumbar Complete With Flex & Ext; Future      Return in about 9 months (around 7/16/2024) for face to face.

## 2023-12-06 ENCOUNTER — TELEPHONE (OUTPATIENT)
Dept: CARDIOLOGY | Facility: CLINIC | Age: 68
End: 2023-12-06

## 2023-12-06 NOTE — TELEPHONE ENCOUNTER
Patient is needing clearance for left knee surgery with Dr Terrazas at [facility].   She is currently on Plavix and they need instructions on holding the medication.       [phone]    Thanks  TONJA Pond   12/6/2023  2:45 PM

## 2023-12-06 NOTE — TELEPHONE ENCOUNTER
Caller name: Avni Olivo III     Phone Number: [phone]    Surgeon's name: DEANA WYMAN    Type of planned surgery: ARTHOSCOPY OF LEFT KNEE    Date of planned surgery: 2/2/24 BUT WOULD LIKE SOONER IF POSSIBLE    Type of anesthesia: ?    Have you been experiencing chest pain or shortness of breath? NO    Is your doctor requesting for you to stop any of your medications prior to your surgery? YES, PLAVIX     Where should we fax the clearance to? [facility] FAX # [phone]  PHONE NUMBER [phone] TO MAH[facility] GARC[facility] COLLECTING INFO FOR THIS PATIENT   PLEASE UPDATE PATIENT WHEN SENT

## 2024-01-02 ENCOUNTER — TELEPHONE (OUTPATIENT)
Dept: PAIN MEDICINE | Facility: CLINIC | Age: 69
End: 2024-01-02

## 2024-01-02 NOTE — TELEPHONE ENCOUNTER
Caller: BONNIE   Relationship to Patient: SELF  Phone Number: [phone] (home)     Reason for Call: PATIENT CALLING ASKING FOR ORDERS FOR ANOTHER RFA

## 2024-01-04 ENCOUNTER — OFFICE VISIT (OUTPATIENT)
Dept: PAIN MEDICINE | Facility: CLINIC | Age: 69
End: 2024-01-04
Payer: MEDICARE

## 2024-01-04 ENCOUNTER — PREP FOR SURGERY (OUTPATIENT)
Dept: SURGERY | Facility: SURGERY CENTER | Age: 69
End: 2024-01-04
Payer: MEDICARE

## 2024-01-04 ENCOUNTER — TRANSCRIBE ORDERS (OUTPATIENT)
Dept: SURGERY | Facility: SURGERY CENTER | Age: 69
End: 2024-01-04
Payer: MEDICARE

## 2024-01-04 VITALS
TEMPERATURE: 96.6 F | WEIGHT: 204 LBS | RESPIRATION RATE: 18 BRPM | OXYGEN SATURATION: 96 % | HEIGHT: 71 IN | BODY MASS INDEX: 28.56 KG/M2 | SYSTOLIC BLOOD PRESSURE: 150 MMHG | DIASTOLIC BLOOD PRESSURE: 86 MMHG | HEART RATE: 60 BPM

## 2024-01-04 DIAGNOSIS — M47.817 LUMBOSACRAL SPONDYLOSIS WITHOUT MYELOPATHY: Primary | ICD-10-CM

## 2024-01-04 DIAGNOSIS — M48.062 SPINAL STENOSIS OF LUMBAR REGION WITH NEUROGENIC CLAUDICATION: ICD-10-CM

## 2024-01-04 DIAGNOSIS — Z41.9 SURGERY, ELECTIVE: Primary | ICD-10-CM

## 2024-01-04 DIAGNOSIS — M51.16 HERNIATION OF LUMBAR INTERVERTEBRAL DISC WITH RADICULOPATHY: ICD-10-CM

## 2024-01-04 DIAGNOSIS — M46.1 BILATERAL SACROILIITIS: ICD-10-CM

## 2024-01-04 PROCEDURE — 99214 OFFICE O/P EST MOD 30 MIN: CPT | Performed by: PHYSICIAN ASSISTANT

## 2024-01-04 PROCEDURE — 1125F AMNT PAIN NOTED PAIN PRSNT: CPT | Performed by: PHYSICIAN ASSISTANT

## 2024-01-04 PROCEDURE — 1160F RVW MEDS BY RX/DR IN RCRD: CPT | Performed by: PHYSICIAN ASSISTANT

## 2024-01-04 PROCEDURE — 1159F MED LIST DOCD IN RCRD: CPT | Performed by: PHYSICIAN ASSISTANT

## 2024-01-04 PROCEDURE — 3077F SYST BP >= 140 MM HG: CPT | Performed by: PHYSICIAN ASSISTANT

## 2024-01-04 PROCEDURE — 3079F DIAST BP 80-89 MM HG: CPT | Performed by: PHYSICIAN ASSISTANT

## 2024-01-04 RX ORDER — SODIUM CHLORIDE 0.9 % (FLUSH) 0.9 %
10 SYRINGE (ML) INJECTION AS NEEDED
OUTPATIENT
Start: 2024-01-04

## 2024-01-04 RX ORDER — TAMSULOSIN HYDROCHLORIDE 0.4 MG/1
1 CAPSULE ORAL DAILY
COMMUNITY
Start: 2023-11-27

## 2024-01-04 RX ORDER — SODIUM CHLORIDE 0.9 % (FLUSH) 0.9 %
10 SYRINGE (ML) INJECTION EVERY 12 HOURS SCHEDULED
OUTPATIENT
Start: 2024-01-04

## 2024-01-04 RX ORDER — METHYLPREDNISOLONE 4 MG/1
TABLET ORAL
Qty: 21 TABLET | Refills: 0 | Status: SHIPPED | OUTPATIENT
Start: 2024-01-04

## 2024-01-04 NOTE — PROGRESS NOTES
CHIEF COMPLAINT  Follow-up for back pain.    Arthroscopic knee surgery left 2/2--dr ramirez    Subjective   Avni Olivo III is a 68 y.o. male  who presents for follow-up.  He has a history of chronic low back pain.  This patient has obtained over 50% reduction of pain following radiofrequency ablation over the past 5 months and is beginning to note very slight recrudescence of symptoms.  He illustrates pain in a transverse distribution across the lumbosacral spine referred into the bilateral paraspinal muscles without lower extremity radicular symptoms.  He is finding that certain facet loading maneuvers tends to aggravate his pain a little bit more frequently than it had been.    The patient is scheduled to undergo left knee arthroscopic repair on 2/2/2024 with Dr. Ramirez.    Pain today 4/10 VAS in severity.      Back Pain  This is a chronic problem. The current episode started more than 1 year ago. The problem occurs constantly. The problem has been gradually worsening since onset. The pain is present in the lumbar spine. The quality of the pain is described as aching (throbbing). The pain does not radiate. The pain is at a severity of 4/10. The pain is moderate. The pain is Worse during the day. The symptoms are aggravated by position, twisting and bending. Stiffness is present In the morning. Pertinent negatives include no abdominal pain, chest pain, dysuria, fever, headaches, numbness or weakness.        PEG Assessment   What number best describes your pain on average in the past week?7  What number best describes how, during the past week, pain has interfered with your enjoyment of life?5  What number best describes how, during the past week, pain has interfered with your general activity?  7    Review of Pertinent Medical Data ---  Neurosurgical evaluation with Dr. Jeremy Martinez on 10/16/2023 shows that MRI from 8/26/2022 shows a new disc extrusion at L3-L4 above the levels that was operated on previously.   Patient does have history of L3-S1 decompression for spinal stenosis.  Dr. Martinez recommends that that he continue with intermittent ablations and will have him follow-up in 9 months.      MRI OF THE LUMBAR SPINE WITH AND WITHOUT CONTRAST 08/26/2022     CLINICAL HISTORY: Low back pain, bilateral leg weakness, status post  lumbar spine decompression on 10/29/2020.     TECHNIQUE: Sagittal T1, proton density fat-suppressed T2 and  postcontrast sagittal T1-weighted images were obtained of the lumbar  spine in addition thin cut axial pre and postcontrast T1-weighted images  were obtained angled through the interspaces from L3 to S1.     This is correlated to 4 prior MRIs of the lumbar spine dating all the  way back to 07/24/2014 and 09/04/2015 and preoperative MRI lumbar spine  08/31/2020 and postoperative MRI of the lumbar spine 07/29/2021. This  also correlated to intraoperative C-arm views of lumbar spine  10/29/2021, lumbar spine plain film series with flexion-extension views  today 08/26/2022.     FINDINGS: There is transitional anatomy at the lumbosacral junction for  the purposes of this report is a transitional vertebra is labeled as a  partially lumbarized S1 vertebrae with the last-inferior most partially  hydrated hypoplastic disc space labeled as the S1-S2 disc space. Above  this are 5 nonrib-bearing vertebra labeled as L1 through L5 and this is  the same numbering system used on prior MRIs of the lumbar spine.     The distal thoracic cord and the conus is normal in signal intensity  conus terminates at the mid body of L2 lumbar level which is normal.     At T11-12 and T12-L1 the disc space and facets are normal with no canal  or foraminal narrowing.     At L1-2 there is a right paracentral posterior lateral tiny disc  protrusion only minimally indents the right ventral aspect thecal sac is  essentially no canal or foraminal narrowing.     At L2-3, the disc space and facets are normal with no canal or  foraminal  narrowing.     At L3-4 there is mild-to-moderate bilateral facet overgrowth some fluid  in the facet joints. There is a posterior central to left paracentral  disc herniation with extruded disc material extending along the  posterior central to left paracentral inferior endplate of L4. There is  severe narrowing of the thecal sac at L3-4 with diminished spinal fluid  bathing the cauda equina at L3-4 lumbar level. There is only minimal  foraminal narrowing.     At L4-5 the patient has had prior lumbar spine surgery on 10/29/2020  during which there is performed to bilateral laminectomies at L4, medial  facetectomies at L4-5. There is moderate bilateral facet overgrowth at  L4-5, minimal posterior disc bulge. Due to posterior decompression there  is no canal narrowing at L4-5. There is mild left but no right foraminal  narrowing at L4-5.     At L5-S1 the patient had prior lumbar spine surgery with bilateral  laminectomies at L5. There is severe bilateral facet overgrowth at L5-S1  with 3 mm anterolisthesis of L5 on S1. There is no canal narrowing into  the medial facet spurs perhaps slightly narrow the lateral recesses but  there is some loss of vertical height of the foramen and bulging disc  material into the inferior foramen spurring synovial thickening off the  facets extending the posterior aspect of the foramen and there is  mild-to-moderate bilateral foraminal narrowing.     IMPRESSION:  1. This patient has transitional anatomy at the lumbosacral junction for  the purposes of this report the transitional vertebra is labeled as a  partially lumbarized S1 vertebra with the last-inferior most partially  hydrated hypoplastic disc space labeled as the S1-S2 disc space and  above the partially lumbarized S1 vertebra are 5 nonrib-bearing vertebra  labeled as L1-L5 and lowest-most rib-bearing vertebra is labeled as T12.  This same numbering system is used on prior lumbar spine MRIs.  2. Patient has had  previous lumbar spine surgery with bilateral  laminectomies at L4 and L5 and S1, bilateral medial facetectomies at  L4-5 and L5-S1. There is no residual canal narrowing at L4-5 or L5-S1  there is mild left but no right foraminal narrowing at L4-5. There is  mild-to-moderate bilateral foraminal narrowing at L5-S1.  3. At L3-4 there is mild-to-moderate bilateral facet overgrowth and  there is posterior central left paracentral disc herniation with some  extruded disc material extending along the midline left paracentral  inferior body and endplate of L3. There is severe narrowing of the  thecal sac at the L3-4 level with diminished spinal fluid bathing the  cauda equina at the L3-4 level. There is only mild bilateral foraminal  narrowing at L3-4.  4. L1-2 there is a tiny right paracentral posterior lateral disc  protrusion only minimally indents the ventral aspect of the thecal sac  and there is no canal or foraminal narrowing. The remainder of the  lumbar spine MRI is unremarkable.     This report was finalized on 8/29/2022 2:10 PM by Dr. Vargas Adorno M.D.        The following portions of the patient's history were reviewed and updated as appropriate: allergies, current medications, past family history, past medical history, past social history, past surgical history, and problem list.    Review of Systems   Constitutional:  Negative for fever.   Cardiovascular:  Negative for chest pain.   Gastrointestinal:  Negative for abdominal pain, constipation and diarrhea.   Genitourinary:  Negative for difficulty urinating and dysuria.   Musculoskeletal:  Positive for back pain.   Neurological:  Negative for weakness, numbness and headaches.   Psychiatric/Behavioral:  Negative for sleep disturbance and suicidal ideas. The patient is not nervous/anxious.      I have reviewed and confirmed the accuracy of the ROS as documented by the MA/LPN/RN DARWIN Conklin  Vitals:    01/04/24 0815   BP: 150/86   Pulse: 60   Resp: 18  "  Temp: 96.6 °F (35.9 °C)   SpO2: 96%   Weight: 92.5 kg (204 lb)   Height: 180.3 cm (70.98\")   PainSc:   4   PainLoc: Back         Objective   Physical Exam  Vitals reviewed.   Constitutional:       Appearance: Normal appearance. He is normal weight.   HENT:      Head: Normocephalic.   Pulmonary:      Effort: Pulmonary effort is normal.   Musculoskeletal:      Lumbar back: Tenderness present.      Comments: Improvement noted of range of motion with lumbar extension   Skin:     General: Skin is warm and dry.   Neurological:      General: No focal deficit present.      Mental Status: He is alert and oriented to person, place, and time.      Cranial Nerves: Cranial nerves 2-12 are intact.      Sensory: Sensation is intact.      Motor: Motor function is intact.      Gait: Gait is intact.   Psychiatric:         Mood and Affect: Mood normal.         Behavior: Behavior normal.         Thought Content: Thought content normal.         Judgment: Judgment normal.             Assessment & Plan   Diagnoses and all orders for this visit:    1. Lumbosacral spondylosis without myelopathy (Primary)  -     methylPREDNISolone (MEDROL) 4 MG dose pack; Take as directed on package instructions.  Dispense: 21 tablet; Refill: 0    2. Spinal stenosis of lumbar region with neurogenic claudication    3. Herniation of lumbar intervertebral disc with radiculopathy    4. Bilateral sacroiliitis        Avni Olivo III reports a pain score of 4.  Given his pain assessment as noted, treatment options were discussed and the following options were decided upon as a follow-up plan to address the patient's pain: continuation of current treatment plan for pain, prescription for non-opiod analgesics, and use of non-medical modalities (ice, heat, stretching and/or behavior modifications).      --- Follow-up after 2/25/2024 for therapeutic lumbar radiofrequency ablation.  The patient understands the risk and benefits associated with the procedure and does " not have any questions to address on today.  --- Due to his flare of pain and had a recent fall on 12/31/2023 while at a party I will prescribe Medrol Dosepak which she has taken in the past and tolerated without any adverse effects.      Thermal Radiofrequency Destruction    This initial thermal radiofrequency destruction (RFA) of cervical, thoracic, or lumbar paravertebral facet joint (medial branch) nerves is considered medically reasonable and necessary as this patient has met the criteria of having at least two (2) medically reasonable and necessary diagnostic MBBs, with each one providing a consistent minimum of 80% sustained relief of primary (index) pain (with the duration of relief being consistent with the agent used).    This repeat thermal radiofrequency destruction (RFA) of cervical, thoracic, or lumbar paravertebral facet joint (medial branch) nerves at the same anatomical site is considered medically reasonable and necessary as this patient has met the criteria of having a minimum of consistent 50% improvement in pain for at least six (6) months or at least 50% consistent improvement in the ability to perform previously painful movements and ADLs as compared to baseline measurement using the same scale.           Dictated utilizing Dragon dictation.

## 2024-02-05 RX ORDER — CLOPIDOGREL BISULFATE 75 MG/1
75 TABLET ORAL DAILY
Qty: 90 TABLET | Refills: 3 | Status: SHIPPED | OUTPATIENT
Start: 2024-02-05

## 2024-02-27 ENCOUNTER — APPOINTMENT (OUTPATIENT)
Dept: GENERAL RADIOLOGY | Facility: SURGERY CENTER | Age: 69
End: 2024-02-27
Payer: MEDICARE

## 2024-03-04 NOTE — TELEPHONE ENCOUNTER
Pt called to say that His Nepphrologist Dr. Glasgow would like for his Amlodipine 2.5 mg to be increased from once a day to twice daily due to issues with BP.       Joel GLASS

## 2024-03-05 RX ORDER — AMLODIPINE BESYLATE 2.5 MG/1
2.5 TABLET ORAL 2 TIMES DAILY
Qty: 180 TABLET | Refills: 3 | Status: SHIPPED | OUTPATIENT
Start: 2024-03-05

## 2024-03-05 NOTE — TELEPHONE ENCOUNTER
Nephrologist did not write, he would like to have it written by Cardiologist. Amlodipine 2.5 mg BID would like sent into [employer]. He stated he is having issues controlling B/P, nephrologist also added Terazosin 2mg Take 2 capsules  BID 4 mg total.

## 2024-03-25 ENCOUNTER — TELEPHONE (OUTPATIENT)
Dept: PAIN MEDICINE | Facility: CLINIC | Age: 69
End: 2024-03-25

## 2024-03-25 NOTE — TELEPHONE ENCOUNTER
Patient states new medications are cialis 5 mg, terazosin 4 mg, gemtesa, omega 3 and multivitamin. He has already discontinued the Plavix 7 days prior to procedure as instructed.

## 2024-03-25 NOTE — TELEPHONE ENCOUNTER
"Caller: Avni Olivo III \"Boris\"    Relationship to patient: Self    Best call back number: [phone] (home)     Patient is needing: PATIENT IS HAVING A PROCEDURE WITH DR FITZGERALD ON 3/28/24 AND NEEDS TO KNOW WHAT MEDICATION HE IS SUPPOSED TO STOP BEFOREHAND. HIS MED LIST HAS CHANGED RECENTLY AND HE NEEDS ASSISTANCE ASAP.   "

## 2024-03-27 RX ORDER — FENTANYL CITRATE 50 UG/ML
100 INJECTION, SOLUTION INTRAMUSCULAR; INTRAVENOUS ONCE
Status: COMPLETED | OUTPATIENT
Start: 2024-03-28 | End: 2024-03-28

## 2024-03-27 RX ORDER — MIDAZOLAM HYDROCHLORIDE 2 MG/2ML
2 INJECTION, SOLUTION INTRAMUSCULAR; INTRAVENOUS ONCE
Status: COMPLETED | OUTPATIENT
Start: 2024-03-28 | End: 2024-03-28

## 2024-03-28 ENCOUNTER — HOSPITAL ENCOUNTER (OUTPATIENT)
Dept: GENERAL RADIOLOGY | Facility: SURGERY CENTER | Age: 69
Setting detail: HOSPITAL OUTPATIENT SURGERY
End: 2024-03-28
Payer: MEDICARE

## 2024-03-28 ENCOUNTER — HOSPITAL ENCOUNTER (OUTPATIENT)
Facility: SURGERY CENTER | Age: 69
Setting detail: HOSPITAL OUTPATIENT SURGERY
Discharge: HOME | End: 2024-03-28
Attending: ANESTHESIOLOGY | Admitting: ANESTHESIOLOGY
Payer: MEDICARE

## 2024-03-28 VITALS
BODY MASS INDEX: 27.3 KG/M2 | OXYGEN SATURATION: 96 % | SYSTOLIC BLOOD PRESSURE: 155 MMHG | DIASTOLIC BLOOD PRESSURE: 92 MMHG | HEART RATE: 57 BPM | WEIGHT: 195 LBS | RESPIRATION RATE: 16 BRPM | HEIGHT: 71 IN | TEMPERATURE: 97.8 F

## 2024-03-28 DIAGNOSIS — M47.817 LUMBOSACRAL SPONDYLOSIS WITHOUT MYELOPATHY: ICD-10-CM

## 2024-03-28 DIAGNOSIS — Z41.9 SURGERY, ELECTIVE: ICD-10-CM

## 2024-03-28 PROCEDURE — 64636 DESTROY L/S FACET JNT ADDL: CPT | Performed by: ANESTHESIOLOGY

## 2024-03-28 PROCEDURE — 25010000002 MIDAZOLAM PER 1MG: Performed by: ANESTHESIOLOGY

## 2024-03-28 PROCEDURE — S0260 H&P FOR SURGERY: HCPCS | Performed by: ANESTHESIOLOGY

## 2024-03-28 PROCEDURE — 76000 FLUOROSCOPY <1 HR PHYS/QHP: CPT

## 2024-03-28 PROCEDURE — 64635 DESTROY LUMB/SAC FACET JNT: CPT | Performed by: ANESTHESIOLOGY

## 2024-03-28 PROCEDURE — 99152 MOD SED SAME PHYS/QHP 5/>YRS: CPT | Performed by: ANESTHESIOLOGY

## 2024-03-28 PROCEDURE — 25010000002 BUPIVACAINE (PF) 0.5 % SOLUTION 10 ML VIAL: Performed by: ANESTHESIOLOGY

## 2024-03-28 PROCEDURE — 25010000002 FENTANYL CITRATE (PF) 50 MCG/ML SOLUTION: Performed by: ANESTHESIOLOGY

## 2024-03-28 RX ORDER — SODIUM CHLORIDE 0.9 % (FLUSH) 0.9 %
10 SYRINGE (ML) INJECTION AS NEEDED
Status: DISCONTINUED | OUTPATIENT
Start: 2024-03-28 | End: 2024-03-28 | Stop reason: HOSPADM

## 2024-03-28 RX ORDER — SODIUM CHLORIDE 0.9 % (FLUSH) 0.9 %
10 SYRINGE (ML) INJECTION EVERY 12 HOURS SCHEDULED
Status: DISCONTINUED | OUTPATIENT
Start: 2024-03-28 | End: 2024-03-28 | Stop reason: HOSPADM

## 2024-03-28 RX ORDER — VIBEGRON 75 MG/1
1 TABLET, FILM COATED ORAL DAILY
COMMUNITY

## 2024-03-28 RX ADMIN — FENTANYL CITRATE 100 MCG: 0.05 INJECTION, SOLUTION INTRAMUSCULAR; INTRAVENOUS at 09:29

## 2024-03-28 RX ADMIN — MIDAZOLAM HYDROCHLORIDE 2 MG: 2 INJECTION, SOLUTION INTRAMUSCULAR; INTRAVENOUS at 09:29

## 2024-03-28 NOTE — H&P
Brief Pre-procedural / Pre-operative H&P        -----    Pertinent Diagnosis:   Lumbar spondylosis.  Lumbar facet arthropathy    Proposed Procedure: Lumbar medial branch radiofrequency ablation      Subjective   Avni Olivo III is a 68 y.o. male  who presents for intervention.  He has a history of back pain.      History of Present Illness     He has had significant therapeutic relief from radiofrequency ablation on multiple occasions previously.  Recurrent low back pain.  Axial elements pain, bilateral, same quality and distribution.  Repeat radiofrequency ablation is therefore indicated.    -------    The following portions of the patient's history were reviewed and updated as appropriate: allergies, current medications, past family history, past medical history, past social history, past surgical history and problem list.    No Known Allergies      Current Facility-Administered Medications:     fentaNYL citrate (PF) (SUBLIMAZE) injection 100 mcg, 100 mcg, Intravenous, Once, Cali Monte MD    Midazolam HCl (PF) (VERSED) injection 2 mg, 2 mg, Intravenous, Once, Cali Monte MD    sodium chloride 0.9 % flush 10 mL, 10 mL, Intravenous, Q12H, Cali Monte MD    sodium chloride 0.9 % flush 10 mL, 10 mL, Intravenous, PRN, Cali Monte MD    No current facility-administered medications on file prior to encounter.     Current Outpatient Medications on File Prior to Encounter   Medication Sig Dispense Refill    atorvastatin (LIPITOR) 40 MG tablet TAKE 1 AND 1/2 TABLETS EVERY NIGHT 135 tablet 3    carvedilol (COREG) 12.5 MG tablet Take 1 tablet by mouth 2 (Two) Times a Day.      eszopiclone (LUNESTA) 2 MG tablet Take 1 mg by mouth Every Night. Takes 1/2 tablet qhs      losartan (COZAAR) 100 MG tablet TAKE 1 TABLET EVERY DAY 90 tablet 3    Omega-3 Fatty Acids (FISH OIL PO) Take 1,280 mg by mouth 2 (Two) Times a Day. HOLD PRIOR TO SURGERY      Vibegron (Gemtesa) 75 MG tablet Take 1 tablet by mouth  Daily.      B Complex Vitamins (VITAMIN B COMPLEX) capsule capsule Take 1 capsule by mouth Daily. HOLD PRIOR TO SURGERY      DHEA 50 MG tablet Take 50 mg by mouth Daily. HOLD PRIOR TO SURGERY      methylPREDNISolone (MEDROL) 4 MG dose pack Take as directed on package instructions. 21 tablet 0    Misc Natural Products (7-KETO LEAN) capsule Take 1 tablet by mouth Daily. HOLD PRIOR TO SURGERY      tamsulosin (FLOMAX) 0.4 MG capsule 24 hr capsule Take 1 capsule by mouth Daily.      vitamin C (ASCORBIC ACID) 250 MG tablet Take 1 tablet by mouth Daily.      VITAMIN D, CHOLECALCIFEROL, PO Take  by mouth.         Patient Active Problem List   Diagnosis    Spinal stenosis of lumbar region with neurogenic claudication    Essential hypertension    DDD (degenerative disc disease), lumbar    Mixed hyperlipidemia    PVC's (premature ventricular contractions)    Coronary artery disease involving native heart without angina pectoris    Bilateral leg weakness    Status post lumbar spine operative procedure for decompression of spinal cord    Chronic midline low back pain without sciatica    Bilateral sacroiliitis    Spondylosis of lumbar region without myelopathy or radiculopathy    Chronic pain syndrome    Lumbosacral spondylosis without myelopathy    Herniation of lumbar intervertebral disc with radiculopathy       Past Medical History:   Diagnosis Date    Arm pain     Bilateral sacroiliitis 05/20/2021    Cervical disc disorder 2008    Chest pain     Chronic pain syndrome 08/17/2022    DDD (degenerative disc disease), lumbar     Dizziness     Facet arthropathy, lumbar 12/20/2021    Fractures 2015    foot Ceja fracture    History of EKG 08/06/2013    HTN (hypertension)     Hyperlipidemia     Joint pain 2008    back    Limb pain     Low back pain     Lower extremity edema     Lumbar canal stenosis     Melanoma 07/05/2017    Mitral regurgitation     trace    Nausea and vomiting     Palpitations     Peripheral neuropathy     Sinus  bradycardia     Snoring     Spinal stenosis     Sprain of right foot        Past Surgical History:   Procedure Laterality Date    BACK SURGERY  2020    Dr Martinez    CARDIAC CATHETERIZATION  06/10/2013    CARDIAC CATHETERIZATION N/A 12/27/2019    Procedure: Coronary angiography;  Surgeon: Terrance Wood MD;  Location:  [facility];  Service: Cardiovascular    CARDIAC CATHETERIZATION N/A 12/27/2019    Procedure: Left Heart Cath;  Surgeon: Terrance Wood MD;  Location:  [facility];  Service: Cardiovascular    CARDIAC CATHETERIZATION N/A 12/27/2019    Procedure: Left ventriculography;  Surgeon: Terrance Wood MD;  Location:  [facility];  Service: Cardiovascular    CARDIAC CATHETERIZATION N/A 12/27/2019    Procedure: Stent KALYAN coronary;  Surgeon: Terrance Wood MD;  Location: [facility] C[facility];  Service: Cardiovascular    COLONOSCOPY      ENDOSCOPY      EPIDURAL BLOCK      FOOT FRACTURE SURGERY Left     FRACTURE SURGERY  2015    foot Ceja fracture    KNEE ARTHROSCOPY Left     KNEE SURGERY Right     LAMINECTOMY  2020    Dr Martinez    LUMBAR LAMINECTOMY DISCECTOMY DECOMPRESSION Bilateral 10/29/2020    Procedure: LUMBAR DECOMPRESSION, OPEN LUMBAR DECOMPRESSION, LUMBAR 3 TO LUMBAR 4, LUMBAR 4 TO LUMBAR 5, LUMBAR 5 TO SACRAL 1 (has transitional anatomy);  Surgeon: Jeremy Martinez MD;  Location: [facility] MAIN OR;  Service: Neurosurgery;  Laterality: Bilateral;    MEDIAL BRANCH BLOCK Bilateral 09/15/2022    Procedure: LUMBAR MEDIAL BRANCH BLOCK bilateral L3-5;  Surgeon: Cali Monte MD;  Location: [facility];  Service: Pain Management;  Laterality: Bilateral;    MEDIAL BRANCH BLOCK Bilateral 11/08/2022    Procedure: LUMBAR MEDIAL BRANCH BLOCK BILATERAL L3-5 #2/CONFIRMATORY;  Surgeon: Cali Monte MD;  Location: [facility];  Service: Pain Management;  Laterality: Bilateral;    MOHS SURGERY      nose    NERVE  "SURGERY Bilateral 01/17/2023    Procedure: LUMBAR RADIOFREQUENCY ABLATION BILATERAL L3-L5;  Surgeon: Cali Monte MD;  Location: [facility];  Service: Pain Management;  Laterality: Bilateral;    NERVE SURGERY Bilateral 08/29/2023    Procedure: LUMBAR RADIOFREQUENCY ABLATION BILATERAL L3-L5  72586, 10496;  Surgeon: Cali Monte MD;  Location: [facility];  Service: Pain Management;  Laterality: Bilateral;    ORTHOPEDIC SURGERY  2015    foot Ceja fracture    SKIN CANCER EXCISION Bilateral 07/05/2017    MELANOMA    SPINE SURGERY  2020    Dr Martinez    TONSILLECTOMY         Family History   Problem Relation Age of Onset    Coronary artery disease Other     Hyperlipidemia Other     Hypertension Other     Other Other         heart surgery    Heart disease Father     Hypertension Father     Heart disease Sister     Stroke Maternal Grandmother     Heart disease Maternal Grandfather     Malig Hyperthermia Neg Hx        Social History     Socioeconomic History    Marital status:     Years of education: college graduate   Tobacco Use    Smoking status: Never    Smokeless tobacco: Former    Tobacco comments:     caffeine - 2 cups coffee daily    Vaping Use    Vaping status: Never Used   Substance and Sexual Activity    Alcohol use: Yes     Alcohol/week: 21.0 standard drinks of alcohol     Types: 14 Cans of beer, 7 Drinks containing 0.5 oz of alcohol per week     Comment: most days    Drug use: Never     Comment: CBD oil    Sexual activity: Yes     Partners: Female       -------       Review of Systems  No Fever, No Chills, No ear pain, No sinus pressure or drainage, No eye pain or drainage, No cough, No SOB, No chest tightness nor chest pain, no palpitations.          Vitals:    03/28/24 0847   BP: 163/93   BP Location: Left arm   Patient Position: Lying   Pulse: 59   Resp: 17   Temp: 95 °F (35 °C)   TempSrc: Temporal   SpO2: 95%   Weight: 88.5 kg (195 lb)   Height: 180.3 cm (71\")     .    Objective "   Physical Exam  VSS, NNR, NCAT, NMNA, NRD, AAOx3.  .    -------    Assessment & Plan:  - as noted above, the stated intervention is indicated  - Follow-up plan will be noted in the operative report  .      Ov 1 mo    .  EMR Dragon/Transcription disclaimer:   Typed items in this encounter note may have been created by electronic transcription/translation software which converts spoken language to printed text. The electronic translation of spoken language may permit erroneous, or at times, nonsensical words or phrases to be inadvertently transcribed; Although I have reviewed the note for such errors, some may still exist.

## 2024-03-28 NOTE — OP NOTE
Bilateral L3-5 Lumbar Medial Branch RADIOFREQUENCY  [facility]      PREOPERATIVE DIAGNOSIS:  Lumbar spondylosis without myelopathy    POSTOPERATIVE DIAGNOSIS:  Lumbar spondylosis without myelopathy    PROCEDURE:   Diagnostic Bilateral Lumbar Medial Branch Nerve thermal radiofrequency lesioning, with fluoroscopy:  L3, L4, and L5 nerves (at the L4 and L5 transverse processes and the sacral alar groove) to thermally treat the innervation to facet joints L4-5 and L5-S1  17727-55 -- Bilateral L/S facet neuro destr., 1st Level  19912-51 -- Bilateral L/S facet neuro destr., 2nd  Level    PRE-PROCEDURE DISCUSSION WITH PATIENT:    Risks and complications were discussed with the patient prior to starting the procedure and informed consent was obtained.      SURGEON:  Cali Monte MD    REASON FOR PROCEDURE:    Previous clinically significant therapeutic effect after prior Radiofrequency procedure and Painful area identified on exam under fluoroscopy    SEDATION:  Versed 2mg & Fentanyl 100 mcg IV and The use of increased procedural sedation was carefully considered, and for this particular patient the need for additional procedural sedation seemed necessary in this instance to safely perform the procedure.  TIME OF PROCEDURE:   The intraoperative procedure time after administration of the sedative was (3150-7145) 16 minutes.     ANESTHETIC:  Lidocaine 2%  STEROID:  NONE      DESCRIPTON OF PROCEDURE:  After obtaining informed consent, IV access  was obtained in the preoperative area.   The patient was taken to the operating room.  The patient was placed in the prone position with a pillow under the abdomen. All pressure points were well padded.  EKG, blood pressure, and pulse oximeter were monitored.  The patient was monitored and sedated by the RN under my direction. The lumbosacral area was prepped with Chloraprep and draped in a sterile fashion.     Under fluoroscopic guidance the transverse processes  of the L4 and L5 vertebrae at the junctions of the superior articular processes were identified on the right.  Also identified was the groove between the ala and the superior articular process of the sacrum on the ipsilateral side.  Skin and subcutaneous tissue were anesthetized with 1ml of 1% lidocaine above each of these points. Then, radiofrequency probe needles were advanced in this fluoro view to the above junctions.  Aspiration was negative for blood and CSF.  After confirming the position of the needle with fluoroscope in all views, testing was initiated.  First, sensory testing was started on each needle a 1V and 50Hz and slowly decreased until painful pressure stimulation diminished at 0.5V.  Next, motor testing was confirmed to be negative at 3V and 2Hz for any radicular stimulation.  Then 1mL of the local anesthetic was instilled in each needle.  Two minutes elapsed, and during this time a lateral fluoroscopic view was confirmed again to ensure the needles had not advanced nor retracted.  Then, Radiofrequency Lesioning was initiated for 3 minutes at 82 degrees Celsius.  Needles were removed intact from each of the areas.     A similar procedure was repeated to address the L3, L4, and L5 nerves on the contralateral side.   Onset of analgesia was noted.  Vital signs remained stable throughout.      ESTIMATED BLOOD LOSS:  <5 mL  SPECIMENS:  none    COMPLICATIONS:   No complications were noted.    TOLERANCE & DISCHARGE CONDITION:    The patient tolerated the procedure well.  The patient was transported to the recovery area without difficulties.  The patient was discharged to home under the care of family in stable and satisfactory condition.    PLAN OF CARE:  The patient was given our standard instruction sheet.  The patient will  Return to clinic 4-6 wks.  The patient will resume all medications as per the medication reconciliation sheet.

## 2024-03-28 NOTE — DISCHARGE INSTRUCTIONS
[facility] - Post-procedure Instructions          --  While there are no absolute restrictions, it is recommended that you do not perform strenuous activity today. In the morning, you may resume your level of activity as before your block.    --  If you have a band-aid at your injection site, please remove it later today. Observe the area for any redness, swelling, pus-like drainage, or a temperature over 101°. If any of these symptoms occur, please call your doctor at [phone]. If after office hours, leave a message and the on-call provider will return your call.    --  Ice may be applied to your injection site. It is recommended you avoid direct heat (heating pad; hot tub) for 1-2 days.    --  Call [facility] at [phone] if you experience persistent headache, persistent bleeding from the injection site, or severe pain not relieved by heat or oral medication.    --  Do not make important decisions today.    --  Due to the effects of the block and/or the I.V. Sedation, DO NOT drive or operate hazardous machinery for 12 hours.  Local anesthetics may cause numbness after procedure and precautions must be taken with regards to operating equipment as well as with walking, even if ambulating with assistance of another person or with an assistive device.    --  Do not drink alcohol for 12 hours.    -- You may return to work tomorrow, or as directed by your referring doctor.    --  Occasionally you may notice a slight increase in your pain after the procedure. This should start to improve within the next 24-48 hours. Radiofrequency ablation procedure pain may last 3-4 weeks.    --  It may take as long as 3-4 days before you notice a gradual improvement in your pain and/or other symptoms.    -- You may continue to take your prescribed pain medication as needed.    --  Some normal possible side effects of steroid use could include fluid retention, increased blood sugar, dull headache,  increased sweating, increased appetite, mood swings and flushing.    --  Diabetics are recommended to watch their blood glucose level closely for 24-48 hours after the injection.    --  Must stay in PACU for 20 min upon arrival and prove no leg weakness before being discharged.    --  IN THE EVENT OF A LIFE THREATENING EMERGENCY, (CHEST PAIN, BREATHING DIFFICULTIES, PARALYSIS…) YOU SHOULD GO TO YOUR NEAREST EMERGENCY ROOM.    --  You should be contacted by our office within 2-3 days to schedule follow up or next appointment date.  If not contacted within 7 days, please call the office at [phone]

## 2024-04-19 ENCOUNTER — OFFICE VISIT (OUTPATIENT)
Dept: CARDIOLOGY | Facility: CLINIC | Age: 69
End: 2024-04-19
Payer: MEDICARE

## 2024-04-19 VITALS
HEART RATE: 59 BPM | DIASTOLIC BLOOD PRESSURE: 80 MMHG | HEIGHT: 71 IN | SYSTOLIC BLOOD PRESSURE: 148 MMHG | WEIGHT: 209.2 LBS | BODY MASS INDEX: 29.29 KG/M2 | OXYGEN SATURATION: 96 %

## 2024-04-19 DIAGNOSIS — I10 ESSENTIAL HYPERTENSION: ICD-10-CM

## 2024-04-19 DIAGNOSIS — E78.2 MIXED HYPERLIPIDEMIA: ICD-10-CM

## 2024-04-19 DIAGNOSIS — I49.3 PVC'S (PREMATURE VENTRICULAR CONTRACTIONS): ICD-10-CM

## 2024-04-19 DIAGNOSIS — I25.10 CORONARY ARTERY DISEASE INVOLVING NATIVE CORONARY ARTERY OF NATIVE HEART WITHOUT ANGINA PECTORIS: Primary | ICD-10-CM

## 2024-04-19 PROCEDURE — 99214 OFFICE O/P EST MOD 30 MIN: CPT | Performed by: INTERNAL MEDICINE

## 2024-04-19 PROCEDURE — 3079F DIAST BP 80-89 MM HG: CPT | Performed by: INTERNAL MEDICINE

## 2024-04-19 PROCEDURE — 3077F SYST BP >= 140 MM HG: CPT | Performed by: INTERNAL MEDICINE

## 2024-04-19 PROCEDURE — 1160F RVW MEDS BY RX/DR IN RCRD: CPT | Performed by: INTERNAL MEDICINE

## 2024-04-19 PROCEDURE — 1159F MED LIST DOCD IN RCRD: CPT | Performed by: INTERNAL MEDICINE

## 2024-04-19 PROCEDURE — 93000 ELECTROCARDIOGRAM COMPLETE: CPT | Performed by: INTERNAL MEDICINE

## 2024-04-19 RX ORDER — TERAZOSIN 10 MG/1
10 CAPSULE ORAL
COMMUNITY
Start: 2024-04-11 | End: 2025-04-11

## 2024-04-19 RX ORDER — MULTIVITAMIN
1 CAPSULE ORAL DAILY
COMMUNITY

## 2024-04-19 RX ORDER — TADALAFIL 5 MG/1
5 TABLET ORAL DAILY
COMMUNITY
Start: 2024-03-01

## 2024-04-19 NOTE — PROGRESS NOTES
CARDIOLOGY    Zoraida Kenyon MD    ENCOUNTER DATE:  04/19/2024    Avni Olivo III / 68 y.o. / male        CHIEF COMPLAINT / REASON FOR OFFICE VISIT     Follow-up      HISTORY OF PRESENT ILLNESS       HPI    Avni Olivo III is a 68 y.o. male     This gentleman saw Dr. Ramírez in 2013 with chest pain. She did an exercise echocardiogram on 06/06/2013 which showed normal LV systolic function with ejection fraction of 64%, trace mitral regurgitation. He exercised for 11.5 minutes under Jacques protocol. His stress echocardiogram images were normal. His stress EKG was normal, but the patient did complain of some chest pain. On 06/10/2013, the patient underwent a heart catheterization, which showed no evidence of coronary artery disease.      I saw him for the first time in October 2016.  He was on vacation in [place] and had a bradycardic and presyncopal episode at the airport. Paramedics were called and he was noted to have a pulse in the low 50s and elevated blood pressure systolic of about 200 over 120. He was taken to the emergency room. I have reviewed that visit. He had an EKG which showed sinus bradycardia, but was otherwise normal. He was hypertensive there. His troponin and proBNP were normal. His creatinine was elevated at 1.48. He was treated with IV fluids. He did not really feel much better, so a CT scan of his head was performed which looked okay. Eventually, he was discharged from the emergency room still feeling somewhat dizzy and they were able to continue their trip home.      I saw him back in April 2018 when he was complaining about palpitations and was diagnosed with symptomatic PVCs.  He had a stress echo in December 2019 which showed normal LV systolic function.  No significant valvular heart disease.  However, he went on to have a heart catheterization.  In December 2019 which showed a mid LAD lesion of 85%.  2 drug-eluting stents were placed.     He had back surgery in October  "2020.  He completed rehab and felt good.  Unfortunately he got Covid in January 2021.  That really laid him up for a few weeks.    He has been doing well.  His blood pressures been high and he is following with a nephrologist.  He had a recent renal artery Doppler which was normal and he says he is having hormonal blood test checked.  He denies chest pain or shortness of breath.  He played golf yesterday and felt good.  He has some lower extremity edema when he is on a cruise and eats more salt and is on his feet more often.    REVIEW OF SYSTEMS     Review of Systems   Constitutional: Negative for chills, fever, weight gain and weight loss.   Cardiovascular:  Positive for leg swelling.   Respiratory:  Positive for snoring. Negative for cough and wheezing.    Hematologic/Lymphatic: Negative for bleeding problem. Bruises/bleeds easily.   Skin:  Negative for color change.   Musculoskeletal:  Positive for joint pain. Negative for falls and myalgias.   Gastrointestinal:  Negative for melena.   Genitourinary:  Negative for hematuria.   Neurological:  Negative for excessive daytime sleepiness.   Psychiatric/Behavioral:  Negative for depression. The patient is not nervous/anxious.          VITAL SIGNS     Visit Vitals  /80   Pulse 59   Ht 180.3 cm (71\")   Wt 94.9 kg (209 lb 3.2 oz)   SpO2 96%   BMI 29.18 kg/m²         Wt Readings from Last 3 Encounters:   04/19/24 94.9 kg (209 lb 3.2 oz)   03/28/24 88.5 kg (195 lb)   01/04/24 92.5 kg (204 lb)     Body mass index is 29.18 kg/m².      PHYSICAL EXAMINATION     Constitutional:       General: Not in acute distress.  Neck:      Vascular: No carotid bruit or JVD.   Pulmonary:      Effort: Pulmonary effort is normal.      Breath sounds: Normal breath sounds.   Cardiovascular:      Normal rate. Regular rhythm.      Murmurs: There is no murmur.   Psychiatric:         Mood and Affect: Mood and affect normal.           REVIEWED DATA       ECG 12 Lead    Date/Time: 4/19/2024 12:51 " PM  Performed by: Zoraida Kenyon MD    Authorized by: Zoraida Kenyon MD  Comparison: compared with previous ECG from 5/30/2023  Similar to previous ECG  Rhythm: sinus rhythm  BPM: 59  Conduction: conduction normal  ST Segments: ST segments normal  T Waves: T waves normal    Clinical impression: normal ECG                Lab Results   Component Value Date    GLUCOSE 133 (H) 01/18/2022    BUN 15 01/18/2022    CREATININE 1.20 08/26/2022    BCR 11.6 01/18/2022    K 4.0 01/18/2022    CO2 25.7 01/18/2022    CALCIUM 10.0 01/18/2022    ALBUMIN 4.40 11/19/2021    BILITOT 0.5 11/19/2021    AST 41 (H) 11/19/2021    ALT 43 (H) 11/19/2021       ASSESSMENT & PLAN      Diagnosis Plan   1. Coronary artery disease involving native coronary artery of native heart without angina pectoris        2. Mixed hyperlipidemia        3. PVC's (premature ventricular contractions)        4. Essential hypertension              1.  Coronary disease status post stent to the mid LAD in December 2019. Continue clopidogrel and statin therapy.  No anginal symptoms.  I encouraged regular exercise.  2.  Hypertension.  His blood pressure has been high but he is following with a nephrologist who is managing his blood pressure.    3.  Hyperlipidemia.    4.  History of premature ventricular contractions.  He showed me a rhythm strip from his phone which showed PVCs.  I reassured him these are not dangerous and the increase in the carvedilol should help.    Overall he is stable.  Follow-up in a year unless something changes      Orders Placed This Encounter   Procedures    ECG 12 Lead     This order was created via procedure documentation     Order Specific Question:   Release to patient     Answer:   Routine Release [[id number]]           MEDICATIONS         Discharge Medications            Accurate as of April 19, 2024 12:52 PM. If you have any questions, ask your nurse or doctor.                Continue These Medications        Instructions Start  Date   amLODIPine 2.5 MG tablet  Commonly known as: NORVASC   2.5 mg, Oral, 2 Times Daily      atorvastatin 40 MG tablet  Commonly known as: LIPITOR   TAKE 1 AND 1/2 TABLETS EVERY NIGHT      carvedilol 12.5 MG tablet  Commonly known as: COREG   12.5 mg, Oral, 2 Times Daily      clopidogrel 75 MG tablet  Commonly known as: PLAVIX   75 mg, Oral, Daily      eszopiclone 2 MG tablet  Commonly known as: LUNESTA   1 mg, Oral, Nightly, Takes 1/2 tablet qhs      FISH OIL PO   1,280 mg, Oral, 2 Times Daily, HOLD PRIOR TO SURGERY      Gemtesa 75 MG tablet  Generic drug: Vibegron   1 tablet, Oral, Daily      losartan 100 MG tablet  Commonly known as: COZAAR   TAKE 1 TABLET EVERY DAY      multivitamin capsule   1 capsule, Oral, Daily      tadalafil 5 MG tablet  Commonly known as: CIALIS   5 mg, Oral, Daily      terazosin 10 MG capsule  Commonly known as: HYTRIN   10 mg, Oral             Stop These Medications      7-Keto Lean capsule  Stopped by: Zoraida Kenyon MD     DHEA 50 MG tablet  Stopped by: Zoraida Kenyon MD     methylPREDNISolone 4 MG dose pack  Commonly known as: MEDROL  Stopped by: Zoraida Kenyon MD     tamsulosin 0.4 MG capsule 24 hr capsule  Commonly known as: FLOMAX  Stopped by: Zoraida Kenyon MD     vitamin b complex capsule capsule  Stopped by: Zoraida Kenyon MD     vitamin C 250 MG tablet  Commonly known as: ASCORBIC ACID  Stopped by: Zoraida Kenyon MD     VITAMIN D (CHOLECALCIFEROL) PO  Stopped by: MD Zoraida Bah MD  04/19/24  12:52 EDT    Part of this note may be an electronic transcription/translation of spoken language to printed text using the Dragon dictation system.

## 2024-04-29 ENCOUNTER — OFFICE VISIT (OUTPATIENT)
Dept: PAIN MEDICINE | Facility: CLINIC | Age: 69
End: 2024-04-29
Payer: MEDICARE

## 2024-04-29 VITALS
DIASTOLIC BLOOD PRESSURE: 84 MMHG | HEART RATE: 61 BPM | SYSTOLIC BLOOD PRESSURE: 161 MMHG | HEIGHT: 71 IN | RESPIRATION RATE: 12 BRPM | WEIGHT: 206.2 LBS | TEMPERATURE: 96.9 F | OXYGEN SATURATION: 96 % | BODY MASS INDEX: 28.87 KG/M2

## 2024-04-29 DIAGNOSIS — M47.816 SPONDYLOSIS OF LUMBAR REGION WITHOUT MYELOPATHY OR RADICULOPATHY: Primary | ICD-10-CM

## 2024-04-29 DIAGNOSIS — M51.16 HERNIATION OF LUMBAR INTERVERTEBRAL DISC WITH RADICULOPATHY: ICD-10-CM

## 2024-04-29 DIAGNOSIS — M48.062 SPINAL STENOSIS OF LUMBAR REGION WITH NEUROGENIC CLAUDICATION: ICD-10-CM

## 2024-04-29 PROCEDURE — 3077F SYST BP >= 140 MM HG: CPT | Performed by: PHYSICIAN ASSISTANT

## 2024-04-29 PROCEDURE — 3079F DIAST BP 80-89 MM HG: CPT | Performed by: PHYSICIAN ASSISTANT

## 2024-04-29 PROCEDURE — 1159F MED LIST DOCD IN RCRD: CPT | Performed by: PHYSICIAN ASSISTANT

## 2024-04-29 PROCEDURE — 1160F RVW MEDS BY RX/DR IN RCRD: CPT | Performed by: PHYSICIAN ASSISTANT

## 2024-04-29 PROCEDURE — 1125F AMNT PAIN NOTED PAIN PRSNT: CPT | Performed by: PHYSICIAN ASSISTANT

## 2024-04-29 PROCEDURE — 99214 OFFICE O/P EST MOD 30 MIN: CPT | Performed by: PHYSICIAN ASSISTANT

## 2024-04-29 RX ORDER — TRAMADOL HYDROCHLORIDE 50 MG/1
50 TABLET ORAL EVERY 6 HOURS PRN
Qty: 20 TABLET | Refills: 0 | Status: SHIPPED | OUTPATIENT
Start: 2024-04-29

## 2024-04-29 NOTE — PROGRESS NOTES
CHIEF COMPLAINT  LUMBAR RADIOFREQUENCY ABLATION BILATERAL L3-L5   Pt reports RFA was not effective--has only 4 weeks      Subjective   Avni Olivo III is a 68 y.o. male  who presents to the office for follow-up of procedure.  He completed a lumbar radiofrequency ablation of bilateral L3-L5 nerve   on 3/28/2024 performed by Dr. Monte for management of low back pain. Patient reports no relief relief from the procedure.  Patient continues with pain in a transverse distribution across the lumbosacral spine which is referred into the bilateral paraspinal muscles into the upper portion of the buttocks however it is only been 30 days since he has had the radiofrequency ablation.  Finds that his pain is aggravated with certain repetitive movements particularly noted when he is golfing.    Pain today 5/10 VAS in severity.        Back Pain  This is a chronic problem. The current episode started more than 1 year ago. The problem occurs constantly. The problem has been gradually worsening since onset. The pain is present in the lumbar spine. The quality of the pain is described as aching (throbbing). The pain does not radiate. The pain is at a severity of 5/10. The pain is moderate. The pain is Worse during the day. The symptoms are aggravated by position, twisting and bending. Stiffness is present In the morning. Pertinent negatives include no abdominal pain, chest pain, dysuria, fever, headaches, numbness or weakness.        PEG Assessment   What number best describes your pain on average in the past week?6  What number best describes how, during the past week, pain has interfered with your enjoyment of life?4  What number best describes how, during the past week, pain has interfered with your general activity?  4    Review of Pertinent Medical Data ---  Neurosurgical evaluation with Dr. Jeremy Martinez on 10/16/2023 shows that MRI from 8/26/2022 shows a new disc extrusion at L3-L4 above the levels that was operated on  previously.  Patient does have history of L3-S1 decompression for spinal stenosis.  Dr. Martinez recommends that that he continue with intermittent ablations and will have him follow-up in 9 months.        MRI OF THE LUMBAR SPINE WITH AND WITHOUT CONTRAST 08/26/2022     CLINICAL HISTORY: Low back pain, bilateral leg weakness, status post  lumbar spine decompression on 10/29/2020.     TECHNIQUE: Sagittal T1, proton density fat-suppressed T2 and  postcontrast sagittal T1-weighted images were obtained of the lumbar  spine in addition thin cut axial pre and postcontrast T1-weighted images  were obtained angled through the interspaces from L3 to S1.     This is correlated to 4 prior MRIs of the lumbar spine dating all the  way back to 07/24/2014 and 09/04/2015 and preoperative MRI lumbar spine  08/31/2020 and postoperative MRI of the lumbar spine 07/29/2021. This  also correlated to intraoperative C-arm views of lumbar spine  10/29/2021, lumbar spine plain film series with flexion-extension views  today 08/26/2022.     FINDINGS: There is transitional anatomy at the lumbosacral junction for  the purposes of this report is a transitional vertebra is labeled as a  partially lumbarized S1 vertebrae with the last-inferior most partially  hydrated hypoplastic disc space labeled as the S1-S2 disc space. Above  this are 5 nonrib-bearing vertebra labeled as L1 through L5 and this is  the same numbering system used on prior MRIs of the lumbar spine.     The distal thoracic cord and the conus is normal in signal intensity  conus terminates at the mid body of L2 lumbar level which is normal.     At T11-12 and T12-L1 the disc space and facets are normal with no canal  or foraminal narrowing.     At L1-2 there is a right paracentral posterior lateral tiny disc  protrusion only minimally indents the right ventral aspect thecal sac is  essentially no canal or foraminal narrowing.     At L2-3, the disc space and facets are normal with  no canal or foraminal  narrowing.     At L3-4 there is mild-to-moderate bilateral facet overgrowth some fluid  in the facet joints. There is a posterior central to left paracentral  disc herniation with extruded disc material extending along the  posterior central to left paracentral inferior endplate of L4. There is  severe narrowing of the thecal sac at L3-4 with diminished spinal fluid  bathing the cauda equina at L3-4 lumbar level. There is only minimal  foraminal narrowing.     At L4-5 the patient has had prior lumbar spine surgery on 10/29/2020  during which there is performed to bilateral laminectomies at L4, medial  facetectomies at L4-5. There is moderate bilateral facet overgrowth at  L4-5, minimal posterior disc bulge. Due to posterior decompression there  is no canal narrowing at L4-5. There is mild left but no right foraminal  narrowing at L4-5.     At L5-S1 the patient had prior lumbar spine surgery with bilateral  laminectomies at L5. There is severe bilateral facet overgrowth at L5-S1  with 3 mm anterolisthesis of L5 on S1. There is no canal narrowing into  the medial facet spurs perhaps slightly narrow the lateral recesses but  there is some loss of vertical height of the foramen and bulging disc  material into the inferior foramen spurring synovial thickening off the  facets extending the posterior aspect of the foramen and there is  mild-to-moderate bilateral foraminal narrowing.     IMPRESSION:  1. This patient has transitional anatomy at the lumbosacral junction for  the purposes of this report the transitional vertebra is labeled as a  partially lumbarized S1 vertebra with the last-inferior most partially  hydrated hypoplastic disc space labeled as the S1-S2 disc space and  above the partially lumbarized S1 vertebra are 5 nonrib-bearing vertebra  labeled as L1-L5 and lowest-most rib-bearing vertebra is labeled as T12.  This same numbering system is used on prior lumbar spine MRIs.  2. Patient  has had previous lumbar spine surgery with bilateral  laminectomies at L4 and L5 and S1, bilateral medial facetectomies at  L4-5 and L5-S1. There is no residual canal narrowing at L4-5 or L5-S1  there is mild left but no right foraminal narrowing at L4-5. There is  mild-to-moderate bilateral foraminal narrowing at L5-S1.  3. At L3-4 there is mild-to-moderate bilateral facet overgrowth and  there is posterior central left paracentral disc herniation with some  extruded disc material extending along the midline left paracentral  inferior body and endplate of L3. There is severe narrowing of the  thecal sac at the L3-4 level with diminished spinal fluid bathing the  cauda equina at the L3-4 level. There is only mild bilateral foraminal  narrowing at L3-4.  4. L1-2 there is a tiny right paracentral posterior lateral disc  protrusion only minimally indents the ventral aspect of the thecal sac  and there is no canal or foraminal narrowing. The remainder of the  lumbar spine MRI is unremarkable.     This report was finalized on 8/29/2022 2:10 PM by Dr. Vargas Adorno M.D.    The following portions of the patient's history were reviewed and updated as appropriate: allergies, current medications, past family history, past medical history, past social history, past surgical history, and problem list.    Review of Systems   Constitutional:  Negative for activity change (more), fatigue and fever.   HENT:  Negative for congestion.    Respiratory:  Negative for cough and chest tightness.    Cardiovascular:  Negative for chest pain.   Gastrointestinal:  Negative for abdominal pain, constipation and diarrhea.   Genitourinary:  Negative for dysuria.   Musculoskeletal:  Positive for back pain.   Neurological:  Negative for dizziness, weakness, light-headedness, numbness and headaches.   Psychiatric/Behavioral:  Negative for agitation, sleep disturbance and suicidal ideas. The patient is not nervous/anxious.      I have reviewed and  "confirmed the accuracy of the ROS as documented by the MA/LPN/RN DARWIN Conklin   Vitals:    04/29/24 1531   BP: 161/84   BP Location: Left arm   Patient Position: Sitting   Cuff Size: Adult   Pulse: 61   Resp: 12   Temp: 96.9 °F (36.1 °C)   TempSrc: Temporal   SpO2: 96%   Weight: 93.5 kg (206 lb 3.2 oz)   Height: 180.3 cm (71\")   PainSc:   5         Objective   Physical Exam  Vitals reviewed.   Constitutional:       Appearance: Normal appearance. He is normal weight.   HENT:      Head: Normocephalic.   Pulmonary:      Effort: Pulmonary effort is normal.   Musculoskeletal:      Lumbar back: Tenderness present.      Comments: Improvement noted of range of motion with lumbar extension   Skin:     General: Skin is warm and dry.   Neurological:      General: No focal deficit present.      Mental Status: He is alert and oriented to person, place, and time.      Cranial Nerves: Cranial nerves 2-12 are intact.      Sensory: Sensation is intact.      Motor: Motor function is intact.      Gait: Gait is intact.   Psychiatric:         Mood and Affect: Mood normal.         Behavior: Behavior normal.         Thought Content: Thought content normal.         Judgment: Judgment normal.             Assessment & Plan   Diagnoses and all orders for this visit:    1. Spondylosis of lumbar region without myelopathy or radiculopathy (Primary)  -     traMADol (ULTRAM) 50 MG tablet; Take 1 tablet by mouth Every 6 (Six) Hours As Needed for Moderate Pain.  Dispense: 20 tablet; Refill: 0    2. Herniation of lumbar intervertebral disc with radiculopathy  -     traMADol (ULTRAM) 50 MG tablet; Take 1 tablet by mouth Every 6 (Six) Hours As Needed for Moderate Pain.  Dispense: 20 tablet; Refill: 0    3. Spinal stenosis of lumbar region with neurogenic claudication        Avni Olivo III reports a pain score of 5.  Given his pain assessment as noted, treatment options were discussed and the following options were decided upon as a follow-up " plan to address the patient's pain: continuation of current treatment plan for pain, prescription for opiod analgesics, and use of non-medical modalities (ice, heat, stretching and/or behavior modifications).      --- Follow-up 6 weeks or sooner for medication management and to assess his response to the radiofrequency ablation  --- I have encouraged the patient that I believe that it always takes 6 to 8 weeks before he will note optimal improvement to the radiofrequency ablation and is only been a month since he had the procedure performed.  --- Due to some of his pain I will send in an acute supply of tramadol 50 mg which he may take for increased episodes of pain           JANET REPORT  As part of the patient's treatment plan, I am prescribing controlled substances. The patient has been made aware of appropriate use of such medications, including potential risk of somnolence, limited ability to drive and/or work safely, and the potential for dependence or overdose. It has also been made clear that these medications are for use by this patient only, without concomitant use of alcohol or other substances unless prescribed.     Patient has completed prescribing agreement detailing terms of continued prescribing of controlled substances, including monitoring JANET reports, urine drug screening, and pill counts if necessary. The patient is aware that inappropriate use will results in cessation of prescribing such medications.    As the clinician, I personally reviewed the JANET from 4/29/2024 while the patient was in the office today.    History and physical exam exhibit continued safe and appropriate use of controlled substances.       Dictated utilizing Dragon dictation.

## 2024-06-04 ENCOUNTER — TELEPHONE (OUTPATIENT)
Dept: NEUROSURGERY | Facility: CLINIC | Age: 69
End: 2024-06-04
Payer: MEDICARE

## 2024-06-04 NOTE — TELEPHONE ENCOUNTER
HUB CAN READ NAD RESCHEDULE    LVM for patient to call us back about rescheduling his appointment for 06/17 due to Dr. Martinez being out on medical leave. Patient can go to Dr. Martinez's next available or see a APC

## 2024-06-12 ENCOUNTER — PREP FOR SURGERY (OUTPATIENT)
Dept: SURGERY | Facility: SURGERY CENTER | Age: 69
End: 2024-06-12
Payer: MEDICARE

## 2024-06-12 ENCOUNTER — TELEPHONE (OUTPATIENT)
Dept: PAIN MEDICINE | Facility: CLINIC | Age: 69
End: 2024-06-12

## 2024-06-12 ENCOUNTER — OFFICE VISIT (OUTPATIENT)
Dept: PAIN MEDICINE | Facility: CLINIC | Age: 69
End: 2024-06-12
Payer: MEDICARE

## 2024-06-12 VITALS
HEART RATE: 63 BPM | RESPIRATION RATE: 12 BRPM | TEMPERATURE: 96.9 F | HEIGHT: 71 IN | SYSTOLIC BLOOD PRESSURE: 157 MMHG | BODY MASS INDEX: 28.14 KG/M2 | OXYGEN SATURATION: 94 % | DIASTOLIC BLOOD PRESSURE: 77 MMHG | WEIGHT: 201 LBS

## 2024-06-12 DIAGNOSIS — M47.817 LUMBOSACRAL SPONDYLOSIS WITHOUT MYELOPATHY: Primary | ICD-10-CM

## 2024-06-12 DIAGNOSIS — M48.062 SPINAL STENOSIS OF LUMBAR REGION WITH NEUROGENIC CLAUDICATION: ICD-10-CM

## 2024-06-12 DIAGNOSIS — I10 ESSENTIAL HYPERTENSION: ICD-10-CM

## 2024-06-12 DIAGNOSIS — M47.816 SPONDYLOSIS OF LUMBAR REGION WITHOUT MYELOPATHY OR RADICULOPATHY: Primary | ICD-10-CM

## 2024-06-12 PROCEDURE — 1125F AMNT PAIN NOTED PAIN PRSNT: CPT | Performed by: PHYSICIAN ASSISTANT

## 2024-06-12 PROCEDURE — 3077F SYST BP >= 140 MM HG: CPT | Performed by: PHYSICIAN ASSISTANT

## 2024-06-12 PROCEDURE — 1160F RVW MEDS BY RX/DR IN RCRD: CPT | Performed by: PHYSICIAN ASSISTANT

## 2024-06-12 PROCEDURE — 99214 OFFICE O/P EST MOD 30 MIN: CPT | Performed by: PHYSICIAN ASSISTANT

## 2024-06-12 PROCEDURE — 1159F MED LIST DOCD IN RCRD: CPT | Performed by: PHYSICIAN ASSISTANT

## 2024-06-12 PROCEDURE — 3078F DIAST BP <80 MM HG: CPT | Performed by: PHYSICIAN ASSISTANT

## 2024-06-12 RX ORDER — SODIUM CHLORIDE 0.9 % (FLUSH) 0.9 %
10 SYRINGE (ML) INJECTION AS NEEDED
OUTPATIENT
Start: 2024-06-12

## 2024-06-12 RX ORDER — SODIUM CHLORIDE 0.9 % (FLUSH) 0.9 %
10 SYRINGE (ML) INJECTION EVERY 12 HOURS SCHEDULED
OUTPATIENT
Start: 2024-06-12

## 2024-06-12 NOTE — TELEPHONE ENCOUNTER
"  Caller: Avni Olivo III \"Boris\"    Relationship to patient: Self    Best call back number: [phone]    Chief complaint: BACK PAIN     Type of visit: ABLATION     Requested date: 9-25-24 EARLY AFTERNOON      If rescheduling, when is the original appointment: 9-26-24     Additional notes: N/A          "

## 2024-06-12 NOTE — PROGRESS NOTES
CHIEF COMPLAINT  Pt reports improved pain since his last OV.    Subjective   Avni Olivo III is a 69 y.o. male  who presents for follow-up.  He has a history of chronic low back pain.  This patient underwent radiofrequency ablation of bilateral L3-L5 nerves completed 3/28/2024 and reports 60% ongoing pain relief at this time.  He continues to have pain in a bandlike distribution across lumbosacral spine referred into the bilateral paraspinal muscles and into the upper portion of the buttocks which at times has been aggravated with lumbar facet loading maneuvers.  He has noted very slight recrudescence of painful symptoms and states that he usually tends to do better when he is able to obtain the ablation every 6 months.    On his last office visit he was provided with an acute supply of tramadol 50 mg which she is only had to take 1 tablet but finds that it is extremely helpful reduction of his pain without any neurocognitive side effects.    Pain today 5/10 VAS in severity.      Back Pain  This is a chronic problem. The current episode started more than 1 year ago. The problem occurs constantly. The problem has been gradually worsening since onset. The pain is present in the lumbar spine. The quality of the pain is described as aching (throbbing). The pain does not radiate. The pain is at a severity of 5/10. The pain is moderate. The pain is Worse during the day. The symptoms are aggravated by position, twisting and bending. Stiffness is present In the morning. Pertinent negatives include no abdominal pain, chest pain, dysuria, fever, headaches, numbness or weakness.        PEG Assessment   What number best describes your pain on average in the past week?5  What number best describes how, during the past week, pain has interfered with your enjoyment of life?0  What number best describes how, during the past week, pain has interfered with your general activity?  0    Review of Pertinent Medical Data  ---  Neurosurgical evaluation with Dr. Jeremy Martinez on 10/16/2023 shows that MRI from 8/26/2022 shows a new disc extrusion at L3-L4 above the levels that was operated on previously.  Patient does have history of L3-S1 decompression for spinal stenosis.  Dr. Martinez recommends that that he continue with intermittent ablations and will have him follow-up in 9 months.        MRI OF THE LUMBAR SPINE WITH AND WITHOUT CONTRAST 08/26/2022     CLINICAL HISTORY: Low back pain, bilateral leg weakness, status post  lumbar spine decompression on 10/29/2020.     TECHNIQUE: Sagittal T1, proton density fat-suppressed T2 and  postcontrast sagittal T1-weighted images were obtained of the lumbar  spine in addition thin cut axial pre and postcontrast T1-weighted images  were obtained angled through the interspaces from L3 to S1.     This is correlated to 4 prior MRIs of the lumbar spine dating all the  way back to 07/24/2014 and 09/04/2015 and preoperative MRI lumbar spine  08/31/2020 and postoperative MRI of the lumbar spine 07/29/2021. This  also correlated to intraoperative C-arm views of lumbar spine  10/29/2021, lumbar spine plain film series with flexion-extension views  today 08/26/2022.     FINDINGS: There is transitional anatomy at the lumbosacral junction for  the purposes of this report is a transitional vertebra is labeled as a  partially lumbarized S1 vertebrae with the last-inferior most partially  hydrated hypoplastic disc space labeled as the S1-S2 disc space. Above  this are 5 nonrib-bearing vertebra labeled as L1 through L5 and this is  the same numbering system used on prior MRIs of the lumbar spine.     The distal thoracic cord and the conus is normal in signal intensity  conus terminates at the mid body of L2 lumbar level which is normal.     At T11-12 and T12-L1 the disc space and facets are normal with no canal  or foraminal narrowing.     At L1-2 there is a right paracentral posterior lateral tiny  disc  protrusion only minimally indents the right ventral aspect thecal sac is  essentially no canal or foraminal narrowing.     At L2-3, the disc space and facets are normal with no canal or foraminal  narrowing.     At L3-4 there is mild-to-moderate bilateral facet overgrowth some fluid  in the facet joints. There is a posterior central to left paracentral  disc herniation with extruded disc material extending along the  posterior central to left paracentral inferior endplate of L4. There is  severe narrowing of the thecal sac at L3-4 with diminished spinal fluid  bathing the cauda equina at L3-4 lumbar level. There is only minimal  foraminal narrowing.     At L4-5 the patient has had prior lumbar spine surgery on 10/29/2020  during which there is performed to bilateral laminectomies at L4, medial  facetectomies at L4-5. There is moderate bilateral facet overgrowth at  L4-5, minimal posterior disc bulge. Due to posterior decompression there  is no canal narrowing at L4-5. There is mild left but no right foraminal  narrowing at L4-5.     At L5-S1 the patient had prior lumbar spine surgery with bilateral  laminectomies at L5. There is severe bilateral facet overgrowth at L5-S1  with 3 mm anterolisthesis of L5 on S1. There is no canal narrowing into  the medial facet spurs perhaps slightly narrow the lateral recesses but  there is some loss of vertical height of the foramen and bulging disc  material into the inferior foramen spurring synovial thickening off the  facets extending the posterior aspect of the foramen and there is  mild-to-moderate bilateral foraminal narrowing.     IMPRESSION:  1. This patient has transitional anatomy at the lumbosacral junction for  the purposes of this report the transitional vertebra is labeled as a  partially lumbarized S1 vertebra with the last-inferior most partially  hydrated hypoplastic disc space labeled as the S1-S2 disc space and  above the partially lumbarized S1 vertebra  are 5 nonrib-bearing vertebra  labeled as L1-L5 and lowest-most rib-bearing vertebra is labeled as T12.  This same numbering system is used on prior lumbar spine MRIs.  2. Patient has had previous lumbar spine surgery with bilateral  laminectomies at L4 and L5 and S1, bilateral medial facetectomies at  L4-5 and L5-S1. There is no residual canal narrowing at L4-5 or L5-S1  there is mild left but no right foraminal narrowing at L4-5. There is  mild-to-moderate bilateral foraminal narrowing at L5-S1.  3. At L3-4 there is mild-to-moderate bilateral facet overgrowth and  there is posterior central left paracentral disc herniation with some  extruded disc material extending along the midline left paracentral  inferior body and endplate of L3. There is severe narrowing of the  thecal sac at the L3-4 level with diminished spinal fluid bathing the  cauda equina at the L3-4 level. There is only mild bilateral foraminal  narrowing at L3-4.  4. L1-2 there is a tiny right paracentral posterior lateral disc  protrusion only minimally indents the ventral aspect of the thecal sac  and there is no canal or foraminal narrowing. The remainder of the  lumbar spine MRI is unremarkable.     This report was finalized on 8/29/2022 2:10 PM by Dr. Vargas Adorno M.D.    The following portions of the patient's history were reviewed and updated as appropriate: allergies, current medications, past family history, past medical history, past social history, past surgical history, and problem list.    Review of Systems   Constitutional:  Negative for activity change, fatigue and fever.   Respiratory:  Negative for cough and chest tightness.    Cardiovascular:  Negative for chest pain.   Gastrointestinal:  Negative for abdominal pain, constipation and diarrhea.   Genitourinary:  Negative for difficulty urinating and dysuria.   Musculoskeletal:  Positive for back pain.   Neurological:  Negative for dizziness, weakness, light-headedness, numbness and  "headaches.   Psychiatric/Behavioral:  Negative for agitation, sleep disturbance and suicidal ideas. The patient is not nervous/anxious.    I have reviewed and confirmed the accuracy of the ROS as documented by the MA/LPN/RN DARWIN Conklin  Vitals:    06/12/24 0829   BP: 157/77   BP Location: Left arm   Patient Position: Sitting   Cuff Size: Adult   Pulse: 63   Resp: 12   Temp: 96.9 °F (36.1 °C)   TempSrc: Temporal   SpO2: 94%   Weight: 91.2 kg (201 lb)   Height: 180.3 cm (71\")   PainSc:   5         Objective   Physical Exam  Vitals reviewed.   Constitutional:       Appearance: Normal appearance. He is normal weight.   HENT:      Head: Normocephalic.   Pulmonary:      Effort: Pulmonary effort is normal.   Musculoskeletal:      Lumbar back: Tenderness present.      Comments: Improvement noted of range of motion with lumbar extension   Skin:     General: Skin is warm and dry.   Neurological:      General: No focal deficit present.      Mental Status: He is alert and oriented to person, place, and time.      Cranial Nerves: Cranial nerves 2-12 are intact.      Sensory: Sensation is intact.      Motor: Motor function is intact.      Gait: Gait is intact.   Psychiatric:         Mood and Affect: Mood normal.         Behavior: Behavior normal.         Thought Content: Thought content normal.         Judgment: Judgment normal.             Assessment & Plan   Diagnoses and all orders for this visit:    1. Spondylosis of lumbar region without myelopathy or radiculopathy (Primary)    2. Spinal stenosis of lumbar region with neurogenic claudication    3. Essential hypertension        Avni Olivo III reports a pain score of 5.  Given his pain assessment as noted, treatment options were discussed and the following options were decided upon as a follow-up plan to address the patient's pain: continuation of current treatment plan for pain and use of non-medical modalities (ice, heat, stretching and/or behavior " modifications).      --- Follow-up in 2 months from today for further evaluation and treatment recommendations to be made  --- The patient tends to do well with therapeutic radiofrequency ablations and will likely require repeat ablation however we will assess his continued response within 2 months.  --- He does not require refill of the tramadol 50 mg at this time.        Thermal Radiofrequency Destruction    This initial thermal radiofrequency destruction (RFA) of cervical, thoracic, or lumbar paravertebral facet joint (medial branch) nerves is considered medically reasonable and necessary as this patient has met the criteria of having at least two (2) medically reasonable and necessary diagnostic MBBs, with each one providing a consistent minimum of 80% sustained relief of primary (index) pain (with the duration of relief being consistent with the agent used).    This repeat thermal radiofrequency destruction (RFA) of cervical, thoracic, or lumbar paravertebral facet joint (medial branch) nerves at the same anatomical site is considered medically reasonable and necessary as this patient has met the criteria of having a minimum of consistent 50% improvement in pain for at least six (6) months or at least 50% consistent improvement in the ability to perform previously painful movements and ADLs as compared to baseline measurement using the same scale.            JANET REPORT  As part of the patient's treatment plan, I am prescribing controlled substances. The patient has been made aware of appropriate use of such medications, including potential risk of somnolence, limited ability to drive and/or work safely, and the potential for dependence or overdose. It has also been made clear that these medications are for use by this patient only, without concomitant use of alcohol or other substances unless prescribed.     Patient has completed prescribing agreement detailing terms of continued prescribing of controlled  substances, including monitoring JANET reports, urine drug screening, and pill counts if necessary. The patient is aware that inappropriate use will results in cessation of prescribing such medications.    As the clinician, I personally reviewed the JANET from 6/12/2024 while the patient was in the office today.    History and physical exam exhibit continued safe and appropriate use of controlled substances.     Dictated utilizing Dragon dictation.

## 2024-06-18 RX ORDER — AMLODIPINE BESYLATE 2.5 MG/1
2.5 TABLET ORAL DAILY
Qty: 90 TABLET | Refills: 3 | Status: SHIPPED | OUTPATIENT
Start: 2024-06-18

## 2024-06-21 ENCOUNTER — TRANSCRIBE ORDERS (OUTPATIENT)
Dept: SURGERY | Facility: SURGERY CENTER | Age: 69
End: 2024-06-21
Payer: MEDICARE

## 2024-06-21 DIAGNOSIS — Z41.9 SURGERY, ELECTIVE: Primary | ICD-10-CM

## 2024-07-24 ENCOUNTER — TELEPHONE (OUTPATIENT)
Dept: NEUROSURGERY | Facility: CLINIC | Age: 69
End: 2024-07-24
Payer: MEDICARE

## 2024-07-24 NOTE — PROGRESS NOTES
"Subjective   Patient ID: Avni Olivo III is a 69 y.o. male is here today for follow-up with new lumbar xray's.     History of Present Illness    Patient was last seen in the office by Dr Martinez in October 2023.  4 years ago he underwent L3-S1 decompression for spinal stenosis.  His residual pain is mainly in the back and he has been working with Dr. Monte getting ablations approximately every 6 months.  Most recent lumbar MRI was in August 2022.    He continues to report acute on chronic midline low back pain.  Most recent RFA was in March and he is scheduled for another in September.  He has chronic bilateral calf weakness, which has made walking more difficult since undergoing his surgery 4 years ago.  He denies any new problems or concerns today, specifically any new pain or weakness.  He is unable to take NSAIDs secondary to history of chronic kidney disease.  He takes extra strength Tylenol as needed.  He does continue to stay active and play golf, despite the leg weakness.  He states that he has to be very careful with walking as his gait has changed as result of the calf weakness.    Presents unaccompanied.      The following portions of the patient's history were reviewed and updated as appropriate: allergies, current medications, past family history, past medical history, past social history, past surgical history, and problem list.    Review of Systems   Constitutional:  Negative for fever.   Musculoskeletal:  Positive for back pain. Negative for gait problem.   Neurological:  Positive for numbness. Negative for weakness.   All other systems reviewed and are negative.      /78 (BP Location: Left arm, Patient Position: Sitting, Cuff Size: Adult)   Resp 20   Ht 180.3 cm (70.98\")   Wt 90.7 kg (200 lb)   BMI 27.91 kg/m²       Objective     Vitals:    07/30/24 1248   BP: 126/78   BP Location: Left arm   Patient Position: Sitting   Cuff Size: Adult   Resp: 20   Weight: 90.7 kg (200 lb)   Height: " "180.3 cm (70.98\")   PainSc:   3   PainLoc: Back     Body mass index is 27.91 kg/m².    Tobacco Use: Medium Risk (7/30/2024)    Patient History     Smoking Tobacco Use: Never     Smokeless Tobacco Use: Former     Passive Exposure: Not on file          Physical Exam  Vitals reviewed.   Constitutional:       Appearance: Normal appearance.   HENT:      Head: Atraumatic.   Pulmonary:      Effort: Pulmonary effort is normal.   Musculoskeletal:         General: No tenderness. Normal range of motion.      Comments: Full lumbar range of motion without pain  Strength equal and intact bilateral lower extremities  Bilateral calf atrophy chronic and unchanged, bilateral gastroc weakness 3/5   Skin:     General: Skin is warm and dry.   Neurological:      Mental Status: He is alert and oriented to person, place, and time.      GCS: GCS eye subscore is 4. GCS verbal subscore is 5. GCS motor subscore is 6.      Sensory: No sensory deficit.      Motor: No weakness or tremor.      Gait: Gait normal.      Deep Tendon Reflexes:      Reflex Scores:       Patellar reflexes are 2+ on the right side and 2+ on the left side.       Achilles reflexes are 2+ on the right side and 2+ on the left side.     Comments: Gait is stable and upright  Unable to stand on heels and toes, chronic and unchanged   Psychiatric:         Mood and Affect: Mood normal.       Neurologic Exam     Mental Status   Oriented to person, place, and time.     Gait, Coordination, and Reflexes     Reflexes   Right patellar: 2+  Left patellar: 2+  Right achilles: 2+  Left achilles: 2+          Assessment & Plan   Independent Review of Radiographic Studies:      I personally reviewed the images from the following studies.    Lumbar MRI from August 26, 2022 shows new disc extrusion at L3-4 above the levels of prior decompression that narrows the spinal canal, worse on the left compared to the right.    Lumbar x-rays obtained today in our office show overall stable findings with " no acute abnormalities.  Significant disc desiccation is seen at L5-S1.    Medical Decision Making:      He returns to the office today for ongoing follow-up with history of acute on chronic midline low back pain that has been present since undergoing multilevel decompression 4 years ago.  Exam as noted above, no red flags.  He is overall stable and doing well.  He has chronic calf weakness that is unchanged.  Motor standpoint he is stable.  He denies any new areas of pain or discomfort.  He has no areas of new weakness on exam.  Lumbar x-ray shows stable findings with the exception of significant disc degeneration at L5-S1.  No other acute findings were identified.  He will let us know if his symptoms change or worsen in the interim, otherwise we will have him see Dr. LEHMAN in 1 year for follow-up with new lumbar xrays.     Plan: Follow-up in 1 year with Dr. LEHMAN    Diagnoses and all orders for this visit:    1. DDD (degenerative disc disease), lumbar (Primary)  -     XR Spine Lumbar Complete With Flex & Ext; Future    2. Chronic pain syndrome  -     XR Spine Lumbar Complete With Flex & Ext; Future      Return in about 1 year (around 7/30/2025).

## 2024-07-25 ENCOUNTER — TELEPHONE (OUTPATIENT)
Dept: CARDIOLOGY | Facility: CLINIC | Age: 69
End: 2024-07-25

## 2024-07-25 NOTE — TELEPHONE ENCOUNTER
I called and spoke to URMILA pt.  His LOV was 4/2024.  He is calling because he has had intermittent mild chest pressure with eating but also with exercise for months to years.  However, over the course of the past few months he shares it occurs more frequently.  The pain does not radiate.  He does not experience other symptoms when he has the pressure.  He also endorses palpitations and elevated BP which is not new.  He does follow with nephro and they have adjusted medications many times, but his BP always seems to average 150s/90s.  He has had extensive GI workup previously because he felt these symptoms could be GI origin, but work up has been negative.  He is going out of the country 8/13.      He shares that he previously has had a calcium score which led to a cath and stents.  He is requesting another calcium score.  I let him know with the multiple symptoms, it would probably be best to come in the office to be evaluated and discuss plan of care.  He is agreeable to see APRN.    I have scheduled him to see KIERSTEN 7/30/24.      If there are different recommendations, please let me know.     Cassidy Manning RN  [facility] T[facility]  07/25/24 13:18 EDT

## 2024-07-25 NOTE — TELEPHONE ENCOUNTER
"Caller: Avni Olivo III \"Boris\"    Relationship to patient: Self    Best call back number: [phone]    Chief complaint: ONGOING HIGH BP. DIZZINESS, INCREASED HEART PALPITATIONS, CHEST PRESSURE AFTER EATING OR UNDER EXERTION.    Type of visit: CT CALCIUM SCORE, OTHER TESTING.    Requested date:ASAP, TRAVELING OUT OF THE COUNTRY IN AUGUST.    If rescheduling, when is the original appointment:     Additional notes:BP RUNNING 150/90 OR HIGHER EVEN WITH MEDICATION THERAPY.      "

## 2024-07-30 ENCOUNTER — OFFICE VISIT (OUTPATIENT)
Dept: CARDIOLOGY | Facility: CLINIC | Age: 69
End: 2024-07-30
Payer: MEDICARE

## 2024-07-30 ENCOUNTER — OFFICE VISIT (OUTPATIENT)
Dept: NEUROSURGERY | Facility: CLINIC | Age: 69
End: 2024-07-30
Payer: MEDICARE

## 2024-07-30 VITALS
WEIGHT: 200.4 LBS | DIASTOLIC BLOOD PRESSURE: 80 MMHG | OXYGEN SATURATION: 97 % | SYSTOLIC BLOOD PRESSURE: 145 MMHG | HEIGHT: 71 IN | HEART RATE: 59 BPM | BODY MASS INDEX: 28.06 KG/M2

## 2024-07-30 VITALS
RESPIRATION RATE: 20 BRPM | WEIGHT: 200 LBS | BODY MASS INDEX: 28 KG/M2 | SYSTOLIC BLOOD PRESSURE: 126 MMHG | DIASTOLIC BLOOD PRESSURE: 78 MMHG | HEIGHT: 71 IN

## 2024-07-30 DIAGNOSIS — M51.36 DDD (DEGENERATIVE DISC DISEASE), LUMBAR: Primary | ICD-10-CM

## 2024-07-30 DIAGNOSIS — G89.4 CHRONIC PAIN SYNDROME: ICD-10-CM

## 2024-07-30 DIAGNOSIS — E78.2 MIXED HYPERLIPIDEMIA: ICD-10-CM

## 2024-07-30 DIAGNOSIS — I10 ESSENTIAL HYPERTENSION: ICD-10-CM

## 2024-07-30 DIAGNOSIS — I25.118 CORONARY ARTERY DISEASE OF NATIVE ARTERY OF NATIVE HEART WITH STABLE ANGINA PECTORIS: Primary | ICD-10-CM

## 2024-07-30 PROCEDURE — G2211 COMPLEX E/M VISIT ADD ON: HCPCS | Performed by: NURSE PRACTITIONER

## 2024-07-30 PROCEDURE — 3077F SYST BP >= 140 MM HG: CPT | Performed by: NURSE PRACTITIONER

## 2024-07-30 PROCEDURE — 3079F DIAST BP 80-89 MM HG: CPT | Performed by: NURSE PRACTITIONER

## 2024-07-30 PROCEDURE — 99214 OFFICE O/P EST MOD 30 MIN: CPT | Performed by: NURSE PRACTITIONER

## 2024-07-30 RX ORDER — SENNOSIDES 8.6 MG
650 CAPSULE ORAL
COMMUNITY

## 2024-07-30 RX ORDER — HYDROCHLOROTHIAZIDE 12.5 MG/1
TABLET ORAL
COMMUNITY
Start: 2024-05-21

## 2024-07-30 NOTE — ASSESSMENT & PLAN NOTE
Patient with previous overlapping KALYAN to the mid LAD in 2019  Patient experiencing symptoms that are similar to this episode.  He is having chest discomfort with eating and with exercise.  When pain is exertional it is improved with rest  He will be traveling out of the country next month for several weeks and is concerned  At this point I have recommended performing a myocardial perfusion stress test.  Patient is also noted to be more hypertensive recently.  Continue GDMT:  Amlodipine  Atorvastatin  Carvedilol  Clopidogrel  Losartan

## 2024-07-30 NOTE — ASSESSMENT & PLAN NOTE
BP has been elevated at home in the 150s/90s.  It is 145/80 in clinic today  Patient has had difficult to control blood pressure in the past, nephrology manages his hypertensive regimen  Continue the following regimen:  Amlodipine 2.5 mg/day  Carvedilol 6.25 mg twice daily  HCTZ 12.5 mg/day  Losartan 100 mg/day  Terazosin 10 mg twice daily

## 2024-07-30 NOTE — PROGRESS NOTES
CARDIOLOGY        Patient Name: Avni Olivo III  :1955  Age: 69 y.o.  Primary Cardiologist: Zoraida Kenyon MD  Encounter Provider:  JESSICA Stallworth    Date of Service: 24    CHIEF COMPLAINT / REASON FOR OFFICE VISIT     Follow-Up, Coronary Artery Disease, and Chest Pain      HISTORY OF PRESENT ILLNESS       HPI  Avni Olivo III is a 69 y.o. male who presents today for evaluation of symptoms.     Pt has a  history significant for CAD with LAD KALYAN x 2, HTN, HLD, PVCs.    Patient originally established care with our clinic in  with complaints of chest discomfort.  Patient complained of chest discomfort during a stress echocardiogram, he subsequently was arranged for a cardiac catheterization which revealed no evidence of CAD.    Patient was evaluated in 2016 for episodes of bradycardia with presyncopal event.  EMS was called to scene and patient had heart rate in the low 50s with a systolic blood pressure greater than 200.  Patient was taken to the emergency department at that time.  He was treated with IV fluids.    In  patient complained of palpitations and was diagnosed with symptomatic PVCs.  Ultimately he had a repeat cardiac catheterization and was found to have a mid LAD lesion of 85%.  2 KALYAN's were placed to that region.    Patient called the office 2024 with complaints of intermittent chest discomfort that occurs with eating but also with exercise for the past months to years.  Pain does not radiate.  Blood pressure also noted to be elevated averaging in the 150s/90s.  Nephrology manages his blood pressure regimen.  Patient has had an extensive GI workup previously because of symptoms but workup thus far has been negative.  Patient is concerned that symptoms could be secondary to coronary disease and was placed on my schedule for evaluation.    Patient reports that over the past several weeks he has not felt well.  Has had intermittent episodes of chest discomfort  "that is midsternal.  At times pain will occur with eating and at other times with exertion.  When it is exertional it does improve with rest.  Denies associated symptoms of nausea, dyspnea.  He does have episodes of lightheadedness with exercise.  He is also noted to be more hypertensive recently.      The following portions of the patient's history were reviewed and updated as appropriate: allergies, current medications, past family history, past medical history, past social history, past surgical history and problem list.      VITAL SIGNS     Visit Vitals  /80 (BP Location: Right arm, Patient Position: Sitting)   Pulse 59   Ht 180.3 cm (71\")   Wt 90.9 kg (200 lb 6.4 oz)   SpO2 97%   BMI 27.95 kg/m²         Wt Readings from Last 3 Encounters:   07/30/24 90.7 kg (200 lb)   07/30/24 90.9 kg (200 lb 6.4 oz)   06/12/24 91.2 kg (201 lb)     Body mass index is 27.95 kg/m².      REVIEW OF SYSTEMS     Review of Systems   Constitutional: Positive for malaise/fatigue. Negative for chills, fever, weight gain and weight loss.   Cardiovascular:  Positive for chest pain. Negative for leg swelling.   Respiratory:  Negative for cough, snoring and wheezing.    Hematologic/Lymphatic: Negative for bleeding problem. Does not bruise/bleed easily.   Skin:  Negative for color change.   Musculoskeletal:  Negative for falls, joint pain and myalgias.   Gastrointestinal:  Negative for melena.   Genitourinary:  Negative for hematuria.   Neurological:  Positive for light-headedness. Negative for excessive daytime sleepiness.   Psychiatric/Behavioral:  Negative for depression. The patient is not nervous/anxious.            PHYSICAL EXAMINATION     Constitutional:       Appearance: Normal appearance. Well-developed.   Eyes:      Conjunctiva/sclera: Conjunctivae normal.   Neck:      Vascular: No carotid bruit.   Pulmonary:      Effort: Pulmonary effort is normal.      Breath sounds: Normal breath sounds.   Cardiovascular:      Normal rate. " Discontinue Regimen: Stop Mupirocin Ointment "Regular rhythm. Normal S1. Normal S2.       Murmurs: There is no murmur.      No gallop.  No click. No rub.   Edema:     Peripheral edema absent.   Musculoskeletal: Normal range of motion. Skin:     General: Skin is warm and dry.   Neurological:      Mental Status: Alert and oriented to person, place, and time.      GCS: GCS eye subscore is 4. GCS verbal subscore is 5. GCS motor subscore is 6.   Psychiatric:         Speech: Speech normal.         Behavior: Behavior normal.         Thought Content: Thought content normal.         Judgment: Judgment normal.           REVIEWED DATA     Procedures    Cardiac Procedures:  Stress echocardiogram 12/12/2019.  LVEF 61%.  No ischemic echocardiographic findings.  Suboptimal heart rate with stress.  Patient only reached 81% of predicted heart rate.  Cardiac catheterization 12/27/2019.  Three-vessel calcification but critical narrowing of the mid LAD.  Successfully treated with overlapping KALYAN.        Lab Results   Component Value Date     01/18/2022     11/19/2021    K 4.0 01/18/2022    K 4.6 11/19/2021     01/18/2022     11/19/2021    CO2 25.7 01/18/2022    CO2 26.0 11/19/2021    BUN 15 01/18/2022    BUN 19 11/19/2021    CREATININE 1.20 08/26/2022    CREATININE 1.29 (H) 01/18/2022    EGFRIFNONA 56 (L) 01/18/2022    EGFRIFNONA 59 (L) 11/19/2021    GLUCOSE 133 (H) 01/18/2022    GLUCOSE 136 (H) 11/19/2021    CALCIUM 10.0 01/18/2022    CALCIUM 9.6 11/19/2021    ALBUMIN 4.40 11/19/2021    ALBUMIN 4.70 06/18/2021    BILITOT 0.5 11/19/2021    BILITOT 0.6 06/18/2021    AST 41 (H) 11/19/2021    AST 29 06/18/2021    ALT 43 (H) 11/19/2021    ALT 31 06/18/2021     Lab Results   Component Value Date    WBC 5.09 12/12/2023    WBC 4.29 11/19/2021    HGB 17.3 12/12/2023    HGB 14.8 11/19/2021    HCT 49.5 12/12/2023    HCT 42.4 11/19/2021    MCV 95.6 12/12/2023    MCV 94.9 11/19/2021     12/12/2023     11/19/2021     No results found for: \"PROBNP\", " Detail Level: Zone "\"BNP\"  Lab Results   Component Value Date    TROPONINT <0.010 12/28/2019     Lab Results   Component Value Date    TSH 1.630 02/20/2020             ASSESSMENT & PLAN     Diagnoses and all orders for this visit:    1. Coronary artery disease of native artery of native heart with stable angina pectoris (Primary)  Assessment & Plan:  Patient with previous overlapping KALYAN to the mid LAD in 2019  Patient experiencing symptoms that are similar to this episode.  He is having chest discomfort with eating and with exercise.  When pain is exertional it is improved with rest  He will be traveling out of the country next month for several weeks and is concerned  At this point I have recommended performing a myocardial perfusion stress test.  Patient is also noted to be more hypertensive recently.  Continue GDMT:  Amlodipine  Atorvastatin  Carvedilol  Clopidogrel  Losartan    Orders:  -     Stress Test With Myocardial Perfusion One Day; Future    2. Essential hypertension  Assessment & Plan:  BP has been elevated at home in the 150s/90s.  It is 145/80 in clinic today  Patient has had difficult to control blood pressure in the past, nephrology manages his hypertensive regimen  Continue the following regimen:  Amlodipine 2.5 mg/day  Carvedilol 6.25 mg twice daily  HCTZ 12.5 mg/day  Losartan 100 mg/day  Terazosin 10 mg twice daily      3. Mixed hyperlipidemia  Assessment & Plan:  Continue atorvastatin              Future Appointments         Provider Department Center    8/1/2024 7:30 AM [facility] NUC CARD [facility]    9/3/2024 11:00 AM Bari Miller PA [facility] PAIN MANAGEMENT [facility]    9/25/2024 1:00 PM [facility] MAIN PAIN OR [facility]     4/24/2025 10:40 AM Zoraida Kenyon MD [facility] CARDIOLOGY [facility]    7/30/2025 9:30 AM [facility] NEURO [facility] XR [facility] NEUROSURGERY [facility]    7/30/2025 10:00 AM Juan" MD Jeremy [facility] NEUROSURGERY [facility]              MEDICATIONS         Discharge Medications            Accurate as of July 30, 2024  3:50 PM. If you have any questions, ask your nurse or doctor.                Continue These Medications        Instructions Start Date   acetaminophen 650 MG 8 hr tablet  Commonly known as: TYLENOL   650 mg, Oral      amLODIPine 2.5 MG tablet  Commonly known as: NORVASC   2.5 mg, Oral, Daily      atorvastatin 40 MG tablet  Commonly known as: LIPITOR   TAKE 1 AND 1/2 TABLETS EVERY NIGHT      carvedilol 12.5 MG tablet  Commonly known as: COREG   6.25 mg, Oral, 2 Times Daily      clopidogrel 75 MG tablet  Commonly known as: PLAVIX   75 mg, Oral, Daily      eszopiclone 2 MG tablet  Commonly known as: LUNESTA   1 mg, Oral, Nightly, Takes 1/2 tablet qhs      FISH OIL PO   1,280 mg, Oral, 2 Times Daily, HOLD PRIOR TO SURGERY      Gemtesa 75 MG tablet  Generic drug: Vibegron   1 tablet, Oral, Daily      hydroCHLOROthiazide 12.5 MG tablet       losartan 100 MG tablet  Commonly known as: COZAAR   TAKE 1 TABLET EVERY DAY      multivitamin capsule   1 capsule, Oral, Daily      terazosin 10 MG capsule  Commonly known as: HYTRIN   10 mg, Oral, 2 Times Daily                   **Dragon Disclaimer:   Much of this encounter note is an electronic transcription/translation of spoken language to printed text. The electronic translation of spoken language may permit erroneous, or at times, nonsensical words or phrases to be inadvertently transcribed. Although I have reviewed the note for such errors, some may still exist.

## 2024-07-31 ENCOUNTER — TELEPHONE (OUTPATIENT)
Dept: CARDIOLOGY | Facility: CLINIC | Age: 69
End: 2024-07-31
Payer: MEDICARE

## 2024-08-01 ENCOUNTER — HOSPITAL ENCOUNTER (OUTPATIENT)
Dept: CARDIOLOGY | Facility: HOSPITAL | Age: 69
Discharge: HOME | End: 2024-08-01
Admitting: NURSE PRACTITIONER
Payer: MEDICARE

## 2024-08-01 VITALS — WEIGHT: 195 LBS | BODY MASS INDEX: 27.3 KG/M2 | HEIGHT: 71 IN

## 2024-08-01 DIAGNOSIS — I25.118 CORONARY ARTERY DISEASE OF NATIVE ARTERY OF NATIVE HEART WITH STABLE ANGINA PECTORIS: ICD-10-CM

## 2024-08-01 LAB
BH CV NUCLEAR PRIOR STUDY: 2
BH CV REST NUCLEAR ISOTOPE DOSE: 10.6 MCI
BH CV STRESS BP STAGE 1: NORMAL
BH CV STRESS BP STAGE 2: NORMAL
BH CV STRESS BP STAGE 3: NORMAL
BH CV STRESS DURATION MIN STAGE 1: 3
BH CV STRESS DURATION MIN STAGE 2: 3
BH CV STRESS DURATION MIN STAGE 3: 2
BH CV STRESS DURATION SEC STAGE 1: 0
BH CV STRESS DURATION SEC STAGE 2: 0
BH CV STRESS DURATION SEC STAGE 3: 0
BH CV STRESS GRADE STAGE 1: 10
BH CV STRESS GRADE STAGE 2: 12
BH CV STRESS GRADE STAGE 3: 14
BH CV STRESS HR STAGE 1: 94
BH CV STRESS HR STAGE 2: 117
BH CV STRESS HR STAGE 3: 136
BH CV STRESS METS STAGE 1: 5
BH CV STRESS METS STAGE 2: 7.5
BH CV STRESS METS STAGE 3: 10
BH CV STRESS NUCLEAR ISOTOPE DOSE: 34.9 MCI
BH CV STRESS PROTOCOL 1: NORMAL
BH CV STRESS RECOVERY BP: NORMAL MMHG
BH CV STRESS RECOVERY HR: 85 BPM
BH CV STRESS SPEED STAGE 1: 1.7
BH CV STRESS SPEED STAGE 2: 2.5
BH CV STRESS SPEED STAGE 3: 3.4
BH CV STRESS STAGE 1: 1
BH CV STRESS STAGE 2: 2
BH CV STRESS STAGE 3: 3
LV EF NUC BP: 66 %
MAXIMAL PREDICTED HEART RATE: 153 BPM
PERCENT MAX PREDICTED HR: 88.89 %
STRESS BASELINE BP: NORMAL MMHG
STRESS BASELINE HR: 68 BPM
STRESS PERCENT HR: 105 %
STRESS POST ESTIMATED WORKLOAD: 10 METS
STRESS POST EXERCISE DUR MIN: 8 MIN
STRESS POST EXERCISE DUR SEC: 0 SEC
STRESS POST PEAK BP: NORMAL MMHG
STRESS POST PEAK HR: 136 BPM
STRESS TARGET HR: 130 BPM

## 2024-08-01 PROCEDURE — 93017 CV STRESS TEST TRACING ONLY: CPT

## 2024-08-01 PROCEDURE — 78452 HT MUSCLE IMAGE SPECT MULT: CPT

## 2024-08-01 PROCEDURE — 0 TECHNETIUM TETROFOSMIN KIT: Performed by: NURSE PRACTITIONER

## 2024-08-01 PROCEDURE — A9502 TC99M TETROFOSMIN: HCPCS | Performed by: NURSE PRACTITIONER

## 2024-08-01 RX ADMIN — TETROFOSMIN 1 DOSE: 1.38 INJECTION, POWDER, LYOPHILIZED, FOR SOLUTION INTRAVENOUS at 08:33

## 2024-08-01 RX ADMIN — TETROFOSMIN 1 DOSE: 1.38 INJECTION, POWDER, LYOPHILIZED, FOR SOLUTION INTRAVENOUS at 07:37

## 2024-08-01 NOTE — PROGRESS NOTES
Please let patient know that his stress test is normal.  There is no evidence of ischemia.  I would encourage him to reach out to his PCP to see if GI/GERD could be contributing.  If he continues to have symptoms please have him notify the office immediately.

## 2024-08-02 ENCOUNTER — TELEPHONE (OUTPATIENT)
Dept: CARDIOLOGY | Facility: CLINIC | Age: 69
End: 2024-08-02
Payer: MEDICARE

## 2024-08-02 NOTE — TELEPHONE ENCOUNTER
Pt called with questions about Nuclear Stress test results.                                                                                                                                                                                                                                                                                                                                                                                                                                                                                                                                                                                                                                                                                                                                                                                                                                                                                                                                                                                                                                                                                                                                                                                                                                                                                                                                                                                                                      p

## 2024-08-02 NOTE — TELEPHONE ENCOUNTER
Spoke to patient. He is concerned because after he was finished with his stress test and he was sitting in the recliner, they checked his BP and the SBP was 244. However, the technician said he didn't think this was an accurate reading, but he was concerned because this is not documented on the result of his stress test so he thought that we should be made aware.     Breanna Perera RN  Triage [facility]

## 2024-08-05 NOTE — TELEPHONE ENCOUNTER
08/05/24  10:01 EDT    Thank you.  It is documented in the stress data portion however this does not often get transcribed over to MyChart.      JESSICA [facility] Card[facility]

## 2024-08-05 NOTE — TELEPHONE ENCOUNTER
Left VM of results/recommendations and to call back with any further questions or concerns, allowed by verbal release form.     Breanna Perera RN  Triage [facility]

## 2024-08-30 ENCOUNTER — TRANSCRIBE ORDERS (OUTPATIENT)
Dept: SURGERY | Facility: SURGERY CENTER | Age: 69
End: 2024-08-30
Payer: MEDICARE

## 2024-08-30 DIAGNOSIS — Z41.9 SURGERY, ELECTIVE: Primary | ICD-10-CM

## 2024-09-03 ENCOUNTER — OFFICE VISIT (OUTPATIENT)
Dept: PAIN MEDICINE | Facility: CLINIC | Age: 69
End: 2024-09-03
Payer: MEDICARE

## 2024-09-03 VITALS
BODY MASS INDEX: 28.92 KG/M2 | HEART RATE: 58 BPM | TEMPERATURE: 96.6 F | DIASTOLIC BLOOD PRESSURE: 80 MMHG | RESPIRATION RATE: 18 BRPM | SYSTOLIC BLOOD PRESSURE: 140 MMHG | HEIGHT: 71 IN | WEIGHT: 206.6 LBS | OXYGEN SATURATION: 95 %

## 2024-09-03 DIAGNOSIS — M47.817 LUMBOSACRAL SPONDYLOSIS WITHOUT MYELOPATHY: Primary | ICD-10-CM

## 2024-09-03 DIAGNOSIS — M46.1 BILATERAL SACROILIITIS: ICD-10-CM

## 2024-09-03 DIAGNOSIS — M51.16 HERNIATION OF LUMBAR INTERVERTEBRAL DISC WITH RADICULOPATHY: ICD-10-CM

## 2024-09-03 PROCEDURE — 3079F DIAST BP 80-89 MM HG: CPT | Performed by: PHYSICIAN ASSISTANT

## 2024-09-03 PROCEDURE — 3077F SYST BP >= 140 MM HG: CPT | Performed by: PHYSICIAN ASSISTANT

## 2024-09-03 PROCEDURE — 99214 OFFICE O/P EST MOD 30 MIN: CPT | Performed by: PHYSICIAN ASSISTANT

## 2024-09-03 PROCEDURE — 1125F AMNT PAIN NOTED PAIN PRSNT: CPT | Performed by: PHYSICIAN ASSISTANT

## 2024-09-03 PROCEDURE — 1160F RVW MEDS BY RX/DR IN RCRD: CPT | Performed by: PHYSICIAN ASSISTANT

## 2024-09-03 PROCEDURE — 1159F MED LIST DOCD IN RCRD: CPT | Performed by: PHYSICIAN ASSISTANT

## 2024-09-03 RX ORDER — TADALAFIL 5 MG/1
1 TABLET ORAL DAILY
COMMUNITY
Start: 2024-08-04

## 2024-09-03 NOTE — PROGRESS NOTES
CHIEF COMPLAINT  Follow-up for back pain.    Subjective   Avni Olivo III is a 69 y.o. male  who presents for follow-up.  He has a history of chronic low back pain.  This patient is obtaining approximately 50-60% reduction of pain ongoing following radiofrequency ablation of the bilateral L3-L5 nerves which was completed 3/28/2024.  He is beginning to note slight recrudescence of symptoms in a bandlike distribution across the lumbar spine referred into the bilateral paraspinal muscles and into the upper portion of the buttocks which is aggravated with certain lumbar facet loading maneuvers.    He continues with extreme sparing use of tramadol 50 mg which she is only used approximately 3 tablets from the acute supply provided.  Tolerates it without any adverse effects.    Pain today 4/10 VAS in severity.      Back Pain  This is a chronic problem. The current episode started more than 1 year ago. The problem occurs constantly. The problem has been gradually worsening since onset. The pain is present in the lumbar spine. The quality of the pain is described as aching (throbbing). The pain does not radiate. The pain is at a severity of 4/10. The pain is moderate. The pain is Worse during the day. The symptoms are aggravated by position, twisting and bending. Stiffness is present In the morning. Pertinent negatives include no abdominal pain, chest pain, dysuria, fever, headaches, numbness or weakness.        PEG Assessment   What number best describes your pain on average in the past week?4  What number best describes how, during the past week, pain has interfered with your enjoyment of life?5  What number best describes how, during the past week, pain has interfered with your general activity?  3    Review of Pertinent Medical Data ---  Neurosurgical evaluation with Dr. Jeremy Martinez on 10/16/2023 shows that MRI from 8/26/2022 shows a new disc extrusion at L3-L4 above the levels that was operated on previously.   Patient does have history of L3-S1 decompression for spinal stenosis.  Dr. Martinez recommends that that he continue with intermittent ablations and will have him follow-up in 9 months.        MRI OF THE LUMBAR SPINE WITH AND WITHOUT CONTRAST 08/26/2022     CLINICAL HISTORY: Low back pain, bilateral leg weakness, status post  lumbar spine decompression on 10/29/2020.     TECHNIQUE: Sagittal T1, proton density fat-suppressed T2 and  postcontrast sagittal T1-weighted images were obtained of the lumbar  spine in addition thin cut axial pre and postcontrast T1-weighted images  were obtained angled through the interspaces from L3 to S1.     This is correlated to 4 prior MRIs of the lumbar spine dating all the  way back to 07/24/2014 and 09/04/2015 and preoperative MRI lumbar spine  08/31/2020 and postoperative MRI of the lumbar spine 07/29/2021. This  also correlated to intraoperative C-arm views of lumbar spine  10/29/2021, lumbar spine plain film series with flexion-extension views  today 08/26/2022.     FINDINGS: There is transitional anatomy at the lumbosacral junction for  the purposes of this report is a transitional vertebra is labeled as a  partially lumbarized S1 vertebrae with the last-inferior most partially  hydrated hypoplastic disc space labeled as the S1-S2 disc space. Above  this are 5 nonrib-bearing vertebra labeled as L1 through L5 and this is  the same numbering system used on prior MRIs of the lumbar spine.     The distal thoracic cord and the conus is normal in signal intensity  conus terminates at the mid body of L2 lumbar level which is normal.     At T11-12 and T12-L1 the disc space and facets are normal with no canal  or foraminal narrowing.     At L1-2 there is a right paracentral posterior lateral tiny disc  protrusion only minimally indents the right ventral aspect thecal sac is  essentially no canal or foraminal narrowing.     At L2-3, the disc space and facets are normal with no canal or  foraminal  narrowing.     At L3-4 there is mild-to-moderate bilateral facet overgrowth some fluid  in the facet joints. There is a posterior central to left paracentral  disc herniation with extruded disc material extending along the  posterior central to left paracentral inferior endplate of L4. There is  severe narrowing of the thecal sac at L3-4 with diminished spinal fluid  bathing the cauda equina at L3-4 lumbar level. There is only minimal  foraminal narrowing.     At L4-5 the patient has had prior lumbar spine surgery on 10/29/2020  during which there is performed to bilateral laminectomies at L4, medial  facetectomies at L4-5. There is moderate bilateral facet overgrowth at  L4-5, minimal posterior disc bulge. Due to posterior decompression there  is no canal narrowing at L4-5. There is mild left but no right foraminal  narrowing at L4-5.     At L5-S1 the patient had prior lumbar spine surgery with bilateral  laminectomies at L5. There is severe bilateral facet overgrowth at L5-S1  with 3 mm anterolisthesis of L5 on S1. There is no canal narrowing into  the medial facet spurs perhaps slightly narrow the lateral recesses but  there is some loss of vertical height of the foramen and bulging disc  material into the inferior foramen spurring synovial thickening off the  facets extending the posterior aspect of the foramen and there is  mild-to-moderate bilateral foraminal narrowing.     IMPRESSION:  1. This patient has transitional anatomy at the lumbosacral junction for  the purposes of this report the transitional vertebra is labeled as a  partially lumbarized S1 vertebra with the last-inferior most partially  hydrated hypoplastic disc space labeled as the S1-S2 disc space and  above the partially lumbarized S1 vertebra are 5 nonrib-bearing vertebra  labeled as L1-L5 and lowest-most rib-bearing vertebra is labeled as T12.  This same numbering system is used on prior lumbar spine MRIs.  2. Patient has had  previous lumbar spine surgery with bilateral  laminectomies at L4 and L5 and S1, bilateral medial facetectomies at  L4-5 and L5-S1. There is no residual canal narrowing at L4-5 or L5-S1  there is mild left but no right foraminal narrowing at L4-5. There is  mild-to-moderate bilateral foraminal narrowing at L5-S1.  3. At L3-4 there is mild-to-moderate bilateral facet overgrowth and  there is posterior central left paracentral disc herniation with some  extruded disc material extending along the midline left paracentral  inferior body and endplate of L3. There is severe narrowing of the  thecal sac at the L3-4 level with diminished spinal fluid bathing the  cauda equina at the L3-4 level. There is only mild bilateral foraminal  narrowing at L3-4.  4. L1-2 there is a tiny right paracentral posterior lateral disc  protrusion only minimally indents the ventral aspect of the thecal sac  and there is no canal or foraminal narrowing. The remainder of the  lumbar spine MRI is unremarkable.     This report was finalized on 8/29/2022 2:10 PM by Dr. Vargas Adorno M.D.    The following portions of the patient's history were reviewed and updated as appropriate: allergies, current medications, past family history, past medical history, past social history, past surgical history, and problem list.    Review of Systems   Constitutional:  Negative for fever.   Cardiovascular:  Negative for chest pain.   Gastrointestinal:  Negative for abdominal pain, constipation and diarrhea.   Genitourinary:  Negative for difficulty urinating and dysuria.   Musculoskeletal:  Positive for back pain.   Neurological:  Negative for weakness, numbness and headaches.   Psychiatric/Behavioral:  Negative for sleep disturbance and suicidal ideas. The patient is not nervous/anxious.      I have reviewed and confirmed the accuracy of the ROS as documented by the MA/LPN/RN DAWRIN Conklin  Vitals:    09/03/24 1112   BP: 140/80   Pulse: 58   Resp: 18   Temp:  "96.6 °F (35.9 °C)   SpO2: 95%   Weight: 93.7 kg (206 lb 9.6 oz)   Height: 180.3 cm (70.98\")   PainSc:   4   PainLoc: Back         Objective   Physical Exam  Vitals reviewed.   Constitutional:       Appearance: Normal appearance. He is normal weight.   HENT:      Head: Normocephalic.   Pulmonary:      Effort: Pulmonary effort is normal.   Musculoskeletal:      Lumbar back: Tenderness (moderate tenderness to palpation over the bilateral lumbar facet joint spaces) present. Decreased range of motion.        Back:    Skin:     General: Skin is warm and dry.   Neurological:      General: No focal deficit present.      Mental Status: He is alert and oriented to person, place, and time.      Cranial Nerves: Cranial nerves 2-12 are intact.      Sensory: Sensation is intact.      Motor: Motor function is intact.      Gait: Gait is intact.   Psychiatric:         Mood and Affect: Mood normal.         Behavior: Behavior normal.         Thought Content: Thought content normal.         Judgment: Judgment normal.             Assessment & Plan   Diagnoses and all orders for this visit:    1. Lumbosacral spondylosis without myelopathy (Primary)    2. Bilateral sacroiliitis    3. Herniation of lumbar intervertebral disc with radiculopathy        Avni Olivo III reports a pain score of 4.  Given his pain assessment as noted, treatment options were discussed and the following options were decided upon as a follow-up plan to address the patient's pain: continuation of current treatment plan for pain and use of non-medical modalities (ice, heat, stretching and/or behavior modifications).      --- Follow-up as scheduled for therapeutic lumbar radiofrequency ablation.  He will follow-up 3 months after the ablation in the clinic with this provider.  --- Continue with sparing use of tramadol 50 mg.  He does not require refill on today.        Thermal Radiofrequency Destruction    This initial thermal radiofrequency destruction (RFA) of " cervical, thoracic, or lumbar paravertebral facet joint (medial branch) nerves is considered medically reasonable and necessary as this patient has met the criteria of having at least two (2) medically reasonable and necessary diagnostic MBBs, with each one providing a consistent minimum of 80% sustained relief of primary (index) pain (with the duration of relief being consistent with the agent used).    This repeat thermal radiofrequency destruction (RFA) of cervical, thoracic, or lumbar paravertebral facet joint (medial branch) nerves at the same anatomical site is considered medically reasonable and necessary as this patient has met the criteria of having a minimum of consistent 50% improvement in pain for at least six (6) months or at least 50% consistent improvement in the ability to perform previously painful movements and ADLs as compared to baseline measurement using the same scale.              JANET REPORT  As part of the patient's treatment plan, I am prescribing controlled substances. The patient has been made aware of appropriate use of such medications, including potential risk of somnolence, limited ability to drive and/or work safely, and the potential for dependence or overdose. It has also been made clear that these medications are for use by this patient only, without concomitant use of alcohol or other substances unless prescribed.     Patient has completed prescribing agreement detailing terms of continued prescribing of controlled substances, including monitoring JANET reports, urine drug screening, and pill counts if necessary. The patient is aware that inappropriate use will results in cessation of prescribing such medications.    As the clinician, I personally reviewed the JANET from 9/3/2024 while the patient was in the office today.    History and physical exam exhibit continued safe and appropriate use of controlled substances.     Dictated utilizing Dragon dictation.

## 2024-09-09 RX ORDER — LOSARTAN POTASSIUM 100 MG/1
TABLET ORAL
Qty: 90 TABLET | Refills: 3 | Status: SHIPPED | OUTPATIENT
Start: 2024-09-09

## 2024-09-23 ENCOUNTER — PATIENT MESSAGE (OUTPATIENT)
Dept: GASTROENTEROLOGY | Facility: CLINIC | Age: 69
End: 2024-09-23
Payer: MEDICARE

## 2024-09-23 ENCOUNTER — OFFICE VISIT (OUTPATIENT)
Dept: GASTROENTEROLOGY | Facility: CLINIC | Age: 69
End: 2024-09-23
Payer: MEDICARE

## 2024-09-23 ENCOUNTER — PREP FOR SURGERY (OUTPATIENT)
Dept: SURGERY | Facility: SURGERY CENTER | Age: 69
End: 2024-09-23
Payer: MEDICARE

## 2024-09-23 ENCOUNTER — HOSPITAL ENCOUNTER (OUTPATIENT)
Dept: GENERAL RADIOLOGY | Facility: HOSPITAL | Age: 69
Discharge: HOME | End: 2024-09-23
Payer: MEDICARE

## 2024-09-23 VITALS
WEIGHT: 201.8 LBS | BODY MASS INDEX: 28.25 KG/M2 | HEIGHT: 71 IN | DIASTOLIC BLOOD PRESSURE: 68 MMHG | SYSTOLIC BLOOD PRESSURE: 114 MMHG

## 2024-09-23 DIAGNOSIS — R68.81 EARLY SATIETY: ICD-10-CM

## 2024-09-23 DIAGNOSIS — R14.0 ABDOMINAL BLOATING: ICD-10-CM

## 2024-09-23 DIAGNOSIS — R14.0 ABDOMINAL BLOATING: Primary | ICD-10-CM

## 2024-09-23 DIAGNOSIS — R68.81 EARLY SATIETY: Primary | ICD-10-CM

## 2024-09-23 DIAGNOSIS — R07.89 ATYPICAL CHEST PAIN: ICD-10-CM

## 2024-09-23 DIAGNOSIS — K59.04 CHRONIC IDIOPATHIC CONSTIPATION: Primary | ICD-10-CM

## 2024-09-23 PROCEDURE — 99214 OFFICE O/P EST MOD 30 MIN: CPT

## 2024-09-23 PROCEDURE — 3078F DIAST BP <80 MM HG: CPT

## 2024-09-23 PROCEDURE — 74018 RADEX ABDOMEN 1 VIEW: CPT

## 2024-09-23 PROCEDURE — 3074F SYST BP LT 130 MM HG: CPT

## 2024-09-23 RX ORDER — ATORVASTATIN CALCIUM 40 MG/1
TABLET, FILM COATED ORAL
Qty: 135 TABLET | Refills: 3 | Status: SHIPPED | OUTPATIENT
Start: 2024-09-23

## 2024-09-23 RX ORDER — SODIUM CHLORIDE, SODIUM LACTATE, POTASSIUM CHLORIDE, CALCIUM CHLORIDE 600; 310; 30; 20 MG/100ML; MG/100ML; MG/100ML; MG/100ML
30 INJECTION, SOLUTION INTRAVENOUS CONTINUOUS PRN
OUTPATIENT
Start: 2024-09-23

## 2024-09-23 RX ORDER — SODIUM CHLORIDE 0.9 % (FLUSH) 0.9 %
10 SYRINGE (ML) INJECTION AS NEEDED
OUTPATIENT
Start: 2024-09-23

## 2024-09-23 RX ORDER — SODIUM CHLORIDE 0.9 % (FLUSH) 0.9 %
3 SYRINGE (ML) INJECTION EVERY 12 HOURS SCHEDULED
OUTPATIENT
Start: 2024-09-23

## 2024-09-23 RX ORDER — SUCRALFATE 1 G/1
1 TABLET ORAL
Qty: 120 TABLET | Refills: 1 | Status: SHIPPED | OUTPATIENT
Start: 2024-09-23

## 2024-09-24 ENCOUNTER — TELEPHONE (OUTPATIENT)
Dept: GASTROENTEROLOGY | Facility: CLINIC | Age: 69
End: 2024-09-24
Payer: MEDICARE

## 2024-09-25 ENCOUNTER — APPOINTMENT (OUTPATIENT)
Dept: GENERAL RADIOLOGY | Facility: SURGERY CENTER | Age: 69
End: 2024-09-25
Payer: MEDICARE

## 2024-10-14 RX ORDER — FENTANYL CITRATE 50 UG/ML
100 INJECTION, SOLUTION INTRAMUSCULAR; INTRAVENOUS ONCE
Status: COMPLETED | OUTPATIENT
Start: 2024-10-15 | End: 2024-10-15

## 2024-10-14 RX ORDER — MIDAZOLAM HYDROCHLORIDE 2 MG/2ML
2 INJECTION, SOLUTION INTRAMUSCULAR; INTRAVENOUS ONCE
Status: COMPLETED | OUTPATIENT
Start: 2024-10-15 | End: 2024-10-15

## 2024-10-15 ENCOUNTER — HOSPITAL ENCOUNTER (OUTPATIENT)
Dept: GENERAL RADIOLOGY | Facility: SURGERY CENTER | Age: 69
Setting detail: HOSPITAL OUTPATIENT SURGERY
End: 2024-10-15
Payer: MEDICARE

## 2024-10-15 ENCOUNTER — HOSPITAL ENCOUNTER (OUTPATIENT)
Facility: SURGERY CENTER | Age: 69
Setting detail: HOSPITAL OUTPATIENT SURGERY
Discharge: HOME | End: 2024-10-15
Attending: ANESTHESIOLOGY | Admitting: ANESTHESIOLOGY
Payer: MEDICARE

## 2024-10-15 VITALS
BODY MASS INDEX: 27.3 KG/M2 | RESPIRATION RATE: 16 BRPM | OXYGEN SATURATION: 95 % | HEIGHT: 71 IN | WEIGHT: 195 LBS | HEART RATE: 59 BPM | DIASTOLIC BLOOD PRESSURE: 84 MMHG | SYSTOLIC BLOOD PRESSURE: 149 MMHG | TEMPERATURE: 97.7 F

## 2024-10-15 DIAGNOSIS — M47.817 LUMBOSACRAL SPONDYLOSIS WITHOUT MYELOPATHY: ICD-10-CM

## 2024-10-15 DIAGNOSIS — Z41.9 SURGERY, ELECTIVE: ICD-10-CM

## 2024-10-15 PROCEDURE — 76000 FLUOROSCOPY <1 HR PHYS/QHP: CPT

## 2024-10-15 PROCEDURE — 25010000002 FENTANYL CITRATE (PF) 50 MCG/ML SOLUTION: Performed by: ANESTHESIOLOGY

## 2024-10-15 PROCEDURE — S0260 H&P FOR SURGERY: HCPCS | Performed by: ANESTHESIOLOGY

## 2024-10-15 PROCEDURE — 64635 DESTROY LUMB/SAC FACET JNT: CPT | Performed by: ANESTHESIOLOGY

## 2024-10-15 PROCEDURE — 25010000002 LIDOCAINE 2% SOLUTION: Performed by: ANESTHESIOLOGY

## 2024-10-15 PROCEDURE — 64636 DESTROY L/S FACET JNT ADDL: CPT | Performed by: ANESTHESIOLOGY

## 2024-10-15 PROCEDURE — 99152 MOD SED SAME PHYS/QHP 5/>YRS: CPT | Performed by: ANESTHESIOLOGY

## 2024-10-15 PROCEDURE — 25010000002 LIDOCAINE 1 % SOLUTION: Performed by: ANESTHESIOLOGY

## 2024-10-15 PROCEDURE — 25010000002 BUPIVACAINE 0.5 % SOLUTION: Performed by: ANESTHESIOLOGY

## 2024-10-15 PROCEDURE — 25010000002 MIDAZOLAM PER 1MG: Performed by: ANESTHESIOLOGY

## 2024-10-15 RX ORDER — SODIUM CHLORIDE 0.9 % (FLUSH) 0.9 %
10 SYRINGE (ML) INJECTION AS NEEDED
Status: DISCONTINUED | OUTPATIENT
Start: 2024-10-15 | End: 2024-10-15 | Stop reason: HOSPADM

## 2024-10-15 RX ORDER — BUPIVACAINE HYDROCHLORIDE 5 MG/ML
INJECTION, SOLUTION PERINEURAL AS NEEDED
Status: DISCONTINUED | OUTPATIENT
Start: 2024-10-15 | End: 2024-10-15 | Stop reason: HOSPADM

## 2024-10-15 RX ORDER — LIDOCAINE HYDROCHLORIDE 10 MG/ML
INJECTION, SOLUTION INFILTRATION; PERINEURAL AS NEEDED
Status: DISCONTINUED | OUTPATIENT
Start: 2024-10-15 | End: 2024-10-15 | Stop reason: HOSPADM

## 2024-10-15 RX ORDER — LIDOCAINE HYDROCHLORIDE 20 MG/ML
INJECTION, SOLUTION INFILTRATION; PERINEURAL AS NEEDED
Status: DISCONTINUED | OUTPATIENT
Start: 2024-10-15 | End: 2024-10-15 | Stop reason: HOSPADM

## 2024-10-15 RX ORDER — SODIUM CHLORIDE 0.9 % (FLUSH) 0.9 %
10 SYRINGE (ML) INJECTION EVERY 12 HOURS SCHEDULED
Status: DISCONTINUED | OUTPATIENT
Start: 2024-10-15 | End: 2024-10-15 | Stop reason: HOSPADM

## 2024-10-15 RX ADMIN — FENTANYL CITRATE 100 MCG: 50 INJECTION INTRAMUSCULAR; INTRAVENOUS at 15:05

## 2024-10-15 RX ADMIN — MIDAZOLAM HYDROCHLORIDE 2 MG: 2 INJECTION, SOLUTION INTRAMUSCULAR; INTRAVENOUS at 15:05

## 2024-10-15 NOTE — DISCHARGE INSTRUCTIONS
[facility] - Post-procedure Instructions          --  While there are no absolute restrictions, it is recommended that you do not perform strenuous activity today. In the morning, you may resume your level of activity as before your block.    --  If you have a band-aid at your injection site, please remove it later today. Observe the area for any redness, swelling, pus-like drainage, or a temperature over 101°. If any of these symptoms occur, please call your doctor at [phone]. If after office hours, leave a message and the on-call provider will return your call.    --  Ice may be applied to your injection site. It is recommended you avoid direct heat (heating pad; hot tub) for 1-2 days.    --  Call [facility] at [phone] if you experience persistent headache, persistent bleeding from the injection site, or severe pain not relieved by heat or oral medication.    --  Do not make important decisions today.    --  Due to the effects of the block and/or the I.V. Sedation, DO NOT drive or operate hazardous machinery for 12 hours.  Local anesthetics may cause numbness after procedure and precautions must be taken with regards to operating equipment as well as with walking, even if ambulating with assistance of another person or with an assistive device.    --  Do not drink alcohol for 12 hours.    -- You may return to work tomorrow, or as directed by your referring doctor.    --  Occasionally you may notice a slight increase in your pain after the procedure. This should start to improve within the next 24-48 hours. Radiofrequency ablation procedure pain may last 3-4 weeks.    --  It may take as long as 3-4 days before you notice a gradual improvement in your pain and/or other symptoms.    -- You may continue to take your prescribed pain medication as needed.    --  Some normal possible side effects of steroid use could include fluid retention, increased blood sugar, dull headache,  increased sweating, increased appetite, mood swings and flushing.    --  Diabetics are recommended to watch their blood glucose level closely for 24-48 hours after the injection.    --  Must stay in PACU for 20 min upon arrival and prove no leg weakness before being discharged.    --  IN THE EVENT OF A LIFE THREATENING EMERGENCY, (CHEST PAIN, BREATHING DIFFICULTIES, PARALYSIS…) YOU SHOULD GO TO YOUR NEAREST EMERGENCY ROOM.    --  You should be contacted by our office within 2-3 days to schedule follow up or next appointment date.  If not contacted within 7 days, please call the office at [phone]

## 2024-10-15 NOTE — H&P
Brief Pre-procedural / Pre-operative H&P        -----    Pertinent Diagnosis:   Lumbar spondylosis.  Lumbar facet arthropathy    Proposed Procedure: Lumbar medial branch radiofrequency ablation anticipated L3 and L4 and L5 medial branches bilaterally      Subjective   Avni Olivo III is a 69 y.o. male  who presents for intervention.  He has a history of back pain.      History of Present Illness     Longstanding history of low back pain and axial elements.    Significant therapeutic effect from radiofrequency ablation on 3 times prior.  With recurrent pain in the same area and distribution and quality of the repeat radiofrequency ablation is very reasonable and most indicated    Noted history of lumbosacral spondylosis.  Bandlike distribution of pain returning across the lower lumbar lumbosacral area aching and otherwise nonradiating at the [place] not a radicular element    Radiofrequency ablation is the ongoing neurosurgical plan of care    -------    The following portions of the patient's history were reviewed and updated as appropriate: allergies, current medications, past family history, past medical history, past social history, past surgical history and problem list.    No Known Allergies      Current Facility-Administered Medications:     fentaNYL citrate (PF) (SUBLIMAZE) injection 100 mcg, 100 mcg, Intravenous, Once, Cali Monte MD    Midazolam HCl (PF) (VERSED) injection 2 mg, 2 mg, Intravenous, Once, Cali Monte MD    sodium chloride 0.9 % flush 10 mL, 10 mL, Intravenous, Q12H, Cali Monte MD    sodium chloride 0.9 % flush 10 mL, 10 mL, Intravenous, PRN, Cali Monte MD    No current facility-administered medications on file prior to encounter.     Current Outpatient Medications on File Prior to Encounter   Medication Sig Dispense Refill    carvedilol (COREG) 12.5 MG tablet Take 0.5 tablets by mouth 2 (Two) Times a Day.      eszopiclone (LUNESTA) 2 MG tablet Take 1 mg by mouth  Every Night. Takes 1/2 tablet qhs      Multiple Vitamin (multivitamin) capsule Take 1 capsule by mouth Daily.      Omega-3 Fatty Acids (FISH OIL PO) Take 1,280 mg by mouth 2 (Two) Times a Day. HOLD PRIOR TO SURGERY      terazosin (HYTRIN) 10 MG capsule Take 1 capsule by mouth 2 (Two) Times a Day.      Vibegron (Gemtesa) 75 MG tablet Take 1 tablet by mouth Daily.      amLODIPine (NORVASC) 2.5 MG tablet TAKE 1 TABLET EVERY DAY (Patient not taking: Reported on 9/23/2024) 90 tablet 3    clopidogrel (PLAVIX) 75 MG tablet TAKE 1 TABLET EVERY DAY 90 tablet 3         Lumbosacral spondylosis without myelopathy       Past Medical History:   Diagnosis Date    Arm pain     Bilateral sacroiliitis 05/20/2021    Cervical disc disorder 2008    Chest pain     Chronic pain syndrome 08/17/2022    DDD (degenerative disc disease), lumbar     Dizziness     Facet arthropathy, lumbar 12/20/2021    Fractures 2015    foot Ceja fracture    History of EKG 08/06/2013    HTN (hypertension)     Hyperlipidemia     Joint pain 2008    back    Limb pain     Low back pain     Lower extremity edema     Lumbar canal stenosis     Melanoma 07/05/2017    Mitral regurgitation     trace    Nausea and vomiting     Palpitations     Peripheral neuropathy     Sinus bradycardia     Snoring     Spinal stenosis     Sprain of right foot        Past Surgical History:   Procedure Laterality Date    BACK SURGERY  2020    Dr Martinez    CARDIAC CATHETERIZATION  06/10/2013    CARDIAC CATHETERIZATION N/A 12/27/2019    Procedure: Coronary angiography;  Surgeon: Terrance Wood MD;  Location:  [facility];  Service: Cardiovascular    CARDIAC CATHETERIZATION N/A 12/27/2019    Procedure: Left Heart Cath;  Surgeon: Terrance Wood MD;  Location: [facility] C[facility];  Service: Cardiovascular    CARDIAC CATHETERIZATION N/A 12/27/2019    Procedure: Left ventriculography;  Surgeon: Terrance Wood MD;  [facility]: [facility]  [facility];  Service: Cardiovascular    CARDIAC CATHETERIZATION N/A 12/27/2019    Procedure: Stent KALYAN coronary;  Surgeon: Terrance Wood MD;  Location:  [facility];  Service: Cardiovascular    COLONOSCOPY      ENDOSCOPY      EPIDURAL BLOCK      FOOT FRACTURE SURGERY Left     FRACTURE SURGERY  2015    foot Ceja fracture    KNEE ARTHROSCOPY Left     KNEE SURGERY Right     LAMINECTOMY  2020    Dr Martinez    LUMBAR LAMINECTOMY DISCECTOMY DECOMPRESSION Bilateral 10/29/2020    Procedure: LUMBAR DECOMPRESSION, OPEN LUMBAR DECOMPRESSION, LUMBAR 3 TO LUMBAR 4, LUMBAR 4 TO LUMBAR 5, LUMBAR 5 TO SACRAL 1 (has transitional anatomy);  Surgeon: Jeremy Martinez MD;  Location:  [facility];  Service: Neurosurgery;  Laterality: Bilateral;    MEDIAL BRANCH BLOCK Bilateral 09/15/2022    Procedure: LUMBAR MEDIAL BRANCH BLOCK bilateral L3-5;  Surgeon: Cali Monte MD;  Location: [facility];  Service: Pain Management;  Laterality: Bilateral;    MEDIAL BRANCH BLOCK Bilateral 11/08/2022    Procedure: LUMBAR MEDIAL BRANCH BLOCK BILATERAL L3-5 #2/CONFIRMATORY;  Surgeon: Cali Monte MD;  Location: [facility];  Service: Pain Management;  Laterality: Bilateral;    MOHS SURGERY      nose    NERVE SURGERY Bilateral 01/17/2023    Procedure: LUMBAR RADIOFREQUENCY ABLATION BILATERAL L3-L5;  Surgeon: Cali Monte MD;  Location: [facility];  Service: Pain Management;  Laterality: Bilateral;    NERVE SURGERY Bilateral 08/29/2023    Procedure: LUMBAR RADIOFREQUENCY ABLATION BILATERAL L3-L5  81553, 04863;  Surgeon: Cali Monte MD;  [facility]: [facility];  Service: Pain Management;  Laterality: Bilateral;    NERVE SURGERY Bilateral 3/28/2024    Procedure: LUMBAR RADIOFREQUENCY ABLATION BILATERAL L3-L5 52195-94, 18681-69;  Surgeon: Cali Monte MD;  Location: [facility];  Service: Pain Management;  Laterality: Bilateral;    ORTHOPEDIC SURGERY  2015    foot Ceja fracture    SKIN  "CANCER EXCISION Bilateral 07/05/2017    MELANOMA    SPINE SURGERY  2020    Dr Martinez    TONSILLECTOMY         Family History   Problem Relation Age of Onset    Coronary artery disease Other     Hyperlipidemia Other     Hypertension Other     Other Other         heart surgery    Heart disease Father     Hypertension Father     Heart disease Sister     Stroke Maternal Grandmother     Heart disease Maternal Grandfather     Malig Hyperthermia Neg Hx        Social History     Socioeconomic History    Marital status:     Years of education: college graduate   Tobacco Use    Smoking status: Never    Smokeless tobacco: Former    Tobacco comments:     caffeine - 2 cups coffee daily    Vaping Use    Vaping status: Never Used   Substance and Sexual Activity    Alcohol use: Yes     Alcohol/week: 21.0 standard drinks of alcohol     Types: 14 Cans of beer, 7 Drinks containing 0.5 oz of alcohol per week     Comment: most days    Drug use: Never     Comment: CBD oil    Sexual activity: Yes     Partners: Female       -------       Review of Systems  No Fever, No Chills, No ear pain, No sinus pressure or drainage, No eye pain or drainage, No cough, No SOB, No chest tightness nor chest pain, no palpitations.          Vitals:    10/15/24 1427   BP: 131/76   Patient Position: Sitting   Pulse: 62   Resp: 16   Temp: 97.7 °F (36.5 °C)   TempSrc: Temporal   SpO2: 95%   Weight: 88.5 kg (195 lb)   Height: 180.3 cm (71\")         Objective   Physical Exam  VSS, NNR, NCAT, NMNA, NRD, AAOx3.      -------    Assessment & Plan:  - as noted above, the stated intervention is indicated  - Follow-up plan will be noted in the operative report        Office in 3 months is scheduled      EMR Dragon/Transcription disclaimer:   Typed items in this encounter note may have been created by electronic transcription/translation software which converts spoken language to printed text. The electronic translation of spoken language may permit erroneous, " or at times, nonsensical words or phrases to be inadvertently transcribed; Although I have reviewed the note for such errors, some may still exist.

## 2024-10-15 NOTE — OP NOTE
Bilateral L3-5 Lumbar Medial Branch RADIOFREQUENCY  [facility]      PREOPERATIVE DIAGNOSIS:  Lumbar spondylosis without myelopathy    POSTOPERATIVE DIAGNOSIS:  Lumbar spondylosis without myelopathy    PROCEDURE:   Diagnostic Bilateral Lumbar Medial Branch Nerve thermal radiofrequency lesioning, with fluoroscopy:  L3, L4, and L5 nerves (at the L4 and L5 transverse processes and the sacral alar groove) to thermally treat the innervation to facet joints L4-5 and L5-S1  34057-45 -- Bilateral L/S facet neuro destr., 1st Level  36208-16 -- Bilateral L/S facet neuro destr., 2nd  Level    PRE-PROCEDURE DISCUSSION WITH PATIENT:    Risks and complications were discussed with the patient prior to starting the procedure and informed consent was obtained.      SURGEON:  Cali Monte MD    REASON FOR PROCEDURE:    Previous clinically significant therapeutic effect after prior Radiofrequency procedure    SEDATION:  Versed 2mg & Fentanyl 100 mcg IV  TIME OF PROCEDURE:   The intraoperative procedure time after administration of the sedative was (2121-7113) 19 minutes.     ANESTHETIC:  Lidocaine 2%  STEROID:  NONE      DESCRIPTON OF PROCEDURE:  After obtaining informed consent, IV access  was obtained in the preoperative area.   The patient was taken to the operating room.  The patient was placed in the prone position with a pillow under the abdomen. All pressure points were well padded.  EKG, blood pressure, and pulse oximeter were monitored.  The patient was monitored and sedated by the RN under my direction. The lumbosacral area was prepped with Chloraprep and draped in a sterile fashion.     Under fluoroscopic guidance the transverse processes of the L4 and L5 vertebrae at the junctions of the superior articular processes were identified on the right.  Also identified was the groove between the ala and the superior articular process of the sacrum on the ipsilateral side.  Skin and subcutaneous tissue were  anesthetized with 1ml of 1% lidocaine above each of these points. Then, radiofrequency probe needles were advanced in this fluoro view to the above junctions.  Aspiration was negative for blood and CSF.  After confirming the position of the needle with fluoroscope in all views, testing was initiated.  First, sensory testing was started on each needle a 1V and 50Hz and slowly decreased until painful pressure stimulation diminished at 0.5V.  Next, motor testing was confirmed to be negative at 3V and 2Hz for any radicular stimulation.  Then 1mL of the local anesthetic was instilled in each needle.  Two minutes elapsed, and during this time a lateral fluoroscopic view was confirmed again to ensure the needles had not advanced nor retracted.  Then, Radiofrequency Lesioning was initiated for 3 minutes at 80 degrees Celsius.  Needles were removed intact from each of the areas.     A similar procedure was repeated to address the L3, L4, and L5 nerves on the contralateral side.   Onset of analgesia was noted.  Vital signs remained stable throughout.      ESTIMATED BLOOD LOSS:  <5 mL  SPECIMENS:  none    COMPLICATIONS:   No complications were noted. and The patient did not have any signs of postprocedure numbness nor weakness.    TOLERANCE & DISCHARGE CONDITION:    The patient tolerated the procedure well.  The patient was transported to the recovery area without difficulties.  The patient was discharged to home under the care of family in stable and satisfactory condition.    PLAN OF CARE:  The patient was given our standard instruction sheet.  The patient will  Plan for scheduled office follow-up in a few months .  The patient will resume all medications as per the medication reconciliation sheet.

## 2024-10-16 ENCOUNTER — HOSPITAL ENCOUNTER (OUTPATIENT)
Dept: ULTRASOUND IMAGING | Facility: HOSPITAL | Age: 69
Discharge: HOME | End: 2024-10-16
Payer: MEDICARE

## 2024-10-16 DIAGNOSIS — R68.81 EARLY SATIETY: ICD-10-CM

## 2024-10-16 DIAGNOSIS — R14.0 ABDOMINAL BLOATING: ICD-10-CM

## 2024-10-16 PROCEDURE — 76705 ECHO EXAM OF ABDOMEN: CPT

## 2024-10-21 DIAGNOSIS — K76.9 HEPATIC LESION: Primary | ICD-10-CM

## 2024-10-23 ENCOUNTER — ANESTHESIA EVENT (OUTPATIENT)
Dept: PERIOP | Facility: HOSPITAL | Age: 69
End: 2024-10-23
Payer: MEDICARE

## 2024-10-24 ENCOUNTER — HOSPITAL ENCOUNTER (OUTPATIENT)
Facility: HOSPITAL | Age: 69
Setting detail: HOSPITAL OUTPATIENT SURGERY
Discharge: HOME | End: 2024-10-24
Attending: STUDENT IN AN ORGANIZED HEALTH CARE EDUCATION/TRAINING PROGRAM | Admitting: STUDENT IN AN ORGANIZED HEALTH CARE EDUCATION/TRAINING PROGRAM
Payer: MEDICARE

## 2024-10-24 ENCOUNTER — ANESTHESIA (OUTPATIENT)
Dept: PERIOP | Facility: HOSPITAL | Age: 69
End: 2024-10-24
Payer: MEDICARE

## 2024-10-24 VITALS
SYSTOLIC BLOOD PRESSURE: 152 MMHG | DIASTOLIC BLOOD PRESSURE: 87 MMHG | TEMPERATURE: 97.4 F | WEIGHT: 200.6 LBS | HEART RATE: 56 BPM | BODY MASS INDEX: 27.98 KG/M2 | OXYGEN SATURATION: 98 % | RESPIRATION RATE: 16 BRPM

## 2024-10-24 DIAGNOSIS — R68.81 EARLY SATIETY: ICD-10-CM

## 2024-10-24 DIAGNOSIS — R14.0 ABDOMINAL BLOATING: ICD-10-CM

## 2024-10-24 LAB — POTASSIUM SERPL-SCNC: 4.6 MMOL/L (ref 3.5–5.2)

## 2024-10-24 PROCEDURE — 84132 ASSAY OF SERUM POTASSIUM: CPT | Performed by: NURSE ANESTHETIST, CERTIFIED REGISTERED

## 2024-10-24 PROCEDURE — 43239 EGD BIOPSY SINGLE/MULTIPLE: CPT | Performed by: STUDENT IN AN ORGANIZED HEALTH CARE EDUCATION/TRAINING PROGRAM

## 2024-10-24 PROCEDURE — 88305 TISSUE EXAM BY PATHOLOGIST: CPT | Performed by: STUDENT IN AN ORGANIZED HEALTH CARE EDUCATION/TRAINING PROGRAM

## 2024-10-24 PROCEDURE — 25010000002 LIDOCAINE 2% SOLUTION: Performed by: NURSE ANESTHETIST, CERTIFIED REGISTERED

## 2024-10-24 PROCEDURE — 25010000002 PROPOFOL 200 MG/20ML EMULSION: Performed by: NURSE ANESTHETIST, CERTIFIED REGISTERED

## 2024-10-24 RX ORDER — SODIUM CHLORIDE 0.9 % (FLUSH) 0.9 %
10 SYRINGE (ML) INJECTION EVERY 12 HOURS SCHEDULED
Status: DISCONTINUED | OUTPATIENT
Start: 2024-10-24 | End: 2024-10-24 | Stop reason: HOSPADM

## 2024-10-24 RX ORDER — OMEPRAZOLE 40 MG/1
40 CAPSULE, DELAYED RELEASE ORAL
Qty: 180 CAPSULE | Refills: 3 | Status: SHIPPED | OUTPATIENT
Start: 2024-10-24

## 2024-10-24 RX ORDER — ONDANSETRON 2 MG/ML
4 INJECTION INTRAMUSCULAR; INTRAVENOUS ONCE AS NEEDED
Status: DISCONTINUED | OUTPATIENT
Start: 2024-10-24 | End: 2024-10-24 | Stop reason: HOSPADM

## 2024-10-24 RX ORDER — SODIUM CHLORIDE, SODIUM LACTATE, POTASSIUM CHLORIDE, CALCIUM CHLORIDE 600; 310; 30; 20 MG/100ML; MG/100ML; MG/100ML; MG/100ML
100 INJECTION, SOLUTION INTRAVENOUS ONCE
Status: DISCONTINUED | OUTPATIENT
Start: 2024-10-24 | End: 2024-10-24 | Stop reason: HOSPADM

## 2024-10-24 RX ORDER — SODIUM CHLORIDE 0.9 % (FLUSH) 0.9 %
10 SYRINGE (ML) INJECTION AS NEEDED
Status: DISCONTINUED | OUTPATIENT
Start: 2024-10-24 | End: 2024-10-24 | Stop reason: HOSPADM

## 2024-10-24 RX ORDER — SODIUM CHLORIDE, SODIUM LACTATE, POTASSIUM CHLORIDE, CALCIUM CHLORIDE 600; 310; 30; 20 MG/100ML; MG/100ML; MG/100ML; MG/100ML
30 INJECTION, SOLUTION INTRAVENOUS CONTINUOUS PRN
Status: DISCONTINUED | OUTPATIENT
Start: 2024-10-24 | End: 2024-10-24 | Stop reason: HOSPADM

## 2024-10-24 RX ORDER — PROPOFOL 10 MG/ML
INJECTION, EMULSION INTRAVENOUS AS NEEDED
Status: DISCONTINUED | OUTPATIENT
Start: 2024-10-24 | End: 2024-10-24 | Stop reason: SURG

## 2024-10-24 RX ORDER — SODIUM CHLORIDE 0.9 % (FLUSH) 0.9 %
3 SYRINGE (ML) INJECTION EVERY 12 HOURS SCHEDULED
Status: DISCONTINUED | OUTPATIENT
Start: 2024-10-24 | End: 2024-10-24 | Stop reason: HOSPADM

## 2024-10-24 RX ORDER — LIDOCAINE HYDROCHLORIDE 10 MG/ML
0.5 INJECTION, SOLUTION EPIDURAL; INFILTRATION; INTRACAUDAL; PERINEURAL ONCE AS NEEDED
Status: DISCONTINUED | OUTPATIENT
Start: 2024-10-24 | End: 2024-10-24 | Stop reason: HOSPADM

## 2024-10-24 RX ORDER — LIDOCAINE HYDROCHLORIDE 20 MG/ML
INJECTION, SOLUTION INFILTRATION; PERINEURAL AS NEEDED
Status: DISCONTINUED | OUTPATIENT
Start: 2024-10-24 | End: 2024-10-24 | Stop reason: SURG

## 2024-10-24 RX ADMIN — PROPOFOL 100 MG: 10 INJECTION, EMULSION INTRAVENOUS at 12:10

## 2024-10-24 RX ADMIN — LIDOCAINE HYDROCHLORIDE 100 MG: 20 INJECTION, SOLUTION INFILTRATION; PERINEURAL at 12:06

## 2024-10-24 RX ADMIN — PROPOFOL 100 MG: 10 INJECTION, EMULSION INTRAVENOUS at 12:06

## 2024-10-24 NOTE — H&P
Patient Care Team:  Apolinar Sims Jr., MD as PCP - General  Apolinar Sims Jr., MD as PCP - Family Medicine  Mikael Dunn MD as Surgeon (Plastic Surgery)  Gayathri Florentino MD as Consulting Physician (Internal Medicine)  Jeremy Martinez MD as Surgeon (Neurosurgery)  Kanika Cade MD (Inactive) as Consulting Physician (Nephrology)  Víctor Terrazas MD as Consulting Physician (Orthopedic Surgery)    CHIEF COMPLAINT: dyspepsia    HISTORY OF PRESENT ILLNESS:  EGD for dyspepsia & bloating.     Past Medical History:   Diagnosis Date    Arm pain     Bilateral sacroiliitis 05/20/2021    Cervical disc disorder 2008    Chest pain     Chronic pain syndrome 08/17/2022    DDD (degenerative disc disease), lumbar     Dizziness     Facet arthropathy, lumbar 12/20/2021    Fractures 2015    foot Ceja fracture    History of EKG 08/06/2013    HTN (hypertension)     Hyperlipidemia     Joint pain 2008    back    Limb pain     Low back pain     Lower extremity edema     Lumbar canal stenosis     Melanoma 07/05/2017    Mitral regurgitation     trace    Nausea and vomiting     Palpitations     Peripheral neuropathy     Sinus bradycardia     Snoring     Spinal stenosis     Sprain of right foot      Past Surgical History:   Procedure Laterality Date    BACK SURGERY  2020    Dr Martinez    CARDIAC CATHETERIZATION  06/10/2013    CARDIAC CATHETERIZATION N/A 12/27/2019    Procedure: Coronary angiography;  Surgeon: Terrance Wood MD;  Location:  [facility];  Service: Cardiovascular    CARDIAC CATHETERIZATION N/A 12/27/2019    Procedure: Left Heart Cath;  Surgeon: Terrance Wood MD;  Location:  [facility];  Service: Cardiovascular    CARDIAC CATHETERIZATION N/A 12/27/2019    Procedure: Left ventriculography;  Surgeon: Terrance Wood MD;  Location:  [facility];  Service: Cardiovascular    CARDIAC CATHETERIZATION N/A 12/27/2019    Procedure:  Stent KALYAN coronary;  Surgeon: Terrance Wood MD;  Location: [facility] CATH INVASIVE LOCATION;  Service: Cardiovascular    COLONOSCOPY      ENDOSCOPY      EPIDURAL BLOCK      FOOT FRACTURE SURGERY Left     FRACTURE SURGERY  2015    foot Ceja fracture    KNEE ARTHROSCOPY Left     KNEE SURGERY Right     LAMINECTOMY  2020    Dr Martinez    LUMBAR LAMINECTOMY DISCECTOMY DECOMPRESSION Bilateral 10/29/2020    Procedure: LUMBAR DECOMPRESSION, OPEN LUMBAR DECOMPRESSION, LUMBAR 3 TO LUMBAR 4, LUMBAR 4 TO LUMBAR 5, LUMBAR 5 TO SACRAL 1 (has transitional anatomy);  Surgeon: Jeremy Martinez MD;  Location:  [facility];  Service: Neurosurgery;  Laterality: Bilateral;    MEDIAL BRANCH BLOCK Bilateral 09/15/2022    Procedure: LUMBAR MEDIAL BRANCH BLOCK bilateral L3-5;  Surgeon: Cali Monte MD;  [facility];  Service: Pain Management;  Laterality: Bilateral;    MEDIAL BRANCH BLOCK Bilateral 11/08/2022    Procedure: LUMBAR MEDIAL BRANCH BLOCK BILATERAL L3-5 #2/CONFIRMATORY;  Surgeon: aCli Monte MD;  [facility];  Service: Pain Management;  Laterality: Bilateral;    MOHS SURGERY      nose    NERVE SURGERY Bilateral 01/17/2023    Procedure: LUMBAR RADIOFREQUENCY ABLATION BILATERAL L3-L5;  Surgeon: Cali Monte MD;  [facility];  Service: Pain Management;  Laterality: Bilateral;    NERVE SURGERY Bilateral 08/29/2023    Procedure: LUMBAR RADIOFREQUENCY ABLATION BILATERAL L3-L5  60069, 02138;  Surgeon: Cali Mnote MD;  [facility];  Service: Pain Management;  Laterality: Bilateral;    NERVE SURGERY Bilateral 3/28/2024    Procedure: LUMBAR RADIOFREQUENCY ABLATION BILATERAL L3-L5 75645-59, 63009-52;  Surgeon: Cali Monte MD;  [facility];  Service: Pain Management;  Laterality: Bilateral;    NERVE SURGERY Bilateral 10/15/2024    Procedure: LUMBAR RADIOFREQUENCY ABLATION BILATERAL L3-L5 00401-94, 64636-50 X 2;  Surgeon: Cali Monte  MD SCARLET;  Location: [facility] MAIN OR;  Service: Pain Management;  Laterality: Bilateral;    ORTHOPEDIC SURGERY  2015    foot Ceja fracture    SKIN CANCER EXCISION Bilateral 07/05/2017    MELANOMA    SPINE SURGERY  2020    Dr Martinez    TONSILLECTOMY       Family History   Problem Relation Age of Onset    Coronary artery disease Other     Hyperlipidemia Other     Hypertension Other     Other Other         heart surgery    Heart disease Father     Hypertension Father     Heart disease Sister     Stroke Maternal Grandmother     Heart disease Maternal Grandfather     Malig Hyperthermia Neg Hx      Social History     Tobacco Use    Smoking status: Never    Smokeless tobacco: Former    Tobacco comments:     caffeine - 2 cups coffee daily    Vaping Use    Vaping status: Never Used   Substance Use Topics    Alcohol use: Yes     Alcohol/week: 21.0 standard drinks of alcohol     Types: 14 Cans of beer, 7 Drinks containing 0.5 oz of alcohol per week     Comment: most days    Drug use: Never     Comment: CBD oil     Medications Prior to Admission   Medication Sig Dispense Refill Last Dose/Taking    acetaminophen (TYLENOL) 650 MG 8 hr tablet Take 1 tablet by mouth.   10/23/2024 Morning    atorvastatin (LIPITOR) 40 MG tablet TAKE 1 AND 1/2 TABLETS EVERY NIGHT 135 tablet 3 10/23/2024    carvedilol (COREG) 12.5 MG tablet Take 0.5 tablets by mouth 2 (Two) Times a Day.   10/24/2024 at  7:30 AM    eszopiclone (LUNESTA) 2 MG tablet Take 1 mg by mouth Every Night. Takes 1/2 tablet qhs   10/23/2024 Evening    hydroCHLOROthiazide 12.5 MG tablet 2 tablets.   10/24/2024 at  9:30 AM    losartan (COZAAR) 100 MG tablet TAKE 1 TABLET EVERY DAY 90 tablet 3 10/23/2024 Evening    Multiple Vitamin (multivitamin) capsule Take 1 capsule by mouth Daily.   10/23/2024 Morning    Omega-3 Fatty Acids (FISH OIL PO) Take 1,280 mg by mouth 2 (Two) Times a Day. HOLD PRIOR TO SURGERY   10/23/2024 Morning    tadalafil (CIALIS) 5 MG tablet Take 1 tablet by mouth  Daily.   10/23/2024 Morning    terazosin (HYTRIN) 10 MG capsule Take 1 capsule by mouth 2 (Two) Times a Day.   10/24/2024 at  7:30 AM    Vibegron (Gemtesa) 75 MG tablet Take 1 tablet by mouth Daily.   10/23/2024 Morning    clopidogrel (PLAVIX) 75 MG tablet TAKE 1 TABLET EVERY DAY 90 tablet 3 10/18/2024     Allergies:  Patient has no known allergies.    REVIEW OF SYSTEMS:  Please see the above history of present illness for pertinent positives and negatives.  The remainder of the patient's systems have been reviewed and are negative.     Vital Signs  Temp:  [97.5 °F (36.4 °C)] 97.5 °F (36.4 °C)  Heart Rate:  [62] 62  Resp:  [14] 14  BP: (134)/(84) 134/84    Flowsheet Rows      Flowsheet Row First Filed Value   Admission Height --   Admission Weight 91 kg (200 lb 9.6 oz) Documented at 10/24/2024 1059             Physical Exam:  Physical Exam   Constitutional: Patient appears well-developed and well-nourished and in no acute distress   HEENT:   Head: Normocephalic and atraumatic.   Eyes:  Pupils are equal, round, and reactive to light. EOM are intact. Sclerae are anicteric and non-injected.  Mouth and Throat: Patient has moist mucous membranes. Oropharynx is clear of any erythema or exudate.     Neck: Neck supple. No JVD present. No thyromegaly present. No lymphadenopathy present.  Cardiovascular: Regular rate, regular rhythm, S1 normal and S2 normal.  Exam reveals no gallop and no friction rub.  No murmur heard.  Pulmonary/Chest: Lungs are clear to auscultation bilaterally. No respiratory distress. No wheezes. No rhonchi. No rales.   Abdominal: Soft. Bowel sounds are normal. No distension and no mass. There is no hepatosplenomegaly. There is no tenderness.   Musculoskeletal: Normal Muscle tone  Extremities: No edema. Pulses are palpable in all 4 extremities.  Neurological: Patient is alert and oriented to person, place, and time. Cranial nerves II-XII are grossly intact with no focal deficits.  Skin: Skin is warm. No  rash noted. Nails show no clubbing.  No cyanosis or erythema.    Debilities/Disabilities Identified: None  Emotional Behavior: Appropriate     Results Review:   I reviewed the patient's new clinical results.    Lab Results (most recent)       Procedure Component Value Units Date/Time    Potassium [[id number]]  (Normal) Collected: 10/24/24 1125    Specimen: Blood Updated: 10/24/24 1132     Potassium 4.6 mmol/L      Comment: Specimen hemolyzed.  Result may be falsely elevated.               Imaging Results (Most Recent)       None          reviewed    ECG/EMG Results (most recent)       None          reviewed    Assessment & Plan   dyspepsia/  EGD      I discussed the patient's findings and my recommendations with patient.     Abhijit Van MD  10/24/24  12:06 EDT    Time: 10 min prior to procedure.

## 2024-10-24 NOTE — ANESTHESIA POSTPROCEDURE EVALUATION
Patient: Avni Olivo III    Procedure Summary       Date: 10/24/24 Room / Location: [facility] ENDOSCOPY 2 /  [facility]    Anesthesia Start: 1153 Anesthesia Stop: 1216    Procedure: ESOPHAGOGASTRODUODENOSCOPY WITH BIOPSY Diagnosis:       Early satiety      Abdominal bloating      (Early satiety [R68.81])      (Abdominal bloating [R14.0])    Surgeons: Abhijit Van MD Provider: Roosevelt Pan CRNA    Anesthesia Type: MAC ASA Status: 2            Anesthesia Type: MAC    Vitals  Vitals Value Taken Time   /87 10/24/24 1250   Temp 97.4 °F (36.3 °C) 10/24/24 1216   Pulse 56 10/24/24 1250   Resp 16 10/24/24 1250   SpO2 98 % 10/24/24 1250           Post Anesthesia Care and Evaluation    Patient location during evaluation: bedside  Patient participation: complete - patient participated  Level of consciousness: awake and alert  Pain score: 0  Pain management: adequate    Airway patency: patent  Anesthetic complications: No anesthetic complications  PONV Status: none  Cardiovascular status: acceptable  Respiratory status: acceptable  Hydration status: acceptable

## 2024-10-24 NOTE — ANESTHESIA PREPROCEDURE EVALUATION
Anesthesia Evaluation     Patient summary reviewed and Nursing notes reviewed   no history of anesthetic complications:   NPO Solid Status: > 8 hours  NPO Liquid Status: > 2 hours           Airway   Mallampati: I  TM distance: >3 FB  Neck ROM: full  No difficulty expected  Dental - normal exam     Pulmonary    (+) ,sleep apnea on CPAP  Cardiovascular - normal exam  Exercise tolerance: good (4-7 METS)    PT is on anticoagulation therapy  Patient on routine beta blocker and Beta blocker given within 24 hours of surgery  Rhythm: regular  Rate: normal    (+) hypertension, valvular problems/murmurs MR, CAD, cardiac stents Drug eluting stent more than 12 months ago , hyperlipidemia  (-) angina, DVT    ROS comment: Plavix LD 5 days ago  Carvedilol today      Neuro/Psych  (-) seizures, CVA, dizziness/light headedness, psychiatric history  GI/Hepatic/Renal/Endo    (+) GERD well controlled, renal disease- CRI  (-) diabetes    Musculoskeletal     (+) arthralgias (lower back, left shoulder, bilateral knees)  (-) neck pain, neck stiffness  Abdominal  - normal exam    Abdomen: soft.   Substance History   (+) alcohol use (2 drinks per day)     OB/GYN          Other   arthritis,   history of cancer (skin) remission    (-) blood dyscrasia              Anesthesia Plan    ASA 2     MAC   total IV anesthesia  intravenous induction     Anesthetic plan, risks, benefits, and alternatives have been provided, discussed and informed consent has been obtained with: patient.    Use of blood products discussed with patient  Consented to blood products.    Plan discussed with CRNA.    CODE STATUS:

## 2024-10-28 LAB
CYTO UR: NORMAL
LAB AP CASE REPORT: NORMAL
PATH REPORT.FINAL DX SPEC: NORMAL
PATH REPORT.GROSS SPEC: NORMAL

## 2024-10-30 DIAGNOSIS — R93.2 ABNORMAL ULTRASOUND OF LIVER: Primary | ICD-10-CM

## 2024-11-04 NOTE — PROGRESS NOTES
- EGD for dyspepsia & bloating.   - Findings/path: gastritis (biopsied -- negative for H Pylori)   - POC: etiology of symptoms are likely due to gastritis. Increased to Omeprazole 40 mg BID to treat gastritis seen.

## 2024-11-13 ENCOUNTER — OFFICE VISIT (OUTPATIENT)
Dept: GASTROENTEROLOGY | Facility: CLINIC | Age: 69
End: 2024-11-13
Payer: MEDICARE

## 2024-11-13 VITALS
SYSTOLIC BLOOD PRESSURE: 132 MMHG | WEIGHT: 202.8 LBS | DIASTOLIC BLOOD PRESSURE: 70 MMHG | BODY MASS INDEX: 28.39 KG/M2 | HEIGHT: 71 IN

## 2024-11-13 DIAGNOSIS — R07.89 ATYPICAL CHEST PAIN: Primary | ICD-10-CM

## 2024-11-13 DIAGNOSIS — K76.9 HEPATIC LESION: ICD-10-CM

## 2024-11-13 NOTE — PROGRESS NOTES
Chief Complaint   Patient presents with    Abdominal Pain    Gastritis           Patient is a 69 y.o. who presents to the office for follow-up evaluation after undergoing endoscopic assessment with Dr. Van.  Last in office visit completed on 9/23/2024 for abdominal bloating.  Patient has a significant past medical history of chronic pain syndrome diagnosed 2022 followed by pain management, nausea and vomiting, mitral regurgitation, hypertension, CKD, and peripheral neuropathy.       He recently underwent a stress test which successfully     Past Surgical History:  Lumbar laminectomy discectomy and decompression with Dr. Martinez    Social History:  Alcohol consumption described as 21 drinks per week, former smokeless tobacco user     10/24/2024 EGD  Endoscopically normal-appearing esophagus  Localized mild inflammation with erythema and granularity in gastric antrum  Path: No evidence of H. pylori or evidence of intestinal metaplasia  Endoscopically normal-appearing duodenum    5/31/2018 Colonoscopy:  Multiple nonbleeding diverticula in descending colon described as moderate in severity    internal hemorrhoids  Next colonoscopy recommended at 10-year interval due 5/31/2028  History of Present Illness  The patient is a 18-year-old male who presents for evaluation of chest discomfort.    He describes the chest discomfort as a pressure sensation similar to heartburn, which has been more pronounced in his chest than his stomach over the past 6 months. He experiences early satiety and bloating, with symptoms occurring every time he eats. His symptoms have improved since starting Prilosec 40 mg twice daily. He takes his first dose between 6:30 and 8:00 in the morning, usually without breakfast, and his second dose at lunchtime around 11:30.     His symptoms are less severe at lunchtime but can be quite intense at dinner, depending on what and how much he eats. He enjoys spicy foods and has noticed that consuming too  many peppers can upset his stomach. He experiences fullness after eating, and his symptoms are worse when sitting compared to lying down.      He has experienced previous episodes of palpitations and dizziness after meal consumption most commonly at dinner.  Denies chest pain.  He is unsure if his symptoms are due to acid reflux or gas.  Previous cardiac eval negative.    He has not been taking Carafate due to concerns about its interaction with his other medications, including those for blood pressure. He takes a group of medications in the morning, at lunchtime, at dinner, and at bedtime, including a small dose of a sleep medication and terazosin for blood pressure. He is concerned about the long-term use of Prilosec and its potential impact on his kidneys.     He experienced constipation when he first started taking Prilosec, but this resolved after taking one dose of Metamucil.    He reports no inflammation in his esophagus and no difficulty swallowing. He recalls having stomach symptoms 6 years ago, but now his chest symptoms are more prominent. He does not have a hiatal hernia and has not experienced any weight loss. He also reports a persistent cough, which he wonders might be related to his symptoms.        Result Review :    Office Visit with Kalie Butterfield APRN (09/23/2024)   US Abdomen Limited (10/16/2024 09:23)   Small hyperechoic liver nodule, suspect benign hepatic hemangioma, repeat liver ultrasound in 6-8 months.  Upper GI Endoscopy (10/24/2024 11:52)   Tissue Pathology Exam (10/24/2024 12:11)     reviewed and updated with patient  Outpatient Medications Marked as Taking for the 11/13/24 encounter (Office Visit) with Kalie Butterfield APRN   Medication Sig Dispense Refill    atorvastatin (LIPITOR) 40 MG tablet TAKE 1 AND 1/2 TABLETS EVERY NIGHT 135 tablet 3    carvedilol (COREG) 12.5 MG tablet Take 0.5 tablets by mouth 2 (Two) Times a Day.      clopidogrel (PLAVIX) 75 MG tablet TAKE 1 TABLET EVERY  "DAY 90 tablet 3    eszopiclone (LUNESTA) 2 MG tablet Take 1 mg by mouth Every Night. Takes 1/2 tablet qhs      hydroCHLOROthiazide 12.5 MG tablet 2 tablets.      losartan (COZAAR) 100 MG tablet TAKE 1 TABLET EVERY DAY 90 tablet 3    Multiple Vitamin (multivitamin) capsule Take 1 capsule by mouth Daily.      Omega-3 Fatty Acids (FISH OIL PO) Take 1,280 mg by mouth 2 (Two) Times a Day. HOLD PRIOR TO SURGERY      omeprazole (priLOSEC) 40 MG capsule Take 1 capsule by mouth 2 (Two) Times a Day Before Meals. 180 capsule 3    tadalafil (CIALIS) 5 MG tablet Take 1 tablet by mouth Daily.      terazosin (HYTRIN) 10 MG capsule Take 1 capsule by mouth 2 (Two) Times a Day.      Vibegron (Gemtesa) 75 MG tablet Take 1 tablet by mouth Daily.       Vital Signs:   /70 (BP Location: Left arm, Patient Position: Sitting, Cuff Size: Large Adult)   Ht 180.3 cm (70.98\")   Wt 92 kg (202 lb 12.8 oz)   BMI 28.30 kg/m²     Body mass index is 28.3 kg/m².     Physical Exam  Vitals reviewed.   Constitutional:       General: He is not in acute distress.     Appearance: Normal appearance. He is not ill-appearing.   Eyes:      General: No scleral icterus.  Pulmonary:      Effort: Pulmonary effort is normal. No respiratory distress.   Abdominal:      General: Bowel sounds are normal. There is no distension.      Palpations: Abdomen is soft. Abdomen is not rigid. There is no mass or pulsatile mass.      Tenderness: There is no abdominal tenderness. There is no guarding or rebound.      Hernia: No hernia is present.   Skin:     Coloration: Skin is not jaundiced.   Neurological:      Mental Status: He is alert and oriented to person, place, and time.   Psychiatric:         Thought Content: Thought content normal.         Judgment: Judgment normal.       Assessment and Plan    Diagnoses and all orders for this visit:    1. Atypical chest pain (Primary)  -     FL ESOPHAGRAM DOUBLE CONTRAST; Future    2. Hepatic lesion         Assessment & " Plan  1. Gastritis.  The patient has been experiencing early fullness and bloating, with most discomfort in the chest. The upper scope showed inflammation in the stomach but no inflammation in the esophagus.   He is currently taking omeprazole 40 mg twice a day, which has improved symptoms, particularly at lunchtime.   He is advised to continue taking omeprazole 40 mg twice a day.   The patient is also instructed to take Carafate at least an hour after his dinner medications to reduce the risk of absorption interference.   If significant relief is not achieved, the Carafate can be dissolved into a slurry in a medicine cup. The patient is advised to avoid spicy foods as they exacerbate symptoms.   An esophagram will be performed to assess esophageal motility.   If the esophagram does not provide sufficient information, a high-resolution manometry test may be considered.    3. Functional dyspepsia.  The patient reports symptoms consistent with functional dyspepsia, including discomfort in the chest and increased pulse rate after eating.   He is advised to continue the current treatment plan with omeprazole and Carafate to manage symptoms. The esophagram will help determine if there is an abnormal motility component contributing to the symptoms.      Patient is agreeable to the outlined above treatment plan.  Verbalizes understanding and will contact office for any new or worsening concerns.  All questions answered and support provided.    Patient Instructions   Scheduling of your Ordered Diagnostic Tests- esophagram to assess esophageal motility  :    A team member from [facility] scheduling department will contact you to schedule your tests.    You can also contact them directly at [phone].    Ways to help reduce heartburn symptoms:    Continue taking omeprazole 40 mg twice daily prior to first and last meal of the day   . Begin taking sucralfate 1 tablet up to 2 times a day (after dinner and at  bedtime if needed) which will help to coat upper GI tract and heal any residual inflammation in the stomach.    This pill can be crushed/dissolved if unable to tolerate swallowing pill whole in 1 inch of water and do not eat or drink for 30 minutes to 1 hour afterward.    Do not take regularly scheduled medications along with this medication as it can reduce absorption of other medications    Avoid eating late night snacks within 3 hours of going to bed  If you have increased abdominal body weight, lifestyle changes to improve weight can help with heartburn symtoms  If you use tobacco products, we recommend that you stop smoking including e-cigarettes  Research has proven that people with heartburn who use tobacco can reduce heartburn symptoms by 44% after they stop smoking.   Sleep on your left side  Sleeping with your head/upper body elevated with a wedge pillow or with the head of the bed inclined   Avoid foods that can trigger symptoms which may include:  citrus fruits  spicy foods,  Tomatoes and Red sauces   Chocolate  coffee/tea  caffeinated or carbonated beverages  Alcohol  High fat foods  peppermint      I spent more than 50 percent of a 46 minute visit discussing diagnostic results, treatment options, and treatment plan.      EMR Dragon/Transcription Disclaimer:  This document has been Dictated utilizing Dragon dictation.     Patient or patient representative verbalized consent for the use of Ambient Listening during the visit with  JESSICA Bradley for chart documentation. 11/13/2024  15:20 EST

## 2024-11-13 NOTE — PATIENT INSTRUCTIONS
Scheduling of your Ordered Diagnostic Tests- esophagram to assess esophageal motility  :    A team member from [facility] scheduling department will contact you to schedule your tests.    You can also contact them directly at [phone].    Ways to help reduce heartburn symptoms:    Continue taking omeprazole 40 mg twice daily prior to first and last meal of the day   . Begin taking sucralfate 1 tablet up to 2 times a day (after dinner and at bedtime if needed) which will help to coat upper GI tract and heal any residual inflammation in the stomach.    This pill can be crushed/dissolved if unable to tolerate swallowing pill whole in 1 inch of water and do not eat or drink for 30 minutes to 1 hour afterward.    Do not take regularly scheduled medications along with this medication as it can reduce absorption of other medications    Avoid eating late night snacks within 3 hours of going to bed  If you have increased abdominal body weight, lifestyle changes to improve weight can help with heartburn symtoms  If you use tobacco products, we recommend that you stop smoking including e-cigarettes  Research has proven that people with heartburn who use tobacco can reduce heartburn symptoms by 44% after they stop smoking.   Sleep on your left side  Sleeping with your head/upper body elevated with a wedge pillow or with the head of the bed inclined   Avoid foods that can trigger symptoms which may include:  citrus fruits  spicy foods,  Tomatoes and Red sauces   Chocolate  coffee/tea  caffeinated or carbonated beverages  Alcohol  High fat foods  peppermint

## 2024-11-21 ENCOUNTER — TELEPHONE (OUTPATIENT)
Dept: NEUROSURGERY | Facility: CLINIC | Age: 69
End: 2024-11-21
Payer: MEDICARE

## 2024-11-21 NOTE — TELEPHONE ENCOUNTER
Provider: PRERNA AYALA    Caller: BONNIE MCNEAL    Relationship to Patient: SELF      Phone Number: [phone]    Reason for Call: PATIENT HAS HURT HIS BACK ABOUT 2 HOURS AGO.  HE HAS A DIAGNOSED HERNIATED DISC IN HIS BACK FROM A COUPLE OF YEARS AGO.  HE STATES IT FEELS LIKE HE HAS HURT THAT.    When was the patient last seen: 7-30-24    When did it start: TWO HOURS AGO    Where is it located: LOW BACK    Characteristics of symptom/severity: ON A SCALE OF 1-10 HIS PAIN LEVEL IS AT A 8 OR 9    I WAS GOING TO SCHEDULE WITH APRN, NO AVAILABILITY UNTIL JAN. PLEASE ADVISE.

## 2024-11-21 NOTE — TELEPHONE ENCOUNTER
When did this start? 2 hours ago     Any injury? (Details to what happened)    Leg pain? (Left/Right/Bilateral) no    Numbness, tingling and/or weakness present? no    Any incontinence issues? no    Are you taking any medications for this?    Any recent imaging done?    Any recent surgery? (If so, when and who's the surgeon) no      Told patient if the pain because unbearable to go to the ED to be evaluated.    Patient understood

## 2024-11-22 ENCOUNTER — OFFICE VISIT (OUTPATIENT)
Dept: SLEEP MEDICINE | Facility: HOSPITAL | Age: 69
End: 2024-11-22
Payer: MEDICARE

## 2024-11-22 VITALS
WEIGHT: 202 LBS | OXYGEN SATURATION: 95 % | BODY MASS INDEX: 28.28 KG/M2 | DIASTOLIC BLOOD PRESSURE: 88 MMHG | HEART RATE: 60 BPM | HEIGHT: 71 IN | SYSTOLIC BLOOD PRESSURE: 149 MMHG

## 2024-11-22 DIAGNOSIS — G47.09 OTHER INSOMNIA: ICD-10-CM

## 2024-11-22 DIAGNOSIS — G47.30 SEVERE SLEEP APNEA: Primary | ICD-10-CM

## 2024-11-22 PROCEDURE — G0463 HOSPITAL OUTPT CLINIC VISIT: HCPCS

## 2024-11-22 NOTE — PROGRESS NOTES
[facility]  Telephone: [phone] / Fax: [phone] [place]  Telephone: [phone] / Fax: [phone] Hilary Stevenson    PCP: Apolinar Sims Jr., MD    Reason for visit: URSZULA f/u    Avni Olivo III is a 69 y.o.male  was last seen at [facility] sleep lab in 2020. He would like to replace CPAP supplies and needs updated prescription. He would like to buy a travel CPAP. He explored Air Mini ResMed devices online. He is also eligible to update his machine this year and would like to do so. He has questions about inspire. He is on Lunesta 2mg through PCP. His bedtime schedule is 11pm-7am. ESS is 4.    SH- no tobacco, 2 caffeine per day.    ROS- +nasal congestion, +post nasal drip, +GERD, rest is negative.    DME [employer]    Current Medications:    Current Outpatient Medications:     atorvastatin (LIPITOR) 40 MG tablet, TAKE 1 AND 1/2 TABLETS EVERY NIGHT, Disp: 135 tablet, Rfl: 3    carvedilol (COREG) 12.5 MG tablet, Take 0.5 tablets by mouth 2 (Two) Times a Day., Disp: , Rfl:     clopidogrel (PLAVIX) 75 MG tablet, TAKE 1 TABLET EVERY DAY, Disp: 90 tablet, Rfl: 3    eszopiclone (LUNESTA) 2 MG tablet, Take 1 mg by mouth Every Night. Takes 1/2 tablet qhs, Disp: , Rfl:     hydroCHLOROthiazide 12.5 MG tablet, 2 tablets., Disp: , Rfl:     losartan (COZAAR) 100 MG tablet, TAKE 1 TABLET EVERY DAY, Disp: 90 tablet, Rfl: 3    Multiple Vitamin (multivitamin) capsule, Take 1 capsule by mouth Daily., Disp: , Rfl:     Omega-3 Fatty Acids (FISH OIL PO), Take 1,280 mg by mouth 2 (Two) Times a Day. HOLD PRIOR TO SURGERY, Disp: , Rfl:     omeprazole (priLOSEC) 40 MG capsule, Take 1 capsule by mouth 2 (Two) Times a Day Before Meals., Disp: 180 capsule, Rfl: 3    tadalafil (CIALIS) 5 MG tablet, Take 1 tablet by mouth Daily., Disp: , Rfl:     terazosin (HYTRIN) 10 MG capsule, Take 1 capsule by mouth 2 (Two) Times a Day., Disp: , Rfl:     Vibegron (Gemtesa) 75 MG tablet, Take 1 tablet by mouth Daily., Disp: , Rfl:  "   also entered in Sleep Questionnaire    Patient  has a past medical history of Arm pain, Bilateral sacroiliitis (05/20/2021), Cervical disc disorder (2008), Chest pain, Chronic pain syndrome (08/17/2022), DDD (degenerative disc disease), lumbar, Dizziness, Facet arthropathy, lumbar (12/20/2021), Fractures (2015), History of EKG (08/06/2013), HTN (hypertension), Hyperlipidemia, Joint pain (2008), Limb pain, Low back pain, Lower extremity edema, Lumbar canal stenosis, Melanoma (07/05/2017), Mitral regurgitation, Nausea and vomiting, Palpitations, Peripheral neuropathy, Sinus bradycardia, Snoring, Spinal stenosis, and Sprain of right foot.    I have reviewed the Past Medical History, Past Surgical History, Social History and Family History.    Vital Signs /88   Pulse 60   Ht 180.3 cm (70.98\")   Wt 91.6 kg (202 lb)   SpO2 95%   BMI 28.19 kg/m²  Body mass index is 28.19 kg/m².    General Alert and oriented. No acute distress noted   Pharynx/Throat Class IV Mallampati airway, large tongue, no evidence of redundant lateral pharyngeal tissue. No oral lesions. No thrush. Moist mucous membranes.   Head Normocephalic. Symmetrical. Atraumatic.    Nose No septal deviation. No drainage   Chest Wall Normal shape. Symmetric expansion with respiration. No tenderness.   Neck Trachea midline, no thyromegaly or adenopathy    Lungs Clear to auscultation bilaterally. No wheezes. No rhonchi. No rales. Respirations regular, even and unlabored.   Heart Regular rhythm and normal rate. Normal S1 and S2. No murmur   Abdomen Soft, non-tender and non-distended. Normal bowel sounds. No masses.   Extremities Moves all extremities well. No edema   Psychiatric Normal mood and affect.     Testing:  Download last 90 day usage shows average nightly use of 5 hours and 44 minutes on auto CPAP 5-15cm H2O, avg pressure of 11.5cm H2O, AHI 3.1/hr, leak is 20.9 L.min.    Study-Diagnostic findings: The patient tolerated the home sleep testing with " monitoring time of 489 minutes. The data obtained make this a technically adequate study. The apnea hypopneas index(AHI) was 33.6 per sleep hour.  The AHI during supine position was 42.3 per sleep hour. Mean heart rate of 63.5 BPM.  Snoring was noted 2.6% of sleep time. Lowest oxygen saturation during the study was 74%. Saturation below 89% was noted for 15.9 mins.        Impression:  1. Severe sleep apnea    2. Other insomnia          Plan:  I reviewed original sleep study and recent download report with patient. I gave him a handwritten order for Air Mini CPAP to purchase online, same settings of 5-15cm H2O as his home machine.  I also placed an order for Air Sense 11 machine through [employer]. He will continue using P10 mask style as he did before.    Weight loss will be strongly beneficial to reduce the severity of sleep-disordered breathing.  Caution during activities that require prolonged concentration is strongly advised if sleepiness returns.     Insomnia-managed on Lunesta through PCP    Follow up with Dr. Sage in 3 months to review data from new machine and decide if adjustment in pressures needed.    Thank you for allowing me to participate in your patient's care.      JESSICA Liu  [facility]  Phone: [phone]      Part of this note may be an electronic transcription/translation of spoken language to printed text using the Dragon Dictation System.

## 2024-11-27 ENCOUNTER — HOSPITAL ENCOUNTER (OUTPATIENT)
Dept: GENERAL RADIOLOGY | Facility: HOSPITAL | Age: 69
Discharge: HOME | End: 2024-11-27
Payer: MEDICARE

## 2024-11-27 DIAGNOSIS — R07.89 ATYPICAL CHEST PAIN: ICD-10-CM

## 2024-11-27 PROCEDURE — 74221 X-RAY XM ESOPHAGUS 2CNTRST: CPT

## 2024-11-27 RX ADMIN — BARIUM SULFATE 120 ML: 980 POWDER, FOR SUSPENSION ORAL at 12:10

## 2024-11-27 RX ADMIN — BARIUM SULFATE 183 ML: 960 POWDER, FOR SUSPENSION ORAL at 12:10

## 2024-11-27 RX ADMIN — ANTACID/ANTIFLATULENT 1 PACKET: 380; 550; 10; 10 GRANULE, EFFERVESCENT ORAL at 12:10

## 2024-11-27 RX ADMIN — BARIUM SULFATE 700 MG: 700 TABLET ORAL at 12:10

## 2025-01-23 ENCOUNTER — OFFICE VISIT (OUTPATIENT)
Dept: PAIN MEDICINE | Facility: CLINIC | Age: 70
End: 2025-01-23
Payer: MEDICARE

## 2025-01-23 ENCOUNTER — PREP FOR SURGERY (OUTPATIENT)
Dept: SURGERY | Facility: SURGERY CENTER | Age: 70
End: 2025-01-23
Payer: MEDICARE

## 2025-01-23 VITALS
TEMPERATURE: 98.9 F | DIASTOLIC BLOOD PRESSURE: 78 MMHG | SYSTOLIC BLOOD PRESSURE: 145 MMHG | WEIGHT: 207.2 LBS | OXYGEN SATURATION: 96 % | HEIGHT: 71 IN | HEART RATE: 58 BPM | BODY MASS INDEX: 29.01 KG/M2 | RESPIRATION RATE: 18 BRPM

## 2025-01-23 DIAGNOSIS — M48.062 SPINAL STENOSIS OF LUMBAR REGION WITH NEUROGENIC CLAUDICATION: ICD-10-CM

## 2025-01-23 DIAGNOSIS — M47.817 LUMBOSACRAL SPONDYLOSIS WITHOUT MYELOPATHY: Primary | ICD-10-CM

## 2025-01-23 DIAGNOSIS — M47.816 SPONDYLOSIS OF LUMBAR REGION WITHOUT MYELOPATHY OR RADICULOPATHY: Primary | ICD-10-CM

## 2025-01-23 PROCEDURE — 3078F DIAST BP <80 MM HG: CPT | Performed by: PHYSICIAN ASSISTANT

## 2025-01-23 PROCEDURE — 1160F RVW MEDS BY RX/DR IN RCRD: CPT | Performed by: PHYSICIAN ASSISTANT

## 2025-01-23 PROCEDURE — 3077F SYST BP >= 140 MM HG: CPT | Performed by: PHYSICIAN ASSISTANT

## 2025-01-23 PROCEDURE — 1125F AMNT PAIN NOTED PAIN PRSNT: CPT | Performed by: PHYSICIAN ASSISTANT

## 2025-01-23 PROCEDURE — 99212 OFFICE O/P EST SF 10 MIN: CPT | Performed by: PHYSICIAN ASSISTANT

## 2025-01-23 PROCEDURE — 1159F MED LIST DOCD IN RCRD: CPT | Performed by: PHYSICIAN ASSISTANT

## 2025-01-23 RX ORDER — SODIUM CHLORIDE 0.9 % (FLUSH) 0.9 %
10 SYRINGE (ML) INJECTION EVERY 12 HOURS SCHEDULED
OUTPATIENT
Start: 2025-01-23

## 2025-01-23 RX ORDER — SODIUM CHLORIDE 0.9 % (FLUSH) 0.9 %
10 SYRINGE (ML) INJECTION AS NEEDED
OUTPATIENT
Start: 2025-01-23

## 2025-01-23 RX ORDER — HYDROCHLOROTHIAZIDE 25 MG/1
25 TABLET ORAL DAILY
COMMUNITY
Start: 2024-12-27

## 2025-01-24 ENCOUNTER — TRANSCRIBE ORDERS (OUTPATIENT)
Dept: SURGERY | Facility: SURGERY CENTER | Age: 70
End: 2025-01-24
Payer: MEDICARE

## 2025-01-24 DIAGNOSIS — Z41.9 SURGERY, ELECTIVE: Primary | ICD-10-CM

## 2025-02-11 ENCOUNTER — TELEPHONE (OUTPATIENT)
Dept: PAIN MEDICINE | Facility: CLINIC | Age: 70
End: 2025-02-11
Payer: MEDICARE

## 2025-02-11 NOTE — TELEPHONE ENCOUNTER
Lvm for patient to call back and get appt on 4/7/25 rescheduled, Yomi Haas will be out of the office.  When Patient calls back please reschedule pre procedure f/up with YOMI Haas

## 2025-02-24 ENCOUNTER — OFFICE VISIT (OUTPATIENT)
Dept: GASTROENTEROLOGY | Facility: CLINIC | Age: 70
End: 2025-02-24
Payer: MEDICARE

## 2025-02-24 VITALS
DIASTOLIC BLOOD PRESSURE: 70 MMHG | SYSTOLIC BLOOD PRESSURE: 120 MMHG | WEIGHT: 205 LBS | BODY MASS INDEX: 28.7 KG/M2 | HEIGHT: 71 IN

## 2025-02-24 DIAGNOSIS — R68.81 EARLY SATIETY: ICD-10-CM

## 2025-02-24 DIAGNOSIS — R07.89 ATYPICAL CHEST PAIN: Primary | ICD-10-CM

## 2025-02-24 DIAGNOSIS — R14.0 ABDOMINAL BLOATING: ICD-10-CM

## 2025-02-24 DIAGNOSIS — K21.9 GASTROESOPHAGEAL REFLUX DISEASE, UNSPECIFIED WHETHER ESOPHAGITIS PRESENT: ICD-10-CM

## 2025-02-24 PROCEDURE — 3078F DIAST BP <80 MM HG: CPT

## 2025-02-24 PROCEDURE — 3074F SYST BP LT 130 MM HG: CPT

## 2025-02-24 PROCEDURE — 1160F RVW MEDS BY RX/DR IN RCRD: CPT

## 2025-02-24 PROCEDURE — 99214 OFFICE O/P EST MOD 30 MIN: CPT

## 2025-02-24 PROCEDURE — 1159F MED LIST DOCD IN RCRD: CPT

## 2025-02-24 PROCEDURE — G2211 COMPLEX E/M VISIT ADD ON: HCPCS

## 2025-02-24 RX ORDER — VONOPRAZAN FUMARATE 26.72 MG/1
20 TABLET ORAL DAILY
Qty: 12 TABLET | Refills: 0 | COMMUNITY
Start: 2025-02-24

## 2025-02-24 NOTE — PROGRESS NOTES
Patient or patient representative verbalized consent for the use of Ambient Listening during the visit with  JESSICA Bradley for chart documentation. 2/24/2025  14:01 EST    Chief Complaint   Patient presents with    Constipation    Hepatic Lesion          Patient is a 69 y.o. who presents to the office for follow-up evaluation of atypical chest pain, early satiety, and bloating patient has a significant past medical history of chronic pain syndrome diagnosed 2022 followed by pain management, nausea and vomiting, mitral regurgitation, hypertension, CKD, and peripheral neuropathy.       He recently underwent a stress test which successfully ruled out a cardiology etiology to atypical chest pain.     Past Surgical History:  Lumbar laminectomy discectomy and decompression with Dr. Martinez     Social History:  Alcohol consumption described as 21 drinks per week, former smokeless tobacco user      10/24/2024 EGD  Endoscopically normal-appearing esophagus  Localized mild inflammation with erythema and granularity in gastric antrum  Path: No evidence of H. pylori or evidence of intestinal metaplasia  Endoscopically normal-appearing duodenum     5/31/2018 Colonoscopy:  Multiple nonbleeding diverticula in descending colon described as moderate in severity    internal hemorrhoids  Next colonoscopy recommended at 10-year interval due 5/31/2028      History of Present Illness  The patient presents for evaluation of a hepatic lesion, gastroesophageal reflux disease (GERD), palpitations, and abdominal distension.    Hepatic Lesion  -Follow-up surveillance ultrasound not completed at time of today's visit secondary to wishing to discuss plans to complete after discussing with GI provider at today's visit.    Possible palpitations associated after consuming dinner  - The patient has a long-standing history of palpitations, which have been assessed as non-cardiac in origin.  - These episodes are frequently precipitated by  food intake, resulting in atypical chest pain given negative cardiac evaluation.  - They have an upcoming appointment with a cardiologist.  - The patient reports experiencing irregular heartbeats, which they monitor using a wearable device.  Plans to further discuss persistent symptoms with cardiologist as scheduled April follow-up.  - The patient believes that gastrointestinal symptoms exacerbate their condition.    Gastroesophageal Reflux Disease (GERD)  - They are currently taking omeprazole 40 mg twice daily but encounter difficulties with the evening dose.  - The morning dose is taken around 7:30 AM, yet they experience severe symptoms postprandially in the evening.  - Administration of omeprazole before or during meals does not yield significant symptomatic relief.  -Denies nausea and vomiting  Denies dysphagia.  Denies chest pain.    Abdominal Distension  - The patient reports experiencing flatulence and abdominal distension.  -An alternative physician suggested possible SIBO testing as potential cause  - Since initiating omeprazole therapy, they have noted a decrease in bowel movements and have not commenced any stool softening treatment.  - They have increased their intake of fruits and report a daily urge to defecate, although they are not always able to do so.  -Denies melena or hematochezia    Supplemental Information  - The patient uses a continuous positive airway pressure (CPAP) machine with a nasal mask.    MEDICATIONS  - Current: Omeprazole      Current/Previous treatment for GERD  Current:  Omeprazole 40 mg twice daily  Previous:  Carafate    Current/Previous treatment for constipation:  Previous:  Metamucil  Not currently taking at time of today's visit    Past Medical History   Past Medical History:   Diagnosis Date    Arm pain     Bilateral sacroiliitis 05/20/2021    Cervical disc disorder 2008    Chest pain     Chronic pain syndrome 08/17/2022    DDD (degenerative disc disease), lumbar      Dizziness     Facet arthropathy, lumbar 12/20/2021    Fractures 2015    foot Ceja fracture    History of EKG 08/06/2013    HTN (hypertension)     Hyperlipidemia     Joint pain 2008    back    Limb pain     Low back pain     Lower extremity edema     Lumbar canal stenosis     Melanoma 07/05/2017    Mitral regurgitation     trace    Nausea and vomiting     Palpitations     Peripheral neuropathy     Sinus bradycardia     Snoring     Spinal stenosis     Sprain of right foot         Past Surgical History   Past Surgical History:   Procedure Laterality Date    BACK SURGERY  2020    Dr Martinez    CARDIAC CATHETERIZATION  06/10/2013    CARDIAC CATHETERIZATION N/A 12/27/2019    Procedure: Coronary angiography;  Surgeon: Terrance Wood MD;  Location:  [facility];  Service: Cardiovascular    CARDIAC CATHETERIZATION N/A 12/27/2019    Procedure: Left Heart Cath;  Surgeon: Terrance Wood MD;  Location:  [facility];  Service: Cardiovascular    CARDIAC CATHETERIZATION N/A 12/27/2019    Procedure: Left ventriculography;  Surgeon: Terrance Wood MD;  Location:  [facility];  Service: Cardiovascular    CARDIAC CATHETERIZATION N/A 12/27/2019    Procedure: Stent KALYAN coronary;  Surgeon: Terrance Wood MD;  Location:  [facility];  Service: Cardiovascular    COLONOSCOPY      ENDOSCOPY      ENDOSCOPY N/A 10/24/2024    Procedure: ESOPHAGOGASTRODUODENOSCOPY WITH BIOPSY;  Surgeon: Abhijit Van MD;  Location: [facility];  Service: Gastroenterology;  Laterality: N/A;  gastritis; gastric bx to r/o h. pylori    EPIDURAL BLOCK      FOOT FRACTURE SURGERY Left     FRACTURE SURGERY  2015    foot Ceja fracture    KNEE ARTHROSCOPY Left     KNEE SURGERY Right     LAMINECTOMY  2020    Dr Martinez    LUMBAR LAMINECTOMY DISCECTOMY DECOMPRESSION Bilateral 10/29/2020    Procedure: LUMBAR DECOMPRESSION, OPEN LUMBAR DECOMPRESSION, LUMBAR 3 TO LUMBAR 4,  LUMBAR 4 TO LUMBAR 5, LUMBAR 5 TO SACRAL 1 (has transitional anatomy);  Surgeon: Jeremy Martinez MD;  Location: [facility];  Service: Neurosurgery;  Laterality: Bilateral;    MEDIAL BRANCH BLOCK Bilateral 09/15/2022    Procedure: LUMBAR MEDIAL BRANCH BLOCK bilateral L3-5;  Surgeon: Cali Monte MD;  Location: [facility];  Service: Pain Management;  Laterality: Bilateral;    MEDIAL BRANCH BLOCK Bilateral 11/08/2022    Procedure: LUMBAR MEDIAL BRANCH BLOCK BILATERAL L3-5 #2/CONFIRMATORY;  Surgeon: Cali Monte MD;  Location: [facility];  Service: Pain Management;  Laterality: Bilateral;    MOHS SURGERY      nose    NERVE SURGERY Bilateral 01/17/2023    Procedure: LUMBAR RADIOFREQUENCY ABLATION BILATERAL L3-L5;  Surgeon: Cali Monte MD;  Location: [facility];  Service: Pain Management;  Laterality: Bilateral;    NERVE SURGERY Bilateral 08/29/2023    Procedure: LUMBAR RADIOFREQUENCY ABLATION BILATERAL L3-L5  94781, [id number];  Surgeon: Cali Monte MD;  Location: [facility];  Service: Pain Management;  Laterality: Bilateral;    NERVE SURGERY Bilateral 3/28/2024    Procedure: LUMBAR RADIOFREQUENCY ABLATION BILATERAL L3-L5 14959-89, 62207-37;  Surgeon: Cali Monte MD;  Location: [facility];  Service: Pain Management;  Laterality: Bilateral;    NERVE SURGERY Bilateral 10/15/2024    Procedure: LUMBAR RADIOFREQUENCY ABLATION BILATERAL L3-L5 06979-67, 64636-50 X 2;  Surgeon: Cali Monte MD;  Location: [facility] MA[facility] OR;  Service: Pain Management;  Laterality: Bilateral;    ORTHOPEDIC SURGERY  2015    foot Ceja fracture    SKIN CANCER EXCISION Bilateral 07/05/2017    MELANOMA    SPINE SURGERY  2020    Dr Martinez    TONSILLECTOMY         Social History  Social History     Socioeconomic History    Marital status:     Years of education: college graduate   Tobacco Use    Smoking status: Never    Smokeless tobacco: Former    Tobacco comments:     caffeine - 2 cups  coffee daily    Vaping Use    Vaping status: Never Used   Substance and Sexual Activity    Alcohol use: Yes     Alcohol/week: 17.0 standard drinks of alcohol     Types: 10 Cans of beer, 7 Drinks containing 0.5 oz of alcohol per week     Comment: most days    Drug use: Never     Comment: CBD oil    Sexual activity: Yes     Partners: Female     Birth control/protection: None       Family History  Family History   Problem Relation Age of Onset    Coronary artery disease Other     Hyperlipidemia Other     Hypertension Other     Heart disease Father     Hypertension Father     Heart disease Sister     Stroke Maternal Grandmother     Heart disease Maternal Grandfather     Malig Hyperthermia Neg Hx        Allergies  No Known Allergies    Medications    Current Outpatient Medications:     atorvastatin (LIPITOR) 40 MG tablet, TAKE 1 AND 1/2 TABLETS EVERY NIGHT, Disp: 135 tablet, Rfl: 3    carvedilol (COREG) 12.5 MG tablet, Take 0.5 tablets by mouth 2 (Two) Times a Day., Disp: , Rfl:     clopidogrel (PLAVIX) 75 MG tablet, TAKE 1 TABLET EVERY DAY, Disp: 90 tablet, Rfl: 3    eszopiclone (LUNESTA) 2 MG tablet, Take 1 mg by mouth Every Night. Takes 1/2 tablet qhs, Disp: , Rfl:     hydroCHLOROthiazide 25 MG tablet, Take 1 tablet by mouth Daily., Disp: , Rfl:     losartan (COZAAR) 100 MG tablet, TAKE 1 TABLET EVERY DAY, Disp: 90 tablet, Rfl: 3    Multiple Vitamin (multivitamin) capsule, Take 1 capsule by mouth Daily., Disp: , Rfl:     Omega-3 Fatty Acids (FISH OIL PO), Take 1,280 mg by mouth 2 (Two) Times a Day. HOLD PRIOR TO SURGERY, Disp: , Rfl:     tadalafil (CIALIS) 5 MG tablet, Take 1 tablet by mouth Daily., Disp: , Rfl:     terazosin (HYTRIN) 10 MG capsule, Take 1 capsule by mouth 2 (Two) Times a Day., Disp: , Rfl:     Vibegron (Gemtesa) 75 MG tablet, Take 1 tablet by mouth Daily., Disp: , Rfl:     Vonoprazan Fumarate (Voquezna) 20 MG tablet, Take 1 tablet by mouth Daily., Disp: 12 tablet, Rfl: 0  Result Review :    Common  "labs          8/29/2024    13:21 10/14/2024    14:20 10/24/2024    11:25   Common Labs   Potassium   4.6    Uric Acid 7.1     6.3           Details          This result is from an external source.               Office Visit with Kalie Butterfield APRN (11/13/2024)   Office Visit with Kalie Butterfield APRN (09/23/2024)   US Abdomen Limited (10/16/2024 09:23)   Small hyperechoic liver nodule, suspect benign hepatic hemangioma, repeat liver ultrasound in 6-8 months.  Due February to April 2025   Upper GI Endoscopy (10/24/2024 11:52)   Tissue Pathology Exam (10/24/2024 12:11)   FL ESOPHAGRAM DOUBLE CONTRAST (11/27/2024 12:10)   Normal-appearing esophagus  Mild GERD with provocative measures  Prominent cricopharyngeus muscle impressing on posterior esophagus at C5/6 level  Unremarkable passage of 13 mm barium tablet  Recommended HREM assessment   Vital Signs:   /70 (BP Location: Left arm, Patient Position: Sitting, Cuff Size: Large Adult)   Ht 180.3 cm (70.98\")   Wt 93 kg (205 lb)   BMI 28.60 kg/m²     Body mass index is 28.6 kg/m².      Physical Exam  Constitutional:       General: He is not in acute distress.     Appearance: Normal appearance. He is not ill-appearing.   HENT:      Head: Normocephalic.   Eyes:      General: No scleral icterus.  Pulmonary:      Effort: No respiratory distress.   Abdominal:      General: Abdomen is flat. Bowel sounds are normal. There is no distension.      Palpations: Abdomen is soft. There is no mass.      Tenderness: There is no abdominal tenderness. There is no guarding or rebound.      Hernia: No hernia is present.   Skin:     General: Skin is warm and dry.   Neurological:      Mental Status: He is alert.      Gait: Gait normal.   Psychiatric:         Mood and Affect: Mood normal.       Assessment and Plan    Diagnoses and all orders for this visit:    1. Atypical chest pain (Primary)    2. Gastroesophageal reflux disease, unspecified whether esophagitis present  -     " Vonoprazan Fumarate (Voquezna) 20 MG tablet; Take 1 tablet by mouth Daily.  Dispense: 12 tablet; Refill: 0    3. Abdominal bloating    4. Early satiety       Assessment & Plan  Hepatic lesion  - Pancreas not fully visualized due to gas interference, no concerning findings in visible portion  - Follow-up liver ultrasound recommended to monitor stability of liver lesion  - If stable at 6 to 8-month follow-up, considered a benign hemangioma    Esophageal reflux  - Esophagram revealed reflux, exacerbated by large meals or lying flat post-eating  - Acid exposure in esophagus can induce irritability or contractility given negative cardiac evaluation including stress test completion  - Increased GI tract and diaphragm pressure can worsen symptoms  - Start vonoprazan 20 mg once daily, with or without food, starting tomorrow since omeprazole dosing was taken this morning.  - If symptoms improve, reduce dosage to 10 mg after 8 weeks  - Update condition via phone or MyChart  - If no improvement, consider manometry testing  -Encouraged him to keep scheduled cardiology follow-up from continued monitoring to ensure no further recommendations from their standpoint.    Palpitations  - Patient reports palpitations triggered by eating  - Cardiologist appointment on 04/24/2025  - Discussed relationship between gastrointestinal symptoms and palpitations, including potential role of vagus nerve    Bloating  - Patient reports bloating and gas, possibly related to incomplete bowel movements  - Advised to take Metamucil for bowel regularity  - Over-the-counter simethicone or ml root may help alleviate pressure  - Patient to monitor symptoms and update in a couple of weeks  Follow up:  4 weeks for close monitoring of symptoms     To consider GES evaluation pending clinical course if bloating do not improve with updated treatment plan     Patient is agreeable to the outlined above treatment plan.  Verbalizes understanding and will  contact office for any new or worsening concerns.  All questions answered and support provided.  Patient Instructions   To monitor to liver lesion complete recommended liver US for monitoring     Scheduling of your Ordered Diagnostic Tests :    A team member from [facility] scheduling department will contact you to schedule your tests.    You can also contact them directly at [phone]    We will do trial samples of a heartburn medication   Voquenza or you to take 20 mg once daily with or without meals x 2 weeks beginning tomorrow     Stop taking the omeprazole tomorrow once Voquezna was started     Milk and dairy products Milk, ice cream, cheese (may/may not relate to lactose)   Vegetables Broccoli, cauliflower, brussel sprouts, onions, leeks, parsnips, celery, radishes, asparagus, cabbage, kohlrabi, cucumber, potatoes, turnips, rutabaga   Fruits Prunes, apricots, apples, pears, peaches, raisins, bananas   Whole grains Wheat and oats, bagels, wheat germ, pretzels, bran/bran cereal   Legumes Beans, peas, baked beans, soybeans, lima beans   Fatty foods Fried foods   Liquids Carbonated beverages, beer, carbonated medications   Miscellaneous Chewing gum, artificial sweeteners          Please contact our office via mobiManagehart or Telephone call to update us on your response to the above treatment plan      If symptoms do not improve will recommend proceeding with HREM     For Constipation:    Goal of constipation regimen is to produce at least 3 complete bowel movements per week   Recommend starting a soluble fiber regimen that includes psyllium such as Metamucil.    This helps to keep stool soft and formed and easy transit throughout the colon to produce regular and complete bowel movements.  Consume at least 20 to 30 g of dietary fiber per day  Research has proven that consuming 5 prunes or 2 kiwi fruit per day can also aid in reducing constipation.  When starting fiber regimen recommend starting with a low-dose  and gradually increasing by 1 tablespoon every 7 days as tolerated.    This will help your body acclimate to the higher fiber diet and reduce risk of unwanted side effects that include bloating, gas, abdominal fullness, and cramping  For example: Begin taking 1 tablespoon daily for at least 7 days, if this is not successful in producing regular bowel movements can begin taking 1 tablespoons twice a day, and finally if this is not successful after taking 2 tablespoons for 7 days, can further increase to maximum recommended dosing of 1 tablespoon 3 times per day.  If you choose capsule formulation:  Begin taking 3 capsules daily in the morning with at least 8 ounces of water, then increase by 1 capsule every 7 days until stool becomes soft and bowel movements are complete and sensation.  Do not exceed maximum package dosing  It is important to consume increased amounts of fluid throughout the day to help improve the effectiveness of the fiber supplementation  Avoid gummy formulations of fiber as this can further increase your risk of the above adverse effects due to artificial sweeteners in the Gummies.      EMR Dragon/Transcription Disclaimer:  This document has been Dictated utilizing Dragon dictation.     Kalie Butterfield, MSN, APRN, FNP-C   [facility]  Gastroenterology   [address]  [address]  [place] [address]  [phone] office  [phone] fax

## 2025-02-24 NOTE — PATIENT INSTRUCTIONS
To monitor to liver lesion complete recommended liver US for monitoring     Scheduling of your Ordered Diagnostic Tests :    A team member from [facility] scheduling department will contact you to schedule your tests.    You can also contact them directly at [phone]    We will do trial samples of a heartburn medication   Voquenza or you to take 20 mg once daily with or without meals x 2 weeks beginning tomorrow     Stop taking the omeprazole tomorrow once Voquezna was started     Milk and dairy products Milk, ice cream, cheese (may/may not relate to lactose)   Vegetables Broccoli, cauliflower, brussel sprouts, onions, leeks, parsnips, celery, radishes, asparagus, cabbage, kohlrabi, cucumber, potatoes, turnips, rutabaga   Fruits Prunes, apricots, apples, pears, peaches, raisins, bananas   Whole grains Wheat and oats, bagels, wheat germ, pretzels, bran/bran cereal   Legumes Beans, peas, baked beans, soybeans, lima beans   Fatty foods Fried foods   Liquids Carbonated beverages, beer, carbonated medications   Miscellaneous Chewing gum, artificial sweeteners          Please contact our office via [employer] or Telephone call to update us on your response to the above treatment plan      If symptoms do not improve will recommend proceeding with HREM     For Constipation:    Goal of constipation regimen is to produce at least 3 complete bowel movements per week   Recommend starting a soluble fiber regimen that includes psyllium such as Metamucil.    This helps to keep stool soft and formed and easy transit throughout the colon to produce regular and complete bowel movements.  Consume at least 20 to 30 g of dietary fiber per day  Research has proven that consuming 5 prunes or 2 kiwi fruit per day can also aid in reducing constipation.  When starting fiber regimen recommend starting with a low-dose and gradually increasing by 1 tablespoon every 7 days as tolerated.    This will help your body acclimate to the higher  fiber diet and reduce risk of unwanted side effects that include bloating, gas, abdominal fullness, and cramping  For example: Begin taking 1 tablespoon daily for at least 7 days, if this is not successful in producing regular bowel movements can begin taking 1 tablespoons twice a day, and finally if this is not successful after taking 2 tablespoons for 7 days, can further increase to maximum recommended dosing of 1 tablespoon 3 times per day.  If you choose capsule formulation:  Begin taking 3 capsules daily in the morning with at least 8 ounces of water, then increase by 1 capsule every 7 days until stool becomes soft and bowel movements are complete and sensation.  Do not exceed maximum package dosing  It is important to consume increased amounts of fluid throughout the day to help improve the effectiveness of the fiber supplementation  Avoid gummy formulations of fiber as this can further increase your risk of the above adverse effects due to artificial sweeteners in the Gummies.

## 2025-03-10 ENCOUNTER — HOSPITAL ENCOUNTER (OUTPATIENT)
Dept: ULTRASOUND IMAGING | Facility: HOSPITAL | Age: 70
Discharge: HOME | End: 2025-03-10
Payer: MEDICARE

## 2025-03-10 DIAGNOSIS — R93.2 ABNORMAL ULTRASOUND OF LIVER: ICD-10-CM

## 2025-03-10 PROCEDURE — 76705 ECHO EXAM OF ABDOMEN: CPT

## 2025-03-24 ENCOUNTER — OFFICE VISIT (OUTPATIENT)
Dept: GASTROENTEROLOGY | Facility: CLINIC | Age: 70
End: 2025-03-24
Payer: MEDICARE

## 2025-03-24 VITALS
DIASTOLIC BLOOD PRESSURE: 70 MMHG | HEIGHT: 71 IN | SYSTOLIC BLOOD PRESSURE: 138 MMHG | WEIGHT: 210 LBS | BODY MASS INDEX: 29.4 KG/M2

## 2025-03-24 DIAGNOSIS — R14.0 ABDOMINAL BLOATING: ICD-10-CM

## 2025-03-24 DIAGNOSIS — R68.81 EARLY SATIETY: ICD-10-CM

## 2025-03-24 DIAGNOSIS — K59.04 CHRONIC IDIOPATHIC CONSTIPATION: ICD-10-CM

## 2025-03-24 DIAGNOSIS — K21.9 GASTROESOPHAGEAL REFLUX DISEASE, UNSPECIFIED WHETHER ESOPHAGITIS PRESENT: ICD-10-CM

## 2025-03-24 DIAGNOSIS — R07.89 ATYPICAL CHEST PAIN: Primary | ICD-10-CM

## 2025-03-24 PROCEDURE — 99214 OFFICE O/P EST MOD 30 MIN: CPT

## 2025-03-24 PROCEDURE — 1159F MED LIST DOCD IN RCRD: CPT

## 2025-03-24 PROCEDURE — 1160F RVW MEDS BY RX/DR IN RCRD: CPT

## 2025-03-24 PROCEDURE — 3075F SYST BP GE 130 - 139MM HG: CPT

## 2025-03-24 PROCEDURE — 3078F DIAST BP <80 MM HG: CPT

## 2025-03-24 RX ORDER — AMOXICILLIN 250 MG
2 CAPSULE ORAL NIGHTLY
Qty: 180 TABLET | Refills: 3 | Status: SHIPPED | OUTPATIENT
Start: 2025-03-24

## 2025-03-24 RX ORDER — VONOPRAZAN FUMARATE 26.72 MG/1
20 TABLET ORAL DAILY
Qty: 60 TABLET | Refills: 0 | Status: SHIPPED | OUTPATIENT
Start: 2025-03-24

## 2025-03-24 RX ORDER — METRONIDAZOLE 250 MG/1
250 TABLET ORAL 3 TIMES DAILY
Qty: 30 TABLET | Refills: 0 | Status: SHIPPED | OUTPATIENT
Start: 2025-03-24 | End: 2025-04-03

## 2025-03-24 RX ORDER — OMEPRAZOLE 40 MG/1
40 CAPSULE, DELAYED RELEASE ORAL DAILY
COMMUNITY
End: 2025-03-24

## 2025-03-24 NOTE — PATIENT INSTRUCTIONS
For Abdominal bloating:  Begin taking Flagyl 250 mg three times  x 10 days     Ways to help reduce heartburn symptoms:    start Voquezna 20 mg daily   Avoid eating late night snacks within 3 hours of going to bed  If you have increased abdominal body weight, lifestyle changes to improve weight can help with heartburn symtoms  If you use tobacco products, we recommend that you stop smoking including e-cigarettes  Research has proven that people with heartburn who use tobacco can reduce heartburn symptoms by 44% after they stop smoking.   Sleep on your left side  Sleeping with your head/upper body elevated with a wedge pillow or with the head of the bed inclined   Avoid foods that can trigger symptoms which may include:  citrus fruits  spicy foods,  Tomatoes and Red sauces   Chocolate  coffee/tea  caffeinated or carbonated beverages  Alcohol  High fat foods  peppermint    Begin taking metamucil 3 capsules in am and PM    And start      Start Senokot 8.6 mg (look on the back of the box at your pharmacy and make sure that Senokot 8.6 mg is the active ingredient).  This can be purchased over-the-counter.    Generic is fine.    Start with 2 tablets in the evening and adjust dose as needed based on response.    Senokot can be used regularly.    Scheduling of your Ordered Diagnostic Tests- Gastric empyting study :    A team member from [facility] scheduling department will contact you to schedule your tests.    You can also contact them directly at [phone]

## 2025-03-24 NOTE — PROGRESS NOTES
Patient or patient representative verbalized consent for the use of Ambient Listening during the visit with  JESSICA Bradley for chart documentation. 3/24/2025  15:40 EDT    Chief Complaint   Patient presents with    Abdominal Pain    Heartburn    Chest Tightness         Patient is a 69 y.o. who presents to the office for follow-up.  Last in office visit completed on 2/24/2025.  Patient has a significant past medical history of chronic pain syndrome diagnosed 2022 followed by pain management, nausea and vomiting, mitral regurgitation, hypertension, CKD, and peripheral neuropathy.       He recently underwent a stress test which successfully ruled out a cardiology etiology to atypical chest pain.     Past Surgical History:  Lumbar laminectomy discectomy and decompression with Dr. Martinez     Social History:  Alcohol consumption described as 21 drinks per week, former smokeless tobacco user      10/24/2024 EGD  Endoscopically normal-appearing esophagus  Localized mild inflammation with erythema and granularity in gastric antrum  Path: No evidence of H. pylori or evidence of intestinal metaplasia  Endoscopically normal-appearing duodenum     5/31/2018 Colonoscopy:  Multiple nonbleeding diverticula in descending colon described as moderate in severity    internal hemorrhoids  Next colonoscopy recommended at 10-year interval due 5/31/2028    History of Present Illness  The patient presents for evaluation of abdominal distension and constipation.    Abdominal Bloating  - The patient reports slight improvement with Voquezna therapy, although the therapeutic effect has reached a plateau.  - Voquezna is preferred over omeprazole (Prilosec) due to its convenience, as the patient frequently forgets to take omeprazole before dinner.  - Despite persistent abdominal distension, the severity is reduced with medication.  - The most distressing symptom is postprandial fullness and bloating, particularly in the evening.  - No  specific dietary triggers have been identified.  - The patient's caloric intake averages 1600 calories per day.  - They have increased physical activity following a period of winter inactivity.  - The patient reports persistent albeit less frequent palpitations associated with the sensation of fullness, with varying intensity which he continues to follow-up with primary care provider  - A gastric emptying study conducted in June 2022 was normal, but the symptoms have since worsened.    Constipation  - The patient attributes constipation to omeprazole therapy.  - They describe incomplete evacuation and frequent urges to defecate, with difficulty passing stool.  Denies melena or hematochezia  - Psyllium (Metamucil) provides some relief, and the patient is considering increasing the dosage, currently taking 2 capsules with each meal 3 times a day for total of 6.        Current/Previous treatment for GERD  Current:  Voquezna 20 mg started 2/24/25   Previous:  Carafate  Omeprazole 40 mg twice daily     Current/Previous treatment for constipation:  Current:  Metamucil 6 capsules per day   Medications    Current Outpatient Medications:     atorvastatin (LIPITOR) 40 MG tablet, TAKE 1 AND 1/2 TABLETS EVERY NIGHT, Disp: 135 tablet, Rfl: 3    carvedilol (COREG) 12.5 MG tablet, Take 0.5 tablets by mouth 2 (Two) Times a Day., Disp: , Rfl:     clopidogrel (PLAVIX) 75 MG tablet, TAKE 1 TABLET EVERY DAY, Disp: 90 tablet, Rfl: 3    eszopiclone (LUNESTA) 2 MG tablet, Take 1 mg by mouth Every Night. Takes 1/2 tablet qhs, Disp: , Rfl:     hydroCHLOROthiazide 25 MG tablet, Take 1 tablet by mouth Daily., Disp: , Rfl:     losartan (COZAAR) 100 MG tablet, TAKE 1 TABLET EVERY DAY, Disp: 90 tablet, Rfl: 3    Multiple Vitamin (multivitamin) capsule, Take 1 capsule by mouth Daily., Disp: , Rfl:     Omega-3 Fatty Acids (FISH OIL PO), Take 1,280 mg by mouth 2 (Two) Times a Day. HOLD PRIOR TO SURGERY, Disp: , Rfl:     tadalafil (CIALIS) 5 MG  "tablet, Take 1 tablet by mouth Daily., Disp: , Rfl:     terazosin (HYTRIN) 10 MG capsule, Take 1 capsule by mouth 2 (Two) Times a Day., Disp: , Rfl:     Vibegron (Gemtesa) 75 MG tablet, Take 1 tablet by mouth Daily., Disp: , Rfl:     Vonoprazan Fumarate (Voquezna) 20 MG tablet, Take 1 tablet by mouth Daily., Disp: 60 tablet, Rfl: 0    metroNIDAZOLE (FLAGYL) 250 MG tablet, Take 1 tablet by mouth 3 (Three) Times a Day for 10 days., Disp: 30 tablet, Rfl: 0    sennosides-docusate (PERICOLACE) 8.6-50 MG per tablet, Take 2 tablets by mouth Every Night., Disp: 180 tablet, Rfl: 3  Result Review :        NM Gastric Emptying (06/23/2022 12:19)   Office Visit with Kalie Butterfield APRN (02/24/2025)   Vital Signs:   /70 (BP Location: Left arm, Patient Position: Sitting, Cuff Size: Large Adult)   Ht 180.3 cm (70.98\")   Wt 95.3 kg (210 lb)   BMI 29.30 kg/m²     Body mass index is 29.3 kg/m².      Physical Exam  Constitutional:       General: He is not in acute distress.     Appearance: Normal appearance. He is not ill-appearing.   HENT:      Head: Normocephalic.   Eyes:      General: No scleral icterus.  Pulmonary:      Effort: No respiratory distress.   Abdominal:      General: Abdomen is flat. Bowel sounds are normal. There is no distension.      Palpations: Abdomen is soft. There is no mass.      Tenderness: There is no abdominal tenderness. There is no guarding or rebound.      Hernia: No hernia is present.   Skin:     General: Skin is warm and dry.   Neurological:      Mental Status: He is alert.      Gait: Gait normal.   Psychiatric:         Mood and Affect: Mood normal.       Assessment and Plan    Diagnoses and all orders for this visit:    1. Atypical chest pain (Primary)    2. Gastroesophageal reflux disease, unspecified whether esophagitis present  -     Vonoprazan Fumarate (Voquezna) 20 MG tablet; Take 1 tablet by mouth Daily.  Dispense: 60 tablet; Refill: 0  -     NM Gastric Emptying; Future    3. Abdominal " bloating  -     metroNIDAZOLE (FLAGYL) 250 MG tablet; Take 1 tablet by mouth 3 (Three) Times a Day for 10 days.  Dispense: 30 tablet; Refill: 0  -     NM Gastric Emptying; Future    4. Chronic idiopathic constipation    5. Early satiety  -     NM Gastric Emptying; Future    Other orders  -     sennosides-docusate (PERICOLACE) 8.6-50 MG per tablet; Take 2 tablets by mouth Every Night.  Dispense: 180 tablet; Refill: 3       Assessment & Plan  1. Abdominal bloating  - Symptoms improved with Voquezna and report more convenient than Prilosec citing difficulty timing evening dose prior to dinner  - Bloating is less severe with medication  - Fullness and bloating after meals, especially in the evening, are most distressing  - No specific food triggers identified  - Caloric intake averages 1600 calories/day  - Increased physical activity after winter inactivity  - Increased palpitations with fullness, varying in intensity  - Normal gastric emptying study in June 2022, but symptoms worsened  - Repeat gastric emptying study ordered  - Initiate metronidazole 250 mg TID for 10 days for clinically suspected SIBO      2. Constipation  - Attributed to Prilosec therapy  - Incomplete bowel movements and frequent urges to defecate, with difficulty passing stool  - Metamucil provides some relief  - Considering increasing dosage, titrating to lowest effective dose which can consider taking 3 twice daily to help reduce capsule burden  - Continue fiber intake and start nightly Senokot, beginning with 1 capsule and increasing as needed    Follow-up  - Follow up in 8 weeks to assess response to metronidazole dosing, Senokot, and fiber update and review GES results      Patient is agreeable to the outlined above treatment plan.  Verbalizes understanding and will contact office for any new or worsening concerns.  All questions answered and support provided.  Patient Instructions   For Abdominal bloating:  Begin taking Flagyl 250 mg three  times  x 10 days     Ways to help reduce heartburn symptoms:    start Voquezna 20 mg daily   Avoid eating late night snacks within 3 hours of going to bed  If you have increased abdominal body weight, lifestyle changes to improve weight can help with heartburn symtoms  If you use tobacco products, we recommend that you stop smoking including e-cigarettes  Research has proven that people with heartburn who use tobacco can reduce heartburn symptoms by 44% after they stop smoking.   Sleep on your left side  Sleeping with your head/upper body elevated with a wedge pillow or with the head of the bed inclined   Avoid foods that can trigger symptoms which may include:  citrus fruits  spicy foods,  Tomatoes and Red sauces   Chocolate  coffee/tea  caffeinated or carbonated beverages  Alcohol  High fat foods  peppermint    Begin taking metamucil 3 capsules in am and PM    And start      Start Senokot 8.6 mg (look on the back of the box at your pharmacy and make sure that Senokot 8.6 mg is the active ingredient).  This can be purchased over-the-counter.    Generic is fine.    Start with 2 tablets in the evening and adjust dose as needed based on response.    Senokot can be used regularly.    Scheduling of your Ordered Diagnostic Tests- Gastric empyting study :    A team member from [facility] scheduling department will contact you to schedule your tests.    You can also contact them directly at [phone]      EMR Dragon/Transcription Disclaimer:  This document has been Dictated utilizing Dragon dictation.     Kalie Butterfield, MSN, APRN, FNP-C   [facility]  Gastroenterology   [address] [facility]. [address] [place] [address]  [phone] office  [phone] fax

## 2025-03-26 ENCOUNTER — TELEPHONE (OUTPATIENT)
Dept: GASTROENTEROLOGY | Facility: CLINIC | Age: 70
End: 2025-03-26
Payer: MEDICARE

## 2025-03-31 NOTE — TELEPHONE ENCOUNTER
Eligibility could not be verified for this patient - patient not found. Please review patient information and re-submit.

## 2025-04-01 RX ORDER — CLOPIDOGREL BISULFATE 75 MG/1
75 TABLET ORAL DAILY
Qty: 90 TABLET | Refills: 3 | Status: SHIPPED | OUTPATIENT
Start: 2025-04-01

## 2025-04-07 ENCOUNTER — HOSPITAL ENCOUNTER (OUTPATIENT)
Dept: NUCLEAR MEDICINE | Facility: HOSPITAL | Age: 70
Discharge: HOME | End: 2025-04-07
Payer: MEDICARE

## 2025-04-07 DIAGNOSIS — K21.9 GASTROESOPHAGEAL REFLUX DISEASE, UNSPECIFIED WHETHER ESOPHAGITIS PRESENT: ICD-10-CM

## 2025-04-07 DIAGNOSIS — R14.0 ABDOMINAL BLOATING: ICD-10-CM

## 2025-04-07 DIAGNOSIS — R68.81 EARLY SATIETY: ICD-10-CM

## 2025-04-07 PROCEDURE — 78264 GASTRIC EMPTYING IMG STUDY: CPT

## 2025-04-07 PROCEDURE — A9541 TC99M SULFUR COLLOID: HCPCS

## 2025-04-07 PROCEDURE — 34310000005 TECHNETIUM SULFUR COLLOID

## 2025-04-07 RX ADMIN — TECHNETIUM TC 99M SULFUR COLLOID 1 DOSE: KIT at 08:33

## 2025-04-08 ENCOUNTER — OFFICE VISIT (OUTPATIENT)
Dept: SLEEP MEDICINE | Facility: HOSPITAL | Age: 70
End: 2025-04-08
Payer: MEDICARE

## 2025-04-08 ENCOUNTER — OFFICE VISIT (OUTPATIENT)
Dept: PAIN MEDICINE | Facility: CLINIC | Age: 70
End: 2025-04-08
Payer: MEDICARE

## 2025-04-08 VITALS
BODY MASS INDEX: 28.56 KG/M2 | SYSTOLIC BLOOD PRESSURE: 152 MMHG | DIASTOLIC BLOOD PRESSURE: 83 MMHG | HEART RATE: 57 BPM | WEIGHT: 204 LBS | OXYGEN SATURATION: 97 % | HEIGHT: 71 IN

## 2025-04-08 VITALS
TEMPERATURE: 97.9 F | HEIGHT: 71 IN | DIASTOLIC BLOOD PRESSURE: 73 MMHG | HEART RATE: 63 BPM | WEIGHT: 205.4 LBS | BODY MASS INDEX: 28.76 KG/M2 | OXYGEN SATURATION: 97 % | RESPIRATION RATE: 18 BRPM | SYSTOLIC BLOOD PRESSURE: 151 MMHG

## 2025-04-08 DIAGNOSIS — G47.33 OBSTRUCTIVE SLEEP APNEA, ADULT: Primary | ICD-10-CM

## 2025-04-08 DIAGNOSIS — M48.062 SPINAL STENOSIS OF LUMBAR REGION WITH NEUROGENIC CLAUDICATION: ICD-10-CM

## 2025-04-08 DIAGNOSIS — M47.817 LUMBOSACRAL SPONDYLOSIS WITHOUT MYELOPATHY: Primary | ICD-10-CM

## 2025-04-08 DIAGNOSIS — M46.1 BILATERAL SACROILIITIS: ICD-10-CM

## 2025-04-08 PROCEDURE — 1160F RVW MEDS BY RX/DR IN RCRD: CPT | Performed by: PHYSICIAN ASSISTANT

## 2025-04-08 PROCEDURE — 1159F MED LIST DOCD IN RCRD: CPT | Performed by: PHYSICIAN ASSISTANT

## 2025-04-08 PROCEDURE — 3078F DIAST BP <80 MM HG: CPT | Performed by: PHYSICIAN ASSISTANT

## 2025-04-08 PROCEDURE — 3077F SYST BP >= 140 MM HG: CPT | Performed by: PHYSICIAN ASSISTANT

## 2025-04-08 PROCEDURE — G0463 HOSPITAL OUTPT CLINIC VISIT: HCPCS

## 2025-04-08 PROCEDURE — 1125F AMNT PAIN NOTED PAIN PRSNT: CPT | Performed by: PHYSICIAN ASSISTANT

## 2025-04-08 PROCEDURE — 99214 OFFICE O/P EST MOD 30 MIN: CPT | Performed by: PHYSICIAN ASSISTANT

## 2025-04-08 RX ORDER — DOCUSATE SODIUM AND SENNOSIDES 8.6; 5 MG/1; MG/1
2 TABLET ORAL NIGHTLY
COMMUNITY
Start: 2025-03-25

## 2025-04-08 NOTE — PROGRESS NOTES
CHIEF COMPLAINT  Follow for back pain.      Subjective   Avni Olivo III is a 69 y.o. male  who presents for follow-up.  He has a history of chronic low back pain.  Patient obtained 70% pain relief x 2.5 months and 60% pain relief for the next 4 months following his last radiofrequency ablation.  On today's visit he has noted significant worsening of pain over the last 4 weeks with increased difficulty performing his activities of daily living.    Current medication regimen consists of very sparing use of tramadol 50 mg of which he has not used since his last office visit.    Patient is anticoagulated on clopidogrel 75 mg p.o. daily.    Pain today is 4/10 VAS in severity.      Back Pain  This is a chronic problem. The current episode started more than 1 year ago. The problem occurs constantly. The problem has been gradually worsening since onset. The pain is present in the lumbar spine. The quality of the pain is described as aching (throbbing). The pain does not radiate. The pain is at a severity of 4/10. The pain is moderate. The pain is Worse during the day. The symptoms are aggravated by position, twisting and bending. Stiffness is present In the morning. Associated symptoms include numbness (bilateral arms and hands). Pertinent negatives include no abdominal pain, chest pain, dysuria, fever, headaches or weakness.        PEG Assessment   What number best describes your pain on average in the past week?7  What number best describes how, during the past week, pain has interfered with your enjoyment of life?5  What number best describes how, during the past week, pain has interfered with your general activity?  6    Review of Pertinent Medical Data ---  XR SPINE LUMBAR COMPLETE W FLEX EXT-     CLINICAL: Evaluate for instability.     COMPARISON: 08/26/2022     FINDINGS: Posterior decompression as before. Lower lumbar facet  hypertrophy seen. No instability or subluxation with flexion/extension.  Transitional  "anatomy at the lumbosacral junction. L5-S1 disc space  narrowing as before. Facet hypertrophy at this location as well.  Remaining disc spaces maintained. There is multilevel lower thoracic  disc space narrowing also seen.     CONCLUSION: Degenerative change as described above, no instability with  flexion/extension.     This report was finalized on 7/31/2024 1:45 PM by Dr. Seth Decker M.D  on Workstation: [employer]    The following portions of the patient's history were reviewed and updated as appropriate: allergies, current medications, past family history, past medical history, past social history, past surgical history, and problem list.    Review of Systems   Constitutional:  Negative for fever.   Cardiovascular:  Negative for chest pain.   Gastrointestinal:  Negative for abdominal pain, constipation and diarrhea.   Genitourinary:  Negative for difficulty urinating and dysuria.   Musculoskeletal:  Positive for back pain.   Neurological:  Positive for numbness (bilateral arms and hands). Negative for weakness and headaches.   Psychiatric/Behavioral:  Negative for sleep disturbance and suicidal ideas. The patient is not nervous/anxious.      I have reviewed and confirmed the accuracy of the ROS as documented by the MA/LPN/RN DARWIN Conklin  Vitals:    04/08/25 1302   BP: 151/73   Pulse: 63   Resp: 18   Temp: 97.9 °F (36.6 °C)   SpO2: 97%   Weight: 93.2 kg (205 lb 6.4 oz)   Height: 180.3 cm (70.98\")   PainSc: 4    PainLoc: Back         Objective   Physical Exam  Vitals reviewed.   Constitutional:       Appearance: Normal appearance. He is normal weight.   HENT:      Head: Normocephalic.   Pulmonary:      Effort: Pulmonary effort is normal.   Musculoskeletal:      Lumbar back: Tenderness (moderate tenderness to palpation over the bilateral lumbar facet joint spaces) present. Decreased range of motion.        Back:    Skin:     General: Skin is warm and dry.   Neurological:      General: No focal deficit " present.      Mental Status: He is alert and oriented to person, place, and time.      Cranial Nerves: Cranial nerves 2-12 are intact.      Sensory: Sensation is intact.      Motor: Motor function is intact.      Gait: Gait is intact.      Deep Tendon Reflexes:      Reflex Scores:       Patellar reflexes are 1+ on the right side and 1+ on the left side.  Psychiatric:         Mood and Affect: Mood normal.         Behavior: Behavior normal.         Thought Content: Thought content normal.         Judgment: Judgment normal.       PHQ-2 Depression Screening  Little interest or pleasure in doing things? Not at all   Feeling down, depressed, or hopeless? Not at all   PHQ-2 Total Score 0     Tobacco Use: Medium Risk (4/8/2025)    Patient History     Smoking Tobacco Use: Never     Smokeless Tobacco Use: Former     Passive Exposure: Not on file           Assessment & Plan   Diagnoses and all orders for this visit:    1. Lumbosacral spondylosis without myelopathy (Primary)    2. Spinal stenosis of lumbar region with neurogenic claudication    3. Bilateral sacroiliitis        Avni MELISSA Olivo III reports a pain score of 4.  Given his pain assessment as noted, treatment options were discussed and the following options were decided upon as a follow-up plan to address the patient's pain: continuation of current treatment plan for pain and use of non-medical modalities (ice, heat, stretching and/or behavior modifications).      --- Due to recrudescence of pain the patient will return for therapeutic lumbar radiofrequency ablation with Dr. Monte.  Patient has been informed to make sure that he hold his clopidogrel 7 days prior to undergoing injective therapy and will resume in 24 hours after the procedure.  --- Continue with sparing use of tramadol.  He does not require prescription refill on today.  --- Follow-up 6 weeks after radiofrequency ablation      Thermal Radiofrequency Destruction    This initial thermal radiofrequency  destruction (RFA) of cervical, thoracic, or lumbar paravertebral facet joint (medial branch) nerves is considered medically reasonable and necessary as this patient has met the criteria of having at least two (2) medically reasonable and necessary diagnostic MBBs, with each one providing a consistent minimum of 80% sustained relief of primary (index) pain (with the duration of relief being consistent with the agent used).    This repeat thermal radiofrequency destruction (RFA) of cervical, thoracic, or lumbar paravertebral facet joint (medial branch) nerves at the same anatomical site is considered medically reasonable and necessary as this patient has met the criteria of having a minimum of consistent 50% improvement in pain for at least six (6) months or at least 50% consistent improvement in the ability to perform previously painful movements and ADLs as compared to baseline measurement using the same scale.         JANET REPORT  As part of the patient's treatment plan, I am prescribing controlled substances. The patient has been made aware of appropriate use of such medications, including potential risk of somnolence, limited ability to drive and/or work safely, and the potential for dependence or overdose. It has also been made clear that these medications are for use by this patient only, without concomitant use of alcohol or other substances unless prescribed.     Patient has completed prescribing agreement detailing terms of continued prescribing of controlled substances, including monitoring JANET reports, urine drug screening, and pill counts if necessary. The patient is aware that inappropriate use will results in cessation of prescribing such medications.    As the clinician, I personally reviewed the JANET from 4/8/25 while the patient was in the office today.    History and physical exam exhibit continued safe and appropriate use of controlled substances.     Dictated utilizing Dragon dictation.

## 2025-04-08 NOTE — H&P (VIEW-ONLY)
CHIEF COMPLAINT  Follow for back pain.      Subjective   Avni Olivo III is a 69 y.o. male  who presents for follow-up.  He has a history of chronic low back pain.  Patient obtained 70% pain relief x 2.5 months and 60% pain relief for the next 4 months following his last radiofrequency ablation.  On today's visit he has noted significant worsening of pain over the last 4 weeks with increased difficulty performing his activities of daily living.    Current medication regimen consists of very sparing use of tramadol 50 mg of which he has not used since his last office visit.    Patient is anticoagulated on clopidogrel 75 mg p.o. daily.    Pain today is 4/10 VAS in severity.      Back Pain  This is a chronic problem. The current episode started more than 1 year ago. The problem occurs constantly. The problem has been gradually worsening since onset. The pain is present in the lumbar spine. The quality of the pain is described as aching (throbbing). The pain does not radiate. The pain is at a severity of 4/10. The pain is moderate. The pain is Worse during the day. The symptoms are aggravated by position, twisting and bending. Stiffness is present In the morning. Associated symptoms include numbness (bilateral arms and hands). Pertinent negatives include no abdominal pain, chest pain, dysuria, fever, headaches or weakness.        PEG Assessment   What number best describes your pain on average in the past week?7  What number best describes how, during the past week, pain has interfered with your enjoyment of life?5  What number best describes how, during the past week, pain has interfered with your general activity?  6    Review of Pertinent Medical Data ---  XR SPINE LUMBAR COMPLETE W FLEX EXT-     CLINICAL: Evaluate for instability.     COMPARISON: 08/26/2022     FINDINGS: Posterior decompression as before. Lower lumbar facet  hypertrophy seen. No instability or subluxation with flexion/extension.  Transitional  "anatomy at the lumbosacral junction. L5-S1 disc space  narrowing as before. Facet hypertrophy at this location as well.  Remaining disc spaces maintained. There is multilevel lower thoracic  disc space narrowing also seen.     CONCLUSION: Degenerative change as described above, no instability with  flexion/extension.     This report was finalized on 7/31/2024 1:45 PM by Dr. Seth Decker M.D  on Workstation: [email or web address]    The following portions of the patient's history were reviewed and updated as appropriate: allergies, current medications, past family history, past medical history, past social history, past surgical history, and problem list.    Review of Systems   Constitutional:  Negative for fever.   Cardiovascular:  Negative for chest pain.   Gastrointestinal:  Negative for abdominal pain, constipation and diarrhea.   Genitourinary:  Negative for difficulty urinating and dysuria.   Musculoskeletal:  Positive for back pain.   Neurological:  Positive for numbness (bilateral arms and hands). Negative for weakness and headaches.   Psychiatric/Behavioral:  Negative for sleep disturbance and suicidal ideas. The patient is not nervous/anxious.      I have reviewed and confirmed the accuracy of the ROS as documented by the MA/LPN/RN DARWIN Conklin  Vitals:    04/08/25 1302   BP: 151/73   Pulse: 63   Resp: 18   Temp: 97.9 °F (36.6 °C)   SpO2: 97%   Weight: 93.2 kg (205 lb 6.4 oz)   Height: 180.3 cm (70.98\")   PainSc: 4    PainLoc: Back         Objective   Physical Exam  Vitals reviewed.   Constitutional:       Appearance: Normal appearance. He is normal weight.   HENT:      Head: Normocephalic.   Pulmonary:      Effort: Pulmonary effort is normal.   Musculoskeletal:      Lumbar back: Tenderness (moderate tenderness to palpation over the bilateral lumbar facet joint spaces) present. Decreased range of motion.        Back:    Skin:     General: Skin is warm and dry.   Neurological:      General: No focal deficit " present.      Mental Status: He is alert and oriented to person, place, and time.      Cranial Nerves: Cranial nerves 2-12 are intact.      Sensory: Sensation is intact.      Motor: Motor function is intact.      Gait: Gait is intact.      Deep Tendon Reflexes:      Reflex Scores:       Patellar reflexes are 1+ on the right side and 1+ on the left side.  Psychiatric:         Mood and Affect: Mood normal.         Behavior: Behavior normal.         Thought Content: Thought content normal.         Judgment: Judgment normal.       PHQ-2 Depression Screening  Little interest or pleasure in doing things? Not at all   Feeling down, depressed, or hopeless? Not at all   PHQ-2 Total Score 0     Tobacco Use: Medium Risk (4/8/2025)    Patient History     Smoking Tobacco Use: Never     Smokeless Tobacco Use: Former     Passive Exposure: Not on file           Assessment & Plan   Diagnoses and all orders for this visit:    1. Lumbosacral spondylosis without myelopathy (Primary)    2. Spinal stenosis of lumbar region with neurogenic claudication    3. Bilateral sacroiliitis        Avni MELISSA Olivo III reports a pain score of 4.  Given his pain assessment as noted, treatment options were discussed and the following options were decided upon as a follow-up plan to address the patient's pain: continuation of current treatment plan for pain and use of non-medical modalities (ice, heat, stretching and/or behavior modifications).      --- Due to recrudescence of pain the patient will return for therapeutic lumbar radiofrequency ablation with Dr. Monte.  Patient has been informed to make sure that he hold his clopidogrel 7 days prior to undergoing injective therapy and will resume in 24 hours after the procedure.  --- Continue with sparing use of tramadol.  He does not require prescription refill on today.  --- Follow-up 6 weeks after radiofrequency ablation      Thermal Radiofrequency Destruction    This initial thermal radiofrequency  destruction (RFA) of cervical, thoracic, or lumbar paravertebral facet joint (medial branch) nerves is considered medically reasonable and necessary as this patient has met the criteria of having at least two (2) medically reasonable and necessary diagnostic MBBs, with each one providing a consistent minimum of 80% sustained relief of primary (index) pain (with the duration of relief being consistent with the agent used).    This repeat thermal radiofrequency destruction (RFA) of cervical, thoracic, or lumbar paravertebral facet joint (medial branch) nerves at the same anatomical site is considered medically reasonable and necessary as this patient has met the criteria of having a minimum of consistent 50% improvement in pain for at least six (6) months or at least 50% consistent improvement in the ability to perform previously painful movements and ADLs as compared to baseline measurement using the same scale.         JANET REPORT  As part of the patient's treatment plan, I am prescribing controlled substances. The patient has been made aware of appropriate use of such medications, including potential risk of somnolence, limited ability to drive and/or work safely, and the potential for dependence or overdose. It has also been made clear that these medications are for use by this patient only, without concomitant use of alcohol or other substances unless prescribed.     Patient has completed prescribing agreement detailing terms of continued prescribing of controlled substances, including monitoring JANET reports, urine drug screening, and pill counts if necessary. The patient is aware that inappropriate use will results in cessation of prescribing such medications.    As the clinician, I personally reviewed the JANET from 4/8/25 while the patient was in the office today.    History and physical exam exhibit continued safe and appropriate use of controlled substances.     Dictated utilizing Dragon dictation.

## 2025-04-08 NOTE — PROGRESS NOTES
"[facility] SLEEP MEDICINE  [address]  [place] [address] [phone]    PCP: Apolinar Sims Jr., MD    Reason for visit:  Sleep disorders: URSZULA    Avni \"Boris\" is a 69 y.o.male who was seen in the [facility] today. Regular fu. He does not notice much change from the CPAP but wife reports snoring. He sleeps from 11pm to 7am. He uses nasal pillows. Has a travel CPAP also for when he travels.  [place] Sleepiness Scale is 4. Caffeine 2 per day. Alcohol 7-14 per week.    Avni \"Boris\"  reports that he has never smoked. He has quit using smokeless tobacco.    Pertinent Positive Review of Systems of denies  Rest of Review of Systems was negative as recorded in Sleep Questionnaire.    Patient  has a past medical history of Arm pain, Bilateral sacroiliitis (05/20/2021), Cervical disc disorder (2008), Chest pain, Chronic pain syndrome (08/17/2022), DDD (degenerative disc disease), lumbar, Dizziness, Facet arthropathy, lumbar (12/20/2021), Fractures (2015), History of EKG (08/06/2013), HTN (hypertension), Hyperlipidemia, Joint pain (2008), Limb pain, Low back pain, Lower extremity edema, Lumbar canal stenosis, Melanoma (07/05/2017), Mitral regurgitation, Nausea and vomiting, Palpitations, Peripheral neuropathy, Sinus bradycardia, Snoring, Spinal stenosis, and Sprain of right foot.     Current Medications:    Current Outpatient Medications:     atorvastatin (LIPITOR) 40 MG tablet, TAKE 1 AND 1/2 TABLETS EVERY NIGHT, Disp: 135 tablet, Rfl: 3    carvedilol (COREG) 12.5 MG tablet, Take 0.5 tablets by mouth 2 (Two) Times a Day., Disp: , Rfl:     clopidogrel (PLAVIX) 75 MG tablet, TAKE 1 TABLET EVERY DAY, Disp: 90 tablet, Rfl: 3    eszopiclone (LUNESTA) 2 MG tablet, Take 1 mg by mouth Every Night. Takes 1/2 tablet qhs, Disp: , Rfl:     hydroCHLOROthiazide 25 MG tablet, Take 1 tablet by mouth Daily., Disp: , Rfl:     losartan (COZAAR) 100 MG tablet, TAKE 1 TABLET EVERY DAY, Disp: 90 " "tablet, Rfl: 3    Multiple Vitamin (multivitamin) capsule, Take 1 capsule by mouth Daily., Disp: , Rfl:     Omega-3 Fatty Acids (FISH OIL PO), Take 1,280 mg by mouth 2 (Two) Times a Day. HOLD PRIOR TO SURGERY, Disp: , Rfl:     sennosides-docusate (PERICOLACE) 8.6-50 MG per tablet, Take 2 tablets by mouth Every Night., Disp: 180 tablet, Rfl: 3    tadalafil (CIALIS) 5 MG tablet, Take 1 tablet by mouth Daily., Disp: , Rfl:     terazosin (HYTRIN) 10 MG capsule, Take 1 capsule by mouth 2 (Two) Times a Day., Disp: , Rfl:     Vibegron (Gemtesa) 75 MG tablet, Take 1 tablet by mouth Daily., Disp: , Rfl:     Vonoprazan Fumarate (Voquezna) 20 MG tablet, Take 1 tablet by mouth Daily., Disp: 60 tablet, Rfl: 0   also entered in Sleep Questionnaire         Vital Signs: /83   Pulse 57   Ht 180.3 cm (70.98\")   Wt 92.5 kg (204 lb)   SpO2 97%   BMI 28.47 kg/m²     Body mass index is 28.47 kg/m².       Tongue: Large       Dentition: good       Pharynx: Posterior pharyngeal pillars are wide   Mallampatti: II (hard and soft palate, upper portion of tonsils anduvula visible)        General: Alert. Cooperative. Well developed. No acute distress.             Nose: No septal deviation. No drainage.          Throat: No oral lesions. No thrush. Moist mucous membranes.           Lungs:  Clear to auscultation bilaterally.            Heart:  Regular rhythm and normal rate. No murmur.     Diagnostic data available to date is as below and was reviewed on current visit:  10/18/19: The patient tolerated the home sleep testing with monitoring time of 489 minutes. The data obtained make this a technically adequate study. The apnea hypopneas index(AHI) was 33.6 per sleep hour. The AHI during supine position was 42.3 per sleep hour. Mean heart rate of 63.5 BPM. Snoring was noted 2.6% of sleep time. Lowest oxygen saturation during the study was 74%. Saturation below 89% was noted for 15.9 mins.     No results found for: \"IRON\", \"TIBC\", " "\"FERRITIN\"    Most current available usage data reviewed on 04/08/2025:          DME Company: [employer]    Prescription to DME for replacement supplies as below:    nasal pillow    Replace head-gear every 3 months.  Replace cushions every 2-4 weeks.     A 4604 Heated Tubing  every 3 mth    A 7037 Standard Tubing  every 3 mth   x A 7035 Headgear  every 3 mth   x A 7046 Repl Humidifier Chamber  every 6 yrs   x A 7038 Disposable Filters  2 per mth   x A 7039 Non-disposable Filter  every 6 mth   x A 7036 Chin Strap  every 6 mth     Orders Placed This Encounter   Procedures    Pulmonary Results Scan    Pulmonary Results Scan          Impression:  1. Obstructive sleep apnea, adult        Plan:  Avni \"Buz\" is compliant and doing well with his CPAP.  He wakes up rested and pressures are comfortable.  He has difficulty laying on his side because of nasal pillows.  I encouraged him to try again or try a different style mask.  We discussed appropriate intervals for cleaning device.  He does change his supplies very regularly.    I reiterated the importance of effective treatment of obstructive sleep apnea with PAP machine.  Cardiovascular health risks of untreated sleep apnea were again reviewed.  Patient was asked to remain cautious if there is persistent hypersomnolence. The benefit of weight loss in reducing severity of obstructive sleep apnea was discussed.  Patient would benefit from adhering to a strict diet to achieve ideal BMI.     Change of PAP supplies regularly is important for effective use.  Change of cushion on the mask or plugs on nasal pillows along with disposable filters once every month and change of mask frame, tubing, headgear and Velcro straps every 6 months at the minimum was reiterated.    This patient is compliant with PAP machine and benefits from its use.  Apnea hypopneas index is corrected/improved.  Daytime hypersomnolence has resolved.     Patient will follow up in this clinic in 1 year " JESSICA    Thank you for allowing me to participate in your patient's care.    Electronically signed by Antonio Sage MD, 04/08/25, 11:30 AM EDT.    Part of this note may be an electronic transcription/translation of spoken language to printed text using the Dragon Dictation System.

## 2025-04-09 ENCOUNTER — TELEPHONE (OUTPATIENT)
Dept: GASTROENTEROLOGY | Facility: CLINIC | Age: 70
End: 2025-04-09
Payer: MEDICARE

## 2025-04-11 ENCOUNTER — TELEPHONE (OUTPATIENT)
Dept: SLEEP MEDICINE | Facility: HOSPITAL | Age: 70
End: 2025-04-11
Payer: MEDICARE

## 2025-04-21 RX ORDER — MIDAZOLAM HYDROCHLORIDE 2 MG/2ML
2 INJECTION, SOLUTION INTRAMUSCULAR; INTRAVENOUS ONCE
Status: COMPLETED | OUTPATIENT
Start: 2025-04-22 | End: 2025-04-22

## 2025-04-21 RX ORDER — FENTANYL CITRATE 50 UG/ML
100 INJECTION, SOLUTION INTRAMUSCULAR; INTRAVENOUS ONCE
Refills: 0 | Status: COMPLETED | OUTPATIENT
Start: 2025-04-22 | End: 2025-04-22

## 2025-04-22 ENCOUNTER — HOSPITAL ENCOUNTER (OUTPATIENT)
Dept: GENERAL RADIOLOGY | Facility: SURGERY CENTER | Age: 70
End: 2025-04-22
Payer: MEDICARE

## 2025-04-22 ENCOUNTER — HOSPITAL ENCOUNTER (OUTPATIENT)
Facility: SURGERY CENTER | Age: 70
Setting detail: HOSPITAL OUTPATIENT SURGERY
Discharge: HOME | End: 2025-04-22
Attending: ANESTHESIOLOGY | Admitting: ANESTHESIOLOGY
Payer: MEDICARE

## 2025-04-22 VITALS
BODY MASS INDEX: 27.58 KG/M2 | RESPIRATION RATE: 16 BRPM | DIASTOLIC BLOOD PRESSURE: 84 MMHG | HEIGHT: 71 IN | OXYGEN SATURATION: 92 % | SYSTOLIC BLOOD PRESSURE: 147 MMHG | WEIGHT: 197 LBS | HEART RATE: 51 BPM | TEMPERATURE: 98.6 F

## 2025-04-22 DIAGNOSIS — Z41.9 SURGERY, ELECTIVE: ICD-10-CM

## 2025-04-22 PROCEDURE — 76000 FLUOROSCOPY <1 HR PHYS/QHP: CPT

## 2025-04-22 PROCEDURE — 25010000002 FENTANYL CITRATE (PF) 50 MCG/ML SOLUTION: Performed by: ANESTHESIOLOGY

## 2025-04-22 PROCEDURE — 64635 DESTROY LUMB/SAC FACET JNT: CPT | Performed by: ANESTHESIOLOGY

## 2025-04-22 PROCEDURE — 99152 MOD SED SAME PHYS/QHP 5/>YRS: CPT | Performed by: ANESTHESIOLOGY

## 2025-04-22 PROCEDURE — 25010000002 BUPIVACAINE (PF) 0.5 % SOLUTION 10 ML VIAL: Performed by: ANESTHESIOLOGY

## 2025-04-22 PROCEDURE — 25010000002 MIDAZOLAM PER 1MG: Performed by: ANESTHESIOLOGY

## 2025-04-22 PROCEDURE — 25010000002 LIDOCAINE PF 1% 1 % SOLUTION 30 ML VIAL: Performed by: ANESTHESIOLOGY

## 2025-04-22 PROCEDURE — 64636 DESTROY L/S FACET JNT ADDL: CPT | Performed by: ANESTHESIOLOGY

## 2025-04-22 PROCEDURE — 25010000002 LIDOCAINE PF 2% 2 % SOLUTION 5 ML VIAL: Performed by: ANESTHESIOLOGY

## 2025-04-22 RX ADMIN — MIDAZOLAM HYDROCHLORIDE 2 MG: 1 INJECTION, SOLUTION INTRAMUSCULAR; INTRAVENOUS at 10:39

## 2025-04-22 RX ADMIN — FENTANYL CITRATE 100 MCG: 0.05 INJECTION, SOLUTION INTRAMUSCULAR; INTRAVENOUS at 10:39

## 2025-04-22 NOTE — DISCHARGE INSTRUCTIONS
[facility] - Post-procedure Instructions          --  While there are no absolute restrictions, it is recommended that you do not perform strenuous activity today. In the morning, you may resume your level of activity as before your block.    --  If you have a band-aid at your injection site, please remove it later today. Observe the area for any redness, swelling, pus-like drainage, or a temperature over 101°. If any of these symptoms occur, please call your doctor at [phone]. If after office hours, leave a message and the on-call provider will return your call.    --  Ice may be applied to your injection site. It is recommended you avoid direct heat (heating pad; hot tub) for 1-2 days.    --  Call [facility] at [phone] if you experience persistent headache, persistent bleeding from the injection site, or severe pain not relieved by heat or oral medication.    --  Do not make important decisions today.    --  Due to the effects of the block and/or the I.V. Sedation, DO NOT drive or operate hazardous machinery for 12 hours.  Local anesthetics may cause numbness after procedure and precautions must be taken with regards to operating equipment as well as with walking, even if ambulating with assistance of another person or with an assistive device.    --  Do not drink alcohol for 12 hours.    -- You may return to work tomorrow, or as directed by your referring doctor.    --  Occasionally you may notice a slight increase in your pain after the procedure. This should start to improve within the next 24-48 hours. Radiofrequency ablation procedure pain may last 3-4 weeks.    --  It may take as long as 3-4 days before you notice a gradual improvement in your pain and/or other symptoms.    -- You may continue to take your prescribed pain medication as needed.    --  Some normal possible side effects of steroid use could include fluid retention, increased blood sugar, dull headache,  increased sweating, increased appetite, mood swings and flushing.    --  Diabetics are recommended to watch their blood glucose level closely for 24-48 hours after the injection.    --  Must stay in PACU for 20 min upon arrival and prove no leg weakness before being discharged.    --  IN THE EVENT OF A LIFE THREATENING EMERGENCY, (CHEST PAIN, BREATHING DIFFICULTIES, PARALYSIS…) YOU SHOULD GO TO YOUR NEAREST EMERGENCY ROOM.    --  You should be contacted by our office within 2-3 days to schedule follow up or next appointment date.  If not contacted within 7 days, please call the office at [phone]

## 2025-04-22 NOTE — OP NOTE
Bilateral L3-5 Lumbar Medial Branch RADIOFREQUENCY  [facility]      PREOPERATIVE DIAGNOSIS:  Lumbar spondylosis without myelopathy    POSTOPERATIVE DIAGNOSIS:  Lumbar spondylosis without myelopathy    PROCEDURE:   Diagnostic Bilateral Lumbar Medial Branch Nerve thermal radiofrequency lesioning, with fluoroscopy:  L3, L4, and L5 nerves (at the L4 and L5 transverse processes and the sacral alar groove) to thermally treat the innervation to facet joints L4-5 and L5-S1  47663-41 -- Bilateral L/S facet neuro destr., 1st Level  87749-31 -- Bilateral L/S facet neuro destr., 2nd  Level    PRE-PROCEDURE DISCUSSION WITH PATIENT:    Risks and complications were discussed with the patient prior to starting the procedure and informed consent was obtained.      SURGEON:  Cali Monte MD    REASON FOR PROCEDURE:    The patient complains of pain that seems to have a significant axial component and Previous clinically significant therapeutic effect after prior Radiofrequency procedure    SEDATION:  Versed 2mg & Fentanyl 100 mcg IV  TIME OF PROCEDURE:   The intraoperative procedure time after administration of the sedative was (8376-2390) 14 minutes.     ANESTHETIC:  Lidocaine 2%  STEROID:   no      DESCRIPTON OF PROCEDURE:  After obtaining informed consent, IV access  was obtained in the preoperative area.   The patient was taken to the operating room.  The patient was placed in the prone position with a pillow under the abdomen. All pressure points were well padded.  EKG, blood pressure, and pulse oximeter were monitored.  The patient was monitored and sedated by the RN under my direction. The lumbosacral area was prepped with Chloraprep and draped in a sterile fashion.     Under fluoroscopic guidance the transverse processes of the L4 and L5 vertebrae at the junctions of the superior articular processes were identified on the right.  Also identified was the groove between the ala and the superior articular  process of the sacrum on the ipsilateral side.  Skin and subcutaneous tissue were anesthetized with 1ml of 1% lidocaine above each of these points. Then, 18g-10cm radiofrequency probe needles were advanced in this fluoro view to the above junctions.  Aspiration was negative for blood and CSF.  After confirming the position of the needle with fluoroscope in all views, testing was initiated.  First, sensory testing was started on each needle a 1V and 50Hz and slowly decreased until painful pressure stimulation diminished at 0.5V.  Next, motor testing was confirmed to be negative at 3V and 2Hz for any radicular stimulation.  Then 1mL of the local anesthetic was instilled in each needle.  Two minutes elapsed, and during this time a lateral fluoroscopic view was confirmed again to ensure the needles had not advanced nor retracted.  Then, Radiofrequency Lesioning was initiated for 2 minutes at 80 degrees Celsius.  Needles were removed intact from each of the areas.     A similar procedure was repeated to address the L3, L4, and L5 nerves on the contralateral side.   Onset of analgesia was noted.  Vital signs remained stable throughout.      ESTIMATED BLOOD LOSS:  <5 mL  SPECIMENS:  none    COMPLICATIONS:   No complications were noted. and The patient did not have any signs of postprocedure numbness nor weakness.    TOLERANCE & DISCHARGE CONDITION:    The patient tolerated the procedure well.  The patient was transported to the recovery area without difficulties.  The patient was discharged to home under the care of family in stable and satisfactory condition.    PLAN OF CARE:  The patient was given our standard instruction sheet.  The patient will  Return to clinic 6 wks. - may resume Plavix in 8 hours  The patient will resume all medications as per the medication reconciliation sheet.

## 2025-04-24 ENCOUNTER — OFFICE VISIT (OUTPATIENT)
Dept: CARDIOLOGY | Facility: CLINIC | Age: 70
End: 2025-04-24
Payer: MEDICARE

## 2025-04-24 VITALS
DIASTOLIC BLOOD PRESSURE: 82 MMHG | RESPIRATION RATE: 16 BRPM | HEART RATE: 52 BPM | OXYGEN SATURATION: 98 % | BODY MASS INDEX: 28.14 KG/M2 | SYSTOLIC BLOOD PRESSURE: 138 MMHG | HEIGHT: 71 IN | WEIGHT: 201 LBS

## 2025-04-24 DIAGNOSIS — E78.2 MIXED HYPERLIPIDEMIA: ICD-10-CM

## 2025-04-24 DIAGNOSIS — I49.3 PVC'S (PREMATURE VENTRICULAR CONTRACTIONS): ICD-10-CM

## 2025-04-24 DIAGNOSIS — I25.10 CORONARY ARTERY DISEASE INVOLVING NATIVE CORONARY ARTERY OF NATIVE HEART WITHOUT ANGINA PECTORIS: Primary | ICD-10-CM

## 2025-04-24 DIAGNOSIS — I10 ESSENTIAL HYPERTENSION: ICD-10-CM

## 2025-04-24 PROCEDURE — 1160F RVW MEDS BY RX/DR IN RCRD: CPT | Performed by: INTERNAL MEDICINE

## 2025-04-24 PROCEDURE — 3079F DIAST BP 80-89 MM HG: CPT | Performed by: INTERNAL MEDICINE

## 2025-04-24 PROCEDURE — 99214 OFFICE O/P EST MOD 30 MIN: CPT | Performed by: INTERNAL MEDICINE

## 2025-04-24 PROCEDURE — 1159F MED LIST DOCD IN RCRD: CPT | Performed by: INTERNAL MEDICINE

## 2025-04-24 PROCEDURE — 93000 ELECTROCARDIOGRAM COMPLETE: CPT | Performed by: INTERNAL MEDICINE

## 2025-04-24 PROCEDURE — 3075F SYST BP GE 130 - 139MM HG: CPT | Performed by: INTERNAL MEDICINE

## 2025-04-24 RX ORDER — OMEPRAZOLE 40 MG/1
40 CAPSULE, DELAYED RELEASE ORAL DAILY
COMMUNITY

## 2025-04-24 RX ORDER — TERAZOSIN 10 MG/1
10 CAPSULE ORAL NIGHTLY
COMMUNITY
Start: 2025-04-22

## 2025-04-24 NOTE — PROGRESS NOTES
CARDIOLOGY    Zoraida Kenyon MD    ENCOUNTER DATE:  04/24/2025    Avni Olivo III / 69 y.o. / male        CHIEF COMPLAINT / REASON FOR OFFICE VISIT     Follow-up      HISTORY OF PRESENT ILLNESS       HPI    Avni Olivo III is a 69 y.o. male     This gentleman saw Dr. Ramírez in 2013 with chest pain. She did an exercise echocardiogram on 06/06/2013 which showed normal LV systolic function with ejection fraction of 64%, trace mitral regurgitation. He exercised for 11.5 minutes under Jacques protocol. His stress echocardiogram images were normal. His stress EKG was normal, but the patient did complain of some chest pain. On 06/10/2013, the patient underwent a heart catheterization, which showed no evidence of coronary artery disease.      I saw him for the first time in October 2016.  He was on vacation in [place] and had a bradycardic and presyncopal episode at the airport. Paramedics were called and he was noted to have a pulse in the low 50s and elevated blood pressure systolic of about 200 over 120. He was taken to the emergency room. I have reviewed that visit. He had an EKG which showed sinus bradycardia, but was otherwise normal. He was hypertensive there. His troponin and proBNP were normal. His creatinine was elevated at 1.48. He was treated with IV fluids. He did not really feel much better, so a CT scan of his head was performed which looked okay. Eventually, he was discharged from the emergency room still feeling somewhat dizzy and they were able to continue their trip home.      I saw him back in April 2018 when he was complaining about palpitations and was diagnosed with symptomatic PVCs.  He had a stress echo in December 2019 which showed normal LV systolic function.  No significant valvular heart disease.  However, he went on to have a heart catheterization.  In December 2019 which showed a mid LAD lesion of 85%.  2 drug-eluting stents were placed.     He had back surgery in October  "2020.  He completed rehab and felt good.  Unfortunately he got Covid in January 2021.  That really laid him up for a few weeks.    He saw JESSICA Motley in July 2020 for complaining of chest discomfort.  SPECT stress test August 1, 2024 showed no evidence of ischemia.  Ejection fraction 66%.    He has palpitations and shows me a rhythm strip from his watch which is consistent with PVCs.  These are worse when he is having GI distress.  He has occasional chest pressure.       VITAL SIGNS     Visit Vitals  /82   Pulse 52   Resp 16   Ht 180.3 cm (71\")   Wt 91.2 kg (201 lb)   SpO2 98%   BMI 28.03 kg/m²         Wt Readings from Last 3 Encounters:   04/24/25 91.2 kg (201 lb)   04/22/25 89.4 kg (197 lb)   04/08/25 93.2 kg (205 lb 6.4 oz)     Body mass index is 28.03 kg/m².      PHYSICAL EXAMINATION     Constitutional:       General: Not in acute distress.  Neck:      Vascular: No carotid bruit or JVD.   Pulmonary:      Effort: Pulmonary effort is normal.      Breath sounds: Normal breath sounds.   Cardiovascular:      Normal rate. Regular rhythm.      Murmurs: There is no murmur.   Psychiatric:         Mood and Affect: Mood and affect normal.           REVIEWED DATA       ECG 12 Lead    Date/Time: 4/24/2025 11:07 AM  Performed by: Zoraida Kenyon MD    Authorized by: Zoraida Kenyon MD  Comparison: compared with previous ECG from 4/19/2024  Similar to previous ECG  Rhythm: sinus rhythm  BPM: 52  Conduction: conduction normal  ST Segments: ST segments normal  T Waves: T waves normal    Clinical impression: normal ECG                Lab Results   Component Value Date    GLUCOSE 133 (H) 01/18/2022    BUN 15 01/18/2022    CREATININE 1.20 08/26/2022     01/18/2022    K 4.6 10/24/2024     01/18/2022    CALCIUM 10.0 01/18/2022    PROTEINTOT 7.0 11/19/2021    ALBUMIN 4.40 11/19/2021    ALT 43 (H) 11/19/2021    AST 41 (H) 11/19/2021    ALKPHOS 58 11/19/2021    BILITOT 0.5 11/19/2021    GLOB 2.6 11/19/2021 "    AGRATIO 1.7 11/19/2021    BCR 11.6 01/18/2022    ANIONGAP 11.3 01/18/2022       ASSESSMENT & PLAN      Diagnosis Plan   1. Coronary artery disease involving native coronary artery of native heart without angina pectoris  LDL Cholesterol, Direct    Lipid Panel      2. Essential hypertension  LDL Cholesterol, Direct    Lipid Panel      3. Mixed hyperlipidemia  LDL Cholesterol, Direct    Lipid Panel      4. PVC's (premature ventricular contractions)  LDL Cholesterol, Direct    Lipid Panel            1.  Coronary disease status post stent to the mid LAD in December 2019. Continue clopidogrel and statin therapy.  No anginal symptoms.  Normal stress test August 2024.  2.  Hypertension.  His blood pressure has been high but he is following with a nephrologist who is managing his blood pressure.    3.  Hyperlipidemia.  No recent lipid panel.  He is going to have blood work done soon with his nephrologist so I printed a order for a lipid panel for him to take to that appointment.  4.  History of premature ventricular contractions.  He showed me a rhythm strip from his phone which showed PVCs.  I reassured him these are not dangerous.          Orders Placed This Encounter   Procedures    LDL Cholesterol, Direct     Standing Status:   Future     Expected Date:   4/24/2025     Expiration Date:   7/24/2026     Release to patient:   Routine Release [[id number]]    Lipid Panel     Standing Status:   Future     Expected Date:   4/24/2025     Expiration Date:   7/24/2026     Release to patient:   Routine Release [[id number]]    ECG 12 Lead     This order was created via procedure documentation     Release to patient:   Routine Release [[id number]]           MEDICATIONS         Discharge Medications            Accurate as of April 24, 2025 11:08 AM. If you have any questions, ask your nurse or doctor.                Changes to Medications        Instructions Start Date   atorvastatin 40 MG tablet  Commonly known as: LIPITOR  What  changed: See the new instructions.   TAKE 1 AND 1/2 TABLETS EVERY NIGHT             Continue These Medications        Instructions Start Date   carvedilol 12.5 MG tablet  Commonly known as: COREG   6.25 mg, 2 Times Daily      clopidogrel 75 MG tablet  Commonly known as: PLAVIX   75 mg, Oral, Daily      eszopiclone 2 MG tablet  Commonly known as: LUNESTA   1 mg, Nightly      FISH OIL PO   1,280 mg, 2 Times Daily      Gemtesa 75 MG tablet  Generic drug: Vibegron   1 tablet, Daily      hydroCHLOROthiazide 25 MG tablet   25 mg, Daily      losartan 100 MG tablet  Commonly known as: COZAAR   TAKE 1 TABLET EVERY DAY      multivitamin capsule   1 capsule, Daily      omeprazole 40 MG capsule  Commonly known as: priLOSEC   40 mg, Daily      Senna S 8.6-50 MG per tablet  Generic drug: sennosides-docusate   2 tablets, Nightly      tadalafil 5 MG tablet  Commonly known as: CIALIS   1 tablet, Daily      terazosin 10 MG capsule  Commonly known as: HYTRIN   10 mg, Nightly             Stop These Medications      Voquezna 20 MG tablet  Generic drug: Vonoprazan Fumarate  Stopped by: Zoraida Kenyon MD  04/24/25  11:08 EDT    Part of this note may be an electronic transcription/translation of spoken language to printed text using the Dragon dictation system.

## 2025-05-27 ENCOUNTER — OFFICE VISIT (OUTPATIENT)
Dept: GASTROENTEROLOGY | Facility: CLINIC | Age: 70
End: 2025-05-27
Payer: MEDICARE

## 2025-05-27 ENCOUNTER — TELEPHONE (OUTPATIENT)
Dept: GASTROENTEROLOGY | Facility: CLINIC | Age: 70
End: 2025-05-27
Payer: MEDICARE

## 2025-05-27 VITALS
SYSTOLIC BLOOD PRESSURE: 134 MMHG | BODY MASS INDEX: 28.39 KG/M2 | DIASTOLIC BLOOD PRESSURE: 80 MMHG | WEIGHT: 202.8 LBS | HEIGHT: 71 IN

## 2025-05-27 DIAGNOSIS — K21.9 GASTROESOPHAGEAL REFLUX DISEASE, UNSPECIFIED WHETHER ESOPHAGITIS PRESENT: Primary | ICD-10-CM

## 2025-05-27 RX ORDER — VONOPRAZAN FUMARATE 13.36 MG/1
10 TABLET ORAL DAILY
Qty: 12 TABLET | Refills: 0 | COMMUNITY
Start: 2025-05-27

## 2025-05-27 RX ORDER — VONOPRAZAN FUMARATE 13.36 MG/1
10 TABLET ORAL DAILY
Qty: 90 TABLET | Refills: 1 | Status: SHIPPED | OUTPATIENT
Start: 2025-05-27

## 2025-05-27 NOTE — TELEPHONE ENCOUNTER
PA sent through [facility] for ana cristina     Not prior appeal denial on file from April 2025  Option at time for pt was to pay cash price $50.00    Eligibility could not be verified for this patient - patient not found. Please review patient information and re-submit.    Sent PA through Fax/ Form

## 2025-05-27 NOTE — PROGRESS NOTES
Patient or patient representative verbalized consent for the use of Ambient Listening during the visit with  JESSICA Bradley for chart documentation. 5/27/2025  14:43 EDT    Chief Complaint   Patient presents with    Follow-up         Patient is a 69 y.o. who presents to the office for follow-up evaluation.  Last in office visit completed on 3/24/2025.  Patient has a significant past medical history of chronic pain syndrome diagnosed 2022 followed by pain management, nausea and vomiting, mitral regurgitation, hypertension, CKD, and peripheral neuropathy.       He recently underwent a stress test which successfully ruled out a cardiology etiology to atypical chest pain.     Past Surgical History:  Lumbar laminectomy discectomy and decompression with Dr. Martinez     Social History:  Alcohol consumption described as 21 drinks per week, former smokeless tobacco user      10/24/2024 EGD  Endoscopically normal-appearing esophagus  Localized mild inflammation with erythema and granularity in gastric antrum  Path: No evidence of H. pylori or evidence of intestinal metaplasia  Endoscopically normal-appearing duodenum     5/31/2018 Colonoscopy:  Multiple nonbleeding diverticula in descending colon described as moderate in severity    internal hemorrhoids  Next colonoscopy recommended at 10-year interval due 5/31/2028    History of Present Illness  Constipation  - The patient reports significant improvement in bowel movements with the use of Senokot as well as completion of metronidazole 10-day regimen and a modified bowel regimen, resulting in increased stool frequency.  - Senokot was discontinued due to concerns about potential dependency and associated flatulence, yet the patient maintained regular bowel movements in its absence.  Denies melena or hematochezia.  Denies nocturnal bowel movements.  - The patient is considering the use of Senokot on an as-needed basis.    GERD  - He continues Voquezna 20 mg once daily  and describes symptoms as improved.  Denies heartburn, nausea, vomiting, or dysphagia.    Denies abdominal pain.  Overall pleased with symptom response to treatment plan.        Current/Previous treatment for GERD  Current:  Voquezna  Previous:  Carafate  Omeprazole 40 mg twice daily     Current/Previous treatment for constipation:  Current  Metamucil  Senna as needed previously nightly  Metronidazole 250 mg 3 times daily x 10 days completed after February 2025 in office visit      Medications    Current Outpatient Medications:     atorvastatin (LIPITOR) 40 MG tablet, TAKE 1 AND 1/2 TABLETS EVERY NIGHT (Patient taking differently: Take 1.5 tablets by mouth Every Night.), Disp: 135 tablet, Rfl: 3    carvedilol (COREG) 12.5 MG tablet, Take 0.5 tablets by mouth 2 (Two) Times a Day., Disp: , Rfl:     clopidogrel (PLAVIX) 75 MG tablet, TAKE 1 TABLET EVERY DAY, Disp: 90 tablet, Rfl: 3    eszopiclone (LUNESTA) 2 MG tablet, Take 1 mg by mouth Every Night. Takes 1/2 tablet qhs, Disp: , Rfl:     hydroCHLOROthiazide 25 MG tablet, Take 1 tablet by mouth Daily., Disp: , Rfl:     losartan (COZAAR) 100 MG tablet, TAKE 1 TABLET EVERY DAY, Disp: 90 tablet, Rfl: 3    Multiple Vitamin (multivitamin) capsule, Take 1 capsule by mouth Daily., Disp: , Rfl:     Omega-3 Fatty Acids (FISH OIL PO), Take 1,280 mg by mouth 2 (Two) Times a Day. HOLD PRIOR TO SURGERY, Disp: , Rfl:     psyllium (METAMUCIL) 0.52 g capsule, Take 6 capsules by mouth Daily., Disp: , Rfl:     tadalafil (CIALIS) 5 MG tablet, Take 1 tablet by mouth Daily., Disp: , Rfl:     terazosin (HYTRIN) 10 MG capsule, Take 1 capsule by mouth Every Night., Disp: , Rfl:     TURMERIC PO, Take 3 capsules by mouth 3 (Three) Times a Day., Disp: , Rfl:     Vibegron (Gemtesa) 75 MG tablet, Take 1 tablet by mouth Daily., Disp: , Rfl:     Senna S 8.6-50 MG per tablet, Take 2 tablets by mouth Every Night. (Patient not taking: Reported on 5/27/2025), Disp: , Rfl:     Vonoprazan Fumarate  "(Voquezna) 10 MG tablet, Take 1 tablet by mouth Daily., Disp: 90 tablet, Rfl: 1    Vonoprazan Fumarate (Voquezna) 10 MG tablet, Take 1 tablet by mouth Daily., Disp: 12 tablet, Rfl: 0  Result Review :      Common labs          10/14/2024    14:20 10/24/2024    11:25 5/7/2025    10:34 5/7/2025    12:25   Common Labs   Potassium  4.6      Uric Acid 6.3      5.6     5.3          Details          This result is from an external source.           NM Gastric Emptying (04/07/2025 12:40)   4% remaining at 4 hours  Office Visit with Zoraida Kenyon MD (04/24/2025)   Vital Signs:   /80 (BP Location: Left arm, Patient Position: Sitting, Cuff Size: Adult)   Ht 180.3 cm (70.98\")   Wt 92 kg (202 lb 12.8 oz)   BMI 28.30 kg/m²     Body mass index is 28.3 kg/m².      Physical Exam  Constitutional:       General: He is not in acute distress.     Appearance: Normal appearance. He is not ill-appearing.   HENT:      Head: Normocephalic.   Eyes:      General: No scleral icterus.  Pulmonary:      Effort: No respiratory distress.   Abdominal:      General: Abdomen is flat. Bowel sounds are normal. There is no distension.      Palpations: Abdomen is soft. There is no mass.      Tenderness: There is no abdominal tenderness. There is no guarding or rebound.      Hernia: No hernia is present.   Skin:     General: Skin is warm and dry.   Neurological:      Mental Status: He is alert.      Gait: Gait normal.   Psychiatric:         Mood and Affect: Mood normal.       Assessment and Plan    Diagnoses and all orders for this visit:    1. Gastroesophageal reflux disease, unspecified whether esophagitis present (Primary)  -     Vonoprazan Fumarate (Voquezna) 10 MG tablet; Take 1 tablet by mouth Daily.  Dispense: 90 tablet; Refill: 1    Other orders  -     Vonoprazan Fumarate (Voquezna) 10 MG tablet; Take 1 tablet by mouth Daily.  Dispense: 12 tablet; Refill: 0       Assessment & Plan  Atypical chest pain and constipation: (Improved)  - " Gastric emptying study normal  - Metronidazole addressed altered gut microbiome, reducing bloating  - Voquezna 20 mg for 8 weeks, then 10 mg for maintenance  - Senokot safe for as-needed use  - Metronidazole may be reintroduced if bloating recurs  - Continue Metamucil regimen  - Continue over-the-counter senna as needed  - Prescription for Voquezna 10 mg sent to pharmacy; samples provided until supply arrives  - Next colonoscopy scheduled for 05/31/2028    Follow-up:  - November 2025    Patient is agreeable to the outlined above treatment plan.  Verbalizes understanding and will contact office for any new or worsening concerns.  All questions answered and support provided.  Patient Instructions   Next colonoscopy recommended at 10-year interval due 5/31/2028    begin taking 10 mg once daily without or without food to help with heartburn symptoms/heal upper GI irritation   Once [facility] has received your prescription, a team member from their pharmacy will contact you to discuss pricing as well as schedule home delivery at no cost to your home.  [facility] contact info:  [email or web address] :[address], [address] [place] [address]  Phone: [phone]  Fax: [phone]      Continue the metamucil    Continue taking senna over the counter as needed      EMR Dragon/Transcription Disclaimer:  This document has been Dictated utilizing Dragon dictation.     Kalie Butterfield, MSN, APRN, FNP-C   [facility]  Gastroenterology   [address] [facility]  [place] [place], [place] [address] [phone] office  [phone] fax

## 2025-05-27 NOTE — PATIENT INSTRUCTIONS
Next colonoscopy recommended at 10-year interval due 5/31/2028    begin taking 10 mg once daily without or without food to help with heartburn symptoms/heal upper GI irritation   Once [facility] has received your prescription, a team member from their pharmacy will contact you to discuss pricing as well as schedule home delivery at no cost to your home.  [facility] contact info:  [facility] :[address], [address] [place] [address]  Phone: [phone]  Fax: [phone]      Continue the metamucil    Continue taking senna over the counter as needed

## 2025-05-28 NOTE — TELEPHONE ENCOUNTER
Time of yesterday's appointment he was agreeable to cash pricing.  Please let patient know so that he can proceed.  JESSICA Bradley

## 2025-05-28 NOTE — TELEPHONE ENCOUNTER
Pt is agreeable   Will send notification to [facility] and pt will call them this afternoon if they havent sent text set up account

## 2025-05-28 NOTE — TELEPHONE ENCOUNTER
Denied coverage for Voquezna    Will send to APRN due to pt is medicare and denial on file would be eligible for $50 copay at [facility]

## 2025-06-03 ENCOUNTER — OFFICE VISIT (OUTPATIENT)
Dept: PAIN MEDICINE | Facility: CLINIC | Age: 70
End: 2025-06-03
Payer: MEDICARE

## 2025-06-03 VITALS
HEART RATE: 54 BPM | DIASTOLIC BLOOD PRESSURE: 81 MMHG | SYSTOLIC BLOOD PRESSURE: 155 MMHG | TEMPERATURE: 97.1 F | OXYGEN SATURATION: 94 % | RESPIRATION RATE: 18 BRPM | HEIGHT: 71 IN | WEIGHT: 201.6 LBS | BODY MASS INDEX: 28.22 KG/M2

## 2025-06-03 DIAGNOSIS — M47.817 LUMBOSACRAL SPONDYLOSIS WITHOUT MYELOPATHY: Primary | ICD-10-CM

## 2025-06-03 DIAGNOSIS — M46.1 BILATERAL SACROILIITIS: ICD-10-CM

## 2025-06-03 DIAGNOSIS — M48.062 SPINAL STENOSIS OF LUMBAR REGION WITH NEUROGENIC CLAUDICATION: ICD-10-CM

## 2025-06-03 PROCEDURE — 3079F DIAST BP 80-89 MM HG: CPT | Performed by: PHYSICIAN ASSISTANT

## 2025-06-03 PROCEDURE — 1159F MED LIST DOCD IN RCRD: CPT | Performed by: PHYSICIAN ASSISTANT

## 2025-06-03 PROCEDURE — 3077F SYST BP >= 140 MM HG: CPT | Performed by: PHYSICIAN ASSISTANT

## 2025-06-03 PROCEDURE — 1160F RVW MEDS BY RX/DR IN RCRD: CPT | Performed by: PHYSICIAN ASSISTANT

## 2025-06-03 PROCEDURE — 99214 OFFICE O/P EST MOD 30 MIN: CPT | Performed by: PHYSICIAN ASSISTANT

## 2025-06-03 PROCEDURE — 1125F AMNT PAIN NOTED PAIN PRSNT: CPT | Performed by: PHYSICIAN ASSISTANT

## 2025-06-03 RX ORDER — METHYLPREDNISOLONE 4 MG/1
TABLET ORAL
Qty: 21 TABLET | Refills: 0 | Status: SHIPPED | OUTPATIENT
Start: 2025-06-03

## 2025-06-03 NOTE — PROGRESS NOTES
CHIEF COMPLAINT  LUMBAR RADIOFREQUENCY ABLATION BILATERAL L3-L5   Patient reports minimal pain relief      Subjective   Avni Olivo III is a 69 y.o. male  who presents to the office for follow-up of procedure.  He completed a lumbar radiofrequency ablation bilateral L3-5 medial branch   on 4/22/2025 performed by Dr. Monte for management of low back pain. Patient reports approximately 30 to 40% relief from the procedure at this time.  However the patient states that over the past weekend he was doing a lot of physical activity which included hanging pictures and changing various fixtures which has significantly exacerbated his pain.  He is now having pain in a bandlike distribution across the upper portion of the lumbar spine which seems to have responded well to ice therapy on yesterday as well as more frequent use of the tramadol at this time.  Denies any lower extremity radicular symptoms, numbness, tingling or weakness.  He also denies bladder or bowel dysfunction.    Medication management consists of sparing use of tramadol 50 mg which does help significantly with his pain however does cause mild pruritus and insomnia.    Today 8/10 VAS in severity.    Back Pain  This is a chronic problem. The current episode started more than 1 year ago. The problem occurs constantly. The problem has been gradually worsening since onset. The pain is present in the lumbar spine. The quality of the pain is described as aching (throbbing). The pain does not radiate. The pain is at a severity of 8/10. The pain is moderate. The pain is Worse during the day. The symptoms are aggravated by position, twisting and bending. Stiffness is present In the morning. Pertinent negatives include no abdominal pain, chest pain, dysuria, fever, headaches, numbness or weakness.        PEG Assessment   What number best describes your pain on average in the past week?6  What number best describes how, during the past week, pain has interfered with  "your enjoyment of life?3  What number best describes how, during the past week, pain has interfered with your general activity?  3    Review of Pertinent Medical Data ---  No new imaging for review today    The following portions of the patient's history were reviewed and updated as appropriate: allergies, current medications, past family history, past medical history, past social history, past surgical history, and problem list.    Review of Systems   Constitutional:  Negative for activity change and fever.   Cardiovascular:  Negative for chest pain.   Gastrointestinal:  Negative for abdominal pain, constipation and diarrhea.   Genitourinary:  Negative for dysuria.   Musculoskeletal:  Positive for back pain.   Neurological:  Negative for dizziness, tremors, weakness, light-headedness, numbness and headaches.   Psychiatric/Behavioral:  Negative for agitation, sleep disturbance and suicidal ideas. The patient is not nervous/anxious.      I have reviewed and confirmed the accuracy of the ROS as documented by the MA/LPN/RN DARWIN Conklin   Vitals:    06/03/25 1106   BP: 155/81   BP Location: Left arm   Patient Position: Sitting   Cuff Size: Adult   Pulse: 54   Resp: 18   Temp: 97.1 °F (36.2 °C)   TempSrc: Temporal   SpO2: 94%   Weight: 91.4 kg (201 lb 9.6 oz)   Height: 180.3 cm (70.98\")   PainSc: 8          Objective   Physical Exam  Vitals reviewed.   Constitutional:       Appearance: Normal appearance. He is normal weight.   HENT:      Head: Normocephalic.   Pulmonary:      Effort: Pulmonary effort is normal.   Musculoskeletal:      Lumbar back: Tenderness (moderate tenderness to palpation over the bilateral lumbar facet joint spaces) present. Decreased range of motion.        Back:    Skin:     General: Skin is warm and dry.   Neurological:      General: No focal deficit present.      Mental Status: He is alert and oriented to person, place, and time.      Cranial Nerves: Cranial nerves 2-12 are intact.      " Sensory: Sensation is intact.      Motor: Motor function is intact.      Gait: Gait is intact.      Deep Tendon Reflexes:      Reflex Scores:       Patellar reflexes are 1+ on the right side and 1+ on the left side.  Psychiatric:         Mood and Affect: Mood normal.         Behavior: Behavior normal.         Thought Content: Thought content normal.         Judgment: Judgment normal.             Assessment & Plan   Diagnoses and all orders for this visit:    1. Lumbosacral spondylosis without myelopathy (Primary)  -     methylPREDNISolone (MEDROL) 4 MG dose pack; Take as directed on package instructions.  Dispense: 21 tablet; Refill: 0    2. Bilateral sacroiliitis  -     methylPREDNISolone (MEDROL) 4 MG dose pack; Take as directed on package instructions.  Dispense: 21 tablet; Refill: 0    3. Spinal stenosis of lumbar region with neurogenic claudication  -     MRI Lumbar Spine With & Without Contrast; Future        Avni Olivo CATIA reports a pain score of 8.  Given his pain assessment as noted, treatment options were discussed and the following options were decided upon as a follow-up plan to address the patient's pain: continuation of current treatment plan for pain, prescription for non-opiod analgesics, and use of non-medical modalities (ice, heat, stretching and/or behavior modifications).    PHQ-2 Depression Screening  Little interest or pleasure in doing things? Several days   Feeling down, depressed, or hopeless? Not at all   PHQ-2 Total Score 1     PHQ-9 Depression Screening  Little interest or pleasure in doing things? Several days   Feeling down, depressed, or hopeless? Not at all   PHQ-2 Total Score 1   Trouble falling or staying asleep, or sleeping too much?     Feeling tired or having little energy?     Poor appetite or overeating?     Feeling bad about yourself - or that you are a failure or have let yourself or your family down?     Trouble concentrating on things, such as reading the newspaper or  watching television?     Moving or speaking so slowly that other people could have noticed? Or the opposite - being so fidgety or restless that you have been moving around a lot more than usual?       Thoughts that you would be better off dead, or of hurting yourself in some way?     PHQ-9 Total Score     If you checked off any problems, how difficult have these problems made it for you to do your work, take care of things at home, or get along with other people? Somewhat difficult       Tobacco Use: Medium Risk (6/3/2025)    Patient History     Smoking Tobacco Use: Never     Smokeless Tobacco Use: Former     Passive Exposure: Never         --- Follow-up 6 weeks for further evaluation and treat recommendations to be made  --- Due to the acute on chronic onset of low back pain I will send in a methylprednisone Dosepak.  Would like to see the patient for further evaluation after his upcoming appointment with Dr. Jeremy Martinez.  --- Order has been placed for lumbar MRI with and without contrast since patient does have a history of previous surgery with increase in pain at this time.  --- Continue with sparing use of tramadol.  He does not require refill on today.             JANET REPORT  As part of the patient's treatment plan, I am prescribing controlled substances. The patient has been made aware of appropriate use of such medications, including potential risk of somnolence, limited ability to drive and/or work safely, and the potential for dependence or overdose. It has also been made clear that these medications are for use by this patient only, without concomitant use of alcohol or other substances unless prescribed.     Patient has completed prescribing agreement detailing terms of continued prescribing of controlled substances, including monitoring JANET reports, urine drug screening, and pill counts if necessary. The patient is aware that inappropriate use will results in cessation of prescribing such  medications.    As the clinician, I personally reviewed the JANET from 6/3/2025 while the patient was in the office today.    History and physical exam exhibit continued safe and appropriate use of controlled substances.       Dictated utilizing Dragon dictation.

## 2025-06-16 ENCOUNTER — TELEPHONE (OUTPATIENT)
Dept: GASTROENTEROLOGY | Facility: CLINIC | Age: 70
End: 2025-06-16
Payer: MEDICARE

## 2025-06-16 NOTE — TELEPHONE ENCOUNTER
Can you please provide patient above recommendations to patient per [facility].    Kalie Butterfield, APRN

## 2025-06-16 NOTE — TELEPHONE ENCOUNTER
Chai ([facility]) left voicemail on clinic phone regarding this patient's Voquezna 10 mg prescription. Pt is also taking generic Plavix (clopidogrel). Voquezna can alter the effectiveness of the clopidogrel and either a medication change is required or patient needs to be educated to take medications 12 hours apart.   Pharmacy is asking for call back to confirm. [phone]. Thank you. EL

## 2025-07-02 ENCOUNTER — HOSPITAL ENCOUNTER (OUTPATIENT)
Dept: MRI IMAGING | Facility: HOSPITAL | Age: 70
Discharge: HOME | End: 2025-07-02
Admitting: PHYSICIAN ASSISTANT
Payer: MEDICARE

## 2025-07-02 DIAGNOSIS — M48.062 SPINAL STENOSIS OF LUMBAR REGION WITH NEUROGENIC CLAUDICATION: ICD-10-CM

## 2025-07-02 LAB — CREAT BLDA-MCNC: 1.6 MG/DL (ref 0.6–1.3)

## 2025-07-02 PROCEDURE — 25510000001 GADOPICLENOL 0.5 MMOL/ML SOLUTION: Performed by: PHYSICIAN ASSISTANT

## 2025-07-02 PROCEDURE — 82565 ASSAY OF CREATININE: CPT

## 2025-07-02 PROCEDURE — 72158 MRI LUMBAR SPINE W/O & W/DYE: CPT

## 2025-07-02 PROCEDURE — A9573 GADOPICLENOL 0.5 MMOL/ML SOLUTION: HCPCS | Performed by: PHYSICIAN ASSISTANT

## 2025-07-02 RX ADMIN — GADOPICLENOL 9 ML: 485.1 INJECTION INTRAVENOUS at 12:28

## 2025-07-24 ENCOUNTER — TELEPHONE (OUTPATIENT)
Dept: NEUROSURGERY | Facility: CLINIC | Age: 70
End: 2025-07-24

## 2025-07-29 NOTE — PROGRESS NOTES
Subjective   Patient ID: Avni Olivo III is a 70 y.o. male is here today for 1 year follow-up.    History of Present Illness    I have not seen him in about 2 years.  It has been about 5 years since he underwent a lumbar decompression by me from L3-S1.  He still has chronic low back pain.  He gets an ablation from Dr. Monte every so often.  The last 1 did not help quite as much which is not unexpected.  He does not have severe sciatica.  The baseline weakness particularly in his calves that he had prior to the surgery is about the same.  He continues to play golf and exercise.  I told him that he ought to try an [place] LSO as that might help some of his back pain.  But he still functional.  We discussed his new x-rays which did not show any instability in his new MRI which was ordered by the pain service.  No reason to consider any reoperative surgery.  Will continue to follow him and see him in 1 year with x-rays.    The following portions of the patient's history were reviewed and updated as appropriate: allergies, current medications, past family history, past medical history, past social history, past surgical history, and problem list.    Review of Systems   All other systems reviewed and are negative.      Objective   Physical Exam  Constitutional:       General: He is awake.      Appearance: He is well-developed.   HENT:      Head: Normocephalic and atraumatic.   Eyes:      General: Lids are normal.      Extraocular Movements: Extraocular movements intact.      Conjunctiva/sclera: Conjunctivae normal.      Pupils: Pupils are equal, round, and reactive to light.   Neck:      Vascular: No carotid bruit.   Neurological:      Mental Status: He is alert.      Coordination: Coordination is intact.      Deep Tendon Reflexes:      Reflex Scores:       Tricep reflexes are 2+ on the right side and 2+ on the left side.       Bicep reflexes are 2+ on the right side and 2+ on the left side.       Brachioradialis  reflexes are 2+ on the right side and 2+ on the left side.       Patellar reflexes are 2+ on the right side and 2+ on the left side.       Achilles reflexes are 2+ on the right side and 2+ on the left side.  Psychiatric:         Speech: Speech normal.       Neurological Exam  Mental Status  Awake and alert. Oriented only to person, place, time and situation. Recent and remote memory are intact. Speech is normal. Language is fluent with no aphasia. Attention and concentration are normal. Fund of knowledge is appropriate for level of education.    Cranial Nerves  CN II: Visual acuity is normal. Visual fields full to confrontation.  CN III, IV, VI: Extraocular movements intact bilaterally. Normal lids and orbits bilaterally. Pupils equal round and reactive to light bilaterally.  CN V: Facial sensation is normal.  CN VII: Full and symmetric facial movement.  CN IX, X: Palate elevates symmetrically. Normal gag reflex.  CN XI: Shoulder shrug strength is normal.  CN XII: Tongue midline without atrophy or fasciculations.    Motor  Normal muscle bulk throughout. Normal muscle tone.                                               Right                     Left  Rhomboids                            5                          5  Infraspinatus                          5                          5  Supraspinatus                       5                          5  Deltoid                                   5                          5   Biceps                                   5                          5  Brachioradialis                      5                          5   Triceps                                  5                          5   Pronator                                5                          5   Supinator                              5                           5   Wrist flexor                            5                          5   Wrist extensor                       5                          5   Finger flexor                           5                          5   Finger extensor                     5                          5   Interossei                              5                          5   Abductor pollicis brevis         5                          5   Flexor pollicis brevis             5                          5   Opponens pollicis                 5                          5  Extensor digitorum               5                          5  Abductor digiti minimi           5                          5   Abdominal                            5                          5  Glutei                                    5                          5  Hip abductor                         5                          5  Hip adductor                         5                          5   Iliopsoas                               5                          5   Quadriceps                           5                          5   Hamstring                             5                          5   Gastrocnemius                     5                           5   Anterior tibialis                      5                          5   Posterior tibialis                    5                          5   Peroneal                               5                          5  Ankle dorsiflexor                   5                          5  Ankle plantar flexor              5                           5  Extensor hallucis longus      5                           5    Sensory  Light touch is normal in upper and lower extremities. Proprioception is normal in upper and lower extremities.     Reflexes                                            Right                      Left  Brachioradialis                    2+                         2+  Biceps                                 2+                         2+  Triceps                                2+                         2+  Finger flex                           2+                          2+  Hamstring                            2+                         2+  Patellar                                2+                         2+  Achilles                                2+                         2+    Coordination    Finger-to-nose, rapid alternating movements and heel-to-shin normal bilaterally without dysmetria.    Gait  Casual gait is normal including stance, stride, and arm swing.Normal toe walking. Normal heel walking. Normal tandem gait.       Assessment & Plan   Independent Review of Radiographic Studies:      The lumbar MRI done on 7/2/2025 was reviewed as per the x-rays done today in the office.  There is no instability.  He has an L4-L5 spondylolisthesis.  The degree of recurrent stenosis at L4-L5 and L5-S1 has worsened compared to 2022.  There is fair amount of stenosis now at L3-L4.    Medical Decision Making:      He will try the [facility] LSO and continue working with the [facility] group.  Will continue to follow him and see him in 1 year with x-rays.  If he develops severe sciatica or worsening of leg strength or both, he will let us know.      Diagnoses and all orders for this visit:    1. Chronic midline low back pain without sciatica (Primary)  -     XR Spine Lumbar Complete With Flex & Ext; Future    2. Status post lumbar spine operative procedure for decompression of spinal cord  -     XR Spine Lumbar Complete With Flex & Ext; Future    3. Degeneration of intervertebral disc of lumbar region with discogenic back pain  -     XR Spine Lumbar Complete With Flex & Ext; Future      Return in about 1 year (around 7/30/2026) for Face-to-face.

## 2025-07-30 ENCOUNTER — OFFICE VISIT (OUTPATIENT)
Dept: NEUROSURGERY | Facility: CLINIC | Age: 70
End: 2025-07-30
Payer: MEDICARE

## 2025-07-30 VITALS
HEIGHT: 71 IN | DIASTOLIC BLOOD PRESSURE: 74 MMHG | HEART RATE: 59 BPM | SYSTOLIC BLOOD PRESSURE: 126 MMHG | WEIGHT: 195 LBS | OXYGEN SATURATION: 97 % | BODY MASS INDEX: 27.3 KG/M2

## 2025-07-30 DIAGNOSIS — G89.29 CHRONIC MIDLINE LOW BACK PAIN WITHOUT SCIATICA: Primary | ICD-10-CM

## 2025-07-30 DIAGNOSIS — M51.360 DEGENERATION OF INTERVERTEBRAL DISC OF LUMBAR REGION WITH DISCOGENIC BACK PAIN: ICD-10-CM

## 2025-07-30 DIAGNOSIS — M54.50 CHRONIC MIDLINE LOW BACK PAIN WITHOUT SCIATICA: Primary | ICD-10-CM

## 2025-07-30 DIAGNOSIS — Z98.890 STATUS POST LUMBAR SPINE OPERATIVE PROCEDURE FOR DECOMPRESSION OF SPINAL CORD: ICD-10-CM

## 2025-07-30 RX ORDER — CARVEDILOL 6.25 MG/1
TABLET ORAL
COMMUNITY
Start: 2025-06-30

## 2025-08-14 ENCOUNTER — PREP FOR SURGERY (OUTPATIENT)
Dept: SURGERY | Facility: SURGERY CENTER | Age: 70
End: 2025-08-14
Payer: MEDICARE

## 2025-08-14 ENCOUNTER — OFFICE VISIT (OUTPATIENT)
Dept: PAIN MEDICINE | Facility: CLINIC | Age: 70
End: 2025-08-14
Payer: MEDICARE

## 2025-08-14 VITALS
HEART RATE: 59 BPM | DIASTOLIC BLOOD PRESSURE: 77 MMHG | WEIGHT: 205 LBS | TEMPERATURE: 98.3 F | HEIGHT: 71 IN | SYSTOLIC BLOOD PRESSURE: 132 MMHG | BODY MASS INDEX: 28.7 KG/M2 | OXYGEN SATURATION: 96 %

## 2025-08-14 DIAGNOSIS — M47.817 LUMBOSACRAL SPONDYLOSIS WITHOUT MYELOPATHY: Primary | ICD-10-CM

## 2025-08-14 DIAGNOSIS — M46.1 BILATERAL SACROILIITIS: ICD-10-CM

## 2025-08-14 DIAGNOSIS — M47.816 SPONDYLOSIS OF LUMBAR REGION WITHOUT MYELOPATHY OR RADICULOPATHY: Primary | ICD-10-CM

## 2025-08-14 DIAGNOSIS — M54.50 CHRONIC MIDLINE LOW BACK PAIN WITHOUT SCIATICA: ICD-10-CM

## 2025-08-14 DIAGNOSIS — G89.29 CHRONIC MIDLINE LOW BACK PAIN WITHOUT SCIATICA: ICD-10-CM

## 2025-08-14 DIAGNOSIS — M51.360 DEGENERATION OF INTERVERTEBRAL DISC OF LUMBAR REGION WITH DISCOGENIC BACK PAIN: ICD-10-CM

## 2025-08-14 RX ORDER — SODIUM CHLORIDE 0.9 % (FLUSH) 0.9 %
10 SYRINGE (ML) INJECTION EVERY 12 HOURS SCHEDULED
OUTPATIENT
Start: 2025-08-14

## 2025-08-14 RX ORDER — SODIUM CHLORIDE 0.9 % (FLUSH) 0.9 %
10 SYRINGE (ML) INJECTION AS NEEDED
OUTPATIENT
Start: 2025-08-14

## 2025-08-21 ENCOUNTER — TRANSCRIBE ORDERS (OUTPATIENT)
Dept: SURGERY | Facility: SURGERY CENTER | Age: 70
End: 2025-08-21
Payer: MEDICARE

## 2025-08-21 DIAGNOSIS — Z41.9 SURGERY, ELECTIVE: Primary | ICD-10-CM

## (undated) DEVICE — VIAL FORMALIN CAP 10P 40ML

## (undated) DEVICE — THE BITE BLOCK MAXI, LATEX FREE STRAP IS USED TO PROTECT THE ENDOSCOPE INSERTION TUBE FROM BEING BITTEN BY THE PATIENT.

## (undated) DEVICE — EPIDURAL TRAY: Brand: MEDLINE INDUSTRIES, INC.

## (undated) DEVICE — PK CATH CARD 40

## (undated) DEVICE — GLIDESHEATH SLENDER STAINLESS STEEL KIT: Brand: GLIDESHEATH SLENDER

## (undated) DEVICE — ADAPT CLN BIOGUARD AIR/H2O DISP

## (undated) DEVICE — LINER SURG CANSTR SXN S/RIGD 1500CC

## (undated) DEVICE — GLV SURG SENSICARE POLYISPRN W/ALOE PF LF 6.5 GRN STRL

## (undated) DEVICE — GLV SURG TRIUMPH PF LTX 7.5 STRL

## (undated) DEVICE — APPL CHLORAPREP HI/LITE 26ML ORNG

## (undated) DEVICE — SYR CONTRL LUERLOK 10CC

## (undated) DEVICE — KT ORCA ORCAPOD DISP STRL

## (undated) DEVICE — MARKR SKIN W/RULR AND LBL

## (undated) DEVICE — 3M™ STERI-STRIP™ REINFORCED ADHESIVE SKIN CLOSURES, R1547, 1/2 IN X 4 IN (12 MM X 100 MM), 6 STRIPS/ENVELOPE: Brand: 3M™ STERI-STRIP™

## (undated) DEVICE — CANN NASL O2 INF

## (undated) DEVICE — PAD GRND CATHAY W/CABL DISP

## (undated) DEVICE — GOWN,NON-REINFORCED,SIRUS,SET IN SLV,XXL: Brand: MEDLINE

## (undated) DEVICE — CATH DIAG IMPULSE FL3.5 6F 100CM

## (undated) DEVICE — PAD GRND E/S NT200IX RF/GEN W/CABL DISP

## (undated) DEVICE — DISPOSABLE BIPOLAR FORCEPS 7 3/4" (19.7CM) SCOVILLE BAYONET, 1.5MM TIP AND 12 FT. (3.6M) CABLE: Brand: KIRWAN

## (undated) DEVICE — GLV SURG BIOGEL LTX PF 6 1/2

## (undated) DEVICE — KT MANIFLD CARDIAC

## (undated) DEVICE — TOWEL,OR,DSP,ST,BLUE,STD,4/PK,20PK/CS: Brand: MEDLINE

## (undated) DEVICE — TREK CORONARY DILATATION CATHETER 3.0 MM X 15 MM / RAPID-EXCHANGE: Brand: TREK

## (undated) DEVICE — Device

## (undated) DEVICE — CATH DIAG IMPULSE FR4 6F 100CM

## (undated) DEVICE — NEEDLE, QUINCKE 22GX3.5": Brand: MEDLINE INDUSTRIES, INC.

## (undated) DEVICE — 6.0MM PRECISION ROUND

## (undated) DEVICE — ANTIBACTERIAL UNDYED BRAIDED (POLYGLACTIN 910), SYNTHETIC ABSORBABLE SUTURE: Brand: COATED VICRYL

## (undated) DEVICE — CATH VENT MIV RADL PIG ST TIP 5F 110CM

## (undated) DEVICE — BW-412T DISP COMBO CLEANING BRUSH: Brand: SINGLE USE COMBINATION CLEANING BRUSH

## (undated) DEVICE — DRN JP FLT NO TROC SIL FUL/PERF 7MM

## (undated) DEVICE — GW EMR FIX EXCHG J STD .035 3MM 260CM

## (undated) DEVICE — CANN ACTIVETIP CRV 18G 10MM 10CM

## (undated) DEVICE — DRP MICROSCOPE 4 BINOCULAR CV 54X150IN

## (undated) DEVICE — SYR LL W/SCALE/MARK 3ML STRL

## (undated) DEVICE — GLV SURG PREMIERPRO ORTHO LTX PF SZ8 BRN

## (undated) DEVICE — DRSNG WND BORDR/ADHS NONADHR/GZ LF 4X14IN STRL

## (undated) DEVICE — SOL IRR H2O BTL 1000ML STRL

## (undated) DEVICE — TR BAND RADIAL ARTERY COMPRESSION DEVICE: Brand: TR BAND

## (undated) DEVICE — SUT VIC 4/0 P3 18IN J494G

## (undated) DEVICE — PK NEURO SPINE 40

## (undated) DEVICE — DRSNG WND GZ PAD BORDERED 4X8IN STRL

## (undated) DEVICE — NDL SPINE 22G 31/2IN BLK

## (undated) DEVICE — 3.0MM PRECISION NEURO (MATCH HEAD)

## (undated) DEVICE — HI-TORQUE EXTRA S'PORT GUIDE WIRE .014 STRAIGHT TIP 3.0 CM X 190 CM: Brand: HI-TORQUE EXTRA S'PORT

## (undated) DEVICE — JACKSON-PRATT 100CC BULB RESERVOIR: Brand: CARDINAL HEALTH

## (undated) DEVICE — 6F .070 JL3.5 100CM: Brand: CORDIS

## (undated) DEVICE — PK ATS CUST W CARDIOTOMY RESEVOIR

## (undated) DEVICE — DRP C/ARM 41X74IN

## (undated) DEVICE — FRCP BX RADJAW4 NDL 2.8 240CM LG OG BX40